# Patient Record
Sex: FEMALE | Race: WHITE | Employment: OTHER | URBAN - METROPOLITAN AREA
[De-identification: names, ages, dates, MRNs, and addresses within clinical notes are randomized per-mention and may not be internally consistent; named-entity substitution may affect disease eponyms.]

---

## 2017-09-01 ENCOUNTER — TRANSFERRED RECORDS (OUTPATIENT)
Dept: HEALTH INFORMATION MANAGEMENT | Facility: CLINIC | Age: 67
End: 2017-09-01

## 2017-10-04 DIAGNOSIS — I10 ESSENTIAL HYPERTENSION WITH GOAL BLOOD PRESSURE LESS THAN 140/90: ICD-10-CM

## 2017-10-04 DIAGNOSIS — E78.5 HYPERLIPIDEMIA WITH TARGET LDL LESS THAN 100: ICD-10-CM

## 2017-10-04 RX ORDER — ISOSORBIDE MONONITRATE 60 MG/1
TABLET, EXTENDED RELEASE ORAL
Qty: 30 TABLET | Refills: 0 | Status: SHIPPED | OUTPATIENT
Start: 2017-10-04 | End: 2017-10-26

## 2017-10-04 RX ORDER — AMLODIPINE BESYLATE 5 MG/1
TABLET ORAL
Qty: 30 TABLET | Refills: 0 | Status: SHIPPED | OUTPATIENT
Start: 2017-10-04 | End: 2017-10-26

## 2017-10-04 RX ORDER — PRAVASTATIN SODIUM 40 MG
TABLET ORAL
Qty: 30 TABLET | Refills: 0 | Status: SHIPPED | OUTPATIENT
Start: 2017-10-04 | End: 2017-10-26

## 2017-10-04 RX ORDER — RAMIPRIL 10 MG/1
CAPSULE ORAL
Qty: 30 CAPSULE | Refills: 0 | Status: SHIPPED | OUTPATIENT
Start: 2017-10-04 | End: 2017-10-26

## 2017-10-04 RX ORDER — LABETALOL 200 MG/1
TABLET, FILM COATED ORAL
Qty: 90 TABLET | Refills: 0 | Status: SHIPPED | OUTPATIENT
Start: 2017-10-04 | End: 2017-10-26

## 2017-10-04 NOTE — TELEPHONE ENCOUNTER
Labetalol     Medication is being filled for 1 time refill only due to:  Patient needs to be seen because due for a yearly office visit .    Ramipril     Medication is being filled for 1 time refill only due to:  Patient needs labs potassium & creatitine . Patient needs to be seen because due for a yearly office visit.    isosorbide mononitrate     Medication is being filled for 1 time refill only due to:  Patient needs to be seen because due for a yearly office visit.     Pravastatin    Medication is being filled for 1 time refill only due to:  Patient needs labs fasting labs. Patient needs to be seen because due for a yearly office visit.     Amlodipine    Medication is being filled for 1 time refill only due to:  Patient needs labs potassium & creatitine. Patient needs to be seen because due for a yearly office visit.

## 2017-10-04 NOTE — TELEPHONE ENCOUNTER
LABETALOL HCL 200MG TAB      Last Written Prescription Date: 10/24/2016  Last Fill Quantity: 270, # refills: 3   [place] [facility] [place] [facility] or  Health prescribing provider:  10/24/2016   Future Office Visit:    Next 5 appointments (look out 90 days)     Oct 23, 2017  2:30 PM CDT   Pre-Op physical with Patel Hughes MD   [facility]    [address] [place] [address]  [address]  [facility] [address]   [phone]                    BP Readings from Last 3 Encounters:   10/24/16 128/70   06/22/15 128/70   07/14/14 128/66     RAMIPRIL 10MG CAP      Last Written Prescription Date: 10/24/2016  Last Fill Quantity: 90, # refills: 3  Last [place] Visit [place] [facility] [place] [facility] or  Health prescribing provider: 10/24/2016  Next 5 appointments (look out 90 days)     Oct 23, 2017  2:30 PM CDT   Pre-Op physical with Patel Hughes MD   [facility] ([facility])    [address] [place] [address] [address]  [facility] [address]   [phone]                   Potassium   Date Value Ref Range Status   10/24/2016 4.1 3.4 - 5.3 mmol/L Final     Creatinine   Date Value Ref Range Status   10/24/2016 0.82 0.52 - 1.04 mg/dL Final     BP Readings from Last 3 Encounters:   10/24/16 128/70   06/22/15 128/70   07/14/14 128/66     ISOSORB MONO ER 60MG TAB      Last Written Prescription Date: 10/24/2016  Last Fill Quantity: 90,  # refills: 3   Last [place] [facility] [place] [facility] or  Health prescribing provider: 10/24/2016                                         Next 5 appointments (look out 90 days)     Oct 23, 2017  2:30 PM CDT   Pre-Op physical with Patel Hughes MD   [facility] ([facility]    [address] [place] [address]  [address]  [facility] [address]   [phone]                    PRAVASTATIN 40MG TAB     Last Written Prescription Date:  10/24/2016  Last Fill Quantity: 90, # refills: 3  [place] [facility] or  Health prescribing provider: 10/24/2016  Next 5 appointments (look out 90 days)     Oct 23, 2017  2:30 PM CDT   [facility] physical with [facility] MD   [facility]    [address]  [address]  [facility] [address] [phone]                   Lab Results   Component Value Date    CHOL 163 10/24/2016     Lab Results   Component Value Date    HDL 58 10/24/2016     Lab Results   Component Value Date    LDL 82 10/24/2016     Lab Results   Component Value Date    TRIG 116 10/24/2016     Lab Results   Component Value Date    CHOLHDLRATIO 3.0 06/22/2015     AmLODIPine Besylate 5MG TAB      Last Written Prescription Date: 10/24/2016  Last Fill Quantity: 90, # refills: 3  Last Office Visit [place] [facility] or [facility] prescribing provider: 10/24/2016  Next 5 appointments (look out 90 days)     Oct 23, 2017  2:30 PM CDT   Pre-Op physical with Patel Hughes MD   [facility]    [address]  [address]  [facility] [address] [phone]                   Potassium   Date Value Ref Range Status   10/24/2016 4.1 3.4 - 5.3 mmol/L Final     Creatinine   Date Value Ref Range Status   10/24/2016 0.82 0.52 - 1.04 mg/dL Final     BP Readings from Last 3 Encounters:   10/24/16 128/70   06/22/15 128/70   07/14/14 128/66

## 2017-10-23 ENCOUNTER — OFFICE VISIT (OUTPATIENT)
Dept: FAMILY MEDICINE | Facility: CLINIC | Age: 67
End: 2017-10-23
Payer: COMMERCIAL

## 2017-10-23 VITALS
DIASTOLIC BLOOD PRESSURE: 72 MMHG | TEMPERATURE: 97.8 F | WEIGHT: 208 LBS | SYSTOLIC BLOOD PRESSURE: 120 MMHG | OXYGEN SATURATION: 96 % | HEART RATE: 67 BPM | RESPIRATION RATE: 20 BRPM | BODY MASS INDEX: 36.86 KG/M2 | HEIGHT: 63 IN

## 2017-10-23 DIAGNOSIS — Z01.818 PREOP GENERAL PHYSICAL EXAM: Primary | ICD-10-CM

## 2017-10-23 DIAGNOSIS — I10 ESSENTIAL HYPERTENSION WITH GOAL BLOOD PRESSURE LESS THAN 140/90: ICD-10-CM

## 2017-10-23 DIAGNOSIS — G47.33 OBSTRUCTIVE SLEEP APNEA: ICD-10-CM

## 2017-10-23 DIAGNOSIS — R82.90 NONSPECIFIC FINDING ON EXAMINATION OF URINE: ICD-10-CM

## 2017-10-23 DIAGNOSIS — E78.5 HYPERLIPIDEMIA WITH TARGET LDL LESS THAN 100: ICD-10-CM

## 2017-10-23 DIAGNOSIS — S86.111D TRAUMATIC RUPTURE OF RIGHT POSTERIOR TIBIAL TENDON, SUBSEQUENT ENCOUNTER: ICD-10-CM

## 2017-10-23 DIAGNOSIS — Z11.59 NEED FOR HEPATITIS C SCREENING TEST: ICD-10-CM

## 2017-10-23 LAB
ALBUMIN UR-MCNC: 30 MG/DL
APPEARANCE UR: CLEAR
BACTERIA #/AREA URNS HPF: ABNORMAL /HPF
BASOPHILS # BLD AUTO: 0 10E9/L (ref 0–0.2)
BASOPHILS NFR BLD AUTO: 0.5 %
BILIRUB UR QL STRIP: ABNORMAL
COLOR UR AUTO: YELLOW
DIFFERENTIAL METHOD BLD: NORMAL
EOSINOPHIL # BLD AUTO: 0.1 10E9/L (ref 0–0.7)
EOSINOPHIL NFR BLD AUTO: 1.2 %
ERYTHROCYTE [DISTWIDTH] IN BLOOD BY AUTOMATED COUNT: 13.5 % (ref 10–15)
GLUCOSE UR STRIP-MCNC: NEGATIVE MG/DL
HCT VFR BLD AUTO: 40.2 % (ref 35–47)
HGB BLD-MCNC: 13.1 G/DL (ref 11.7–15.7)
HGB UR QL STRIP: NEGATIVE
KETONES UR STRIP-MCNC: ABNORMAL MG/DL
LEUKOCYTE ESTERASE UR QL STRIP: ABNORMAL
LYMPHOCYTES # BLD AUTO: 1.3 10E9/L (ref 0.8–5.3)
LYMPHOCYTES NFR BLD AUTO: 23.4 %
MCH RBC QN AUTO: 30.8 PG (ref 26.5–33)
MCHC RBC AUTO-ENTMCNC: 32.6 G/DL (ref 31.5–36.5)
MCV RBC AUTO: 95 FL (ref 78–100)
MONOCYTES # BLD AUTO: 0.6 10E9/L (ref 0–1.3)
MONOCYTES NFR BLD AUTO: 10.3 %
MUCOUS THREADS #/AREA URNS LPF: PRESENT /LPF
NEUTROPHILS # BLD AUTO: 3.6 10E9/L (ref 1.6–8.3)
NEUTROPHILS NFR BLD AUTO: 64.6 %
NITRATE UR QL: NEGATIVE
NON-SQ EPI CELLS #/AREA URNS LPF: ABNORMAL /LPF
PH UR STRIP: 6.5 PH (ref 5–7)
PLATELET # BLD AUTO: 200 10E9/L (ref 150–450)
RBC # BLD AUTO: 4.25 10E12/L (ref 3.8–5.2)
RBC #/AREA URNS AUTO: ABNORMAL /HPF
SOURCE: ABNORMAL
SP GR UR STRIP: 1.02 (ref 1–1.03)
UROBILINOGEN UR STRIP-ACNC: 0.2 EU/DL (ref 0.2–1)
WBC # BLD AUTO: 5.6 10E9/L (ref 4–11)
WBC #/AREA URNS AUTO: ABNORMAL /HPF

## 2017-10-23 PROCEDURE — 81001 URINALYSIS AUTO W/SCOPE: CPT | Performed by: INTERNAL MEDICINE

## 2017-10-23 PROCEDURE — 85025 COMPLETE CBC W/AUTO DIFF WBC: CPT | Performed by: INTERNAL MEDICINE

## 2017-10-23 PROCEDURE — 80053 COMPREHEN METABOLIC PANEL: CPT | Performed by: INTERNAL MEDICINE

## 2017-10-23 PROCEDURE — 86803 HEPATITIS C AB TEST: CPT | Performed by: INTERNAL MEDICINE

## 2017-10-23 PROCEDURE — 99214 OFFICE O/P EST MOD 30 MIN: CPT | Performed by: INTERNAL MEDICINE

## 2017-10-23 PROCEDURE — 87086 URINE CULTURE/COLONY COUNT: CPT | Performed by: INTERNAL MEDICINE

## 2017-10-23 PROCEDURE — 93000 ELECTROCARDIOGRAM COMPLETE: CPT | Performed by: INTERNAL MEDICINE

## 2017-10-23 PROCEDURE — 36415 COLL VENOUS BLD VENIPUNCTURE: CPT | Performed by: INTERNAL MEDICINE

## 2017-10-23 NOTE — MR AVS SNAPSHOT
After Visit Summary   10/23/2017    Bridget Lawson    MRN: [id number]           Patient Information     Date Of Birth          1950        Visit Information        Provider Department      10/23/2017 2:30 PM Patel Hughes MD [facility]        Today's Diagnoses     Preop general physical exam    -  1    Traumatic rupture of right posterior tibial tendon, subsequent encounter        Essential hypertension with goal blood pressure less than 140/90        Obstructive sleep apnea        Hyperlipidemia with target LDL less than 100        Need for hepatitis C screening test          Care Instructions    On the morning of surgery, take the amlodipine,labetalol,and the isosorbide.           Follow-ups after your visit        Your next 10 appointments already scheduled     Nov 01, 2017   Procedure with Xavi Duran MD   [facility] (--)    [address]  [facility] [address] [phone]              Who to contact     If you have questions or need follow up information about today's clinic visit or your schedule please contact [facility] [place] directly at [phone].  Normal or non-critical lab and imaging results will be communicated to you by MyChart, letter or phone within 4 business days after the clinic has received the results. If you do not hear from us within 7 days, please contact the clinic through MyChart or phone. If you have a critical or abnormal lab result, we will notify you by phone as soon as possible.  Submit refill requests through [employer] or call your pharmacy and they will forward the refill request to us. Please allow 3 business days for your refill to be completed.          Additional Information About Your Visit        Care EveryWhere ID     This is your Care EveryWhere ID. This could be used by other organizations to access your [place] medical records  [id number]    "     Your Vitals Were     Pulse Temperature Respirations Height Pulse Oximetry Breastfeeding?    67 97.8  F (36.6  C) 20 5' 3.25\" (1.607 m) 96% No    BMI (Body Mass Index)                   36.55 kg/m2            Blood Pressure from Last 3 Encounters:   10/23/17 120/72   10/24/16 128/70   06/22/15 128/70    Weight from Last 3 Encounters:   10/23/17 208 lb (94.3 kg)   10/24/16 241 lb (109.3 kg)   06/22/15 233 lb (105.7 kg)              We Performed the Following     CBC with platelets and differential     Comprehensive metabolic panel (BMP + Alb, Alk Phos, ALT, AST, Total. Bili, TP)     EKG 12-lead complete w/read - Clinics     Hepatitis C antibody     UA with Microscopic reflex to Culture        Primary Care Provider Office Phone # Fax #    Patel Hughes [phone] [address] [facility] [address]        Equal Access to Services     GITA HIGGINS : Hadii aad ku hadasho Soomaali, waaxda luqadaha, qaybta kaalmada adeegyada, waxay idiin hayaan yasmany kharaitalo hernandez . So [facility] [phone].    ATENCIÓN: Si habla español, tiene a freeman disposición servicios gratuitos de asistencia lingüística. Llame al [phone].    We comply with applicable federal civil rights laws and [place] laws. We do not discriminate on the basis of race, color, national origin, age, disability, sex, sexual orientation, or gender identity.            Thank you!     Thank you for choosing [facility]  for your care. Our goal is always to provide you with excellent care. Hearing back from our patients is one way we can continue to improve our services. Please take a few minutes to complete the written survey that you may receive in the mail after your visit with us. Thank you!             Your Updated Medication List - Protect others around you: Learn how to safely use, store and throw away your medicines at www.disposemymeds.org.          This list is accurate as of: 10/23/17  " 3:32 PM.  Always use your most recent med list.                   Brand Name Dispense Instructions for use Diagnosis    ADVIL 200 MG tablet   Generic drug:  ibuprofen      Take 200 mg by mouth as needed for mild pain        amLODIPine 5 MG tablet    NORVASC    30 tablet    TAKE ONE TABLET BY (5 MG) MOUTH ONCE DAILY    Hyperlipidemia with target LDL less than 100       calcium-vitamin D 600-400 MG-UNIT per tablet    CALTRATE     Take 2 tablets by mouth daily        CALTRATE 600+D 600-800 MG-UNIT Tabs   Generic drug:  Calcium Carb-Cholecalciferol      Take 2 tablets by mouth daily        CENTRUM SILVER ULTRA WOMENS PO      Take 1 tablet by mouth daily        fluticasone 50 MCG/ACT spray    FLONASE    48 g    USE ONE SPRAY(S) IN EACH NOSTRIL ONCE DAILY    Chronic rhinitis       GLUCOSAMINE-CHONDROITIN PO      Take 2 tablets by mouth daily 1500mg/1200mg        isosorbide mononitrate 60 MG 24 hr tablet    IMDUR    30 tablet    TAKE ONE TABLET (60 MG) BY MOUTH ONCE DAILY    Essential hypertension with goal blood pressure less than 140/90       ketoconazole 2 % cream    NIZORAL    60 g    APPLY  CREAM TOPICALLY  DAILY PRN    Intertrigo       labetalol 200 MG tablet    NORMODYNE    90 tablet    TAKE ONE TABLET (200 MG) BY MOUTH THREE TIMES DAILY    Essential hypertension with goal blood pressure less than 140/90       MAGNESIUM GLUCONATE PO      Patient taking 1 400mg tab daily        ORAL PEROXIDE MT      The strength on this is 1.7%, and she applies  BID-TID in orally daily        order for DME      Use your CPAP device as directed by your provider.        pravastatin 40 MG tablet    PRAVACHOL    30 tablet    TAKE ONE TABLET (40 MG) BY MOUTH ONCE DAILY    Hyperlipidemia with target LDL less than 100       ramipril 10 MG capsule    ALTACE    30 capsule    TAKE ONE CAPSULE BY MOUTH ONCE DAILY    Essential hypertension with goal blood pressure less than 140/90       TYLENOL 8 HOUR 650 MG CR tablet   Generic drug:   acetaminophen      Take 650 mg by mouth as needed.

## 2017-10-23 NOTE — PROGRESS NOTES
[address]  [address]  [facility] [address]  [phone]  Dept: [phone]    PRE-OP EVALUATION:  Today's date: 10/23/2017    Bridget Lawson (: 1950) presents for pre-operative evaluation assessment as requested by Dr. Caputo.  She requires evaluation and anesthesia risk assessment prior to undergoing surgery/procedure for treatment of right lower leg pain .  Proposed procedure:   RELEASE TENDON ANKLE     RIGHT POSTERIOR TIBIAL TENDON RECONSTRUCTION          Date of Surgery/ Procedure: 2017  Time of Surgery/ Procedure: early  Hospital/Surgical Facility: [facility]  Fax number for surgical facility:   Primary Physician: Patel Hughes  Type of Anesthesia Anticipated: to be determined    Patient has a Health Care Directive or Living Will:  YES -brought copy in today, and will scan into chart    Preop Questions 10/23/2017   1.  Do you have a history of heart attack, stroke, stent, bypass or surgery on an artery in the head, neck, heart or legs? YES -    2.  Do you ever have any pain or discomfort in your chest? No   3.  Do you have a history of  Heart Failure? No   4.   Are you troubled by shortness of breath when:  walking on a level surface, or up a slight hill, or at night? No   5.  Do you currently have a cold, bronchitis or other respiratory infection? No   6.  Do you have a cough, shortness of breath, or wheezing? No   7.  Do you sometimes get pains in the calves of your legs when you walk? YES -    8. Do you or anyone in your family have previous history of blood clots? No   9.  Do you or does anyone in your family have a serious bleeding problem such as prolonged bleeding following surgeries or cuts? No   10. Have you ever had problems with anemia or been told to take iron pills? YES -    11. Have you had any abnormal blood loss such as black, tarry or bloody stools, or abnormal vaginal bleeding? YES - remote history of gastric ulcer  while taking high doses of Excedrin    12. Have you ever had a blood transfusion? YES -    13. Have you or any of your relatives ever had problems with anesthesia? No   14. Do you have sleep apnea, excessive snoring or daytime drowsiness? YES - she uses CPAP    15. Do you have any prosthetic heart valves? No   16. Do you have prosthetic joints? No         HPI:                                                      Brief HPI related to upcoming procedure: see the ortho notes.              She injured the ankle in late April, but she did not seek medical attention for several months.                                     She reports her health otherwise has been stable.       Her meds include isosorbide mononitrate; she has no hx of CAD, but she has taken this since 2010 to help control hypertension.                                Weight is down 33 lbs via dieting!                            C/o also hand pain; uses ibuprofen,200 mg per day.                         See problem list for active medical problems.  Problems all longstanding and stable, except as noted/documented.  See ROS for pertinent symptoms related to these conditions.                                                                                                  .    MEDICAL HISTORY:                                                      Patient Active Problem List    Diagnosis Date Noted     Essential hypertension with goal blood pressure less than 140/90 10/24/2016     Priority: High     Personal history of tobacco use, presenting hazards to health 10/24/2016     Priority: High     Memory loss 06/22/2015     Priority: High     Noted by pt in 6/15; labs ok; referred to neuro. She saw Dr Silva; labs were done; no consult letter received.                       Saw neuro last year; they told her to stop taking advil PM, and there was improvement.        Obstructive sleep apnea 10/08/2012     Priority: High     Uses CPAP as of 6/2010  Problem list name  "updated by automated process. Provider to review       Primary hyperparathyroidism (H) 10/08/2012     Priority: High     DX in 2003: cured 12/2010 with parathyroid surgery       Acute gastric ulcer with hemorrhage 10/08/2012     Priority: High     2008; f/u EGD showed ulcers had healed; she avoids ASA/ NSAID'S due to this ulcer hx  Problem list name updated by automated process. Provider to review       Late effects of cerebrovascular disease 10/08/2012     Priority: High     Mild L hemiparesis and dysarthria and dysphagia; good recovery           Osteoporosis 10/08/2012     Priority: High     Problem list name updated by automated process. Provider to review       Intracranial hemorrhage (H)      Priority: High     R basal ganglia, due to HTN; had mild L hemiparesis and dysarthria; good recovery       Traumatic rupture of right posterior tibial tendon, subsequent encounter 10/23/2017     Priority: Medium     Happened in 4/17       Family history of malignant neoplasm of breast 10/24/2016     Priority: Medium     mother       Dysphagia 07/14/2014     Priority: Medium     EGD neg in 7/14; Dx= globus       Pain in left foot 02/17/2014     Priority: Medium     5/13; walking injury; \"stress fracture\" saw Dr. Judson Manjarrez; takes aleve 1 tab daily since then; still bothers her in 7/14       Arthritis of right knee 02/17/2014     Priority: Medium     Uses a cane; s/p injection       Anemia 10/08/2012     Priority: Medium     Stable at 12.7 in 6/11  Problem list name updated by automated process. Provider to review       Disorder of bursae and tendons in shoulder region 10/08/2012     Priority: Medium     Problem list name updated by automated process. Provider to review       Hyperlipidemia with target LDL less than 100 10/08/2012     Priority: Medium     Start prava in 7/14  Diagnosis updated by automated process. Provider to review and confirm.       Osteopenia      Priority: Medium     in 6/10, prior to PTH surgery, T - " 3.2 forearm, - 1.9 spine, - 1.5L and -1.6 R femoral necks       Preventive measure 10/08/2012     Priority: Low     APRIMA DATA BASE UNDER THE 9/26/13 NOTE  Mammogram fall 2011 at [facility]; 1/13, 6/15 at [facility], 11/16                                                    s/p GREGORY/BSO;                                             colonoscopy 3/08 neg per pt          Past Medical History:   Diagnosis Date     Hyperlipidemia LDL goal < 100 10/8/2012    Start prava in 7/14     Intracranial hemorrhage (H) 2/10    R basal ganglia, due to HTN; had mild L hemiparesis and dysarthria; good recovery     Osteopenia     in 6/10, prior to PTH surgery, T - 3.2 forearm, - 1.9 spine, - 1.5L and -1.6 R femoral necks     Unspecified essential hypertension     Essential hypertension     Past Surgical History:   Procedure Laterality Date     BREAST SURGERY  11/2004    R breast bx     C/SECTION, CLASSICAL  1985    with tubal     HYSTERECTOMY  5/1998    GREGORY w/ BSO     KNEE SURGERY  9/1998    L Arthroscopy     NECK SURGERY  12/2010    R parathyroid adenoma     SHOULDER SURGERY  11/2008    rotator cuff repair R     TONSILLECTOMY      as a child T & A     Current Outpatient Prescriptions   Medication Sig Dispense Refill     labetalol (NORMODYNE) 200 MG tablet TAKE ONE TABLET (200 MG) BY MOUTH THREE TIMES DAILY 90 tablet 0     ramipril (ALTACE) 10 MG capsule TAKE ONE CAPSULE BY MOUTH ONCE DAILY 30 capsule 0     isosorbide mononitrate (IMDUR) 60 MG 24 hr tablet TAKE ONE TABLET (60 MG) BY MOUTH ONCE DAILY 30 tablet 0     pravastatin (PRAVACHOL) 40 MG tablet TAKE ONE TABLET (40 MG) BY MOUTH ONCE DAILY 30 tablet 0     amLODIPine (NORVASC) 5 MG tablet TAKE ONE TABLET BY (5 MG) MOUTH ONCE DAILY 30 tablet 0     calcium-vitamin D (CALTRATE) 600-400 MG-UNIT per tablet Take 2 tablets by mouth daily       ibuprofen (ADVIL) 200 MG tablet Take 200 mg by mouth as needed for mild pain       MAGNESIUM GLUCONATE PO Patient taking 1 400mg tab daily       Carbamide  Peroxide (ORAL PEROXIDE MT) The strength on this is 1.7%, and she applies  BID-TID in orally daily       fluticasone (FLONASE) 50 MCG/ACT nasal spray USE ONE SPRAY(S) IN EACH NOSTRIL ONCE DAILY 48 g 11     ketoconazole (NIZORAL) 2 % cream APPLY  CREAM TOPICALLY  DAILY PRN 60 g 3     GLUCOSAMINE-CHONDROITIN PO Take 2 tablets by mouth daily 1500mg/1200mg       Calcium Carb-Cholecalciferol (CALTRATE 600+D) 600-800 MG-UNIT TABS Take 2 tablets by mouth daily       Multiple Vitamins-Minerals (CENTRUM SILVER ULTRA WOMENS PO) Take 1 tablet by mouth daily       ORDER FOR DME Use your CPAP device as directed by your provider.       acetaminophen (TYLENOL 8 HOUR) 650 MG CR tablet Take 650 mg by mouth as needed.       OTC products: None, except as noted above    Allergies   Allergen Reactions     Animal Dander       Latex Allergy: NO    Social History   Substance Use Topics     Smoking status: Former Smoker     Packs/day: 2.00     Years: 27.00     Types: Cigarettes     Start date: 10/24/1969     Quit date: 9/1/1996     Smokeless tobacco: Never Used     Alcohol use 0.0 oz/week     0 Standard drinks or equivalent per week      Comment: occasionally     History   Drug Use No       REVIEW OF SYSTEMS:                                                    CONSTITUTIONAL:NEGATIVE  for chills and fever   I: NEGATIVE for worrisome rashes, moles or lesions  E: NEGATIVE for vision changes or irritation  E/M: NEGATIVE for ear, mouth and throat problems  R: NEGATIVE for significant cough or SOB  CV: NEGATIVE for chest pain, palpitations or peripheral edema  GI: NEGATIVE for nausea, abdominal pain, heartburn, or change in bowel habits  : NEGATIVE for frequency, dysuria, or hematuria  N: NEGATIVE for weakness, dizziness or paresthesias  E: NEGATIVE for temperature intolerance, skin/hair changes  H: NEGATIVE for bleeding problems  P: NEGATIVE for changes in mood or affect    EXAM:                                                    /72 (BP  "Location: Left arm, Patient Position: Chair, Cuff Size: Adult Large)  Pulse 67  Temp 97.8  F (36.6  C)  Resp 20  Ht 5' 3.25\" (1.607 m)  Wt 208 lb (94.3 kg)  SpO2 96%  Breastfeeding? No  BMI 36.55 kg/m2    GENERAL APPEARANCE: alert, no distress and over weight     EYES: Eyes grossly normal to inspection     HENT: nose and mouth without ulcers or lesions     NECK: no adenopathy     RESP: lungs clear to auscultation - no rales, rhonchi or wheezes     CV: regular rates and rhythm, normal S1 S2, no S3 or S4 and no murmur, click or rub     ABDOMEN: soft, nontender, without hepatosplenomegaly or masses     MS: She is wearing a boot on the right foot     NEURO: mentation intact and speech normal     PSYCH: mentation appears normal. and affect normal/bright     LYMPHATICS: normal ant/post cervical, supraclavicular nodes    DIAGNOSTICS:                                                      EKG: sinus bradycardia, rate 59 bpm, normal axis, normal intervals, no acute ST/T changes c/w ischemia, no LVH by voltage criteria  Labs Resulted Today:          She has no urinary symptoms, however a urinalysis was requested by the orthopedic office.           Results for orders placed or performed in visit on 10/23/17   Comprehensive metabolic panel (BMP + Alb, Alk Phos, ALT, AST, Total. Bili, TP)   Result Value Ref Range    Sodium 138 133 - 144 mmol/L    Potassium 4.2 3.4 - 5.3 mmol/L    Chloride 103 94 - 109 mmol/L    Carbon Dioxide 28 20 - 32 mmol/L    Anion Gap 7 3 - 14 mmol/L    Glucose 87 70 - 99 mg/dL    Urea Nitrogen 13 7 - 30 mg/dL    Creatinine 0.75 0.52 - 1.04 mg/dL    GFR Estimate 77 >60 mL/min/1.7m2    GFR Estimate If Black >90 >60 mL/min/1.7m2    Calcium 9.8 8.5 - 10.1 mg/dL    Bilirubin Total 0.4 0.2 - 1.3 mg/dL    Albumin 4.2 3.4 - 5.0 g/dL    Protein Total 7.3 6.8 - 8.8 g/dL    Alkaline Phosphatase 60 40 - 150 U/L    ALT 27 0 - 50 U/L    AST 13 0 - 45 U/L   CBC with platelets and differential   Result Value Ref " Range    WBC 5.6 4.0 - 11.0 10e9/L    RBC Count 4.25 3.8 - 5.2 10e12/L    Hemoglobin 13.1 11.7 - 15.7 g/dL    Hematocrit 40.2 35.0 - 47.0 %    MCV 95 78 - 100 fl    MCH 30.8 26.5 - 33.0 pg    MCHC 32.6 31.5 - 36.5 g/dL    RDW 13.5 10.0 - 15.0 %    Platelet Count 200 150 - 450 10e9/L    Diff Method Automated Method     % Neutrophils 64.6 %    % Lymphocytes 23.4 %    % Monocytes 10.3 %    % Eosinophils 1.2 %    % Basophils 0.5 %    Absolute Neutrophil 3.6 1.6 - 8.3 10e9/L    Absolute Lymphocytes 1.3 0.8 - 5.3 10e9/L    Absolute Monocytes 0.6 0.0 - 1.3 10e9/L    Absolute Eosinophils 0.1 0.0 - 0.7 10e9/L    Absolute Basophils 0.0 0.0 - 0.2 10e9/L   Hepatitis C antibody   Result Value Ref Range    Hepatitis C Antibody Nonreactive NR^Nonreactive   UA with Microscopic reflex to Culture   Result Value Ref Range    Color Urine Yellow     Appearance Urine Clear     Glucose Urine Negative NEG^Negative mg/dL    Bilirubin Urine Small (A) NEG^Negative    Ketones Urine Trace (A) NEG^Negative mg/dL    Specific Gravity Urine 1.020 1.003 - 1.035    pH Urine 6.5 5.0 - 7.0 pH    Protein Albumin Urine 30 (A) NEG^Negative mg/dL    Urobilinogen Urine 0.2 0.2 - 1.0 EU/dL    Nitrite Urine Negative NEG^Negative    Blood Urine Negative NEG^Negative    Leukocyte Esterase Urine Small (A) NEG^Negative    Source Midstream Urine     WBC Urine 10-25 (A) OTO2^O - 2 /HPF    RBC Urine O - 2 OTO2^O - 2 /HPF    Squamous Epithelial /LPF Urine Few FEW^Few /LPF    Bacteria Urine Few (A) NEG^Negative /HPF    Mucous Urine Present (A) NEG^Negative /LPF   Urine Culture Aerobic Bacterial   Result Value Ref Range    Specimen Description      Culture Micro       <10,000 colonies/mL  urogenital karthikeyan  Susceptibility testing not routinely done            IMPRESSION:                                                    Reason for surgery/procedure: need for orthopedic surgery  Diagnosis/reason for consult: need for pre-op evaluation    The proposed surgical procedure  is considered INTERMEDIATE risk.    REVISED CARDIAC RISK INDEX  The patient has the following serious cardiovascular risks for perioperative complications:  Cerebrovascular Disease (TIA or CVA)  INTERPRETATION: 1 risks: Class II (low risk - 0.9% complication rate)    The patient has the following additional risks for perioperative complications:  JOHNY: she uses CPAP.                     She is at risk for post-op DVT; she wants post-op DVT prophylaxis.       ICD-10-CM    1. Preop general physical exam Z01.818 Comprehensive metabolic panel (BMP + Alb, Alk Phos, ALT, AST, Total. Bili, TP)     CBC with platelets and differential     EKG 12-lead complete w/read - Clinics     UA with Microscopic reflex to Culture   2. Traumatic rupture of right posterior tibial tendon, subsequent encounter S86.111D Comprehensive metabolic panel (BMP + Alb, Alk Phos, ALT, AST, Total. Bili, TP)     CBC with platelets and differential     EKG 12-lead complete w/read - Clinics     UA with Microscopic reflex to Culture   3. Essential hypertension with goal blood pressure less than 140/90 I10 Comprehensive metabolic panel (BMP + Alb, Alk Phos, ALT, AST, Total. Bili, TP)     EKG 12-lead complete w/read - Clinics     UA with Microscopic reflex to Culture   4. Obstructive sleep apnea G47.33 EKG 12-lead complete w/read - Clinics   5. Hyperlipidemia with target LDL less than 100 E78.5    6. Need for hepatitis C screening test Z11.59 Hepatitis C antibody   7. Nonspecific finding on examination of urine R82.90 Urine Culture Aerobic Bacterial       RECOMMENDATIONS:                                                      --Consult hospital rounder / IM to assist post-op medical management    Obstructive Sleep Apnea (or suspected sleep apnea)  Patient is to bring their home CPAP with them on the day of surgery        Her surgery is early; the only meds that she normally takes early are labetalol,amlodipine,and isosorbide mononitrate.         Patient  Instructions   On the morning of surgery, take the amlodipine,labetalol,and the isosorbide.                She also is requesting postoperative DVT prophylaxis.    APPROVAL GIVEN to proceed with proposed procedure, without further diagnostic evaluation.                                         She does not have a urinary tract infection.                                        Signed Electronically by: Patel Hughes MD    Copy of this evaluation report is provided to requesting physician.    [facility] Preop Guidelines

## 2017-10-23 NOTE — NURSING NOTE
"Chief Complaint   Patient presents with     Pre-Op Exam       Initial /72 (BP Location: Left arm, Patient Position: Chair, Cuff Size: Adult Large)  Pulse 67  Temp 97.8  F (36.6  C)  Resp 20  Ht 5' 3.25\" (1.607 m)  Wt 208 lb (94.3 kg)  SpO2 96%  Breastfeeding? No  BMI 36.55 kg/m2 Estimated body mass index is 36.55 kg/(m^2) as calculated from the following:    Height as of this encounter: 5' 3.25\" (1.607 m).    Weight as of this encounter: 208 lb (94.3 kg).  Medication Reconciliation: complete   Adrienne Ruano LPN  "

## 2017-10-24 LAB
ALBUMIN SERPL-MCNC: 4.2 G/DL (ref 3.4–5)
ALP SERPL-CCNC: 60 U/L (ref 40–150)
ALT SERPL W P-5'-P-CCNC: 27 U/L (ref 0–50)
ANION GAP SERPL CALCULATED.3IONS-SCNC: 7 MMOL/L (ref 3–14)
AST SERPL W P-5'-P-CCNC: 13 U/L (ref 0–45)
BILIRUB SERPL-MCNC: 0.4 MG/DL (ref 0.2–1.3)
BUN SERPL-MCNC: 13 MG/DL (ref 7–30)
CALCIUM SERPL-MCNC: 9.8 MG/DL (ref 8.5–10.1)
CHLORIDE SERPL-SCNC: 103 MMOL/L (ref 94–109)
CO2 SERPL-SCNC: 28 MMOL/L (ref 20–32)
CREAT SERPL-MCNC: 0.75 MG/DL (ref 0.52–1.04)
GFR SERPL CREATININE-BSD FRML MDRD: 77 ML/MIN/1.7M2
GLUCOSE SERPL-MCNC: 87 MG/DL (ref 70–99)
HCV AB SERPL QL IA: NONREACTIVE
POTASSIUM SERPL-SCNC: 4.2 MMOL/L (ref 3.4–5.3)
PROT SERPL-MCNC: 7.3 G/DL (ref 6.8–8.8)
SODIUM SERPL-SCNC: 138 MMOL/L (ref 133–144)

## 2017-10-26 LAB
BACTERIA SPEC CULT: NORMAL
SPECIMEN SOURCE: NORMAL

## 2017-10-26 RX ORDER — KETOCONAZOLE 20 MG/G
CREAM TOPICAL 2 TIMES DAILY PRN
COMMUNITY
End: 2019-06-18

## 2017-10-26 RX ORDER — FLUTICASONE PROPIONATE 50 MCG
1 SPRAY, SUSPENSION (ML) NASAL DAILY
COMMUNITY

## 2017-10-30 DIAGNOSIS — R09.82 PND (POST-NASAL DRIP): Primary | ICD-10-CM

## 2017-10-30 DIAGNOSIS — J31.0 CHRONIC RHINITIS: ICD-10-CM

## 2017-10-30 NOTE — H&P (VIEW-ONLY)
[address]  [address]  [facility] [address]  [phone]  Dept: [phone]    PRE-OP EVALUATION:  Today's date: 10/23/2017    Bridget Lawson (: 1950) presents for pre-operative evaluation assessment as requested by Dr. Caputo.  She requires evaluation and anesthesia risk assessment prior to undergoing surgery/procedure for treatment of right lower leg pain .  Proposed procedure:   RELEASE TENDON ANKLE     RIGHT POSTERIOR TIBIAL TENDON RECONSTRUCTION          Date of Surgery/ Procedure: 2017  Time of Surgery/ Procedure: early  Hospital/Surgical Facility: [facility]  Fax number for surgical facility:   Primary Physician: Patel Hughes  Type of Anesthesia Anticipated: to be determined    Patient has a Health Care Directive or Living Will:  YES -brought copy in today, and will scan into chart    Preop Questions 10/23/2017   1.  Do you have a history of heart attack, stroke, stent, bypass or surgery on an artery in the head, neck, heart or legs? YES -    2.  Do you ever have any pain or discomfort in your chest? No   3.  Do you have a history of  Heart Failure? No   4.   Are you troubled by shortness of breath when:  walking on a level surface, or up a slight hill, or at night? No   5.  Do you currently have a cold, bronchitis or other respiratory infection? No   6.  Do you have a cough, shortness of breath, or wheezing? No   7.  Do you sometimes get pains in the calves of your legs when you walk? YES -    8. Do you or anyone in your family have previous history of blood clots? No   9.  Do you or does anyone in your family have a serious bleeding problem such as prolonged bleeding following surgeries or cuts? No   10. Have you ever had problems with anemia or been told to take iron pills? YES -    11. Have you had any abnormal blood loss such as black, tarry or bloody stools, or abnormal vaginal bleeding? YES - remote history of gastric ulcer  while taking high doses of Excedrin    12. Have you ever had a blood transfusion? YES -    13. Have you or any of your relatives ever had problems with anesthesia? No   14. Do you have sleep apnea, excessive snoring or daytime drowsiness? YES - she uses CPAP    15. Do you have any prosthetic heart valves? No   16. Do you have prosthetic joints? No         HPI:                                                      Brief HPI related to upcoming procedure: see the ortho notes.              She injured the ankle in late April, but she did not seek medical attention for several months.                                     She reports her health otherwise has been stable.       Her meds include isosorbide mononitrate; she has no hx of CAD, but she has taken this since 2010 to help control hypertension.                                Weight is down 33 lbs via dieting!                            C/o also hand pain; uses ibuprofen,200 mg per day.                         See problem list for active medical problems.  Problems all longstanding and stable, except as noted/documented.  See ROS for pertinent symptoms related to these conditions.                                                                                                  .    MEDICAL HISTORY:                                                      Patient Active Problem List    Diagnosis Date Noted     Essential hypertension with goal blood pressure less than 140/90 10/24/2016     Priority: High     Personal history of tobacco use, presenting hazards to health 10/24/2016     Priority: High     Memory loss 06/22/2015     Priority: High     Noted by pt in 6/15; labs ok; referred to neuro. She saw Dr Silva; labs were done; no consult letter received.                       Saw neuro last year; they told her to stop taking advil PM, and there was improvement.        Obstructive sleep apnea 10/08/2012     Priority: High     Uses CPAP as of 6/2010  Problem list name  "updated by automated process. Provider to review       Primary hyperparathyroidism (H) 10/08/2012     Priority: High     DX in 2003: cured 12/2010 with parathyroid surgery       Acute gastric ulcer with hemorrhage 10/08/2012     Priority: High     2008; f/u EGD showed ulcers had healed; she avoids ASA/ NSAID'S due to this ulcer hx  Problem list name updated by automated process. Provider to review       Late effects of cerebrovascular disease 10/08/2012     Priority: High     Mild L hemiparesis and dysarthria and dysphagia; good recovery           Osteoporosis 10/08/2012     Priority: High     Problem list name updated by automated process. Provider to review       Intracranial hemorrhage (H)      Priority: High     R basal ganglia, due to HTN; had mild L hemiparesis and dysarthria; good recovery       Traumatic rupture of right posterior tibial tendon, subsequent encounter 10/23/2017     Priority: Medium     Happened in 4/17       Family history of malignant neoplasm of breast 10/24/2016     Priority: Medium     mother       Dysphagia 07/14/2014     Priority: Medium     EGD neg in 7/14; Dx= globus       Pain in left foot 02/17/2014     Priority: Medium     5/13; walking injury; \"stress fracture\" saw Dr. Judson Manjarrez; takes aleve 1 tab daily since then; still bothers her in 7/14       Arthritis of right knee 02/17/2014     Priority: Medium     Uses a cane; s/p injection       Anemia 10/08/2012     Priority: Medium     Stable at 12.7 in 6/11  Problem list name updated by automated process. Provider to review       Disorder of bursae and tendons in shoulder region 10/08/2012     Priority: Medium     Problem list name updated by automated process. Provider to review       Hyperlipidemia with target LDL less than 100 10/08/2012     Priority: Medium     Start prava in 7/14  Diagnosis updated by automated process. Provider to review and confirm.       Osteopenia      Priority: Medium     in 6/10, prior to PTH surgery, T - " 3.2 forearm, - 1.9 spine, - 1.5L and -1.6 R femoral necks       Preventive measure 10/08/2012     Priority: Low     APRIMA DATA BASE UNDER THE 9/26/13 NOTE  Mammogram fall 2011 at [facility]; 1/13, 6/15 at [facility], 11/16                                                    s/p GREGORY/BSO;                                             colonoscopy 3/08 neg per pt          Past Medical History:   Diagnosis Date     Hyperlipidemia LDL goal < 100 10/8/2012    Start prava in 7/14     Intracranial hemorrhage (H) 2/10    R basal ganglia, due to HTN; had mild L hemiparesis and dysarthria; good recovery     Osteopenia     in 6/10, prior to PTH surgery, T - 3.2 forearm, - 1.9 spine, - 1.5L and -1.6 R femoral necks     Unspecified essential hypertension     Essential hypertension     Past Surgical History:   Procedure Laterality Date     BREAST SURGERY  11/2004    R breast bx     C/SECTION, CLASSICAL  1985    with tubal     HYSTERECTOMY  5/1998    GREGORY w/ BSO     KNEE SURGERY  9/1998    L Arthroscopy     NECK SURGERY  12/2010    R parathyroid adenoma     SHOULDER SURGERY  11/2008    rotator cuff repair R     TONSILLECTOMY      as a child T & A     Current Outpatient Prescriptions   Medication Sig Dispense Refill     labetalol (NORMODYNE) 200 MG tablet TAKE ONE TABLET (200 MG) BY MOUTH THREE TIMES DAILY 90 tablet 0     ramipril (ALTACE) 10 MG capsule TAKE ONE CAPSULE BY MOUTH ONCE DAILY 30 capsule 0     isosorbide mononitrate (IMDUR) 60 MG 24 hr tablet TAKE ONE TABLET (60 MG) BY MOUTH ONCE DAILY 30 tablet 0     pravastatin (PRAVACHOL) 40 MG tablet TAKE ONE TABLET (40 MG) BY MOUTH ONCE DAILY 30 tablet 0     amLODIPine (NORVASC) 5 MG tablet TAKE ONE TABLET BY (5 MG) MOUTH ONCE DAILY 30 tablet 0     calcium-vitamin D (CALTRATE) 600-400 MG-UNIT per tablet Take 2 tablets by mouth daily       ibuprofen (ADVIL) 200 MG tablet Take 200 mg by mouth as needed for mild pain       MAGNESIUM GLUCONATE PO Patient taking 1 400mg tab daily       Carbamide  Peroxide (ORAL PEROXIDE MT) The strength on this is 1.7%, and she applies  BID-TID in orally daily       fluticasone (FLONASE) 50 MCG/ACT nasal spray USE ONE SPRAY(S) IN EACH NOSTRIL ONCE DAILY 48 g 11     ketoconazole (NIZORAL) 2 % cream APPLY  CREAM TOPICALLY  DAILY PRN 60 g 3     GLUCOSAMINE-CHONDROITIN PO Take 2 tablets by mouth daily 1500mg/1200mg       Calcium Carb-Cholecalciferol (CALTRATE 600+D) 600-800 MG-UNIT TABS Take 2 tablets by mouth daily       Multiple Vitamins-Minerals (CENTRUM SILVER ULTRA WOMENS PO) Take 1 tablet by mouth daily       ORDER FOR DME Use your CPAP device as directed by your provider.       acetaminophen (TYLENOL 8 HOUR) 650 MG CR tablet Take 650 mg by mouth as needed.       OTC products: None, except as noted above    Allergies   Allergen Reactions     Animal Dander       Latex Allergy: NO    Social History   Substance Use Topics     Smoking status: Former Smoker     Packs/day: 2.00     Years: 27.00     Types: Cigarettes     Start date: 10/24/1969     Quit date: 9/1/1996     Smokeless tobacco: Never Used     Alcohol use 0.0 oz/week     0 Standard drinks or equivalent per week      Comment: occasionally     History   Drug Use No       REVIEW OF SYSTEMS:                                                    CONSTITUTIONAL:NEGATIVE  for chills and fever   I: NEGATIVE for worrisome rashes, moles or lesions  E: NEGATIVE for vision changes or irritation  E/M: NEGATIVE for ear, mouth and throat problems  R: NEGATIVE for significant cough or SOB  CV: NEGATIVE for chest pain, palpitations or peripheral edema  GI: NEGATIVE for nausea, abdominal pain, heartburn, or change in bowel habits  : NEGATIVE for frequency, dysuria, or hematuria  N: NEGATIVE for weakness, dizziness or paresthesias  E: NEGATIVE for temperature intolerance, skin/hair changes  H: NEGATIVE for bleeding problems  P: NEGATIVE for changes in mood or affect    EXAM:                                                    /72 (BP  "Location: Left arm, Patient Position: Chair, Cuff Size: Adult Large)  Pulse 67  Temp 97.8  F (36.6  C)  Resp 20  Ht 5' 3.25\" (1.607 m)  Wt 208 lb (94.3 kg)  SpO2 96%  Breastfeeding? No  BMI 36.55 kg/m2    GENERAL APPEARANCE: alert, no distress and over weight     EYES: Eyes grossly normal to inspection     HENT: nose and mouth without ulcers or lesions     NECK: no adenopathy     RESP: lungs clear to auscultation - no rales, rhonchi or wheezes     CV: regular rates and rhythm, normal S1 S2, no S3 or S4 and no murmur, click or rub     ABDOMEN: soft, nontender, without hepatosplenomegaly or masses     MS: She is wearing a boot on the right foot     NEURO: mentation intact and speech normal     PSYCH: mentation appears normal. and affect normal/bright     LYMPHATICS: normal ant/post cervical, supraclavicular nodes    DIAGNOSTICS:                                                      EKG: sinus bradycardia, rate 59 bpm, normal axis, normal intervals, no acute ST/T changes c/w ischemia, no LVH by voltage criteria  Labs Resulted Today:          She has no urinary symptoms, however a urinalysis was requested by the orthopedic office.           Results for orders placed or performed in visit on 10/23/17   Comprehensive metabolic panel (BMP + Alb, Alk Phos, ALT, AST, Total. Bili, TP)   Result Value Ref Range    Sodium 138 133 - 144 mmol/L    Potassium 4.2 3.4 - 5.3 mmol/L    Chloride 103 94 - 109 mmol/L    Carbon Dioxide 28 20 - 32 mmol/L    Anion Gap 7 3 - 14 mmol/L    Glucose 87 70 - 99 mg/dL    Urea Nitrogen 13 7 - 30 mg/dL    Creatinine 0.75 0.52 - 1.04 mg/dL    GFR Estimate 77 >60 mL/min/1.7m2    GFR Estimate If Black >90 >60 mL/min/1.7m2    Calcium 9.8 8.5 - 10.1 mg/dL    Bilirubin Total 0.4 0.2 - 1.3 mg/dL    Albumin 4.2 3.4 - 5.0 g/dL    Protein Total 7.3 6.8 - 8.8 g/dL    Alkaline Phosphatase 60 40 - 150 U/L    ALT 27 0 - 50 U/L    AST 13 0 - 45 U/L   CBC with platelets and differential   Result Value Ref " Range    WBC 5.6 4.0 - 11.0 10e9/L    RBC Count 4.25 3.8 - 5.2 10e12/L    Hemoglobin 13.1 11.7 - 15.7 g/dL    Hematocrit 40.2 35.0 - 47.0 %    MCV 95 78 - 100 fl    MCH 30.8 26.5 - 33.0 pg    MCHC 32.6 31.5 - 36.5 g/dL    RDW 13.5 10.0 - 15.0 %    Platelet Count 200 150 - 450 10e9/L    Diff Method Automated Method     % Neutrophils 64.6 %    % Lymphocytes 23.4 %    % Monocytes 10.3 %    % Eosinophils 1.2 %    % Basophils 0.5 %    Absolute Neutrophil 3.6 1.6 - 8.3 10e9/L    Absolute Lymphocytes 1.3 0.8 - 5.3 10e9/L    Absolute Monocytes 0.6 0.0 - 1.3 10e9/L    Absolute Eosinophils 0.1 0.0 - 0.7 10e9/L    Absolute Basophils 0.0 0.0 - 0.2 10e9/L   Hepatitis C antibody   Result Value Ref Range    Hepatitis C Antibody Nonreactive NR^Nonreactive   UA with Microscopic reflex to Culture   Result Value Ref Range    Color Urine Yellow     Appearance Urine Clear     Glucose Urine Negative NEG^Negative mg/dL    Bilirubin Urine Small (A) NEG^Negative    Ketones Urine Trace (A) NEG^Negative mg/dL    Specific Gravity Urine 1.020 1.003 - 1.035    pH Urine 6.5 5.0 - 7.0 pH    Protein Albumin Urine 30 (A) NEG^Negative mg/dL    Urobilinogen Urine 0.2 0.2 - 1.0 EU/dL    Nitrite Urine Negative NEG^Negative    Blood Urine Negative NEG^Negative    Leukocyte Esterase Urine Small (A) NEG^Negative    Source Midstream Urine     WBC Urine 10-25 (A) OTO2^O - 2 /HPF    RBC Urine O - 2 OTO2^O - 2 /HPF    Squamous Epithelial /LPF Urine Few FEW^Few /LPF    Bacteria Urine Few (A) NEG^Negative /HPF    Mucous Urine Present (A) NEG^Negative /LPF   Urine Culture Aerobic Bacterial   Result Value Ref Range    Specimen Description      Culture Micro       <10,000 colonies/mL  urogenital karthikeyan  Susceptibility testing not routinely done            IMPRESSION:                                                    Reason for surgery/procedure: need for orthopedic surgery  Diagnosis/reason for consult: need for pre-op evaluation    The proposed surgical procedure  is considered INTERMEDIATE risk.    REVISED CARDIAC RISK INDEX  The patient has the following serious cardiovascular risks for perioperative complications:  Cerebrovascular Disease (TIA or CVA)  INTERPRETATION: 1 risks: Class II (low risk - 0.9% complication rate)    The patient has the following additional risks for perioperative complications:  JOHNY: she uses CPAP.                     She is at risk for post-op DVT; she wants post-op DVT prophylaxis.       ICD-10-CM    1. Preop general physical exam Z01.818 Comprehensive metabolic panel (BMP + Alb, Alk Phos, ALT, AST, Total. Bili, TP)     CBC with platelets and differential     EKG 12-lead complete w/read - Clinics     UA with Microscopic reflex to Culture   2. Traumatic rupture of right posterior tibial tendon, subsequent encounter S86.111D Comprehensive metabolic panel (BMP + Alb, Alk Phos, ALT, AST, Total. Bili, TP)     CBC with platelets and differential     EKG 12-lead complete w/read - Clinics     UA with Microscopic reflex to Culture   3. Essential hypertension with goal blood pressure less than 140/90 I10 Comprehensive metabolic panel (BMP + Alb, Alk Phos, ALT, AST, Total. Bili, TP)     EKG 12-lead complete w/read - Clinics     UA with Microscopic reflex to Culture   4. Obstructive sleep apnea G47.33 EKG 12-lead complete w/read - Clinics   5. Hyperlipidemia with target LDL less than 100 E78.5    6. Need for hepatitis C screening test Z11.59 Hepatitis C antibody   7. Nonspecific finding on examination of urine R82.90 Urine Culture Aerobic Bacterial       RECOMMENDATIONS:                                                      --Consult hospital rounder / IM to assist post-op medical management    Obstructive Sleep Apnea (or suspected sleep apnea)  Patient is to bring their home CPAP with them on the day of surgery        Her surgery is early; the only meds that she normally takes early are labetalol,amlodipine,and isosorbide mononitrate.         Patient  Instructions   On the morning of surgery, take the amlodipine,labetalol,and the isosorbide.                She also is requesting postoperative DVT prophylaxis.    APPROVAL GIVEN to proceed with proposed procedure, without further diagnostic evaluation.                                         She does not have a urinary tract infection.                                        Signed Electronically by: Patel Hughes MD    Copy of this evaluation report is provided to requesting physician.    [place] Preop Guidelines

## 2017-10-31 PROBLEM — R09.82 PND (POST-NASAL DRIP): Status: ACTIVE | Noted: 2017-10-31

## 2017-10-31 RX ORDER — FLUTICASONE PROPIONATE 50 MCG
SPRAY, SUSPENSION (ML) NASAL
Qty: 1 BOTTLE | Refills: 11 | Status: ON HOLD | OUTPATIENT
Start: 2017-10-31 | End: 2017-11-01

## 2017-10-31 NOTE — TELEPHONE ENCOUNTER
Routing refill request to provider for review/approval because:  Medication is reported/historical  Last OV 10-23-17

## 2017-10-31 NOTE — TELEPHONE ENCOUNTER
fluticasone (FLONASE) 50 MCG/ACT spray - Historical      Last Written Prescription Date:  NA  Last Fill Quantity: NA,   # refills: NA  Future Office visit:     Last office visit 10/23/17    Routing refill request to provider for review/approval because:  Drug not on the [facility], [facility] or [facility] refill protocol or controlled substance

## 2017-11-01 ENCOUNTER — SURGERY (OUTPATIENT)
Age: 67
End: 2017-11-01

## 2017-11-01 ENCOUNTER — ANESTHESIA (OUTPATIENT)
Dept: SURGERY | Facility: CLINIC | Age: 67
End: 2017-11-01
Payer: MEDICARE

## 2017-11-01 ENCOUNTER — HOSPITAL ENCOUNTER (OUTPATIENT)
Facility: CLINIC | Age: 67
Discharge: HOME | End: 2017-11-02
Attending: ORTHOPAEDIC SURGERY | Admitting: ORTHOPAEDIC SURGERY
Payer: MEDICARE

## 2017-11-01 ENCOUNTER — ANESTHESIA EVENT (OUTPATIENT)
Dept: SURGERY | Facility: CLINIC | Age: 67
End: 2017-11-01
Payer: MEDICARE

## 2017-11-01 DIAGNOSIS — Z98.890 STATUS POST TENDON REPAIR: Primary | ICD-10-CM

## 2017-11-01 PROBLEM — S86.119A TIBIALIS POSTERIOR TENDON RUPTURE: Status: ACTIVE | Noted: 2017-11-01

## 2017-11-01 PROCEDURE — A9270 NON-COVERED ITEM OR SERVICE: HCPCS | Mod: GY | Performed by: ORTHOPAEDIC SURGERY

## 2017-11-01 PROCEDURE — 27210995 ZZH RX 272: Performed by: ORTHOPAEDIC SURGERY

## 2017-11-01 PROCEDURE — 25000125 ZZHC RX 250: Performed by: ORTHOPAEDIC SURGERY

## 2017-11-01 PROCEDURE — 37000008 ZZH ANESTHESIA TECHNICAL FEE, 1ST 30 MIN: Performed by: ORTHOPAEDIC SURGERY

## 2017-11-01 PROCEDURE — 40000170 ZZH STATISTIC PRE-PROCEDURE ASSESSMENT II: Performed by: ORTHOPAEDIC SURGERY

## 2017-11-01 PROCEDURE — 71000012 ZZH RECOVERY PHASE 1 LEVEL 1 FIRST HR: Performed by: ORTHOPAEDIC SURGERY

## 2017-11-01 PROCEDURE — 36000056 ZZH SURGERY LEVEL 3 1ST 30 MIN: Performed by: ORTHOPAEDIC SURGERY

## 2017-11-01 PROCEDURE — 25000128 H RX IP 250 OP 636: Performed by: ORTHOPAEDIC SURGERY

## 2017-11-01 PROCEDURE — 40000936 ZZH STATISTIC OUTPATIENT (NON-OBS) NIGHT

## 2017-11-01 PROCEDURE — 25000132 ZZH RX MED GY IP 250 OP 250 PS 637: Mod: GY | Performed by: ORTHOPAEDIC SURGERY

## 2017-11-01 PROCEDURE — 71000013 ZZH RECOVERY PHASE 1 LEVEL 1 EA ADDTL HR: Performed by: ORTHOPAEDIC SURGERY

## 2017-11-01 PROCEDURE — 27210794 ZZH OR GENERAL SUPPLY STERILE: Performed by: ORTHOPAEDIC SURGERY

## 2017-11-01 PROCEDURE — 37000009 ZZH ANESTHESIA TECHNICAL FEE, EACH ADDTL 15 MIN: Performed by: ORTHOPAEDIC SURGERY

## 2017-11-01 PROCEDURE — 27110028 ZZH OR GENERAL SUPPLY NON-STERILE: Performed by: ORTHOPAEDIC SURGERY

## 2017-11-01 PROCEDURE — 36000058 ZZH SURGERY LEVEL 3 EA 15 ADDTL MIN: Performed by: ORTHOPAEDIC SURGERY

## 2017-11-01 PROCEDURE — S0020 INJECTION, BUPIVICAINE HYDRO: HCPCS | Performed by: ORTHOPAEDIC SURGERY

## 2017-11-01 PROCEDURE — 40000934 ZZH STATISTIC OUTPATIENT (NON-OBS) DAY

## 2017-11-01 PROCEDURE — 25000128 H RX IP 250 OP 636: Performed by: NURSE ANESTHETIST, CERTIFIED REGISTERED

## 2017-11-01 PROCEDURE — 25000125 ZZHC RX 250: Performed by: NURSE ANESTHETIST, CERTIFIED REGISTERED

## 2017-11-01 PROCEDURE — 40000935 ZZH STATISTIC OUTPATIENT (NON-OBS) EVE

## 2017-11-01 PROCEDURE — 25000128 H RX IP 250 OP 636: Performed by: ANESTHESIOLOGY

## 2017-11-01 RX ORDER — CEFAZOLIN SODIUM 2 G/100ML
2 INJECTION, SOLUTION INTRAVENOUS
Status: COMPLETED | OUTPATIENT
Start: 2017-11-01 | End: 2017-11-01

## 2017-11-01 RX ORDER — BUPIVACAINE HYDROCHLORIDE 7.5 MG/ML
INJECTION, SOLUTION INTRASPINAL PRN
Status: DISCONTINUED | OUTPATIENT
Start: 2017-11-01 | End: 2017-11-01

## 2017-11-01 RX ORDER — CEPHALEXIN 500 MG/1
500 CAPSULE ORAL 4 TIMES DAILY
Qty: 8 CAPSULE | Refills: 0 | Status: SHIPPED | OUTPATIENT
Start: 2017-11-01 | End: 2017-11-03

## 2017-11-01 RX ORDER — HYDROMORPHONE HYDROCHLORIDE 1 MG/ML
.3-.5 INJECTION, SOLUTION INTRAMUSCULAR; INTRAVENOUS; SUBCUTANEOUS
Status: DISCONTINUED | OUTPATIENT
Start: 2017-11-01 | End: 2017-11-02 | Stop reason: HOSPADM

## 2017-11-01 RX ORDER — AMLODIPINE BESYLATE 5 MG/1
5 TABLET ORAL DAILY
Status: DISCONTINUED | OUTPATIENT
Start: 2017-11-02 | End: 2017-11-02 | Stop reason: HOSPADM

## 2017-11-01 RX ORDER — SODIUM CHLORIDE, SODIUM LACTATE, POTASSIUM CHLORIDE, CALCIUM CHLORIDE 600; 310; 30; 20 MG/100ML; MG/100ML; MG/100ML; MG/100ML
INJECTION, SOLUTION INTRAVENOUS CONTINUOUS
Status: DISCONTINUED | OUTPATIENT
Start: 2017-11-01 | End: 2017-11-02 | Stop reason: HOSPADM

## 2017-11-01 RX ORDER — CEFAZOLIN SODIUM 1 G/3ML
1 INJECTION, POWDER, FOR SOLUTION INTRAMUSCULAR; INTRAVENOUS SEE ADMIN INSTRUCTIONS
Status: DISCONTINUED | OUTPATIENT
Start: 2017-11-01 | End: 2017-11-01

## 2017-11-01 RX ORDER — KETOROLAC TROMETHAMINE 15 MG/ML
15 INJECTION, SOLUTION INTRAMUSCULAR; INTRAVENOUS EVERY 6 HOURS
Status: DISCONTINUED | OUTPATIENT
Start: 2017-11-01 | End: 2017-11-01

## 2017-11-01 RX ORDER — PRAVASTATIN SODIUM 40 MG
40 TABLET ORAL EVERY EVENING
Status: DISCONTINUED | OUTPATIENT
Start: 2017-11-01 | End: 2017-11-02 | Stop reason: HOSPADM

## 2017-11-01 RX ORDER — HYDROXYZINE HYDROCHLORIDE 10 MG/1
10 TABLET, FILM COATED ORAL EVERY 6 HOURS PRN
Status: DISCONTINUED | OUTPATIENT
Start: 2017-11-01 | End: 2017-11-02 | Stop reason: HOSPADM

## 2017-11-01 RX ORDER — ISOSORBIDE MONONITRATE 60 MG/1
60 TABLET, EXTENDED RELEASE ORAL DAILY
Status: DISCONTINUED | OUTPATIENT
Start: 2017-11-02 | End: 2017-11-02 | Stop reason: HOSPADM

## 2017-11-01 RX ORDER — IBUPROFEN 600 MG/1
600 TABLET, FILM COATED ORAL EVERY 6 HOURS PRN
Status: DISCONTINUED | OUTPATIENT
Start: 2017-11-01 | End: 2017-11-01

## 2017-11-01 RX ORDER — ACETAMINOPHEN 325 MG/1
650 TABLET ORAL EVERY 6 HOURS PRN
Status: DISCONTINUED | OUTPATIENT
Start: 2017-11-01 | End: 2017-11-02 | Stop reason: HOSPADM

## 2017-11-01 RX ORDER — KETAMINE HYDROCHLORIDE 10 MG/ML
INJECTION, SOLUTION INTRAMUSCULAR; INTRAVENOUS PRN
Status: DISCONTINUED | OUTPATIENT
Start: 2017-11-01 | End: 2017-11-01

## 2017-11-01 RX ORDER — HYDROMORPHONE HYDROCHLORIDE 1 MG/ML
.3-.5 INJECTION, SOLUTION INTRAMUSCULAR; INTRAVENOUS; SUBCUTANEOUS EVERY 10 MIN PRN
Status: DISCONTINUED | OUTPATIENT
Start: 2017-11-01 | End: 2017-11-01 | Stop reason: HOSPADM

## 2017-11-01 RX ORDER — ONDANSETRON 2 MG/ML
4 INJECTION INTRAMUSCULAR; INTRAVENOUS EVERY 30 MIN PRN
Status: DISCONTINUED | OUTPATIENT
Start: 2017-11-01 | End: 2017-11-01 | Stop reason: HOSPADM

## 2017-11-01 RX ORDER — BUPIVACAINE HYDROCHLORIDE 2.5 MG/ML
INJECTION, SOLUTION EPIDURAL; INFILTRATION; INTRACAUDAL PRN
Status: DISCONTINUED | OUTPATIENT
Start: 2017-11-01 | End: 2017-11-01 | Stop reason: HOSPADM

## 2017-11-01 RX ORDER — MEPERIDINE HYDROCHLORIDE 25 MG/ML
12.5 INJECTION INTRAMUSCULAR; INTRAVENOUS; SUBCUTANEOUS
Status: DISCONTINUED | OUTPATIENT
Start: 2017-11-01 | End: 2017-11-01 | Stop reason: HOSPADM

## 2017-11-01 RX ORDER — SODIUM CHLORIDE, SODIUM LACTATE, POTASSIUM CHLORIDE, CALCIUM CHLORIDE 600; 310; 30; 20 MG/100ML; MG/100ML; MG/100ML; MG/100ML
INJECTION, SOLUTION INTRAVENOUS CONTINUOUS PRN
Status: DISCONTINUED | OUTPATIENT
Start: 2017-11-01 | End: 2017-11-01

## 2017-11-01 RX ORDER — DIAZEPAM 10 MG/2ML
2.5 INJECTION, SOLUTION INTRAMUSCULAR; INTRAVENOUS
Status: DISCONTINUED | OUTPATIENT
Start: 2017-11-01 | End: 2017-11-01 | Stop reason: HOSPADM

## 2017-11-01 RX ORDER — KETOCONAZOLE 20 MG/G
CREAM TOPICAL 2 TIMES DAILY PRN
Status: DISCONTINUED | OUTPATIENT
Start: 2017-11-01 | End: 2017-11-02 | Stop reason: HOSPADM

## 2017-11-01 RX ORDER — LABETALOL 200 MG/1
200 TABLET, FILM COATED ORAL 3 TIMES DAILY
Status: DISCONTINUED | OUTPATIENT
Start: 2017-11-01 | End: 2017-11-02 | Stop reason: HOSPADM

## 2017-11-01 RX ORDER — LABETALOL HYDROCHLORIDE 5 MG/ML
10 INJECTION, SOLUTION INTRAVENOUS
Status: DISCONTINUED | OUTPATIENT
Start: 2017-11-01 | End: 2017-11-01 | Stop reason: HOSPADM

## 2017-11-01 RX ORDER — OXYCODONE HYDROCHLORIDE 5 MG/1
5-10 TABLET ORAL
Status: DISCONTINUED | OUTPATIENT
Start: 2017-11-01 | End: 2017-11-02 | Stop reason: HOSPADM

## 2017-11-01 RX ORDER — FLUTICASONE PROPIONATE 50 MCG
1 SPRAY, SUSPENSION (ML) NASAL AT BEDTIME
Status: DISCONTINUED | OUTPATIENT
Start: 2017-11-01 | End: 2017-11-02 | Stop reason: HOSPADM

## 2017-11-01 RX ORDER — ONDANSETRON 2 MG/ML
INJECTION INTRAMUSCULAR; INTRAVENOUS PRN
Status: DISCONTINUED | OUTPATIENT
Start: 2017-11-01 | End: 2017-11-01

## 2017-11-01 RX ORDER — DEXAMETHASONE SODIUM PHOSPHATE 4 MG/ML
INJECTION, SOLUTION INTRA-ARTICULAR; INTRALESIONAL; INTRAMUSCULAR; INTRAVENOUS; SOFT TISSUE PRN
Status: DISCONTINUED | OUTPATIENT
Start: 2017-11-01 | End: 2017-11-01

## 2017-11-01 RX ORDER — ONDANSETRON 4 MG/1
4 TABLET, ORALLY DISINTEGRATING ORAL EVERY 6 HOURS PRN
Status: DISCONTINUED | OUTPATIENT
Start: 2017-11-01 | End: 2017-11-02 | Stop reason: HOSPADM

## 2017-11-01 RX ORDER — MULTIPLE VITAMINS W/ MINERALS TAB 9MG-400MCG
1 TAB ORAL DAILY
Status: DISCONTINUED | OUTPATIENT
Start: 2017-11-01 | End: 2017-11-02 | Stop reason: HOSPADM

## 2017-11-01 RX ORDER — SODIUM CHLORIDE, SODIUM LACTATE, POTASSIUM CHLORIDE, CALCIUM CHLORIDE 600; 310; 30; 20 MG/100ML; MG/100ML; MG/100ML; MG/100ML
INJECTION, SOLUTION INTRAVENOUS CONTINUOUS
Status: DISCONTINUED | OUTPATIENT
Start: 2017-11-01 | End: 2017-11-01 | Stop reason: HOSPADM

## 2017-11-01 RX ORDER — ACETAMINOPHEN 650 MG/1
650 SUPPOSITORY RECTAL EVERY 4 HOURS PRN
Status: DISCONTINUED | OUTPATIENT
Start: 2017-11-01 | End: 2017-11-01 | Stop reason: HOSPADM

## 2017-11-01 RX ORDER — PROCHLORPERAZINE MALEATE 5 MG
5 TABLET ORAL EVERY 6 HOURS PRN
Status: DISCONTINUED | OUTPATIENT
Start: 2017-11-01 | End: 2017-11-02 | Stop reason: HOSPADM

## 2017-11-01 RX ORDER — KETOROLAC TROMETHAMINE 30 MG/ML
INJECTION, SOLUTION INTRAMUSCULAR; INTRAVENOUS PRN
Status: DISCONTINUED | OUTPATIENT
Start: 2017-11-01 | End: 2017-11-01

## 2017-11-01 RX ORDER — ONDANSETRON 2 MG/ML
4 INJECTION INTRAMUSCULAR; INTRAVENOUS EVERY 6 HOURS PRN
Status: DISCONTINUED | OUTPATIENT
Start: 2017-11-01 | End: 2017-11-02 | Stop reason: HOSPADM

## 2017-11-01 RX ORDER — NALOXONE HYDROCHLORIDE 0.4 MG/ML
.1-.4 INJECTION, SOLUTION INTRAMUSCULAR; INTRAVENOUS; SUBCUTANEOUS
Status: DISCONTINUED | OUTPATIENT
Start: 2017-11-01 | End: 2017-11-01 | Stop reason: HOSPADM

## 2017-11-01 RX ORDER — FENTANYL CITRATE 0.05 MG/ML
25-50 INJECTION, SOLUTION INTRAMUSCULAR; INTRAVENOUS
Status: DISCONTINUED | OUTPATIENT
Start: 2017-11-01 | End: 2017-11-01 | Stop reason: HOSPADM

## 2017-11-01 RX ORDER — FENTANYL CITRATE 50 UG/ML
INJECTION, SOLUTION INTRAMUSCULAR; INTRAVENOUS PRN
Status: DISCONTINUED | OUTPATIENT
Start: 2017-11-01 | End: 2017-11-01

## 2017-11-01 RX ORDER — NALOXONE HYDROCHLORIDE 0.4 MG/ML
.1-.4 INJECTION, SOLUTION INTRAMUSCULAR; INTRAVENOUS; SUBCUTANEOUS
Status: DISCONTINUED | OUTPATIENT
Start: 2017-11-01 | End: 2017-11-02 | Stop reason: HOSPADM

## 2017-11-01 RX ORDER — PROPOFOL 10 MG/ML
INJECTION, EMULSION INTRAVENOUS PRN
Status: DISCONTINUED | OUTPATIENT
Start: 2017-11-01 | End: 2017-11-01

## 2017-11-01 RX ORDER — IBUPROFEN 600 MG/1
600 TABLET, FILM COATED ORAL EVERY 6 HOURS PRN
Qty: 60 TABLET | Refills: 0 | Status: SHIPPED | OUTPATIENT
Start: 2017-11-01 | End: 2021-01-21

## 2017-11-01 RX ORDER — OXYCODONE HYDROCHLORIDE 5 MG/1
5-10 TABLET ORAL
Qty: 60 TABLET | Refills: 0 | Status: SHIPPED | OUTPATIENT
Start: 2017-11-01 | End: 2019-10-26

## 2017-11-01 RX ORDER — RAMIPRIL 10 MG/1
10 CAPSULE ORAL EVERY EVENING
Status: DISCONTINUED | OUTPATIENT
Start: 2017-11-01 | End: 2017-11-02 | Stop reason: HOSPADM

## 2017-11-01 RX ORDER — CEFAZOLIN SODIUM 2 G/100ML
2 INJECTION, SOLUTION INTRAVENOUS EVERY 8 HOURS
Status: DISCONTINUED | OUTPATIENT
Start: 2017-11-01 | End: 2017-11-02 | Stop reason: HOSPADM

## 2017-11-01 RX ORDER — FENTANYL CITRATE 50 UG/ML
25-100 INJECTION, SOLUTION INTRAMUSCULAR; INTRAVENOUS
Status: DISCONTINUED | OUTPATIENT
Start: 2017-11-01 | End: 2017-11-01

## 2017-11-01 RX ORDER — PROPOFOL 10 MG/ML
INJECTION, EMULSION INTRAVENOUS CONTINUOUS PRN
Status: DISCONTINUED | OUTPATIENT
Start: 2017-11-01 | End: 2017-11-01

## 2017-11-01 RX ORDER — ONDANSETRON 4 MG/1
4 TABLET, ORALLY DISINTEGRATING ORAL EVERY 30 MIN PRN
Status: DISCONTINUED | OUTPATIENT
Start: 2017-11-01 | End: 2017-11-01 | Stop reason: HOSPADM

## 2017-11-01 RX ADMIN — LABETALOL HCL 200 MG: 200 TABLET, FILM COATED ORAL at 19:57

## 2017-11-01 RX ADMIN — PROPOFOL 150 MG: 10 INJECTION, EMULSION INTRAVENOUS at 08:29

## 2017-11-01 RX ADMIN — PROPOFOL 50 MG: 10 INJECTION, EMULSION INTRAVENOUS at 08:30

## 2017-11-01 RX ADMIN — BUPIVACAINE HYDROCHLORIDE IN DEXTROSE 13.5 MG: 7.5 INJECTION, SOLUTION SUBARACHNOID at 07:35

## 2017-11-01 RX ADMIN — PROCHLORPERAZINE EDISYLATE 5 MG: 5 INJECTION INTRAMUSCULAR; INTRAVENOUS at 12:25

## 2017-11-01 RX ADMIN — DEXMEDETOMIDINE HYDROCHLORIDE 8 MCG: 100 INJECTION, SOLUTION INTRAVENOUS at 08:26

## 2017-11-01 RX ADMIN — ONDANSETRON 4 MG: 2 SOLUTION INTRAMUSCULAR; INTRAVENOUS at 14:17

## 2017-11-01 RX ADMIN — FENTANYL CITRATE 50 MCG: 50 INJECTION, SOLUTION INTRAMUSCULAR; INTRAVENOUS at 08:59

## 2017-11-01 RX ADMIN — KETAMINE HYDROCHLORIDE 10 MG: 10 INJECTION, SOLUTION INTRAMUSCULAR; INTRAVENOUS at 09:33

## 2017-11-01 RX ADMIN — RAMIPRIL 10 MG: 10 CAPSULE ORAL at 19:53

## 2017-11-01 RX ADMIN — PROPOFOL: 10 INJECTION, EMULSION INTRAVENOUS at 09:12

## 2017-11-01 RX ADMIN — KETAMINE HYDROCHLORIDE 10 MG: 10 INJECTION, SOLUTION INTRAMUSCULAR; INTRAVENOUS at 08:27

## 2017-11-01 RX ADMIN — GENTAMICIN SULFATE 1000 ML: 40 INJECTION, SOLUTION INTRAMUSCULAR; INTRAVENOUS at 09:18

## 2017-11-01 RX ADMIN — PRAVASTATIN SODIUM 40 MG: 40 TABLET ORAL at 19:53

## 2017-11-01 RX ADMIN — MIDAZOLAM HYDROCHLORIDE 1 MG: 1 INJECTION, SOLUTION INTRAMUSCULAR; INTRAVENOUS at 07:32

## 2017-11-01 RX ADMIN — PROPOFOL 75 MCG/KG/MIN: 10 INJECTION, EMULSION INTRAVENOUS at 07:38

## 2017-11-01 RX ADMIN — FENTANYL CITRATE 25 MCG: 50 INJECTION, SOLUTION INTRAMUSCULAR; INTRAVENOUS at 10:24

## 2017-11-01 RX ADMIN — CEFAZOLIN SODIUM 1 G: 2 INJECTION, SOLUTION INTRAVENOUS at 09:42

## 2017-11-01 RX ADMIN — ONDANSETRON 4 MG: 2 INJECTION INTRAMUSCULAR; INTRAVENOUS at 09:22

## 2017-11-01 RX ADMIN — CEFAZOLIN SODIUM 2 G: 2 INJECTION, SOLUTION INTRAVENOUS at 07:39

## 2017-11-01 RX ADMIN — GENTAMICIN SULFATE 1000 ML: 40 INJECTION, SOLUTION INTRAMUSCULAR; INTRAVENOUS at 08:14

## 2017-11-01 RX ADMIN — FENTANYL CITRATE 50 MCG: 50 INJECTION, SOLUTION INTRAMUSCULAR; INTRAVENOUS at 10:16

## 2017-11-01 RX ADMIN — LABETALOL HCL 200 MG: 200 TABLET, FILM COATED ORAL at 15:35

## 2017-11-01 RX ADMIN — HYDROMORPHONE HYDROCHLORIDE 0.5 MG: 1 INJECTION, SOLUTION INTRAMUSCULAR; INTRAVENOUS; SUBCUTANEOUS at 10:38

## 2017-11-01 RX ADMIN — CEFAZOLIN SODIUM 2 G: 2 INJECTION, SOLUTION INTRAVENOUS at 17:58

## 2017-11-01 RX ADMIN — SODIUM CHLORIDE, POTASSIUM CHLORIDE, SODIUM LACTATE AND CALCIUM CHLORIDE: 600; 310; 30; 20 INJECTION, SOLUTION INTRAVENOUS at 07:31

## 2017-11-01 RX ADMIN — FENTANYL CITRATE 50 MCG: 50 INJECTION, SOLUTION INTRAMUSCULAR; INTRAVENOUS at 07:33

## 2017-11-01 RX ADMIN — KETOROLAC TROMETHAMINE 15 MG: 30 INJECTION, SOLUTION INTRAMUSCULAR at 09:41

## 2017-11-01 RX ADMIN — HYDROXYZINE HYDROCHLORIDE 10 MG: 10 TABLET ORAL at 17:54

## 2017-11-01 RX ADMIN — FENTANYL CITRATE 25 MCG: 50 INJECTION, SOLUTION INTRAMUSCULAR; INTRAVENOUS at 10:30

## 2017-11-01 RX ADMIN — DEXAMETHASONE SODIUM PHOSPHATE 4 MG: 4 INJECTION, SOLUTION INTRA-ARTICULAR; INTRALESIONAL; INTRAMUSCULAR; INTRAVENOUS; SOFT TISSUE at 07:48

## 2017-11-01 RX ADMIN — MIDAZOLAM HYDROCHLORIDE 1 MG: 1 INJECTION, SOLUTION INTRAMUSCULAR; INTRAVENOUS at 07:36

## 2017-11-01 RX ADMIN — BUPIVACAINE HYDROCHLORIDE 7 ML: 2.5 INJECTION, SOLUTION EPIDURAL; INFILTRATION; INTRACAUDAL at 09:37

## 2017-11-01 RX ADMIN — HYDROMORPHONE HYDROCHLORIDE 0.5 MG: 1 INJECTION, SOLUTION INTRAMUSCULAR; INTRAVENOUS; SUBCUTANEOUS at 14:24

## 2017-11-01 RX ADMIN — OXYCODONE HYDROCHLORIDE 10 MG: 5 TABLET ORAL at 21:12

## 2017-11-01 RX ADMIN — OXYCODONE HYDROCHLORIDE 10 MG: 5 TABLET ORAL at 17:54

## 2017-11-01 RX ADMIN — HYDROMORPHONE HYDROCHLORIDE 0.5 MG: 1 INJECTION, SOLUTION INTRAMUSCULAR; INTRAVENOUS; SUBCUTANEOUS at 11:46

## 2017-11-01 RX ADMIN — Medication 1 TABLET: at 19:53

## 2017-11-01 RX ADMIN — SODIUM CHLORIDE, POTASSIUM CHLORIDE, SODIUM LACTATE AND CALCIUM CHLORIDE: 600; 310; 30; 20 INJECTION, SOLUTION INTRAVENOUS at 09:22

## 2017-11-01 ASSESSMENT — LIFESTYLE VARIABLES: TOBACCO_USE: 1

## 2017-11-01 ASSESSMENT — PAIN DESCRIPTION - DESCRIPTORS
DESCRIPTORS: ACHING;CONSTANT
DESCRIPTORS: ACHING

## 2017-11-01 NOTE — PLAN OF CARE
Problem: Patient Care Overview  Goal: Plan of Care/Patient Progress Review  Outcome: Improving  Pt doing well. A/o. VSS. Pain controlled. Tolerated reg diet. Some nausea present. Up to BSC with walker. Voided. R leg elevated. IS given.

## 2017-11-01 NOTE — PROGRESS NOTES
Admission medication history interview status for the 11/1/2017  admission is complete. See EPIC admission navigator for prior to admission medications     Medication history source reliability:Good    Medication history interview source(s):Patient    Medication history resources (including written lists, pill bottles, clinic record):None    [facility]    Additional medication history information not noted on PTA med list :None    Time spent in this activity: 45 minutes    Prior to Admission medications    Medication Sig Last Dose Taking? Auth Provider   Calcium Carbonate Antacid (TUMS PO) Take 2 tablets by mouth 2 times daily as needed 10/31/2017 at pm Yes Reported, Patient   MELATONIN PO Take 5 mg by mouth nightly as needed 10/29/2017 at prn Yes Reported, Patient   Carboxymethylcellulose Sodium (THERATEARS OP) Place 1-2 drops into both eyes 2 times daily as needed 10/29/2017 at prn Yes Reported, Patient   Acetaminophen (TYLENOL PO) Take 500 mg by mouth every 6 hours as needed for mild pain or fever  10/31/2017 at pm Yes Reported, Patient   AmLODIPine Besylate (NORVASC PO) Take 5 mg by mouth daily 11/1/2017 at 0400 Yes Reported, Patient   NONFORMULARY Apply to affected area daily PERIO GEL (contains hydrogen peroxide 1.7%)  - Apply to Periotray more than a week at prn Yes Reported, Patient   fluticasone (FLONASE) 50 MCG/ACT spray Spray 1 spray into both nostrils At Bedtime 10/31/2017 at pm Yes Reported, Patient   glucosamine-chondroitinoitin 750-600 MG TABS Take 1 tablet by mouth 2 times daily  10/31/2017 at pm Yes Reported, Patient   Isosorbide Mononitrate (IMDUR PO) Take 60 mg by mouth daily 11/1/2017 at 0400 Yes Reported, Patient   ketoconazole (NIZORAL) 2 % cream Apply topically 2 times daily as needed  10/31/2017 at pm Yes Reported, Patient   LABETALOL HCL PO Take 200 mg by mouth 3 times daily 11/1/2017 at 0400 Yes Reported, Patient   MAGNESIUM GLUCONATE PO Take 400 mg by mouth daily  10/31/2017 at am Yes Reported, Patient   PRAVASTATIN SODIUM PO Take 40 mg by mouth daily 10/31/2017 at pm Yes Reported, Patient   RAMIPRIL PO Take 10 mg by mouth daily 10/31/2017 at pm Yes Reported, Patient   Calcium Carb-Cholecalciferol (CALTRATE 600+D) 600-800 MG-UNIT TABS Take 1 tablet by mouth 2 times daily  10/31/2017 at pm Yes Reported, Patient   Multiple Vitamins-Minerals (CENTRUM SILVER ULTRA WOMENS PO) Take 1 tablet by mouth daily 10/31/2017 at am Yes Reported, Patient   ORDER FOR DME Use your CPAP device as directed by your provider.   Reported, Patient

## 2017-11-01 NOTE — ANESTHESIA POSTPROCEDURE EVALUATION
Patient: Bridget Lawson    Procedure(s):  RIGHT POSTERIOR TIBIAL TENDON RECONSTRUCTION  - Wound Class: I-Clean    Diagnosis:RIGHT TIBIALIS POSTERIOR TENDON RUPTURE  Diagnosis Additional Information: No value filed.    Anesthesia Type:  Spinal    Note:  Anesthesia Post Evaluation    Patient location during evaluation: PACU  Patient participation: Able to fully participate in evaluation  Level of consciousness: awake and alert  Pain management: adequate  Airway patency: patent  Cardiovascular status: acceptable, hemodynamically stable and blood pressure returned to baseline  Respiratory status: acceptable  Hydration status: acceptable  PONV: none     Anesthetic complications: None    Comments: Doing well. No complaints        Last vitals:  Vitals:    11/01/17 1145 11/01/17 1200 11/01/17 1230   BP: 139/75 126/70 136/74   Resp: 18 18 18   Temp:      SpO2:            Electronically Signed By: Merced Sims MD  November 1, 2017  1:16 PM

## 2017-11-01 NOTE — IP AVS SNAPSHOT
MRN:[id number]                      After Visit Summary   11/1/2017    Bridget Lawson    MRN: [id number]           Thank you!     Thank you for choosing [facility] for your care. Our goal is always to provide you with excellent care. Hearing back from our patients is one way we can continue to improve our services. Please take a few minutes to complete the written survey that you may receive in the mail after you visit with us. Thank you!        Patient Information     Date Of Birth          1950        About your hospital stay     You were admitted on:  November 1, 2017 You last received care in the:  [facility]    You were discharged on:  November 2, 2017       Who to Call     For medical emergencies, please call 911.  For non-urgent questions about your medical care, please call your primary care provider or clinic, [phone]  For questions related to your surgery, please call your surgery clinic        Attending Provider     Provider Specialty    Xavi Duran MD Orthopedics       Primary Care Provider Office Phone # Fax #    Patel Hughes [phone]      Additional Services     Home Care PT Referral for Hospital Discharge       PT to eval and treat    Your provider has ordered home care - physical therapy. If you have not been contacted within 2 days of your discharge please call the department phone number listed on the top of this document.                  Further instructions from your care team       Post-Operative Instructions Hindfoot/Ankle/Tendon Surgery Patients  Xavi Duran M.D.    Board Certified  Fellowship, Foot and Ankle Surgery  Member, American Academy of Orthopaedic Surgeons  American Orthopaedic Foot and Ankle Society    Direct Phone 9:00am to 5:00pm [phone]  After Hours/24 Hour On Call [phone]    Diet:  ? Take all prescribed medications with food   ? Narcotic pain medication can cause constipation  ? Avoid  "alcoholic beverages while taking pain medications   ? Increase dietary fiber protein rich foods, fluids post operatively    Activity:  ? Elevate operative foot 90% of the time.  ? \"Swelling runs downhill\" - the more you elevate, the less permanent swelling you will experience  ? Strict non-weight bearing on operative leg.  Crutches, walker, scooter or wheelchair must be used for mobilization.  ? You may be up to the bathroom, to the kitchen for meals and to change locations in the house.  ? You may do sit-ups (NOT BONE GRAFT PATIENTS), leg raises, upper body exercises  ? Adjust your immobilized foot/ankle to relieve pressure on your heel    Special Care Instructions:     ? DO NOT CHANGE OR ALTER THE CAST  ? Keep your cast/splint dry  ? Sponge bath or wrap seal your cast for shower   ? It is not unusual to have some \"numbness\" in foot and ankle following surgery    Report to your Doctor if any of the following occur:  ? Elevated temperature, 101o or higher  ? Increased pain unrelieved by pain medication and/or elevation  ? Tight/loose/wet cast  ? Increased swelling in foot or hip  ? Calf pain  ? For any shortness of breath, DIAL 911, go to the Emergency Room  Medications:  ? Take all of the following medications with food.    ? Aspirin/Ecotrin 325 mg.:  1 tab 1x/day  ? This is for blood clot prevention  ? If you have a sensitive stomach, Ecotrin can be less irritating  ? If you experience any unusual bruising or bleeding or ringing in the ears, stop this medication and call us  ? Oxycodone  1-2 every 3-4 hours as needed for pain  o You may take 1 tablet with plain Tylenol or Ibuprofen for less severe pain or to decrease nausea/mental effect  ?  Hydrocodone  1-2 every 3-4 hours as needed for pain  o You may take 1 tablet with plain Tylenol or Ibuprofen for less severe pain or to decrease nausea/mental effects  ? Ibuprofen 1 every 6 hours as needed for inflammatory pain        Your Next Appointment:    ? Appt. " "scheduled for ______Wed 11/15/17____3:30 pm_____________________        Direct Phone 9:00am to 5:00pm [phone]    After Hours/24 Hour On Call [phone]        [facility]  [phone]  **Call if not contacted within 24-48 hours of discharge**          Pending Results     No orders found for last 3 day(s).            Admission Information     Date & Time Provider Department Dept. Phone    11/1/2017 Xavi Duran MD [facility] [phone]      Your Vitals Were     Blood Pressure Temperature Respirations Height Weight Pulse Oximetry    168/83 (BP Location: Right arm) 98.3  F (36.8  C) (Oral) 18 1.607 m (5' 3.25\") 94.3 kg (208 lb) 95%    BMI (Body Mass Index)                   36.55 kg/m2           Care EveryWhere ID     This is your Care EveryWhere ID. This could be used by other organizations to access your [facility] medical records  [id number]        Equal Access to Services     GITA HIGGINS AH: Hadii michael Collins, wasilvia castillo, qacaseyta katwin stuart, mary hernandez . So [facility] [phone].    ATENCIÓN: Si habla español, tiene a freeman disposición servicios gratuitos de asistencia lingüística. [facility] [phone].    We comply with applicable federal civil rights laws and [place] laws. We do not discriminate on the basis of race, color, national origin, age, disability, sex, sexual orientation, or gender identity.               Review of your medicines      START taking        Dose / Directions    aspirin  MG EC tablet        Dose:  325 mg   Take 1 tablet (325 mg) by mouth daily   Quantity:  60 tablet   Refills:  0       cephALEXin 500 MG capsule   Commonly known as:  KEFLEX        Dose:  500 mg   Take 1 capsule (500 mg) by mouth 4 times daily for 2 days   Quantity:  8 capsule   Refills:  0       ibuprofen 600 MG tablet   Commonly known as:  ADVIL/MOTRIN        Dose:  600 mg   Take 1 tablet (600 mg) by mouth every 6 hours as " needed for other (inflammatory pain)   Quantity:  60 tablet   Refills:  0       oxyCODONE IR 5 MG tablet   Commonly known as:  ROXICODONE        Dose:  5-10 mg   Take 1-2 tablets (5-10 mg) by mouth every 3 hours as needed for moderate to severe pain   Quantity:  60 tablet   Refills:  0         CONTINUE these medicines which have NOT CHANGED        Dose / Directions    CALTRATE 600+D 600-800 MG-UNIT Tabs   Generic drug:  Calcium Carb-Cholecalciferol        Dose:  1 tablet   Take 1 tablet by mouth 2 times daily   Refills:  0       CENTRUM SILVER ULTRA WOMENS PO        Dose:  1 tablet   Take 1 tablet by mouth daily   Refills:  0       fluticasone 50 MCG/ACT spray   Commonly known as:  FLONASE        Dose:  1 spray   Spray 1 spray into both nostrils At Bedtime   Refills:  0       glucosamine-chondroitinoitin 750-600 MG Tabs        Dose:  1 tablet   Take 1 tablet by mouth 2 times daily   Refills:  0       IMDUR PO        Dose:  60 mg   Take 60 mg by mouth daily   Refills:  0       ketoconazole 2 % cream   Commonly known as:  NIZORAL        Apply topically 2 times daily as needed   Refills:  0       LABETALOL HCL PO        Dose:  200 mg   Take 200 mg by mouth 3 times daily   Refills:  0       MAGNESIUM GLUCONATE PO        Dose:  400 mg   Take 400 mg by mouth daily   Refills:  0       MELATONIN PO        Dose:  5 mg   Take 5 mg by mouth nightly as needed   Refills:  0       NONFORMULARY        Apply to affected area daily PERIO GEL (contains hydrogen peroxide 1.7%)  - Apply to Periotray   Refills:  0       NORVASC PO        Dose:  5 mg   Take 5 mg by mouth daily   Refills:  0       order for DME        Use your CPAP device as directed by your provider.   Refills:  0       PRAVASTATIN SODIUM PO        Dose:  40 mg   Take 40 mg by mouth daily   Refills:  0       RAMIPRIL PO        Dose:  10 mg   Take 10 mg by mouth daily   Refills:  0       THERATEARS OP        Dose:  1-2 drop   Place 1-2 drops into both eyes 2 times daily  as needed   Refills:  0       TUMS PO        Dose:  2 tablet   Take 2 tablets by mouth 2 times daily as needed   Refills:  0       TYLENOL PO        Dose:  500 mg   Take 500 mg by mouth every 6 hours as needed for mild pain or fever   Refills:  0            Where to get your medicines      These medications were sent to [facility] - [address]  [address] [address]     Phone:  [phone]     aspirin  MG EC tablet    cephALEXin 500 MG capsule    ibuprofen 600 MG tablet         Some of these will need a paper prescription and others can be bought over the counter. Ask your nurse if you have questions.     Bring a paper prescription for each of these medications     oxyCODONE IR 5 MG tablet               ANTIBIOTIC INSTRUCTION     You've Been Prescribed an Antibiotic - Now What?  Your healthcare team thinks that you or your loved one might have an infection. Some infections can be treated with antibiotics, which are powerful, life-saving drugs. Like all medications, antibiotics have side effects and should only be used when necessary. There are some important things you should know about your antibiotic treatment.      Your healthcare team may run tests before you start taking an antibiotic.    Your team may take samples (e.g., from your blood, urine or other areas) to run tests to look for bacteria. These test can be important to determine if you need an antibiotic at all and, if you do, which antibiotic will work best.      Within a few days, your healthcare team might change or even stop your antibiotic.    Your team may start you on an antibiotic while they are working to find out what is making you sick.    Your team might change your antibiotic because test results show that a different antibiotic would be better to treat your infection.    In some cases, once your team has more information, they learn that you do not need an antibiotic at all.  They may find out that you don't have an infection, or that the antibiotic you're taking won't work against your infection. For example, an infection caused by a virus can't be treated with antibiotics. Staying on an antibiotic when you don't need it is more likely to be harmful than helpful.      You may experience side effects from your antibiotic.    Like all medications, antibiotics have side effects. Some of these can be serious.    Let you healthcare team know if you have any known allergies when you are admitted to the hospital.    One significant side effect of nearly all antibiotics is the risk of severe and sometimes deadly diarrhea caused by Clostridium difficile (C. Difficile). This occurs when a person takes antibiotics because some good germs are destroyed. Antibiotic use allows C. diificile to take over, putting patients at high risk for this serious infection.    As a patient or caregiver, it is important to understand your or your loved one's antibiotic treatment. It is especially important for caregivers to speak up when patients can't speak for themselves. Here are some important questions to ask your healthcare team.    What infection is this antibiotic treating and how do you know I have that infection?    What side effects might occur from this antibiotic?    How long will I need to take this antibiotic?    Is it safe to take this antibiotic with other medications or supplements (e.g., vitamins) that I am taking?     Are there any special directions I need to know about taking this antibiotic? For example, should I take it with food?    How will I be monitored to know whether my infection is responding to the antibiotic?    What tests may help to make sure the right antibiotic is prescribed for me?      Information provided by:  www.cdc.gov/getsmart  U.S. Department of Health and Human Services  Centers for disease Control and Prevention  National Center for Emerging and Zoonotic Infectious  Diseases  Division of Healthcare Quality Promotion         Protect others around you: Learn how to safely use, store and throw away your medicines at www.disposemymeds.org.             Medication List: This is a list of all your medications and when to take them. Check marks below indicate your daily home schedule. Keep this list as a reference.      Medications           Morning Afternoon Evening Bedtime As Needed    aspirin  MG EC tablet   Take 1 tablet (325 mg) by mouth daily                                CALTRATE 600+D 600-800 MG-UNIT Tabs   Take 1 tablet by mouth 2 times daily   Generic drug:  Calcium Carb-Cholecalciferol                                CENTRUM SILVER ULTRA WOMENS PO   Take 1 tablet by mouth daily   Last time this was given:  1 tablet on 11/2/2017  8:39 AM                                cephALEXin 500 MG capsule   Commonly known as:  KEFLEX   Take 1 capsule (500 mg) by mouth 4 times daily for 2 days                                fluticasone 50 MCG/ACT spray   Commonly known as:  FLONASE   Spray 1 spray into both nostrils At Bedtime                                glucosamine-chondroitinoitin 750-600 MG Tabs   Take 1 tablet by mouth 2 times daily                                ibuprofen 600 MG tablet   Commonly known as:  ADVIL/MOTRIN   Take 1 tablet (600 mg) by mouth every 6 hours as needed for other (inflammatory pain)                                IMDUR PO   Take 60 mg by mouth daily   Last time this was given:  60 mg on 11/2/2017  8:39 AM                                ketoconazole 2 % cream   Commonly known as:  NIZORAL   Apply topically 2 times daily as needed                                LABETALOL HCL PO   Take 200 mg by mouth 3 times daily   Last time this was given:  200 mg on 11/2/2017  8:39 AM                                MAGNESIUM GLUCONATE PO   Take 400 mg by mouth daily                                MELATONIN PO   Take 5 mg by mouth nightly as needed                                 NONFORMULARY   Apply to affected area daily PERIO GEL (contains hydrogen peroxide 1.7%)  - Apply to Periotray                                NORVASC PO   Take 5 mg by mouth daily   Last time this was given:  5 mg on 11/2/2017  8:39 AM                                order for DME   Use your CPAP device as directed by your provider.                                oxyCODONE IR 5 MG tablet   Commonly known as:  ROXICODONE   Take 1-2 tablets (5-10 mg) by mouth every 3 hours as needed for moderate to severe pain   Last time this was given:  10 mg on 11/2/2017  8:40 AM                                PRAVASTATIN SODIUM PO   Take 40 mg by mouth daily   Last time this was given:  40 mg on 11/1/2017  7:53 PM                                RAMIPRIL PO   Take 10 mg by mouth daily   Last time this was given:  10 mg on 11/1/2017  7:53 PM                                THERATEARS OP   Place 1-2 drops into both eyes 2 times daily as needed                                TUMS PO   Take 2 tablets by mouth 2 times daily as needed                                TYLENOL PO   Take 500 mg by mouth every 6 hours as needed for mild pain or fever   Last time this was given:  650 mg on 11/2/2017 11:02 AM

## 2017-11-01 NOTE — ANESTHESIA PREPROCEDURE EVALUATION
Anesthesia Evaluation     . Pt has had prior anesthetic.     No history of anesthetic complications          ROS/MED HX    ENT/Pulmonary:     (+)sleep apnea, tobacco use, Past use uses CPAP , . .    Neurologic:     (+)CVA date: 2012 ICH from HTN with deficits- mild left sided facial droop and weakness of arm,     Cardiovascular:     (+) Dyslipidemia, hypertension-range: well controlled typically, ---. : . . . :. .       METS/Exercise Tolerance:     Hematologic:         Musculoskeletal:         GI/Hepatic:     (+) Other GI/Hepatic ho gastric ulcer     (-) GERD   Renal/Genitourinary:         Endo:     (+) Obesity (BMI 37), Other Endocrine Disorder prior hyperparathyroidism, sp surgery and cure 2010.      Psychiatric:         Infectious Disease:         Malignancy:         Other:                     Physical Exam  Normal systems: cardiovascular, pulmonary and dental    Airway   Mallampati: III  TM distance: >3 FB  Neck ROM: full    Dental     Cardiovascular       Pulmonary                     Anesthesia Plan      History & Physical Review  History and physical reviewed and following examination; no interval change.    ASA Status:  3 .    NPO Status:  > 8 hours    Plan for Spinal   PONV prophylaxis:  Ondansetron (or other 5HT-3) and Dexamethasone or Solumedrol       Postoperative Care  Postoperative pain management:  IV analgesics.      Consents  Anesthetic plan, risks, benefits and alternatives discussed with:  Patient and Spouse..                          .

## 2017-11-01 NOTE — IP AVS SNAPSHOT
[facility]    [address] [place] [address]    Phone:  [phone]                                       After Visit Summary   11/1/2017    Bridget Lawson    MRN: [id number]           After Visit Summary Signature Page     I have received my discharge instructions, and my questions have been answered. I have discussed any challenges I see with this plan with the nurse or doctor.    ..........................................................................................................................................  Patient/Patient Representative Signature      ..........................................................................................................................................  Patient Representative Print Name and Relationship to Patient    ..................................................               ................................................  Date                                            Time    ..........................................................................................................................................  Reviewed by Signature/Title    ...................................................              ..............................................  Date                                                            Time

## 2017-11-01 NOTE — ANESTHESIA CARE TRANSFER NOTE
Patient: Bridget Lawson    Procedure(s):  RIGHT POSTERIOR TIBIAL TENDON RECONSTRUCTION  - Wound Class: I-Clean    Diagnosis: RIGHT TIBIALIS POSTERIOR TENDON RUPTURE  Diagnosis Additional Information: No value filed.    Anesthesia Type:   Spinal     Note:  Airway :Face Mask  Patient transferred to:PACU  Comments: Pt exhibits spont resps, all monitors and alarms on in pacu, report given to RN, vss.Handoff Report: Identifed the Patient, Identified the Reponsible Provider, Reviewed the pertinent medical history, Discussed the surgical course, Reviewed Intra-OP anesthesia mangement and issues during anesthesia, Set expectations for post-procedure period and Allowed opportunity for questions and acknowledgement of understanding      Vitals: (Last set prior to Anesthesia Care Transfer)    CRNA VITALS  11/1/2017 0923 - 11/1/2017 1003      11/1/2017             Pulse: 73    SpO2: 97 %    Resp Rate (set): 10                Electronically Signed By: LIZZ Brannon CRNA  November 1, 2017  10:03 AM

## 2017-11-01 NOTE — OP NOTE
DATE OF PROCEDURE:  11/1/2017      PREOPERATIVE DIAGNOSIS:  Complete rupture of right tibialis posterior tendon.      POSTOPERATIVE DIAGNOSIS:  Complete rupture of right tibialis posterior tendon.      PROCEDURES:   1.  Debridement tenosynovectomy, remaining tibialis posterior tendon.   2.  Talonavicular capsulorrhaphy with spring ligament advancement.   3.  Flexor digitorum longus transfer.   4.  Tenodesis, FDL to remaining tibialis posterior stump.      DESCRIPTION OF PROCEDURE:  After adequate general anesthetic was obtained, Bridget Lawson's right foot and ankle were prepped and draped in the usual sterile fashion.  Thigh tourniquet was utilized.  Limb was exsanguinated, tourniquet raised.  A curvilinear incision was made along the course of tibialis posterior tendon extending to the navicular insertion and carried down through the subcutaneous tissue.  Sheath was entered.  There was complete rupture of the tibialis posterior tendon.  There was 3 cm of viable tendon attached to the navicular.  The proximal portion had retracted marked scarring and had deteriorated.  FDL tendon was identified in its sheath, traced to the knot of Ankur, sharply incised and tagged.  We then turned our attention to the spring ligament and talonavicular capsule.  A truncated wedge was excised and sutures were temporarily placed, but not secured, for the talonavicular capsulorrhaphy and spring ligament advancement.  Soft tissue subperiosteally stripped off the dorsal aspect of the navicular.  Drill hole was placed.  The previously harvested FDL tendon was then transferred through the navicular from a plantar to dorsal direction and secured upon itself and to the remaining tibialis posterior stump with interrupted 0 Ethibond.  The talonavicular capsule and spring ligament advancement sutures were secured.  Wound thoroughly irrigated with antibiotic solution.  Flexor retinaculum closed with interrupted 0 Ethibond.  Subcutaneous tissue  closed with a running 0 and 2-0 Vicryl, 3-0 Prolene vertical mattress stitch for the skin.  Marcaine 0.25% instilled along the incision line.  Sterile Guilherme Rosales dressing was applied.  Tourniquet was released after 80 minutes and with good capillary refill.  The patient taken to the recovery room in good condition.         ETHAN EVANS MD             D: 2017 09:54   T: 2017 10:24   MT:       Name:     KERRI LOMELI   MRN:      [id number]        Account:        [id number]   :      1950           Procedure Date: 2017      Document: [id number]       cc: Ethan Evans MD

## 2017-11-02 ENCOUNTER — APPOINTMENT (OUTPATIENT)
Dept: PHYSICAL THERAPY | Facility: CLINIC | Age: 67
End: 2017-11-02
Attending: ORTHOPAEDIC SURGERY
Payer: MEDICARE

## 2017-11-02 VITALS
DIASTOLIC BLOOD PRESSURE: 83 MMHG | RESPIRATION RATE: 18 BRPM | SYSTOLIC BLOOD PRESSURE: 168 MMHG | HEIGHT: 63 IN | TEMPERATURE: 98.3 F | WEIGHT: 208 LBS | BODY MASS INDEX: 36.86 KG/M2 | OXYGEN SATURATION: 95 %

## 2017-11-02 LAB — GLUCOSE BLDC GLUCOMTR-MCNC: 102 MG/DL (ref 70–99)

## 2017-11-02 PROCEDURE — 40000934 ZZH STATISTIC OUTPATIENT (NON-OBS) DAY

## 2017-11-02 PROCEDURE — G8978 MOBILITY CURRENT STATUS: HCPCS | Mod: GP,CI

## 2017-11-02 PROCEDURE — 25000128 H RX IP 250 OP 636: Performed by: ORTHOPAEDIC SURGERY

## 2017-11-02 PROCEDURE — 97161 PT EVAL LOW COMPLEX 20 MIN: CPT | Mod: GP

## 2017-11-02 PROCEDURE — 40000193 ZZH STATISTIC PT WARD VISIT

## 2017-11-02 PROCEDURE — G8980 MOBILITY D/C STATUS: HCPCS | Mod: GP,CI

## 2017-11-02 PROCEDURE — A9270 NON-COVERED ITEM OR SERVICE: HCPCS | Mod: GY | Performed by: ORTHOPAEDIC SURGERY

## 2017-11-02 PROCEDURE — 25000132 ZZH RX MED GY IP 250 OP 250 PS 637: Mod: GY | Performed by: ORTHOPAEDIC SURGERY

## 2017-11-02 PROCEDURE — 82962 GLUCOSE BLOOD TEST: CPT

## 2017-11-02 PROCEDURE — G8979 MOBILITY GOAL STATUS: HCPCS | Mod: GP,CI

## 2017-11-02 RX ADMIN — ISOSORBIDE MONONITRATE 60 MG: 60 TABLET, EXTENDED RELEASE ORAL at 08:39

## 2017-11-02 RX ADMIN — CEFAZOLIN SODIUM 2 G: 2 INJECTION, SOLUTION INTRAVENOUS at 10:10

## 2017-11-02 RX ADMIN — OXYCODONE HYDROCHLORIDE 10 MG: 5 TABLET ORAL at 00:27

## 2017-11-02 RX ADMIN — MULTIPLE VITAMINS W/ MINERALS TAB 1 TABLET: TAB at 08:39

## 2017-11-02 RX ADMIN — AMLODIPINE BESYLATE 5 MG: 5 TABLET ORAL at 08:39

## 2017-11-02 RX ADMIN — ACETAMINOPHEN 650 MG: 325 TABLET, FILM COATED ORAL at 11:02

## 2017-11-02 RX ADMIN — OXYCODONE HYDROCHLORIDE 10 MG: 5 TABLET ORAL at 08:40

## 2017-11-02 RX ADMIN — LABETALOL HCL 200 MG: 200 TABLET, FILM COATED ORAL at 08:39

## 2017-11-02 RX ADMIN — OXYCODONE HYDROCHLORIDE 10 MG: 5 TABLET ORAL at 12:13

## 2017-11-02 RX ADMIN — CEFAZOLIN SODIUM 2 G: 2 INJECTION, SOLUTION INTRAVENOUS at 02:03

## 2017-11-02 RX ADMIN — OXYCODONE HYDROCHLORIDE 10 MG: 5 TABLET ORAL at 04:27

## 2017-11-02 RX ADMIN — SODIUM CHLORIDE, POTASSIUM CHLORIDE, SODIUM LACTATE AND CALCIUM CHLORIDE: 600; 310; 30; 20 INJECTION, SOLUTION INTRAVENOUS at 04:05

## 2017-11-02 RX ADMIN — Medication 1 TABLET: at 08:39

## 2017-11-02 NOTE — PLAN OF CARE
Problem: Patient Care Overview  Goal: Plan of Care/Patient Progress Review  Outcome: Adequate for Discharge Date Met:  11/02/17  VSS, A&Ox4. CMS intact, dressing CDI. Up A1 and voiding adequately. Oxycodone for pain control. Discharge instructions reviewed with pt and all questions answered. Discharged home with family.

## 2017-11-02 NOTE — PLAN OF CARE
Problem: Patient Care Overview  Goal: Plan of Care/Patient Progress Review  Outcome: Improving  ~ Patient able to ambulate as they were prior to admission or with assist devices provided by therapies during their stay. Not met.  ~ Nurse to notify MD when Outpatient Discharge goals have been met and patient is ready for discharge.

## 2017-11-02 NOTE — PROGRESS NOTES
Care Transition Initial Assessment - [facility]  Reason For Consult: discharge planning  Met with: Patient and Family    Active Problems:    Tibialis posterior tendon rupture         DATA  Lives With: spouse  Living Arrangements: house  Description of Support System: Supportive, Involved  Who is your support system?: , Children  Support Assessment: Adequate family and caregiver support.   Identified issues/concerns regarding health management:    SW received request for consult to assist with discharge planning. Patient admitted Observation Status on 11/1/17 following Tibialis Tendon Rupture Surgery. Tentative discharge date 11/2/17. SW met with patient and patient's family to discuss discharge planning and recommendation of Home Care PT at discharge. Patient stating she is in agreement with this at discharge. Discussed with patient coverage through Medicare, and needing to be considered homebound while receiving the services. Patient stating she is in agreement. Patient does not have a preference of home care agencies, and is okay with using [facility]. SW sent referral per protocol. Patient lives with her spouse in a house. Patient is independent at baseline. Patient has good support from spouse and family. Patient has no further questions or concerns at this time.      Resources List: Home Care     Quality Of Family Relationships: supportive, helpful, involved  Transportation Available: family or friend will provide      ASSESSMENT  Cognitive Status:  awake, alert and oriented  Concerns to be addressed: discharge planning.       PLAN  Financial costs for the patient includes: N/A.  Patient given options and choices for discharge: Discussed recommendation of Home PT.  Patient/family is agreeable to the plan?  Yes  Patient Goals and Preferences: return home.  Patient anticipates discharging to: Home with family support and [facility] PT.    Lucy Frye, MSW, LGSW

## 2017-11-02 NOTE — PROGRESS NOTES
Pod 1    Date of Service: November 2, 2017    Afeb, n/v intact  Reviewed findings at surg  Plan: Physical therapy, home, instructed

## 2017-11-02 NOTE — PROGRESS NOTES
11/02/17 0900   Quick Adds   Type of Visit Initial PT Evaluation   Living Environment   Lives With spouse   Living Arrangements house   Home Accessibility no concerns   Number of Stairs to Enter Home 0   Number of Stairs Within Home 0   Transportation Available car;family or friend will provide   Self-Care   Dominant Hand right   Usual Activity Tolerance good   Current Activity Tolerance moderate   Regular Exercise no   Equipment Currently Used at Home walker, rolling;walker, standard;wheelchair, manual   Functional Level Prior   Ambulation 0-->independent   Transferring 0-->independent   Fall history within last six months no   Which of the above functional risks had a recent onset or change? none   Prior Functional Level Comment Pt reports independence with mobility prior to admit. Has ramp to enter home.   General Information   Onset of Illness/Injury or Date of Surgery - Date 11/01/17   Referring Physician Dr. Duran   Patient/Family Goals Statement To go home   Pertinent History of Current Problem (include personal factors and/or comorbidities that impact the POC) Pt is a 67 year old female POD#1 achilles tendon repair   Weight-Bearing Status - RLE nonweight-bearing   Cognitive Status Examination   Orientation orientation to person, place and time   Level of Consciousness alert   Follows Commands and Answers Questions 100% of the time   Pain Assessment   Patient Currently in Pain Yes, see Vital Sign flowsheet   Range of Motion (ROM)   ROM Quick Adds No deficits were identified   ROM Comment Right ankle immobilized   Strength   Manual Muscle Testing Quick Adds No deficits were identified   Strength Comments Right ankle immobilized   Bed Mobility   Bed Mobility Comments Independent supine to/from sit   Transfer Skills   Transfer Comments Sit to/from stand SBA   Gait   Gait Comments 30' FWW CGA   Balance   Balance Comments Good in sitting, fair in standing   Clinical Impression   Criteria for Skilled  "Therapeutic Intervention evaluation only   Clinical Presentation Stable/Uncomplicated   Clinical Presentation Rationale VSS, pain controlled   Clinical Decision Making (Complexity) Low complexity   Anticipated Equipment Needs at Discharge walker;wheelchair;bedside commode   Anticipated Discharge Disposition Home with Home Therapy   Risk & Benefits of therapy have been explained Yes   Patient, Family & other staff in agreement with plan of care Yes   [facility] TM \"6 Clicks\"   2016, Trustees of [facility], under license to [employer].  All rights reserved.   6 Clicks Short Forms Basic Mobility Inpatient Short Form   [facility]  \"6 Clicks\" V.2 Basic Mobility Inpatient Short Form   1. Turning from your back to your side while in a flat bed without using bedrails? 4 - None   2. Moving from lying on your back to sitting on the side of a flat bed without using bedrails? 4 - None   3. Moving to and from a bed to a chair (including a wheelchair)? 3 - A Little   4. Standing up from a chair using your arms (e.g., wheelchair, or bedside chair)? 3 - A Little   5. To walk in hospital room? 3 - A Little   6. Climbing 3-5 steps with a railing? 2 - A Lot   Basic Mobility Raw Score (Score out of 24.Lower scores equate to lower levels of function) 19   Total Evaluation Time   Total Evaluation Time (Minutes) 40     "

## 2017-11-02 NOTE — PLAN OF CARE
Problem: Patient Care Overview  Goal: Plan of Care/Patient Progress Review  Physical Therapy: Order received, chart reviewed and evaluation completed. Pt is a 67 year old female under OBS status POD #1 for right achilles tendon repair by Dr. Duran. Pt with orders for non-WBin on right side. Pt and  report that house is set up, including a ramp to enter and all needs met on first floor. Pt has walker, wheelchair, bedside commode, knee scooter for home use. Pt able to assist at home as needed.     Discharge Planner PT   Patient plan for discharge: Home with spouse  Current status: Pt independent with bed mobility, good sitting balance at EOB. Pt performed sit to/from stand with SBA. Pt able to ambulate 30' with FWW and CGA, maintaining non-WBing status on right. Pt also able to use knee scooter 100' with CGA. Pts  educated on how to assist patient with gait belt, transfers, and ambulation in home environment.  Barriers to return to prior living situation: Decreased activity tolerance, post-op pain, non-WBing  Recommendations for discharge: Home with spouse, may benefit from HHPT to increase mobility within home  Rationale for recommendations: Pt currently requires SBA to CGA, which  is able to provide. Pt has home set-up for wheelchair and knee scooter mobility.        Entered by: Helen Klein 11/02/2017 10:12 AM     Will complete PT orders and discharge patient from PT as discharge recommendations are stated above and patient is a patient under OBS status.

## 2017-11-02 NOTE — PLAN OF CARE
Problem: Patient Care Overview  Goal: Plan of Care/Patient Progress Review  Outcome: Improving  A/o4. VSS. Weaned O2 from 1L to RA, stable at 93%. CMS intact. A2 to BSC. Has left-sided weakness. Non-wt bear on right. Dressing CDI. RLE elevated on foam wedge while in bed. Pt using own CPAP overnight. Rfoot pain mgt oxycodone. Cont to monitor.

## 2017-11-02 NOTE — DISCHARGE INSTRUCTIONS
"Post-Operative Instructions Hindfoot/Ankle/Tendon Surgery Patients  Xavi Duran M.D.    Board Certified  Fellowship, Foot and Ankle Surgery  Member, American Academy of Orthopaedic Surgeons  American Orthopaedic Foot and Ankle Society    Direct Phone 9:00am to 5:00pm [phone]  After Hours/24 Hour On Call [phone]    Diet:  ? Take all prescribed medications with food   ? Narcotic pain medication can cause constipation  ? Avoid alcoholic beverages while taking pain medications   ? Increase dietary fiber protein rich foods, fluids post operatively    Activity:  ? Elevate operative foot 90% of the time.  ? \"Swelling runs downhill\" - the more you elevate, the less permanent swelling you will experience  ? Strict non-weight bearing on operative leg.  Crutches, walker, scooter or wheelchair must be used for mobilization.  ? You may be up to the bathroom, to the kitchen for meals and to change locations in the house.  ? You may do sit-ups (NOT BONE GRAFT PATIENTS), leg raises, upper body exercises  ? Adjust your immobilized foot/ankle to relieve pressure on your heel    Special Care Instructions:     ? DO NOT CHANGE OR ALTER THE CAST  ? Keep your cast/splint dry  ? Sponge bath or wrap seal your cast for shower   ? It is not unusual to have some \"numbness\" in foot and ankle following surgery    Report to your Doctor if any of the following occur:  ? Elevated temperature, 101o or higher  ? Increased pain unrelieved by pain medication and/or elevation  ? Tight/loose/wet cast  ? Increased swelling in foot or hip  ? Calf pain  ? For any shortness of breath, DIAL 911, go to the Emergency Room  Medications:  ? Take all of the following medications with food.    ? Aspirin/Ecotrin 325 mg.:  1 tab 1x/day  ? This is for blood clot prevention  ? If you have a sensitive stomach, Ecotrin can be less irritating  ? If you experience any unusual bruising or bleeding or ringing in the ears, stop this medication and call " us  ? Oxycodone  1-2 every 3-4 hours as needed for pain  o You may take 1 tablet with plain Tylenol or Ibuprofen for less severe pain or to decrease nausea/mental effect  ?  Hydrocodone  1-2 every 3-4 hours as needed for pain  o You may take 1 tablet with plain Tylenol or Ibuprofen for less severe pain or to decrease nausea/mental effects  ? Ibuprofen 1 every 6 hours as needed for inflammatory pain        Your Next Appointment:    ? Appt. scheduled for ______Wed 11/15/17____3:30 pm_____________________        Direct Phone 9:00am to 5:00pm [phone]    After Hours/24 Hour On Call [phone]        [facility]  [phone]  **Call if not contacted within 24-48 hours of discharge**

## 2017-11-24 DIAGNOSIS — I10 ESSENTIAL HYPERTENSION WITH GOAL BLOOD PRESSURE LESS THAN 140/90: ICD-10-CM

## 2017-11-24 DIAGNOSIS — E78.5 HYPERLIPIDEMIA WITH TARGET LDL LESS THAN 100: ICD-10-CM

## 2017-11-27 RX ORDER — RAMIPRIL 10 MG/1
10 CAPSULE ORAL DAILY
Qty: 90 CAPSULE | Refills: 1 | Status: SHIPPED | OUTPATIENT
Start: 2017-11-27 | End: 2018-05-08

## 2017-11-27 RX ORDER — LABETALOL 200 MG/1
200 TABLET, FILM COATED ORAL 3 TIMES DAILY
Qty: 270 TABLET | Refills: 1 | Status: SHIPPED | OUTPATIENT
Start: 2017-11-27 | End: 2018-05-08

## 2017-11-27 RX ORDER — ISOSORBIDE MONONITRATE 60 MG/1
60 TABLET, EXTENDED RELEASE ORAL DAILY
Qty: 90 TABLET | Refills: 1 | Status: SHIPPED | OUTPATIENT
Start: 2017-11-27 | End: 2018-05-08

## 2017-11-27 RX ORDER — AMLODIPINE BESYLATE 5 MG/1
5 TABLET ORAL DAILY
Qty: 90 TABLET | Refills: 1 | Status: SHIPPED | OUTPATIENT
Start: 2017-11-27 | End: 2018-05-08

## 2017-11-27 RX ORDER — PRAVASTATIN SODIUM 40 MG
40 TABLET ORAL DAILY
Qty: 90 TABLET | Refills: 1 | Status: SHIPPED | OUTPATIENT
Start: 2017-11-27 | End: 2018-05-08

## 2017-11-27 NOTE — TELEPHONE ENCOUNTER
Routing refill request to provider for review/approval because:  B/P out of range on all 4 medications

## 2017-12-28 ENCOUNTER — THERAPY VISIT (OUTPATIENT)
Dept: PHYSICAL THERAPY | Facility: CLINIC | Age: 67
End: 2017-12-28
Payer: COMMERCIAL

## 2017-12-28 DIAGNOSIS — Z47.89 AFTERCARE FOLLOWING SURGERY OF THE MUSCULOSKELETAL SYSTEM: ICD-10-CM

## 2017-12-28 DIAGNOSIS — S86.111A RUPTURE OF RIGHT POSTERIOR TIBIALIS TENDON, INITIAL ENCOUNTER: Primary | ICD-10-CM

## 2017-12-28 PROCEDURE — 97161 PT EVAL LOW COMPLEX 20 MIN: CPT | Mod: GP | Performed by: PHYSICAL THERAPIST

## 2017-12-28 PROCEDURE — 97110 THERAPEUTIC EXERCISES: CPT | Mod: GP | Performed by: PHYSICAL THERAPIST

## 2017-12-28 NOTE — PROGRESS NOTES
[facility] Initial Evaluation    Subjective:  Patient is a 67 year old female presenting with rehab right ankle/foot hpi.   Bridget Lawson is a 67 year old female with a right ankle condition.  Condition occurred with:  A fall/slip.  Condition occurred: in the community.  This is a new condition  Late April 2017, patient was traveling to [place] and rolled her R ankle stepping off of a trolley.  This caused rupture of posterior tibialis tendon.  She went on to have tib post reconstruction with Dr Duran on 11/1/17.  .    Patient reports pain:  Medial.    Pain is described as aching and is intermittent and reported as 2/10 and 1/10.  Associated symptoms:  Loss of motion/stiffness, loss of strength and edema. Pain is worse in the P.M..  Exacerbated by: being on her feet/weight bearing activities; currently she's WBAT in orthopedic shoe with a rolling walker (she's not yet taking full weight through R foot) and relieved by ice and bracing/immobilizing (elevation, compression, tylenol as needed).  Since onset symptoms are gradually improving.  Special testing: MRI pre op, no post op imaging.  Previous treatment includes surgery.  Improvement with previous treatment: good initial response to surgery.  General health as reported by patient is fair.  Pertinent medical history includes:  Overweight, high blood pressure, stroke, thyroid problems and sleep disorder/apnea (stroke with mild L sided weakness and decreased balance but overall good recovery (2010)).  Medical allergies: flonase, animal dander.  Surgical history: shoulder and knee surgeries.  Current medications:  High blood pressure medication.  Current occupation is Retired.        Barriers include:  None as reported by patient.    Red flags:  None as reported by patient.                        Objective:  ANKLE:     PROM L PROM R AROM L AROM R MMT L MMT R   Dorsiflexion 20 8 WNL Just stiff     Plantarflexion 50 40 WNL Just stiff      Inversion   WNL Just stiff     Eversion   WNL Just stiff     G Toe Ext         G Toe Flex           Edema:    General: mod edema about R foot (min-nil at basline on L)  Figure 8: L: 53.5cm ; R: 56.0cm    Palpation: incision appears well healing, there is one very small scab yet, the rest is closed     Gait: WBAT in post op shoe with rolling walker (she started at 20% WB and is gradually increasing)      System    Physical Exam    General     ROS    Assessment/Plan:    Patient is a 67 year old female with right side ankle complaints.    Patient has the following significant findings with corresponding treatment plan.                Diagnosis 1:  S/p R tibialis posterior reconstruction on 11/1/17    Pain -  hot/cold therapy, electric stimulation and manual therapy  Decreased ROM/flexibility - manual therapy and therapeutic exercise  Decreased joint mobility - manual therapy and therapeutic exercise  Decreased strength - therapeutic exercise and therapeutic activities  Impaired balance - neuro re-education and therapeutic activities  Decreased proprioception - neuro re-education and therapeutic activities  Edema - cold therapy  Impaired gait - gait training  Decreased function - therapeutic activities    Therapy Evaluation Codes:   1) History comprised of:   Personal factors that impact the plan of care:      None.    Comorbidity factors that impact the plan of care are:      None.     Medications impacting care: None.  2) Examination of Body Systems comprised of:   Body structures and functions that impact the plan of care:      Ankle.   Activity limitations that impact the plan of care are:      Driving, Dressing, Lifting, Squatting/kneeling, Stairs and Walking.  3) Clinical presentation characteristics are:   Stable/Uncomplicated.  4) Decision-Making    Low complexity using standardized patient assessment instrument and/or measureable assessment of functional outcome.  Cumulative Therapy Evaluation is: Low  complexity.    Previous and current functional limitations:  (See Goal Flow Sheet for this information)    Short term and Long term goals: (See Goal Flow Sheet for this information)     Communication ability:  Patient appears to be able to clearly communicate and understand verbal and written communication and follow directions correctly.  Treatment Explanation - The following has been discussed with the patient:   RX ordered/plan of care  Anticipated outcomes  Possible risks and side effects  This patient would benefit from PT intervention to resume normal activities.   Rehab potential is good.    Frequency:  3 X week, once daily  Duration:  for 6 weeks  Discharge Plan:  Achieve all LTG.  Independent in home treatment program.  Reach maximal therapeutic benefit.    Please refer to the daily flowsheet for treatment today, total treatment time and time spent performing 1:1 timed codes.

## 2017-12-28 NOTE — LETTER
[facility]  [address]  [place] [address]  [phone]    2017    Re: Bridget Lawson   :   1950  MRN:  [id number]   REFERRING PHYSICIAN:   Xavi Duran    [facility]  Date of Initial Evaluation:  2017  Visits: 1  Rxs Used: 1  Reason for Referral:     Rupture of right posterior tibialis tendon, initial encounter  Aftercare following surgery of the musculoskeletal system  EVALUATION SUMMARY  [facility] Initial Evaluation  Subjective:  Bridget Lawson is a 67 year old female with a right ankle condition.  Condition occurred with:  A fall/slip.  Condition occurred: in the community.  This is a new condition  Late 2017, patient was traveling to [place] and rolled her R ankle stepping off of a trolley.  This caused rupture of posterior tibialis tendon.  She went on to have tib post reconstruction with Dr Duran on 17.  .  Patient reports pain:  Medial.    Pain is described as aching and is intermittent and reported as 2/10 and 1/10.  Associated symptoms:  Loss of motion/stiffness, loss of strength and edema. Pain is worse in the P.M..  Exacerbated by: being on her feet/weight bearing activities; currently she's WBAT in orthopedic shoe with a rolling walker (she's not yet taking full weight through R foot) and relieved by ice and bracing/immobilizing (elevation, compression, tylenol as needed).  Since onset symptoms are gradually improving.  Special testing: MRI pre op, no post op imaging.  Previous treatment includes surgery.  Improvement with previous treatment: good initial response to surgery.  General health as reported by patient is fair.  Pertinent medical history includes:  Overweight, high blood pressure, stroke, thyroid problems and sleep disorder/apnea (stroke with mild L sided weakness and decreased balance but overall good  recovery ()).  Medical allergies: flonase, animal dander.  Surgical history: shoulder and knee surgeries.  Current medications:  High blood pressure medication.  Current occupation is Retired.  Barriers include:  None as reported by patient.Red flags:  None as reported by patient.  Objective:  ANKLE:     PROM L PROM R AROM L AROM R MMT L MMT R   Dorsiflexion 20 8 WNL Just stiff     Plantarflexion 50 40 WNL Just stiff     Inversion   WNL Just stiff     Eversion   WNL Just stiff     G Toe Ext         G Toe Flex         Edema:    General: mod edema about R foot (min-nil at basline on L)  Figure 8: L: 53.5cm ; R: 56.0cm  Palpation: incision appears well healing, there is one very small scab yet, the rest is   Re: Bridget GABRIELLE Lawson   :   1950    closed   Gait: WBAT in post op shoe with rolling walker (she started at 20% WB and is gradually increasing)  Assessment/Plan:    Patient is a 67 year old female with right side ankle complaints.    Patient has the following significant findings with corresponding treatment plan.                Diagnosis 1:  S/p R tibialis posterior reconstruction on 17    Pain -  hot/cold therapy, electric stimulation and manual therapy  Decreased ROM/flexibility - manual therapy and therapeutic exercise  Decreased joint mobility - manual therapy and therapeutic exercise  Decreased strength - therapeutic exercise and therapeutic activities  Impaired balance - neuro re-education and therapeutic activities  Decreased proprioception - neuro re-education and therapeutic activities  Edema - cold therapy  Impaired gait - gait training  Decreased function - therapeutic activities  Therapy Evaluation Codes:   1) History comprised of:   Personal factors that impact the plan of care:      None.    Comorbidity factors that impact the plan of care are:      None.     Medications impacting care: None.  2) Examination of Body Systems comprised of:   Body structures and functions that impact the  plan of care:      Ankle.   Activity limitations that impact the plan of care are:      Driving, Dressing, Lifting, Squatting/kneeling, Stairs and Walking.  3) Clinical presentation characteristics are:   Stable/Uncomplicated.  4) Decision-Making    Low complexity using standardized patient assessment instrument and/or measureable assessment of functional outcome.  Cumulative Therapy Evaluation is: Low complexity.  Previous and current functional limitations:  (See Goal Flow Sheet for this information)    Short term and Long term goals: (See Goal Flow Sheet for this information)   Communication ability:  Patient appears to be able to clearly communicate and understand verbal and written communication and follow directions correctly.  Treatment Explanation - The following has been discussed with the patient:   RX ordered/plan of care  Anticipated outcomes  Possible risks and side effects  This patient would benefit from PT intervention to resume normal activities.   Rehab potential is good.  Frequency:  3 X week, once daily  Duration:  for 6 weeks  Discharge Plan:  Achieve all LTG.  Independent in home treatment program.  Reach maximal therapeutic benefit.    Re: Bridget Lawson   :   1950    Thank you for your referral.    INQUIRIES  Therapist: Lj Paredes, REHANT, SCS, Cert. MDT    [facility] PHYSICAL THERAPY  [address] [place] [address] [id number] [address]  Phone: [phone]  Fax: [phone]

## 2018-01-02 ENCOUNTER — THERAPY VISIT (OUTPATIENT)
Dept: PHYSICAL THERAPY | Facility: CLINIC | Age: 68
End: 2018-01-02
Payer: COMMERCIAL

## 2018-01-02 DIAGNOSIS — Z47.89 AFTERCARE FOLLOWING SURGERY OF THE MUSCULOSKELETAL SYSTEM: ICD-10-CM

## 2018-01-02 DIAGNOSIS — S86.111A RUPTURE OF RIGHT POSTERIOR TIBIALIS TENDON, INITIAL ENCOUNTER: ICD-10-CM

## 2018-01-02 PROCEDURE — 97140 MANUAL THERAPY 1/> REGIONS: CPT | Mod: GP | Performed by: PHYSICAL THERAPIST

## 2018-01-02 PROCEDURE — 97110 THERAPEUTIC EXERCISES: CPT | Mod: GP | Performed by: PHYSICAL THERAPIST

## 2018-01-05 ENCOUNTER — THERAPY VISIT (OUTPATIENT)
Dept: PHYSICAL THERAPY | Facility: CLINIC | Age: 68
End: 2018-01-05
Payer: COMMERCIAL

## 2018-01-05 DIAGNOSIS — Z47.89 AFTERCARE FOLLOWING SURGERY OF THE MUSCULOSKELETAL SYSTEM: ICD-10-CM

## 2018-01-05 DIAGNOSIS — S86.111A RUPTURE OF RIGHT POSTERIOR TIBIALIS TENDON, INITIAL ENCOUNTER: ICD-10-CM

## 2018-01-05 PROCEDURE — 97140 MANUAL THERAPY 1/> REGIONS: CPT | Mod: GP | Performed by: PHYSICAL THERAPIST

## 2018-01-05 PROCEDURE — 97110 THERAPEUTIC EXERCISES: CPT | Mod: GP | Performed by: PHYSICAL THERAPIST

## 2018-01-08 ENCOUNTER — THERAPY VISIT (OUTPATIENT)
Dept: PHYSICAL THERAPY | Facility: CLINIC | Age: 68
End: 2018-01-08
Payer: COMMERCIAL

## 2018-01-08 DIAGNOSIS — S86.111A RUPTURE OF RIGHT POSTERIOR TIBIALIS TENDON, INITIAL ENCOUNTER: ICD-10-CM

## 2018-01-08 DIAGNOSIS — Z47.89 AFTERCARE FOLLOWING SURGERY OF THE MUSCULOSKELETAL SYSTEM: ICD-10-CM

## 2018-01-08 PROCEDURE — 97110 THERAPEUTIC EXERCISES: CPT | Mod: GP | Performed by: PHYSICAL THERAPIST

## 2018-01-08 PROCEDURE — 97140 MANUAL THERAPY 1/> REGIONS: CPT | Mod: GP | Performed by: PHYSICAL THERAPIST

## 2018-01-10 ENCOUNTER — THERAPY VISIT (OUTPATIENT)
Dept: PHYSICAL THERAPY | Facility: CLINIC | Age: 68
End: 2018-01-10
Payer: COMMERCIAL

## 2018-01-10 DIAGNOSIS — S86.111A RUPTURE OF RIGHT POSTERIOR TIBIALIS TENDON, INITIAL ENCOUNTER: ICD-10-CM

## 2018-01-10 DIAGNOSIS — Z47.89 AFTERCARE FOLLOWING SURGERY OF THE MUSCULOSKELETAL SYSTEM: ICD-10-CM

## 2018-01-10 PROCEDURE — 97140 MANUAL THERAPY 1/> REGIONS: CPT | Mod: GP | Performed by: PHYSICAL THERAPIST

## 2018-01-10 PROCEDURE — 97110 THERAPEUTIC EXERCISES: CPT | Mod: GP | Performed by: PHYSICAL THERAPIST

## 2018-01-12 ENCOUNTER — THERAPY VISIT (OUTPATIENT)
Dept: PHYSICAL THERAPY | Facility: CLINIC | Age: 68
End: 2018-01-12
Payer: COMMERCIAL

## 2018-01-12 DIAGNOSIS — S86.111A RUPTURE OF RIGHT POSTERIOR TIBIALIS TENDON, INITIAL ENCOUNTER: ICD-10-CM

## 2018-01-12 DIAGNOSIS — Z47.89 AFTERCARE FOLLOWING SURGERY OF THE MUSCULOSKELETAL SYSTEM: ICD-10-CM

## 2018-01-12 PROCEDURE — 97110 THERAPEUTIC EXERCISES: CPT | Mod: GP | Performed by: PHYSICAL THERAPIST

## 2018-01-12 PROCEDURE — 97140 MANUAL THERAPY 1/> REGIONS: CPT | Mod: GP | Performed by: PHYSICAL THERAPIST

## 2018-01-15 ENCOUNTER — THERAPY VISIT (OUTPATIENT)
Dept: PHYSICAL THERAPY | Facility: CLINIC | Age: 68
End: 2018-01-15
Payer: COMMERCIAL

## 2018-01-15 DIAGNOSIS — Z47.89 AFTERCARE FOLLOWING SURGERY OF THE MUSCULOSKELETAL SYSTEM: ICD-10-CM

## 2018-01-15 DIAGNOSIS — S86.111A RUPTURE OF RIGHT POSTERIOR TIBIALIS TENDON, INITIAL ENCOUNTER: ICD-10-CM

## 2018-01-15 PROCEDURE — 97110 THERAPEUTIC EXERCISES: CPT | Mod: GP | Performed by: PHYSICAL THERAPIST

## 2018-01-15 PROCEDURE — 97140 MANUAL THERAPY 1/> REGIONS: CPT | Mod: GP | Performed by: PHYSICAL THERAPIST

## 2018-01-15 PROCEDURE — 97112 NEUROMUSCULAR REEDUCATION: CPT | Mod: GP | Performed by: PHYSICAL THERAPIST

## 2018-01-17 ENCOUNTER — THERAPY VISIT (OUTPATIENT)
Dept: PHYSICAL THERAPY | Facility: CLINIC | Age: 68
End: 2018-01-17
Payer: COMMERCIAL

## 2018-01-17 DIAGNOSIS — S86.111A RUPTURE OF RIGHT POSTERIOR TIBIALIS TENDON, INITIAL ENCOUNTER: ICD-10-CM

## 2018-01-17 DIAGNOSIS — Z47.89 AFTERCARE FOLLOWING SURGERY OF THE MUSCULOSKELETAL SYSTEM: ICD-10-CM

## 2018-01-17 PROCEDURE — 97110 THERAPEUTIC EXERCISES: CPT | Mod: GP | Performed by: PHYSICAL THERAPIST

## 2018-01-17 PROCEDURE — 97112 NEUROMUSCULAR REEDUCATION: CPT | Mod: GP | Performed by: PHYSICAL THERAPIST

## 2018-01-17 PROCEDURE — 97140 MANUAL THERAPY 1/> REGIONS: CPT | Mod: GP | Performed by: PHYSICAL THERAPIST

## 2018-01-19 ENCOUNTER — THERAPY VISIT (OUTPATIENT)
Dept: PHYSICAL THERAPY | Facility: CLINIC | Age: 68
End: 2018-01-19
Payer: COMMERCIAL

## 2018-01-19 DIAGNOSIS — Z47.89 AFTERCARE FOLLOWING SURGERY OF THE MUSCULOSKELETAL SYSTEM: ICD-10-CM

## 2018-01-19 DIAGNOSIS — S86.111A RUPTURE OF RIGHT POSTERIOR TIBIALIS TENDON, INITIAL ENCOUNTER: ICD-10-CM

## 2018-01-19 PROCEDURE — 97110 THERAPEUTIC EXERCISES: CPT | Mod: GP | Performed by: PHYSICAL THERAPIST

## 2018-01-19 PROCEDURE — 97140 MANUAL THERAPY 1/> REGIONS: CPT | Mod: GP | Performed by: PHYSICAL THERAPIST

## 2018-01-19 PROCEDURE — 97112 NEUROMUSCULAR REEDUCATION: CPT | Mod: GP | Performed by: PHYSICAL THERAPIST

## 2018-01-22 ENCOUNTER — THERAPY VISIT (OUTPATIENT)
Dept: PHYSICAL THERAPY | Facility: CLINIC | Age: 68
End: 2018-01-22
Payer: COMMERCIAL

## 2018-01-22 DIAGNOSIS — Z47.89 AFTERCARE FOLLOWING SURGERY OF THE MUSCULOSKELETAL SYSTEM: ICD-10-CM

## 2018-01-22 DIAGNOSIS — S86.111A RUPTURE OF RIGHT POSTERIOR TIBIALIS TENDON, INITIAL ENCOUNTER: ICD-10-CM

## 2018-01-22 PROCEDURE — 97140 MANUAL THERAPY 1/> REGIONS: CPT | Mod: GP | Performed by: PHYSICAL THERAPIST

## 2018-01-22 PROCEDURE — 97110 THERAPEUTIC EXERCISES: CPT | Mod: GP | Performed by: PHYSICAL THERAPIST

## 2018-01-22 PROCEDURE — 97112 NEUROMUSCULAR REEDUCATION: CPT | Mod: GP | Performed by: PHYSICAL THERAPIST

## 2018-01-22 NOTE — LETTER
"[facility]  [address]  [place] [address]  [phone]    2018    Re: Bridget Lawson   :   1950  MRN:  [id number]   REFERRING PHYSICIAN:   Xavi Duran    [facility]  Date of Initial Evaluation:  2017  Visits:  10 Rxs Used: 10  Reason for Referral:     Aftercare following surgery of the musculoskeletal system  Rupture of right posterior tibialis tendon, initial encounter  PROGRESS  REPORT  Progress reporting period is from 2017 to 2018.   Bridget has been seen in PT 10 times s/p right posterior tibial tendon reconstruction.    SUBJECTIVE  Subjective: no pain at right ankle but brief intermittent \"stinging\" sensations at medial ankle.      Current Pain level:  (0-1/10).      Initial Pain level: 2/10.   Changes in function:  Yes (See Goal flowsheet attached for changes in current functional level)  Adverse reaction to treatment or activity: None  OBJECTIVE  Changes noted in objective findings:    Objective: Patient has progressed to ambulation with cane without ortho shoe, with SBA.  She continues to use walker in community. Her main issue with cane seems to be balance/confidence. Right ankle AROM: DF  10, NE 55, inversion 5, eversion 8.  Lateral ankle pain complaint has resolved but patient continues tender here.   ASSESSMENT/PLAN  Updated problem list and treatment plan: Diagnosis 1:  S/p right posterior tibial tendon reconstruction.    Pain -  manual therapy, self management and education  Decreased ROM/flexibility - manual therapy and therapeutic exercise  Decreased strength - therapeutic exercise and therapeutic activities  Impaired balance - neuro re-education and therapeutic activities  Impaired gait - gait training  STG/LTGs have been met or progress has been made towards goals:  Yes (See Goal flow sheet completed today.)  Assessment of Progress: The " patient's condition is improving.  Self Management Plans:  Patient has been instructed in a home treatment program.  I have re-evaluated this patient and find that the nature, scope, duration and intensity of the therapy is appropriate for the medical condition of the patient.  Bridget continues to require the following intervention to meet STG and LTG's:  PT  Recommendations:  This patient would benefit from continued therapy.     Frequency:  1 X week, once daily  Duration:  for 6 weeks  Re: Bridget ANTHONY Lulu   :   1950    Bridget would benefit from continued PT to address safe independent gait, balance, and ankle strengthening as OK'd by surgeon.    Thank you for your referral.    INQUIRIES  Therapist: Zoraida Arriola PT, [facility]   [facility]  [address] [place] [address] [address] [address]  Phone: [phone]  Fax: [phone]

## 2018-01-22 NOTE — PROGRESS NOTES
"Subjective:  HPI                    Objective:  System    Physical Exam    General     ROS    Assessment/Plan:    PROGRESS  REPORT    Progress reporting period is from 12/28/2017 to 1/22/2018.   Bridget has been seen in PT 10 times s/p right posterior tibial tendon reconstruction.      SUBJECTIVE  Subjective: no pain at right ankle but brief intermittent \"stinging\" sensations at medial ankle.      Current Pain level:  (0-1/10).      Initial Pain level: 2/10.   Changes in function:  Yes (See Goal flowsheet attached for changes in current functional level)  Adverse reaction to treatment or activity: None    OBJECTIVE  Changes noted in objective findings:    Objective: Patient has progressed to ambulation with cane without ortho shoe, with SBA.  She continues to use walker in community. Her main issue with cane seems to be balance/confidence. Right ankle AROM: DF  10, MI 55, inversion 5, eversion 8.  Lateral ankle pain complaint has resolved but patient continues tender here.     ASSESSMENT/PLAN  Updated problem list and treatment plan: Diagnosis 1:  S/p right posterior tibial tendon reconstruction.    Pain -  manual therapy, self management and education  Decreased ROM/flexibility - manual therapy and therapeutic exercise  Decreased strength - therapeutic exercise and therapeutic activities  Impaired balance - neuro re-education and therapeutic activities  Impaired gait - gait training  STG/LTGs have been met or progress has been made towards goals:  Yes (See Goal flow sheet completed today.)  Assessment of Progress: The patient's condition is improving.  Self Management Plans:  Patient has been instructed in a home treatment program.  I have re-evaluated this patient and find that the nature, scope, duration and intensity of the therapy is appropriate for the medical condition of the patient.  Bridget continues to require the following intervention to meet STG and LTG's:  PT    Recommendations:  This patient would " benefit from continued therapy.     Frequency:  1 X week, once daily  Duration:  for 6 weeks  Bridget would benefit from continued PT to address safe independent gait, balance, and ankle strengthening as OK'd by surgeon.          Please refer to the daily flowsheet for treatment today, total treatment time and time spent performing 1:1 timed codes.

## 2018-01-24 ENCOUNTER — THERAPY VISIT (OUTPATIENT)
Dept: PHYSICAL THERAPY | Facility: CLINIC | Age: 68
End: 2018-01-24
Payer: COMMERCIAL

## 2018-01-24 DIAGNOSIS — S86.111A RUPTURE OF RIGHT POSTERIOR TIBIALIS TENDON, INITIAL ENCOUNTER: ICD-10-CM

## 2018-01-24 DIAGNOSIS — Z47.89 AFTERCARE FOLLOWING SURGERY OF THE MUSCULOSKELETAL SYSTEM: ICD-10-CM

## 2018-01-24 PROCEDURE — 97110 THERAPEUTIC EXERCISES: CPT | Mod: GP | Performed by: PHYSICAL THERAPIST

## 2018-01-24 PROCEDURE — 97112 NEUROMUSCULAR REEDUCATION: CPT | Mod: GP | Performed by: PHYSICAL THERAPIST

## 2018-01-24 PROCEDURE — 97140 MANUAL THERAPY 1/> REGIONS: CPT | Mod: GP | Performed by: PHYSICAL THERAPIST

## 2018-01-29 ENCOUNTER — THERAPY VISIT (OUTPATIENT)
Dept: PHYSICAL THERAPY | Facility: CLINIC | Age: 68
End: 2018-01-29
Payer: COMMERCIAL

## 2018-01-29 DIAGNOSIS — Z47.89 AFTERCARE FOLLOWING SURGERY OF THE MUSCULOSKELETAL SYSTEM: ICD-10-CM

## 2018-01-29 DIAGNOSIS — S86.111A RUPTURE OF RIGHT POSTERIOR TIBIALIS TENDON, INITIAL ENCOUNTER: ICD-10-CM

## 2018-01-29 PROCEDURE — 97110 THERAPEUTIC EXERCISES: CPT | Mod: GP | Performed by: PHYSICAL THERAPIST

## 2018-01-29 PROCEDURE — 97140 MANUAL THERAPY 1/> REGIONS: CPT | Mod: GP | Performed by: PHYSICAL THERAPIST

## 2018-01-29 PROCEDURE — 97112 NEUROMUSCULAR REEDUCATION: CPT | Mod: GP | Performed by: PHYSICAL THERAPIST

## 2018-02-06 ENCOUNTER — THERAPY VISIT (OUTPATIENT)
Dept: PHYSICAL THERAPY | Facility: CLINIC | Age: 68
End: 2018-02-06
Payer: COMMERCIAL

## 2018-02-06 DIAGNOSIS — Z47.89 AFTERCARE FOLLOWING SURGERY OF THE MUSCULOSKELETAL SYSTEM: ICD-10-CM

## 2018-02-06 DIAGNOSIS — S86.111A RUPTURE OF RIGHT POSTERIOR TIBIALIS TENDON, INITIAL ENCOUNTER: ICD-10-CM

## 2018-02-06 PROCEDURE — 97112 NEUROMUSCULAR REEDUCATION: CPT | Mod: GP | Performed by: PHYSICAL THERAPIST

## 2018-02-06 PROCEDURE — 97110 THERAPEUTIC EXERCISES: CPT | Mod: GP | Performed by: PHYSICAL THERAPIST

## 2018-02-06 PROCEDURE — 97140 MANUAL THERAPY 1/> REGIONS: CPT | Mod: GP | Performed by: PHYSICAL THERAPIST

## 2018-02-12 ENCOUNTER — THERAPY VISIT (OUTPATIENT)
Dept: PHYSICAL THERAPY | Facility: CLINIC | Age: 68
End: 2018-02-12
Payer: COMMERCIAL

## 2018-02-12 DIAGNOSIS — Z47.89 AFTERCARE FOLLOWING SURGERY OF THE MUSCULOSKELETAL SYSTEM: ICD-10-CM

## 2018-02-12 DIAGNOSIS — S86.111A RUPTURE OF RIGHT POSTERIOR TIBIALIS TENDON, INITIAL ENCOUNTER: ICD-10-CM

## 2018-02-12 PROCEDURE — 97110 THERAPEUTIC EXERCISES: CPT | Mod: GP | Performed by: PHYSICAL THERAPIST

## 2018-02-12 PROCEDURE — 97112 NEUROMUSCULAR REEDUCATION: CPT | Mod: GP | Performed by: PHYSICAL THERAPIST

## 2018-02-17 DIAGNOSIS — L30.4 INTERTRIGO: ICD-10-CM

## 2018-02-19 RX ORDER — KETOCONAZOLE 20 MG/G
CREAM TOPICAL
Qty: 60 G | Refills: 3 | Status: SHIPPED | OUTPATIENT
Start: 2018-02-19 | End: 2019-02-15

## 2018-02-19 NOTE — TELEPHONE ENCOUNTER
"Requested Prescriptions   Pending Prescriptions Disp Refills     ketoconazole (NIZORAL) 2 % cream [Pharmacy Med Name: KETOCONAZOLE 2%     CRE]  3      Last Written Prescription Date:  na  Last Fill Quantity: na,  # refills: na   Last office visit: 10/23/2017 with prescribing provider:  Dr Hughes   Future Office Visit:     Sig: APPLY CREAM TOPICALLY DAILY AS NEEDED    Antifungal Agents Passed    2/17/2018  4:17 PM       Passed - Recent or future visit with authorizing provider's specialty    Patient had office visit in the last year or has a visit in the next 30 days with authorizing provider.  See \"Patient Info\" tab in inbasket, or \"Choose Columns\" in Meds & Orders section of the refill encounter.            Passed - Not Fluconazole or Terconazole     If oral Fluconazole or Terconazole, may refill if indicated in progress notes.             "

## 2018-02-20 ENCOUNTER — THERAPY VISIT (OUTPATIENT)
Dept: PHYSICAL THERAPY | Facility: CLINIC | Age: 68
End: 2018-02-20
Payer: COMMERCIAL

## 2018-02-20 DIAGNOSIS — Z47.89 AFTERCARE FOLLOWING SURGERY OF THE MUSCULOSKELETAL SYSTEM: ICD-10-CM

## 2018-02-20 DIAGNOSIS — S86.111A RUPTURE OF RIGHT POSTERIOR TIBIALIS TENDON, INITIAL ENCOUNTER: ICD-10-CM

## 2018-02-20 PROCEDURE — 97110 THERAPEUTIC EXERCISES: CPT | Mod: GP | Performed by: PHYSICAL THERAPIST

## 2018-02-20 PROCEDURE — 97140 MANUAL THERAPY 1/> REGIONS: CPT | Mod: GP | Performed by: PHYSICAL THERAPIST

## 2018-02-20 PROCEDURE — 97112 NEUROMUSCULAR REEDUCATION: CPT | Mod: GP | Performed by: PHYSICAL THERAPIST

## 2018-02-26 ENCOUNTER — THERAPY VISIT (OUTPATIENT)
Dept: PHYSICAL THERAPY | Facility: CLINIC | Age: 68
End: 2018-02-26
Payer: COMMERCIAL

## 2018-02-26 DIAGNOSIS — S86.111A RUPTURE OF RIGHT POSTERIOR TIBIALIS TENDON, INITIAL ENCOUNTER: ICD-10-CM

## 2018-02-26 DIAGNOSIS — Z47.89 AFTERCARE FOLLOWING SURGERY OF THE MUSCULOSKELETAL SYSTEM: ICD-10-CM

## 2018-02-26 PROCEDURE — 97112 NEUROMUSCULAR REEDUCATION: CPT | Mod: GP | Performed by: PHYSICAL THERAPIST

## 2018-02-26 PROCEDURE — 97110 THERAPEUTIC EXERCISES: CPT | Mod: GP | Performed by: PHYSICAL THERAPIST

## 2018-02-26 PROCEDURE — 97140 MANUAL THERAPY 1/> REGIONS: CPT | Mod: GP | Performed by: PHYSICAL THERAPIST

## 2018-03-06 ENCOUNTER — THERAPY VISIT (OUTPATIENT)
Dept: PHYSICAL THERAPY | Facility: CLINIC | Age: 68
End: 2018-03-06
Payer: COMMERCIAL

## 2018-03-06 DIAGNOSIS — Z47.89 AFTERCARE FOLLOWING SURGERY OF THE MUSCULOSKELETAL SYSTEM: ICD-10-CM

## 2018-03-06 DIAGNOSIS — S86.111A RUPTURE OF RIGHT POSTERIOR TIBIALIS TENDON, INITIAL ENCOUNTER: ICD-10-CM

## 2018-03-06 PROCEDURE — 97140 MANUAL THERAPY 1/> REGIONS: CPT | Mod: GP | Performed by: PHYSICAL THERAPIST

## 2018-03-06 PROCEDURE — 97110 THERAPEUTIC EXERCISES: CPT | Mod: GP | Performed by: PHYSICAL THERAPIST

## 2018-03-06 PROCEDURE — 97112 NEUROMUSCULAR REEDUCATION: CPT | Mod: GP | Performed by: PHYSICAL THERAPIST

## 2018-03-12 ENCOUNTER — THERAPY VISIT (OUTPATIENT)
Dept: PHYSICAL THERAPY | Facility: CLINIC | Age: 68
End: 2018-03-12
Payer: COMMERCIAL

## 2018-03-12 DIAGNOSIS — S86.111A RUPTURE OF RIGHT POSTERIOR TIBIALIS TENDON, INITIAL ENCOUNTER: ICD-10-CM

## 2018-03-12 DIAGNOSIS — Z47.89 AFTERCARE FOLLOWING SURGERY OF THE MUSCULOSKELETAL SYSTEM: ICD-10-CM

## 2018-03-12 PROCEDURE — 97112 NEUROMUSCULAR REEDUCATION: CPT | Mod: GP | Performed by: PHYSICAL THERAPIST

## 2018-03-12 PROCEDURE — 97110 THERAPEUTIC EXERCISES: CPT | Mod: GP | Performed by: PHYSICAL THERAPIST

## 2018-03-12 NOTE — LETTER
[facility]  [id number]  [place] [address]  [phone]    2018    Re: Bridget Lawson   :   1950  MRN:  [id number]   REFERRING PHYSICIAN:   Xavi Duran    [facility]  Date of Initial Evaluation:  2017  Visits:  18 Rxs Used: 18  Reason for Referral:     Aftercare following surgery of the musculoskeletal system  Rupture of right posterior tibialis tendon, initial encounter  DISCHARGE REPORT  Progress reporting period is from 2017 to 3/12/2018.   Bridget has been seen in PT 18 times s/p right posterior tibialis tendon reconstruction.  Treatment has included manual therapy, ROM and strengthening exercises, and gait training.  SUBJECTIVE    Subjective: Patient continues to report mild right lateral ankle pain     Current Pain level:  (0-1/10).        Initial Pain level: 2/10.   Changes in function:  Yes (See Goal flowsheet attached for changes in current functional level)  Adverse reaction to treatment or activity: None  OBJECTIVE  Changes noted in objective findings:    Objective: Patient ambulates with narrow base quad cane.  She does not rely on cane for weight bearing, but gait pattern is improved with cane.  Most notable gait deviation is loss of push off bilat.  Patient is unable to do single toe raise on either leg.  She can invert right ankle through partial range in gravity neutral but not against gravity.  Mild edema at lateral ankle persists.   ASSESSMENT/PLAN  STG/LTGs have been met or progress has been made towards goals:  Yes (See Goal flow sheet completed today.)  Assessment of Progress: The patient's condition is improving.  Self Management Plans:  Patient is independent in a home treatment program.  Recommendations:  Patient returns to MD for follow up visit on 3/20/2018.    Thank you for your referral.    INQUIRIES  Therapist:Zoraida Arriola, PT,  \[facility]\""  [facility]  [address] [place] [address] [id number]  [place] [address]  Phone: [phone]  Fax: [phone]

## 2018-03-13 NOTE — PROGRESS NOTES
Subjective:  HPI                    Objective:  System    Physical Exam    General     ROS    Assessment/Plan:    DISCHARGE REPORT    Progress reporting period is from 12/28/2017 to 3/12/2018.   Bridget has been seen in PT 18 times s/p right posterior tibialis tendon reconstruction.  Treatment has included manual therapy, ROM and strengthening exercises, and gait training.    SUBJECTIVE    Subjective: Patient continues to report mild right lateral ankle pain     Current Pain level:  (0-1/10).        Initial Pain level: 2/10.   Changes in function:  Yes (See Goal flowsheet attached for changes in current functional level)  Adverse reaction to treatment or activity: None    OBJECTIVE  Changes noted in objective findings:    Objective: Patient ambulates with narrow base quad cane.  She does not rely on cane for weight bearing, but gait pattern is improved with cane.  Most notable gait deviation is loss of push off bilat.  Patient is unable to do single toe raise on either leg.  She can invert right ankle through partial range in gravity neutral but not against gravity.  Mild edema at lateral ankle persists.     ASSESSMENT/PLAN    STG/LTGs have been met or progress has been made towards goals:  Yes (See Goal flow sheet completed today.)  Assessment of Progress: The patient's condition is improving.  Self Management Plans:  Patient is independent in a home treatment program.      Recommendations:  Patient returns to MD for follow up visit on 3/20/2018.    Please refer to the daily flowsheet for treatment today, total treatment time and time spent performing 1:1 timed codes.

## 2018-03-20 ENCOUNTER — TRANSFERRED RECORDS (OUTPATIENT)
Dept: HEALTH INFORMATION MANAGEMENT | Facility: CLINIC | Age: 68
End: 2018-03-20

## 2018-05-08 DIAGNOSIS — I10 ESSENTIAL HYPERTENSION WITH GOAL BLOOD PRESSURE LESS THAN 140/90: ICD-10-CM

## 2018-05-08 DIAGNOSIS — E78.5 HYPERLIPIDEMIA WITH TARGET LDL LESS THAN 100: ICD-10-CM

## 2018-05-09 NOTE — TELEPHONE ENCOUNTER
"RAMIPRIL 10MG CAP  Last Written Prescription Date:  11/27/17  Last Fill Quantity: 90,  # refills: 1   Last office visit: 10/23/2017 with prescribing provider:     Future Office Visit:    Requested Prescriptions   Pending Prescriptions Disp Refills     ramipril (ALTACE) 10 MG capsule [Pharmacy Med Name: RAMIPRIL 10MG       CAP] 90 capsule 1     Sig: TAKE ONE CAPSULE BY MOUTH ONCE DAILY    ACE Inhibitors (Including Combos) Protocol Failed    5/8/2018  3:22 PM       Failed - Blood pressure under 140/90 in past 12 months    BP Readings from Last 3 Encounters:   11/02/17 168/83   10/23/17 120/72   10/24/16 128/70                Passed - Recent (12 mo) or future (30 days) visit within the authorizing provider's specialty    Patient had office visit in the last 12 months or has a visit in the next 30 days with authorizing provider or within the authorizing provider's specialty.  See \"Patient Info\" tab in inbasket, or \"Choose Columns\" in Meds & Orders section of the refill encounter.           Passed - Patient is age 18 or older       Passed - No active pregnancy on record       Passed - Normal serum creatinine on file in past 12 months    Recent Labs   Lab Test  10/23/17   1519   CR  0.75            Passed - Normal serum potassium on file in past 12 months    Recent Labs   Lab Test  10/23/17   1519   POTASSIUM  4.2            Passed - No positive pregnancy test in past 12 months        pravastatin (PRAVACHOL) 40 MG tablet [Pharmacy Med Name: PRAVASTATIN 40MG    TAB] 90 tablet 1     Sig: TAKE ONE TABLET BY MOUTH ONCE DAILY    Statins Protocol Failed    5/8/2018  3:22 PM       Failed - LDL on file in past 12 months    Recent Labs   Lab Test  10/24/16   0937   LDL  82            Passed - No abnormal creatine kinase in past 12 months    No lab results found.            Passed - Recent (12 mo) or future (30 days) visit within the authorizing provider's specialty    Patient had office visit in the last 12 months or has a visit " "in the next 30 days with authorizing provider or within the authorizing provider's specialty.  See \"Patient Info\" tab in inbasket, or \"Choose Columns\" in Meds & Orders section of the refill encounter.           Passed - Patient is age 18 or older       Passed - No active pregnancy on record       Passed - No positive pregnancy test in past 12 months        amLODIPine (NORVASC) 5 MG tablet [Pharmacy Med Name: AmLODIPine Besylate 5MG TAB] 90 tablet 1     Sig: TAKE ONE TABLET BY MOUTH ONCE DAILY    Calcium Channel Blockers Protocol  Failed    5/8/2018  3:22 PM       Failed - Blood pressure under 140/90 in past 12 months    BP Readings from Last 3 Encounters:   11/02/17 168/83   10/23/17 120/72   10/24/16 128/70                Passed - Recent (12 mo) or future (30 days) visit within the authorizing provider's specialty    Patient had office visit in the last 12 months or has a visit in the next 30 days with authorizing provider or within the authorizing provider's specialty.  See \"Patient Info\" tab in inbasket, or \"Choose Columns\" in Meds & Orders section of the refill encounter.           Passed - Patient is age 18 or older       Passed - No active pregnancy on record       Passed - Normal serum creatinine on file in past 12 months    Recent Labs   Lab Test  10/23/17   1519   CR  0.75            Passed - No positive pregnancy test in past 12 months        labetalol (NORMODYNE) 200 MG tablet [Pharmacy Med Name: LABETALOL HCL 200MG     TAB] 270 tablet 1     Sig: TAKE ONE TABLET BY MOUTH THREE TIMES DAILY    Beta-Blockers Protocol Failed    5/8/2018  3:22 PM       Failed - Blood pressure under 140/90 in past 12 months    BP Readings from Last 3 Encounters:   11/02/17 168/83   10/23/17 120/72   10/24/16 128/70                Passed - Patient is age 6 or older       Passed - Recent (12 mo) or future (30 days) visit within the authorizing provider's specialty    Patient had office visit in the last 12 months or has a visit " "in the next 30 days with authorizing provider or within the authorizing provider's specialty.  See \"Patient Info\" tab in inbasket, or \"Choose Columns\" in Meds & Orders section of the refill encounter.            isosorbide mononitrate (IMDUR) 60 MG 24 hr tablet [Pharmacy Med Name: ISOSORB MONO ER 60MG TAB] 90 tablet 1     Sig: TAKE ONE TABLET BY MOUTH ONCE DAILY    Nitrates Failed    5/8/2018  3:22 PM       Failed - Blood pressure under 140/90 in past 12 months    BP Readings from Last 3 Encounters:   11/02/17 168/83   10/23/17 120/72   10/24/16 128/70                Passed - Pt is not on erectile dysfunction medications       Passed - Recent (12 mo) or future (30 days) visit within the authorizing provider's specialty    Patient had office visit in the last 12 months or has a visit in the next 30 days with authorizing provider or within the authorizing provider's specialty.  See \"Patient Info\" tab in inbasket, or \"Choose Columns\" in Meds & Orders section of the refill encounter.           Passed - Patient is age 18 or older        PRAVASTATIN 40MG TAB  Last Written Prescription Date:  11/27/17  Last Fill Quantity: 90,  # refills: 1   Last office visit: 10/23/2017 with prescribing provider:     Future Office Visit:    Requested Prescriptions   Pending Prescriptions Disp Refills     ramipril (ALTACE) 10 MG capsule [Pharmacy Med Name: RAMIPRIL 10MG       CAP] 90 capsule 1     Sig: TAKE ONE CAPSULE BY MOUTH ONCE DAILY    ACE Inhibitors (Including Combos) Protocol Failed    5/8/2018  3:22 PM       Failed - Blood pressure under 140/90 in past 12 months    BP Readings from Last 3 Encounters:   11/02/17 168/83   10/23/17 120/72   10/24/16 128/70                Passed - Recent (12 mo) or future (30 days) visit within the authorizing provider's specialty    Patient had office visit in the last 12 months or has a visit in the next 30 days with authorizing provider or within the authorizing provider's specialty.  See \"Patient " "Info\" tab in inbasket, or \"Choose Columns\" in Meds & Orders section of the refill encounter.           Passed - Patient is age 18 or older       Passed - No active pregnancy on record       Passed - Normal serum creatinine on file in past 12 months    Recent Labs   Lab Test  10/23/17   1519   CR  0.75            Passed - Normal serum potassium on file in past 12 months    Recent Labs   Lab Test  10/23/17   1519   POTASSIUM  4.2            Passed - No positive pregnancy test in past 12 months        pravastatin (PRAVACHOL) 40 MG tablet [Pharmacy Med Name: PRAVASTATIN 40MG    TAB] 90 tablet 1     Sig: TAKE ONE TABLET BY MOUTH ONCE DAILY    Statins Protocol Failed    5/8/2018  3:22 PM       Failed - LDL on file in past 12 months    Recent Labs   Lab Test  10/24/16   0937   LDL  82            Passed - No abnormal creatine kinase in past 12 months    No lab results found.            Passed - Recent (12 mo) or future (30 days) visit within the authorizing provider's specialty    Patient had office visit in the last 12 months or has a visit in the next 30 days with authorizing provider or within the authorizing provider's specialty.  See \"Patient Info\" tab in inbasket, or \"Choose Columns\" in Meds & Orders section of the refill encounter.           Passed - Patient is age 18 or older       Passed - No active pregnancy on record       Passed - No positive pregnancy test in past 12 months        amLODIPine (NORVASC) 5 MG tablet [Pharmacy Med Name: AmLODIPine Besylate 5MG TAB] 90 tablet 1     Sig: TAKE ONE TABLET BY MOUTH ONCE DAILY    Calcium Channel Blockers Protocol  Failed    5/8/2018  3:22 PM       Failed - Blood pressure under 140/90 in past 12 months    BP Readings from Last 3 Encounters:   11/02/17 168/83   10/23/17 120/72   10/24/16 128/70                Passed - Recent (12 mo) or future (30 days) visit within the authorizing provider's specialty    Patient had office visit in the last 12 months or has a visit in the " "next 30 days with authorizing provider or within the authorizing provider's specialty.  See \"Patient Info\" tab in inbasket, or \"Choose Columns\" in Meds & Orders section of the refill encounter.           Passed - Patient is age 18 or older       Passed - No active pregnancy on record       Passed - Normal serum creatinine on file in past 12 months    Recent Labs   Lab Test  10/23/17   1519   CR  0.75            Passed - No positive pregnancy test in past 12 months        labetalol (NORMODYNE) 200 MG tablet [Pharmacy Med Name: LABETALOL HCL 200MG     TAB] 270 tablet 1     Sig: TAKE ONE TABLET BY MOUTH THREE TIMES DAILY    Beta-Blockers Protocol Failed    5/8/2018  3:22 PM       Failed - Blood pressure under 140/90 in past 12 months    BP Readings from Last 3 Encounters:   11/02/17 168/83   10/23/17 120/72   10/24/16 128/70                Passed - Patient is age 6 or older       Passed - Recent (12 mo) or future (30 days) visit within the authorizing provider's specialty    Patient had office visit in the last 12 months or has a visit in the next 30 days with authorizing provider or within the authorizing provider's specialty.  See \"Patient Info\" tab in inbasket, or \"Choose Columns\" in Meds & Orders section of the refill encounter.            isosorbide mononitrate (IMDUR) 60 MG 24 hr tablet [Pharmacy Med Name: ISOSORB MONO ER 60MG TAB] 90 tablet 1     Sig: TAKE ONE TABLET BY MOUTH ONCE DAILY    Nitrates Failed    5/8/2018  3:22 PM       Failed - Blood pressure under 140/90 in past 12 months    BP Readings from Last 3 Encounters:   11/02/17 168/83   10/23/17 120/72   10/24/16 128/70                Passed - Pt is not on erectile dysfunction medications       Passed - Recent (12 mo) or future (30 days) visit within the authorizing provider's specialty    Patient had office visit in the last 12 months or has a visit in the next 30 days with authorizing provider or within the authorizing provider's specialty.  See " "\"Patient Info\" tab in inbasket, or \"Choose Columns\" in Meds & Orders section of the refill encounter.           Passed - Patient is age 18 or older        AmLODIPine Besylate 5MG TAB  Last Written Prescription Date:  11/27/17  Last Fill Quantity: 90,  # refills: 1   Last office visit: 10/23/2017 with prescribing provider:     Future Office Visit:    Requested Prescriptions   Pending Prescriptions Disp Refills     ramipril (ALTACE) 10 MG capsule [Pharmacy Med Name: RAMIPRIL 10MG       CAP] 90 capsule 1     Sig: TAKE ONE CAPSULE BY MOUTH ONCE DAILY    ACE Inhibitors (Including Combos) Protocol Failed    5/8/2018  3:22 PM       Failed - Blood pressure under 140/90 in past 12 months    BP Readings from Last 3 Encounters:   11/02/17 168/83   10/23/17 120/72   10/24/16 128/70                Passed - Recent (12 mo) or future (30 days) visit within the authorizing provider's specialty    Patient had office visit in the last 12 months or has a visit in the next 30 days with authorizing provider or within the authorizing provider's specialty.  See \"Patient Info\" tab in inbasket, or \"Choose Columns\" in Meds & Orders section of the refill encounter.           Passed - Patient is age 18 or older       Passed - No active pregnancy on record       Passed - Normal serum creatinine on file in past 12 months    Recent Labs   Lab Test  10/23/17   1519   CR  0.75            Passed - Normal serum potassium on file in past 12 months    Recent Labs   Lab Test  10/23/17   1519   POTASSIUM  4.2            Passed - No positive pregnancy test in past 12 months        pravastatin (PRAVACHOL) 40 MG tablet [Pharmacy Med Name: PRAVASTATIN 40MG    TAB] 90 tablet 1     Sig: TAKE ONE TABLET BY MOUTH ONCE DAILY    Statins Protocol Failed    5/8/2018  3:22 PM       Failed - LDL on file in past 12 months    Recent Labs   Lab Test  10/24/16   0937   LDL  82            Passed - No abnormal creatine kinase in past 12 months    No lab results found.      " "      Passed - Recent (12 mo) or future (30 days) visit within the authorizing provider's specialty    Patient had office visit in the last 12 months or has a visit in the next 30 days with authorizing provider or within the authorizing provider's specialty.  See \"Patient Info\" tab in inbasket, or \"Choose Columns\" in Meds & Orders section of the refill encounter.           Passed - Patient is age 18 or older       Passed - No active pregnancy on record       Passed - No positive pregnancy test in past 12 months        amLODIPine (NORVASC) 5 MG tablet [Pharmacy Med Name: AmLODIPine Besylate 5MG TAB] 90 tablet 1     Sig: TAKE ONE TABLET BY MOUTH ONCE DAILY    Calcium Channel Blockers Protocol  Failed    5/8/2018  3:22 PM       Failed - Blood pressure under 140/90 in past 12 months    BP Readings from Last 3 Encounters:   11/02/17 168/83   10/23/17 120/72   10/24/16 128/70                Passed - Recent (12 mo) or future (30 days) visit within the authorizing provider's specialty    Patient had office visit in the last 12 months or has a visit in the next 30 days with authorizing provider or within the authorizing provider's specialty.  See \"Patient Info\" tab in inbasket, or \"Choose Columns\" in Meds & Orders section of the refill encounter.           Passed - Patient is age 18 or older       Passed - No active pregnancy on record       Passed - Normal serum creatinine on file in past 12 months    Recent Labs   Lab Test  10/23/17   1519   CR  0.75            Passed - No positive pregnancy test in past 12 months        labetalol (NORMODYNE) 200 MG tablet [Pharmacy Med Name: LABETALOL HCL 200MG     TAB] 270 tablet 1     Sig: TAKE ONE TABLET BY MOUTH THREE TIMES DAILY    Beta-Blockers Protocol Failed    5/8/2018  3:22 PM       Failed - Blood pressure under 140/90 in past 12 months    BP Readings from Last 3 Encounters:   11/02/17 168/83   10/23/17 120/72   10/24/16 128/70                Passed - Patient is age 6 or older " "      Passed - Recent (12 mo) or future (30 days) visit within the authorizing provider's specialty    Patient had office visit in the last 12 months or has a visit in the next 30 days with authorizing provider or within the authorizing provider's specialty.  See \"Patient Info\" tab in inbasket, or \"Choose Columns\" in Meds & Orders section of the refill encounter.            isosorbide mononitrate (IMDUR) 60 MG 24 hr tablet [Pharmacy Med Name: ISOSORB MONO ER 60MG TAB] 90 tablet 1     Sig: TAKE ONE TABLET BY MOUTH ONCE DAILY    Nitrates Failed    5/8/2018  3:22 PM       Failed - Blood pressure under 140/90 in past 12 months    BP Readings from Last 3 Encounters:   11/02/17 168/83   10/23/17 120/72   10/24/16 128/70                Passed - Pt is not on erectile dysfunction medications       Passed - Recent (12 mo) or future (30 days) visit within the authorizing provider's specialty    Patient had office visit in the last 12 months or has a visit in the next 30 days with authorizing provider or within the authorizing provider's specialty.  See \"Patient Info\" tab in inbasket, or \"Choose Columns\" in Meds & Orders section of the refill encounter.           Passed - Patient is age 18 or older          LABETALOL HCL 200MG TAB  Last Written Prescription Date:  11/27/17  Last Fill Quantity: 270,  # refills: 1   Last office visit: 10/23/2017 with prescribing provider:     Future Office Visit:    Requested Prescriptions   Pending Prescriptions Disp Refills     ramipril (ALTACE) 10 MG capsule [Pharmacy Med Name: RAMIPRIL 10MG       CAP] 90 capsule 1     Sig: TAKE ONE CAPSULE BY MOUTH ONCE DAILY    ACE Inhibitors (Including Combos) Protocol Failed    5/8/2018  3:22 PM       Failed - Blood pressure under 140/90 in past 12 months    BP Readings from Last 3 Encounters:   11/02/17 168/83   10/23/17 120/72   10/24/16 128/70                Passed - Recent (12 mo) or future (30 days) visit within the authorizing provider's specialty    " "Patient had office visit in the last 12 months or has a visit in the next 30 days with authorizing provider or within the authorizing provider's specialty.  See \"Patient Info\" tab in inbasket, or \"Choose Columns\" in Meds & Orders section of the refill encounter.           Passed - Patient is age 18 or older       Passed - No active pregnancy on record       Passed - Normal serum creatinine on file in past 12 months    Recent Labs   Lab Test  10/23/17   1519   CR  0.75            Passed - Normal serum potassium on file in past 12 months    Recent Labs   Lab Test  10/23/17   1519   POTASSIUM  4.2            Passed - No positive pregnancy test in past 12 months        pravastatin (PRAVACHOL) 40 MG tablet [Pharmacy Med Name: PRAVASTATIN 40MG    TAB] 90 tablet 1     Sig: TAKE ONE TABLET BY MOUTH ONCE DAILY    Statins Protocol Failed    5/8/2018  3:22 PM       Failed - LDL on file in past 12 months    Recent Labs   Lab Test  10/24/16   0937   LDL  82            Passed - No abnormal creatine kinase in past 12 months    No lab results found.            Passed - Recent (12 mo) or future (30 days) visit within the authorizing provider's specialty    Patient had office visit in the last 12 months or has a visit in the next 30 days with authorizing provider or within the authorizing provider's specialty.  See \"Patient Info\" tab in inbasket, or \"Choose Columns\" in Meds & Orders section of the refill encounter.           Passed - Patient is age 18 or older       Passed - No active pregnancy on record       Passed - No positive pregnancy test in past 12 months        amLODIPine (NORVASC) 5 MG tablet [Pharmacy Med Name: AmLODIPine Besylate 5MG TAB] 90 tablet 1     Sig: TAKE ONE TABLET BY MOUTH ONCE DAILY    Calcium Channel Blockers Protocol  Failed    5/8/2018  3:22 PM       Failed - Blood pressure under 140/90 in past 12 months    BP Readings from Last 3 Encounters:   11/02/17 168/83   10/23/17 120/72   10/24/16 128/70             " "   Passed - Recent (12 mo) or future (30 days) visit within the authorizing provider's specialty    Patient had office visit in the last 12 months or has a visit in the next 30 days with authorizing provider or within the authorizing provider's specialty.  See \"Patient Info\" tab in inbasket, or \"Choose Columns\" in Meds & Orders section of the refill encounter.           Passed - Patient is age 18 or older       Passed - No active pregnancy on record       Passed - Normal serum creatinine on file in past 12 months    Recent Labs   Lab Test  10/23/17   1519   CR  0.75            Passed - No positive pregnancy test in past 12 months        labetalol (NORMODYNE) 200 MG tablet [Pharmacy Med Name: LABETALOL HCL 200MG     TAB] 270 tablet 1     Sig: TAKE ONE TABLET BY MOUTH THREE TIMES DAILY    Beta-Blockers Protocol Failed    5/8/2018  3:22 PM       Failed - Blood pressure under 140/90 in past 12 months    BP Readings from Last 3 Encounters:   11/02/17 168/83   10/23/17 120/72   10/24/16 128/70                Passed - Patient is age 6 or older       Passed - Recent (12 mo) or future (30 days) visit within the authorizing provider's specialty    Patient had office visit in the last 12 months or has a visit in the next 30 days with authorizing provider or within the authorizing provider's specialty.  See \"Patient Info\" tab in inbasket, or \"Choose Columns\" in Meds & Orders section of the refill encounter.            isosorbide mononitrate (IMDUR) 60 MG 24 hr tablet [Pharmacy Med Name: ISOSORB MONO ER 60MG TAB] 90 tablet 1     Sig: TAKE ONE TABLET BY MOUTH ONCE DAILY    Nitrates Failed    5/8/2018  3:22 PM       Failed - Blood pressure under 140/90 in past 12 months    BP Readings from Last 3 Encounters:   11/02/17 168/83   10/23/17 120/72   10/24/16 128/70                Passed - Pt is not on erectile dysfunction medications       Passed - Recent (12 mo) or future (30 days) visit within the authorizing provider's specialty    " "Patient had office visit in the last 12 months or has a visit in the next 30 days with authorizing provider or within the authorizing provider's specialty.  See \"Patient Info\" tab in inbasket, or \"Choose Columns\" in Meds & Orders section of the refill encounter.           Passed - Patient is age 18 or older        ISOSORB MONO ER 60MG TAB  Last Written Prescription Date:  11/27/17  Last Fill Quantity: 90,  # refills: 1   Last office visit: 10/23/2017 with prescribing provider:     Future Office Visit:    Requested Prescriptions   Pending Prescriptions Disp Refills     ramipril (ALTACE) 10 MG capsule [Pharmacy Med Name: RAMIPRIL 10MG       CAP] 90 capsule 1     Sig: TAKE ONE CAPSULE BY MOUTH ONCE DAILY    ACE Inhibitors (Including Combos) Protocol Failed    5/8/2018  3:22 PM       Failed - Blood pressure under 140/90 in past 12 months    BP Readings from Last 3 Encounters:   11/02/17 168/83   10/23/17 120/72   10/24/16 128/70                Passed - Recent (12 mo) or future (30 days) visit within the authorizing provider's specialty    Patient had office visit in the last 12 months or has a visit in the next 30 days with authorizing provider or within the authorizing provider's specialty.  See \"Patient Info\" tab in inbasket, or \"Choose Columns\" in Meds & Orders section of the refill encounter.           Passed - Patient is age 18 or older       Passed - No active pregnancy on record       Passed - Normal serum creatinine on file in past 12 months    Recent Labs   Lab Test  10/23/17   1519   CR  0.75            Passed - Normal serum potassium on file in past 12 months    Recent Labs   Lab Test  10/23/17   1519   POTASSIUM  4.2            Passed - No positive pregnancy test in past 12 months        pravastatin (PRAVACHOL) 40 MG tablet [Pharmacy Med Name: PRAVASTATIN 40MG    TAB] 90 tablet 1     Sig: TAKE ONE TABLET BY MOUTH ONCE DAILY    Statins Protocol Failed    5/8/2018  3:22 PM       Failed - LDL on file in past 12 " "months    Recent Labs   Lab Test  10/24/16   0937   LDL  82            Passed - No abnormal creatine kinase in past 12 months    No lab results found.            Passed - Recent (12 mo) or future (30 days) visit within the authorizing provider's specialty    Patient had office visit in the last 12 months or has a visit in the next 30 days with authorizing provider or within the authorizing provider's specialty.  See \"Patient Info\" tab in inbasket, or \"Choose Columns\" in Meds & Orders section of the refill encounter.           Passed - Patient is age 18 or older       Passed - No active pregnancy on record       Passed - No positive pregnancy test in past 12 months        amLODIPine (NORVASC) 5 MG tablet [Pharmacy Med Name: AmLODIPine Besylate 5MG TAB] 90 tablet 1     Sig: TAKE ONE TABLET BY MOUTH ONCE DAILY    Calcium Channel Blockers Protocol  Failed    5/8/2018  3:22 PM       Failed - Blood pressure under 140/90 in past 12 months    BP Readings from Last 3 Encounters:   11/02/17 168/83   10/23/17 120/72   10/24/16 128/70                Passed - Recent (12 mo) or future (30 days) visit within the authorizing provider's specialty    Patient had office visit in the last 12 months or has a visit in the next 30 days with authorizing provider or within the authorizing provider's specialty.  See \"Patient Info\" tab in inbasket, or \"Choose Columns\" in Meds & Orders section of the refill encounter.           Passed - Patient is age 18 or older       Passed - No active pregnancy on record       Passed - Normal serum creatinine on file in past 12 months    Recent Labs   Lab Test  10/23/17   1519   CR  0.75            Passed - No positive pregnancy test in past 12 months        labetalol (NORMODYNE) 200 MG tablet [Pharmacy Med Name: LABETALOL HCL 200MG     TAB] 270 tablet 1     Sig: TAKE ONE TABLET BY MOUTH THREE TIMES DAILY    Beta-Blockers Protocol Failed    5/8/2018  3:22 PM       Failed - Blood pressure under 140/90 in " "past 12 months    BP Readings from Last 3 Encounters:   11/02/17 168/83   10/23/17 120/72   10/24/16 128/70                Passed - Patient is age 6 or older       Passed - Recent (12 mo) or future (30 days) visit within the authorizing provider's specialty    Patient had office visit in the last 12 months or has a visit in the next 30 days with authorizing provider or within the authorizing provider's specialty.  See \"Patient Info\" tab in inbasket, or \"Choose Columns\" in Meds & Orders section of the refill encounter.            isosorbide mononitrate (IMDUR) 60 MG 24 hr tablet [Pharmacy Med Name: ISOSORB MONO ER 60MG TAB] 90 tablet 1     Sig: TAKE ONE TABLET BY MOUTH ONCE DAILY    Nitrates Failed    5/8/2018  3:22 PM       Failed - Blood pressure under 140/90 in past 12 months    BP Readings from Last 3 Encounters:   11/02/17 168/83   10/23/17 120/72   10/24/16 128/70                Passed - Pt is not on erectile dysfunction medications       Passed - Recent (12 mo) or future (30 days) visit within the authorizing provider's specialty    Patient had office visit in the last 12 months or has a visit in the next 30 days with authorizing provider or within the authorizing provider's specialty.  See \"Patient Info\" tab in inbasket, or \"Choose Columns\" in Meds & Orders section of the refill encounter.           Passed - Patient is age 18 or older          "

## 2018-05-10 RX ORDER — LABETALOL 200 MG/1
TABLET, FILM COATED ORAL
Qty: 270 TABLET | Refills: 4 | Status: SHIPPED | OUTPATIENT
Start: 2018-05-10 | End: 2019-08-01

## 2018-05-10 RX ORDER — RAMIPRIL 10 MG/1
CAPSULE ORAL
Qty: 90 CAPSULE | Refills: 4 | Status: SHIPPED | OUTPATIENT
Start: 2018-05-10 | End: 2019-08-01

## 2018-05-10 RX ORDER — PRAVASTATIN SODIUM 40 MG
TABLET ORAL
Qty: 90 TABLET | Refills: 4 | Status: SHIPPED | OUTPATIENT
Start: 2018-05-10 | End: 2019-08-01

## 2018-05-10 RX ORDER — ISOSORBIDE MONONITRATE 60 MG/1
TABLET, EXTENDED RELEASE ORAL
Qty: 90 TABLET | Refills: 4 | Status: SHIPPED | OUTPATIENT
Start: 2018-05-10 | End: 2019-08-01

## 2018-05-10 RX ORDER — AMLODIPINE BESYLATE 5 MG/1
TABLET ORAL
Qty: 90 TABLET | Refills: 4 | Status: SHIPPED | OUTPATIENT
Start: 2018-05-10 | End: 2019-08-01

## 2018-05-10 NOTE — TELEPHONE ENCOUNTER
Routing refill request to provider for review/approval because:  out of range:  Blood pressure  Labs not current:  Last lipids 10/16

## 2018-06-08 ENCOUNTER — TELEPHONE (OUTPATIENT)
Dept: FAMILY MEDICINE | Facility: CLINIC | Age: 68
End: 2018-06-08

## 2018-06-08 NOTE — LETTER
June 8, 2018    Bridget Lawson  [address]  [facility] [address]    Dear Lea Gill cares about your health and your health plan.  I have reviewed your medical conditions, medication list and lab results, and am making recommendations based on this review to better manage your health.    You are in particular need of attention regarding:  -Colon Cancer Screening    I am recommending that you:     -schedule a COLONOSCOPY to look for colon cancer (due every 10 years or 5 years in higher risk situations.)        Colon cancer is now the second leading cause of cancer-related deaths in the [facility] for both men and women and there are over 130,000 new cases and 50,000 deaths per year from colon cancer.  Colonoscopies can prevent 90-95% of these deaths.  Problem lesions can be removed before they ever become cancer.  This test is not only looking for cancer, but also getting rid of precancerious lesions.    If you are under/uninsured, we recommend you contact the [facility] program. [facility] is a free colorectal cancer screening program that provides colonoscopies for eligible under/uninsured [place] men and women. If you are interested in receiving a free colonoscopy, please call [facility] at [phone] (mention code [email or web address]) to see if you re eligible.      If you do not wish to do a colonoscopy or cannot afford to do one, at this time, there is another option. It is called a FIT test or Fecal Immunochemical Occult Blood Test (take home stool sample kit).  It does not replace the colonoscopy for colorectal cancer screening, but it can detect hidden bleeding in the lower colon.  It does need to be repeated every year and if a positive result is obtained, you would be referred for a colonoscopy.          If you have completed either one of these tests at another facility, please call with the details of when and where the tests were done and if they were normal or not. Or have the  records sent to our clinic so that we can best coordinate your care.      Please call us at the [place] location:  [phone] or use [email or web address] to address the above recommendations.     Thank you for trusting [facility].  We appreciate the opportunity to serve you and look forward to supporting your healthcare in the future.    If you have (or plan to have) any of these tests done at a facility other than a [facility] or a [facility], please have the results sent to the [facility] location noted above.      Best Regards,    Patel Hughes MD

## 2018-06-08 NOTE — TELEPHONE ENCOUNTER
Panel Management Review      Patient has the following on her problem list: None      Composite cancer screening  Chart review shows that this patient is due/due soon for the following Colonoscopy  Summary:    Patient is due/failing the following:   COLONOSCOPY    Action needed:   Patient needs referral/order: colonoscopy    Type of outreach:    Sent letter.    Questions for provider review:    None                                                                                                                                    .Aristeo BROWN       Chart routed to  .

## 2018-09-10 ENCOUNTER — OFFICE VISIT (OUTPATIENT)
Dept: SLEEP MEDICINE | Facility: CLINIC | Age: 68
End: 2018-09-10
Payer: COMMERCIAL

## 2018-09-10 VITALS
HEIGHT: 63 IN | TEMPERATURE: 97.8 F | SYSTOLIC BLOOD PRESSURE: 119 MMHG | WEIGHT: 222.6 LBS | HEART RATE: 79 BPM | BODY MASS INDEX: 39.44 KG/M2 | RESPIRATION RATE: 16 BRPM | OXYGEN SATURATION: 95 % | DIASTOLIC BLOOD PRESSURE: 79 MMHG

## 2018-09-10 DIAGNOSIS — G47.33 OSA (OBSTRUCTIVE SLEEP APNEA): Primary | ICD-10-CM

## 2018-09-10 DIAGNOSIS — E66.01 MORBID OBESITY (H): ICD-10-CM

## 2018-09-10 PROCEDURE — 99202 OFFICE O/P NEW SF 15 MIN: CPT | Performed by: PSYCHIATRY & NEUROLOGY

## 2018-09-10 NOTE — MR AVS SNAPSHOT
After Visit Summary   9/10/2018    Bridget Lawson    MRN: [id number]           Patient Information     Date Of Birth          1950        Visit Information        Provider Department      9/10/2018 10:00 AM Wale Sandhu MD [facility]        Today's Diagnoses     JOHNY (obstructive sleep apnea)    -  1    Morbid obesity (H)          Care Instructions      Your BMI is Body mass index is 39.43 kg/(m^2).  Weight management is a personal decision.  If you are interested in exploring weight loss strategies, the following discussion covers the approaches that may be successful. Body mass index (BMI) is one way to tell whether you are at a healthy weight, overweight, or obese. It measures your weight in relation to your height.  A BMI of 18.5 to 24.9 is in the healthy range. A person with a BMI of 25 to 29.9 is considered overweight, and someone with a BMI of 30 or greater is considered obese. More than two-thirds of American adults are considered overweight or obese.  Being overweight or obese increases the risk for further weight gain. Excess weight may lead to heart disease and diabetes.  Creating and following plans for healthy eating and physical activity may help you improve your health.  Weight control is part of healthy lifestyle and includes exercise, emotional health, and healthy eating habits. Careful eating habits lifelong are the mainstay of weight control. Though there are significant health benefits from weight loss, long-term weight loss with diet alone may be very difficult to achieve- studies show long-term success with dietary management in less than 10% of people. Attaining a healthy weight may be especially difficult to achieve in those with severe obesity. In some cases, medications, devices and surgical management might be considered.  What can you do?  If you are overweight or obese and are interested in methods for weight loss, you should discuss this  with your provider.     Consider reducing daily calorie intake by 500 calories.     Keep a food journal.     Avoiding skipping meals, consider cutting portions instead.    Diet combined with exercise helps maintain muscle while optimizing fat loss. Strength training is particularly important for building and maintaining muscle mass. Exercise helps reduce stress, increase energy, and improves fitness. Increasing exercise without diet control, however, may not burn enough calories to loose weight.       Start walking three days a week 10-20 minutes at a time    Work towards walking thirty minutes five days a week     Eventually, increase the speed of your walking for 1-2 minutes at time    In addition, we recommend that you review healthy lifestyles and methods for weight loss available through the National Institutes of Health patient information sites:  http://win.niddk.nih.gov/publications/index.htm    And look into health and wellness programs that may be available through your health insurance provider, employer, local community center, or bello club.    Weight management plan: Patient was referred to their PCP to discuss a diet and exercise plan.            Follow-ups after your visit        Your next 10 appointments already scheduled     Oct 22, 2018 10:00 AM CDT   Return Sleep Patient with Geovanni Gardner MD   [facility] ([facility]    [address] [address] [phone]              Who to contact     If you have questions or need follow up information about today's clinic visit or your schedule please contact [facility] directly at [phone].  Normal or non-critical lab and imaging results will be communicated to you by MyChart, letter or phone within 4 business days after the clinic has received the results. If you do not hear from us within 7 days, please contact the clinic through MyChart or phone.  "If you have a critical or abnormal lab result, we will notify you by phone as soon as possible.  Submit refill requests through Strut or call your pharmacy and they will forward the refill request to us. Please allow 3 business days for your refill to be completed.          Additional Information About Your Visit        [employer] Information     Strut lets you send messages to your doctor, view your test results, renew your prescriptions, schedule appointments and more. To sign up, go to [email or web address] . Click on \"Log in\" on the left side of the screen, which will take you to the Welcome page. Then click on \"Sign up Now\" on the right side of the page.     You will be asked to enter the access code listed below, as well as some personal information. Please follow the directions to create your username and password.     Your access code is: [email or web address]  Expires: 2018 10:53 AM     Your access code will  in 90 days. If you need help or a new code, please call your [facility] clinic or [phone].        Care EveryWhere ID     This is your Care EveryWhere ID. This could be used by other organizations to access your [facility] medical records  [id number]        Your Vitals Were     Pulse Temperature Respirations Height Pulse Oximetry BMI (Body Mass Index)    79 97.8  F (36.6  C) (Oral) 16 1.6 m (5' 3\") 95% 39.43 kg/m2       Blood Pressure from Last 3 Encounters:   09/10/18 119/79   17 168/83   10/23/17 120/72    Weight from Last 3 Encounters:   09/10/18 101 kg (222 lb 9.6 oz)   17 94.3 kg (208 lb)   10/23/17 94.3 kg (208 lb)              We Performed the Following     Comprehensive DME        Primary Care Provider Office Phone # Fax #    Patel Hughes [phone] [address]  [facility] [address]        Equal Access to Services     [facility]: Lisa Collins, prince castillo, qaybta mary hamilton" laandre sapp. So [facility] [phone].    ATENCIÓN: Si habla mei, tiene a freeman disposición servicios gratuitos de asistencia lingüística. Yeyo couch [phone].    We comply with applicable federal civil rights laws and [place] laws. We do not discriminate on the basis of race, color, national origin, age, disability, sex, sexual orientation, or gender identity.            Thank you!     Thank you for choosing [facility]  for your care. Our goal is always to provide you with excellent care. Hearing back from our patients is one way we can continue to improve our services. Please take a few minutes to complete the written survey that you may receive in the mail after your visit with us. Thank you!             Your Updated Medication List - Protect others around you: Learn how to safely use, store and throw away your medicines at www.disposemymeds.org.          This list is accurate as of 9/10/18 10:53 AM.  Always use your most recent med list.                   Brand Name Dispense Instructions for use Diagnosis    aspirin 325 MG EC tablet     60 tablet    Take 1 tablet (325 mg) by mouth daily        CALTRATE 600+D 600-800 MG-UNIT Tabs   Generic drug:  Calcium Carb-Cholecalciferol      Take 1 tablet by mouth 2 times daily        CENTRUM SILVER ULTRA WOMENS PO      Take 1 tablet by mouth daily        fluticasone 50 MCG/ACT spray    FLONASE     Spray 1 spray into both nostrils At Bedtime        glucosamine-chondroitinoitin 750-600 MG Tabs      Take 1 tablet by mouth 2 times daily        ibuprofen 600 MG tablet    ADVIL/MOTRIN    60 tablet    Take 1 tablet (600 mg) by mouth every 6 hours as needed for other (inflammatory pain)        * IMDUR PO      Take 60 mg by mouth daily        * isosorbide mononitrate 60 MG 24 hr tablet    IMDUR    90 tablet    TAKE ONE TABLET BY MOUTH ONCE DAILY    Essential hypertension with goal blood pressure less than 140/90       * ketoconazole 2 % cream    NIZORAL     Apply  topically 2 times daily as needed        * ketoconazole 2 % cream    NIZORAL    60 g    APPLY CREAM TOPICALLY DAILY AS NEEDED    Intertrigo       * LABETALOL HCL PO      Take 200 mg by mouth 3 times daily        * labetalol 200 MG tablet    NORMODYNE    270 tablet    TAKE ONE TABLET BY MOUTH THREE TIMES DAILY    Essential hypertension with goal blood pressure less than 140/90       MAGNESIUM GLUCONATE PO      Take 400 mg by mouth daily        MELATONIN PO      Take 5 mg by mouth nightly as needed        NONFORMULARY      Apply to affected area daily PERIO GEL (contains hydrogen peroxide 1.7%)  - Apply to Periotray        * NORVASC PO      Take 5 mg by mouth daily        * amLODIPine 5 MG tablet    NORVASC    90 tablet    TAKE ONE TABLET BY MOUTH ONCE DAILY    Hyperlipidemia with target LDL less than 100       order for DME      Use your CPAP device as directed by your provider.        oxyCODONE IR 5 MG tablet    ROXICODONE    60 tablet    Take 1-2 tablets (5-10 mg) by mouth every 3 hours as needed for moderate to severe pain        * PRAVASTATIN SODIUM PO      Take 40 mg by mouth daily        * pravastatin 40 MG tablet    PRAVACHOL    90 tablet    TAKE ONE TABLET BY MOUTH ONCE DAILY    Hyperlipidemia with target LDL less than 100       * RAMIPRIL PO      Take 10 mg by mouth daily        * ramipril 10 MG capsule    ALTACE    90 capsule    TAKE ONE CAPSULE BY MOUTH ONCE DAILY    Essential hypertension with goal blood pressure less than 140/90       THERATEARS OP      Place 1-2 drops into both eyes 2 times daily as needed        TUMS PO      Take 2 tablets by mouth 2 times daily as needed        TYLENOL PO      Take 500 mg by mouth every 6 hours as needed for mild pain or fever        * Notice:  This list has 12 medication(s) that are the same as other medications prescribed for you. Read the directions carefully, and ask your doctor or other care provider to review them with you.

## 2018-09-10 NOTE — NURSING NOTE
"Chief Complaint   Patient presents with     Sleep Problem     Have CPAP, need new machine       Initial /79  Pulse 79  Temp 97.8  F (36.6  C) (Oral)  Resp 16  Ht 1.6 m (5' 3\")  Wt 101 kg (222 lb 9.6 oz)  SpO2 95%  BMI 39.43 kg/m2 Estimated body mass index is 39.43 kg/(m^2) as calculated from the following:    Height as of this encounter: 1.6 m (5' 3\").    Weight as of this encounter: 101 kg (222 lb 9.6 oz).    Medication Reconciliation: complete     ESS 7  Neck 37cm  Nida Holley      "

## 2018-09-10 NOTE — PROGRESS NOTES
"  Sleep Consultation:    Patient is referred by Dr Hughes to re-evaluate JOHNY and its management.     Date on this visit: 9/10/2018    Primary Physician: Patel Hughes     Bridget Lawson reports nightly poor quality of sleep for 6 months.       Ms. Lawson is a 68 year old female with a history of hemorhagic stroke and JOHNY managed by 8 years of CPAP use who presents to clinic complaining of a dysfunctional CPAP device. She complains that the device started to smell like it was \"burning\" and she \"doesn't even hear the motor anymore.\" This has been going on for 6 months and has caused her sleep quality to deteriorate. She reports waking up 5-6x/night and has difficulty falling asleep. In addition to this, she reports some subjective \"leg restlessness\" which impedes her ability to fall asleep. She endorses dry mouth in conjunction with mouth breathing when she wakes up. Her  tells her that she snores.       Bridget goes to sleep at 1:00 AM during the week. She wakes up at 9:00 AM with an alarm. She falls asleep in 15 minutes.  Bridget has difficulty falling asleep.  She wakes up 5-8 times a night for 10 minutes before falling back to sleep.  On weekends, Bridget goes to sleep at 1:00 AM.  She wakes up at 9:00 AM without an alarm. She falls asleep in 15 minutes. Patient gets an average of 8 hours of sleep per night although this sleep is highly fragmented.      Bridget is retired. Patient does have a regular bed partner. There is not report of kicking.  She does not have witnessed apneas. Patient sleeps on her back. She has frequent morning dry mouth. Bridget has reflux at night.      Bridget inadvertently naps 1-2 times per day for 20-30 minutes, feels unrefreshed after naps. Bridget does not drive.    Allergies:    Allergies   Allergen Reactions     Animal Dander        Medications:    Current Outpatient Prescriptions   Medication Sig Dispense Refill     Acetaminophen (TYLENOL PO) Take 500 mg by mouth every 6 hours as " needed for mild pain or fever        amLODIPine (NORVASC) 5 MG tablet TAKE ONE TABLET BY MOUTH ONCE DAILY 90 tablet 4     AmLODIPine Besylate (NORVASC PO) Take 5 mg by mouth daily       aspirin  MG EC tablet Take 1 tablet (325 mg) by mouth daily 60 tablet 0     Calcium Carb-Cholecalciferol (CALTRATE 600+D) 600-800 MG-UNIT TABS Take 1 tablet by mouth 2 times daily        Calcium Carbonate Antacid (TUMS PO) Take 2 tablets by mouth 2 times daily as needed       Carboxymethylcellulose Sodium (THERATEARS OP) Place 1-2 drops into both eyes 2 times daily as needed       fluticasone (FLONASE) 50 MCG/ACT spray Spray 1 spray into both nostrils At Bedtime       glucosamine-chondroitinoitin 750-600 MG TABS Take 1 tablet by mouth 2 times daily        ibuprofen (ADVIL/MOTRIN) 600 MG tablet Take 1 tablet (600 mg) by mouth every 6 hours as needed for other (inflammatory pain) 60 tablet 0     Isosorbide Mononitrate (IMDUR PO) Take 60 mg by mouth daily       isosorbide mononitrate (IMDUR) 60 MG 24 hr tablet TAKE ONE TABLET BY MOUTH ONCE DAILY 90 tablet 4     ketoconazole (NIZORAL) 2 % cream APPLY CREAM TOPICALLY DAILY AS NEEDED 60 g 3     ketoconazole (NIZORAL) 2 % cream Apply topically 2 times daily as needed        labetalol (NORMODYNE) 200 MG tablet TAKE ONE TABLET BY MOUTH THREE TIMES DAILY 270 tablet 4     LABETALOL HCL PO Take 200 mg by mouth 3 times daily       MAGNESIUM GLUCONATE PO Take 400 mg by mouth daily       MELATONIN PO Take 5 mg by mouth nightly as needed       Multiple Vitamins-Minerals (CENTRUM SILVER ULTRA WOMENS PO) Take 1 tablet by mouth daily       NONFORMULARY Apply to affected area daily PERIO GEL (contains hydrogen peroxide 1.7%)  - Apply to Periotray       ORDER FOR DME Use your CPAP device as directed by your provider.       oxyCODONE (ROXICODONE) 5 MG IR tablet Take 1-2 tablets (5-10 mg) by mouth every 3 hours as needed for moderate to severe pain 60 tablet 0     pravastatin (PRAVACHOL) 40 MG tablet  "TAKE ONE TABLET BY MOUTH ONCE DAILY 90 tablet 4     PRAVASTATIN SODIUM PO Take 40 mg by mouth daily       ramipril (ALTACE) 10 MG capsule TAKE ONE CAPSULE BY MOUTH ONCE DAILY 90 capsule 4     RAMIPRIL PO Take 10 mg by mouth daily         Problem List:  Patient Active Problem List    Diagnosis Date Noted     Personal history of tobacco use, presenting hazards to health 10/24/2016     Priority: High     Obesity (BMI 35.0-39.9) with comorbidity (H) 09/10/2018     Priority: Medium     Aftercare following surgery of the musculoskeletal system 12/28/2017     Priority: Medium     Tibialis posterior tendon rupture 11/01/2017     Priority: Medium     PND (post-nasal drip) 10/31/2017     Priority: Medium     Traumatic rupture of right posterior tibial tendon, subsequent encounter 10/23/2017     Priority: Medium     Happened in 4/17       Essential hypertension with goal blood pressure less than 140/90 10/24/2016     Priority: Medium     Family history of malignant neoplasm of breast 10/24/2016     Priority: Medium     mother       Memory loss 06/22/2015     Priority: Medium     Noted by pt in 6/15; labs ok; referred to neuro. She saw Dr Silva; labs were done; no consult letter received.                       Saw neuro last year; they told her to stop taking advil PM, and there was improvement.        Dysphagia 07/14/2014     Priority: Medium     EGD neg in 7/14; Dx= globus       Pain in left foot 02/17/2014     Priority: Medium     5/13; walking injury; \"stress fracture\" saw Dr. Judson Manjarrez; takes aleve 1 tab daily since then; still bothers her in 7/14       Arthritis of right knee 02/17/2014     Priority: Medium     Uses a cane; s/p injection       Anemia 10/08/2012     Priority: Medium     Stable at 12.7 in 6/11  Problem list name updated by automated process. Provider to review       Obstructive sleep apnea 10/08/2012     Priority: Medium     Uses CPAP as of 6/2010  Problem list name updated by automated process. Provider " to review       Primary hyperparathyroidism (H) 10/08/2012     Priority: Medium     DX in 2003: cured 12/2010 with parathyroid surgery       Acute gastric ulcer with hemorrhage 10/08/2012     Priority: Medium     2008; f/u EGD showed ulcers had healed; she avoids ASA/ NSAID'S due to this ulcer hx  Problem list name updated by automated process. Provider to review       Late effects of cerebrovascular disease 10/08/2012     Priority: Medium     Mild L hemiparesis and dysarthria and dysphagia; good recovery           Osteoporosis 10/08/2012     Priority: Medium     Problem list name updated by automated process. Provider to review       Disorder of bursae and tendons in shoulder region 10/08/2012     Priority: Medium     Problem list name updated by automated process. Provider to review       Hyperlipidemia with target LDL less than 100 10/08/2012     Priority: Medium     Start prava in 7/14  Diagnosis updated by automated process. Provider to review and confirm.       Preventive measure 10/08/2012     Priority: Medium     [facility] DATA BASE UNDER THE 9/26/13 NOTE  Mammogram fall 2011 at [facility]; 1/13, 6/15 at [facility] [place], 11/16                                                    s/p GREGORY/BSO;                                             colonoscopy 3/08 neg per pt       Osteopenia      Priority: Medium     in 6/10, prior to PTH surgery, T - 3.2 forearm, - 1.9 spine, - 1.5L and -1.6 R femoral necks       Intracranial hemorrhage (H)      Priority: Medium     R basal ganglia, due to HTN; had mild L hemiparesis and dysarthria; good recovery          Past Medical/Surgical History:  Past Medical History:   Diagnosis Date     Cerebral artery occlusion with cerebral infarction (H)      Hx of stroke   (02/10)     Hyperlipidemia LDL goal < 100 10/8/2012    Start prava in 7/14     Intracranial hemorrhage (H) 2/10    R basal ganglia, due to HTN; had mild L hemiparesis and dysarthria; good recovery     Obese      Osteopenia     in 6/10,  prior to PTH surgery, T - 3.2 forearm, - 1.9 spine, - 1.5L and -1.6 R femoral necks     Sleep apnea     CPAP     Unspecified essential hypertension     Essential hypertension     Past Surgical History:   Procedure Laterality Date     BREAST SURGERY  2004    R breast bx     C/SECTION, CLASSICAL      with tubal     HYSTERECTOMY  1998    GREGORY w/ BSO     KNEE SURGERY  1998    L Arthroscopy     NECK SURGERY  2010    R parathyroid adenoma     ORTHOPEDIC SURGERY       REPAIR TENDON ANKLE Right 2017    Procedure: REPAIR TENDON ANKLE;  RIGHT POSTERIOR TIBIAL TENDON RECONSTRUCTION ;  Surgeon: Xavi Duran MD;  Location: [facility] OR     SHOULDER SURGERY  2008    rotator cuff repair R     TONSILLECTOMY      as a child T & A       Social History:  Social History     Social History     Marital status:      Spouse name: N/A     Number of children: N/A     Years of education: N/A     Occupational History     Not on file.     Social History Main Topics     Smoking status: Former Smoker     Packs/day: 2.00     Years: 27.00     Types: Cigarettes     Start date: 10/24/1969     Quit date: 1996     Smokeless tobacco: Never Used     Alcohol use 0.0 oz/week     0 Standard drinks or equivalent per week      Comment: occasionally     Drug use: No     Sexual activity: No     Other Topics Concern     Not on file     Social History Narrative       Family History:  Family History   Problem Relation Age of Onset     Breast Cancer Mother       [place]; masectomy left; asthma;  in  at age 97     Breast Cancer Maternal Grandmother      possible CA; masectomy     Breast Cancer Paternal Aunt      possible CA, masectomy     Respiratory Father      COPD     Lipids Brother        Review of Systems:  A 10 complete review of systems reviewed and is negative with the exeption of what has been mentioned in the history of present illness.      Physical Examination:  Vitals: /79  Pulse 79  Temp 97.8  F (36.6  C)  "(Oral)  Resp 16  Ht 1.6 m (5' 3\")  Wt 101 kg (222 lb 9.6 oz)  SpO2 95%  BMI 39.43 kg/m2  BMI= Body mass index is 39.43 kg/(m^2).    Neck Cir (cm): 37 cm    Paradise Total Score 9/10/2018   Total score - Paradise 7       RAFAEL Total Score: 10 (09/10/18 1013)    GENERAL APPEARANCE: healthy, alert and no distress  NEURO: Mentation intact, slight dysarthria from tongue and mouth asymmetry and mild weakness of the facial muscles on the left. Loss of the nasolabial fold present on the left. Otherwise strength and tone are normal and symmetric. Patient is alert and oriented. Reflexes are appropriate and symmetric. PERRLA, sensation symmetric on both sides.     Impression/Plan:    Ms. Lawson is a 68 year old female who returns to discuss the replacement of her CPAP for her JOHNY. Over the last 5 years she has done exceptionally well but now has had 6 months of dysfunctional CPAP use and is waking frequently due to difficulty breathing. She describes her CPAP as being \"broken\" stating that you \"can''t hear the motor.\" I agree that the CPAP has reached the end of its serviceable life at 8 years.  As far as she can tell she is essentially perfect in terms of compliance.  She is using CPAP 100% of nights.      We will reorder her supplies/CPAP and have her follow up with us through the STM program as well as a 6 week follow up with Dr Gardner.          Obstructive sleep apnea reviewed.  Complications of untreated sleep apnea were reviewed.      CC: No ref. provider found    Sleep Staff Addendum  I have seen and evaluated the patient today and agree with the documentation.      JACKIE DE LA VEGA MD     "

## 2018-09-10 NOTE — PATIENT INSTRUCTIONS

## 2018-09-21 ENCOUNTER — DOCUMENTATION ONLY (OUTPATIENT)
Dept: SLEEP MEDICINE | Facility: CLINIC | Age: 68
End: 2018-09-21
Payer: COMMERCIAL

## 2018-09-21 NOTE — PROGRESS NOTES
Patient was offered choice of vendor and chose [facility].  Patient Bridget Lawson was set up at [facility] on September 21, 2018. Patient received a [employer] DreamStation Auto. Pressures were set at 5-15 cm H2O.   Patient s ramp is 5 cm H2O for Auto and FLEX/EPR is A Flex.  Patient received  heated tubing and heated humidifier.  Patient is not enrolled in the STM Program, SHE DECLINED HAVING [facility] FOLLOW UP WITH HER, THIS IS A REPLACEMENT MACHINE and does need to meet compliance. Patient has a follow up on 10/22/2018 with .    Alvin Joshua

## 2018-10-22 ENCOUNTER — OFFICE VISIT (OUTPATIENT)
Dept: SLEEP MEDICINE | Facility: CLINIC | Age: 68
End: 2018-10-22
Payer: COMMERCIAL

## 2018-10-22 VITALS
SYSTOLIC BLOOD PRESSURE: 128 MMHG | WEIGHT: 219 LBS | BODY MASS INDEX: 38.8 KG/M2 | HEIGHT: 63 IN | DIASTOLIC BLOOD PRESSURE: 74 MMHG | HEART RATE: 71 BPM | OXYGEN SATURATION: 96 %

## 2018-10-22 DIAGNOSIS — G47.33 OSA (OBSTRUCTIVE SLEEP APNEA): Primary | ICD-10-CM

## 2018-10-22 PROCEDURE — 99213 OFFICE O/P EST LOW 20 MIN: CPT | Performed by: INTERNAL MEDICINE

## 2018-10-22 NOTE — NURSING NOTE
"Chief Complaint   Patient presents with     Sleep Study     follow up [place], increase pressure       Initial /74  Pulse 71  Ht 1.6 m (5' 2.99\")  Wt 99.3 kg (219 lb)  SpO2 96%  BMI 38.8 kg/m2 Estimated body mass index is 38.8 kg/(m^2) as calculated from the following:    Height as of this encounter: 1.6 m (5' 2.99\").    Weight as of this encounter: 99.3 kg (219 lb).    Medication Reconciliation: complete    ESS 4    Marina Welsh MA        "

## 2018-10-22 NOTE — PROGRESS NOTES
Follow up new AutoCPAP machine.      Patient data (AHI-3.7, adherance 97%, leak 21 secs, average usage > 8 hours a night) looks great however she struggles with pressure starting low which is not surprising since she she started at 10cm of H2O for many years.      We discussed raising the starting CPAP from 5 where it currently is to as high as 10.  After discussion it was clear she would like to start where it was before and use the ramp as necessary.  I think that is fine.  She will continue to follow up with [facility] and give us a call if she has any problems.      15 minutes were spent with this patient greater than 50% in counsiling and coordination of care. All questions were answered.    It is a great privilege being asked to participate in this patients care.  They understand the importance in of never operating operating a motor vehicle while tired or sleepy.

## 2018-10-22 NOTE — MR AVS SNAPSHOT
After Visit Summary   10/22/2018    Bridget Lawson    MRN: [id number]           Patient Information     Date Of Birth          1950        Visit Information        Provider Department      10/22/2018 10:00 AM Geovanni Gardner MD [facility]        Today's Diagnoses     JOHNY (obstructive sleep apnea)    -  1      Care Instructions      Your BMI is Body mass index is 38.8 kg/(m^2).  Weight management is a personal decision.  If you are interested in exploring weight loss strategies, the following discussion covers the approaches that may be successful. Body mass index (BMI) is one way to tell whether you are at a healthy weight, overweight, or obese. It measures your weight in relation to your height.  A BMI of 18.5 to 24.9 is in the healthy range. A person with a BMI of 25 to 29.9 is considered overweight, and someone with a BMI of 30 or greater is considered obese. More than two-thirds of American adults are considered overweight or obese.  Being overweight or obese increases the risk for further weight gain. Excess weight may lead to heart disease and diabetes.  Creating and following plans for healthy eating and physical activity may help you improve your health.  Weight control is part of healthy lifestyle and includes exercise, emotional health, and healthy eating habits. Careful eating habits lifelong are the mainstay of weight control. Though there are significant health benefits from weight loss, long-term weight loss with diet alone may be very difficult to achieve- studies show long-term success with dietary management in less than 10% of people. Attaining a healthy weight may be especially difficult to achieve in those with severe obesity. In some cases, medications, devices and surgical management might be considered.  What can you do?  If you are overweight or obese and are interested in methods for weight loss, you should discuss this with your provider.      Consider reducing daily calorie intake by 500 calories.     Keep a food journal.     Avoiding skipping meals, consider cutting portions instead.    Diet combined with exercise helps maintain muscle while optimizing fat loss. Strength training is particularly important for building and maintaining muscle mass. Exercise helps reduce stress, increase energy, and improves fitness. Increasing exercise without diet control, however, may not burn enough calories to loose weight.       Start walking three days a week 10-20 minutes at a time    Work towards walking thirty minutes five days a week     Eventually, increase the speed of your walking for 1-2 minutes at time    In addition, we recommend that you review healthy lifestyles and methods for weight loss available through the National Institutes of Health patient information sites:  http://win.niddk.nih.gov/publications/index.htm    And look into health and wellness programs that may be available through your health insurance provider, employer, local community center, or bello club.    Weight management plan: Patient was referred to their PCP to discuss a diet and exercise plan.              Follow-ups after your visit        Who to contact     If you have questions or need follow up information about today's clinic visit or your schedule please contact [facility] directly at [phone].  Normal or non-critical lab and imaging results will be communicated to you by MyChart, letter or phone within 4 business days after the clinic has received the results. If you do not hear from us within 7 days, please contact the clinic through sickweatherhart or phone. If you have a critical or abnormal lab result, we will notify you by phone as soon as possible.  Submit refill requests through [employer] or call your pharmacy and they will forward the refill request to us. Please allow 3 business days for your refill to be completed.          Additional Information  "About Your Visit        [employer] Information     "[employer]" lets you send messages to your doctor, view your test results, renew your prescriptions, schedule appointments and more. To sign up, go to [email or web address]/"[email or web address]" . Click on \"Log in\" on the left side of the screen, which will take you to the Welcome page. Then click on \"Sign up Now\" on the right side of the page.     You will be asked to enter the access code listed below, as well as some personal information. Please follow the directions to create your username and password.     Your access code is: [email or web address]  Expires: 2018 10:53 AM     Your access code will  in 90 days. If you need help or a new code, please call your [facility] clinic or [phone].        Care EveryWhere ID     This is your Care EveryWhere ID. This could be used by other organizations to access your [facility] medical records  [id number]        Your Vitals Were     Pulse Height Pulse Oximetry BMI (Body Mass Index)          71 1.6 m (5' 2.99\") 96% 38.8 kg/m2         Blood Pressure from Last 3 Encounters:   10/22/18 128/74   09/10/18 119/79   17 168/83    Weight from Last 3 Encounters:   10/22/18 99.3 kg (219 lb)   09/10/18 101 kg (222 lb 9.6 oz)   17 94.3 kg (208 lb)              Today, you had the following     No orders found for display       Primary Care Provider Office Phone # Fax #    Patel Hughes [phone]       [address] [facility] [address]        Equal Access to Services     [facility] RONALDO : Hadii michael Collins, waaxda luqadaha, qaybta kaalmada narciso, mary sapp. So [facility] [phone].    ATENCIÓN: Si habla español, tiene a freeman disposición servicios gratuitos de asistencia lingüística. Llame al [phone].    We comply with applicable federal civil rights laws and [place] laws. We do not discriminate on the basis of race, color, national origin, age, disability, sex, sexual " orientation, or gender identity.            Thank you!     Thank you for choosing [facility]  for your care. Our goal is always to provide you with excellent care. Hearing back from our patients is one way we can continue to improve our services. Please take a few minutes to complete the written survey that you may receive in the mail after your visit with us. Thank you!             Your Updated Medication List - Protect others around you: Learn how to safely use, store and throw away your medicines at www.disposemymeds.org.          This list is accurate as of 10/22/18 10:24 AM.  Always use your most recent med list.                   Brand Name Dispense Instructions for use Diagnosis    aspirin 325 MG EC tablet     60 tablet    Take 1 tablet (325 mg) by mouth daily        CALTRATE 600+D 600-800 MG-UNIT Tabs   Generic drug:  Calcium Carb-Cholecalciferol      Take 1 tablet by mouth 2 times daily        CENTRUM SILVER ULTRA WOMENS PO      Take 1 tablet by mouth daily        fluticasone 50 MCG/ACT spray    FLONASE     Spray 1 spray into both nostrils At Bedtime        glucosamine-chondroitinoitin 750-600 MG Tabs      Take 1 tablet by mouth 2 times daily        ibuprofen 600 MG tablet    ADVIL/MOTRIN    60 tablet    Take 1 tablet (600 mg) by mouth every 6 hours as needed for other (inflammatory pain)        * IMDUR PO      Take 60 mg by mouth daily        * isosorbide mononitrate 60 MG 24 hr tablet    IMDUR    90 tablet    TAKE ONE TABLET BY MOUTH ONCE DAILY    Essential hypertension with goal blood pressure less than 140/90       * ketoconazole 2 % cream    NIZORAL     Apply topically 2 times daily as needed        * ketoconazole 2 % cream    NIZORAL    60 g    APPLY CREAM TOPICALLY DAILY AS NEEDED    Intertrigo       * LABETALOL HCL PO      Take 200 mg by mouth 3 times daily        * labetalol 200 MG tablet    NORMODYNE    270 tablet    TAKE ONE TABLET BY MOUTH THREE TIMES DAILY    Essential  hypertension with goal blood pressure less than 140/90       MAGNESIUM GLUCONATE PO      Take 400 mg by mouth daily        MELATONIN PO      Take 5 mg by mouth nightly as needed        NONFORMULARY      Apply to affected area daily PERIO GEL (contains hydrogen peroxide 1.7%)  - Apply to Periotray        * NORVASC PO      Take 5 mg by mouth daily        * amLODIPine 5 MG tablet    NORVASC    90 tablet    TAKE ONE TABLET BY MOUTH ONCE DAILY    Hyperlipidemia with target LDL less than 100       order for DME      Use your CPAP device as directed by your provider.        oxyCODONE IR 5 MG tablet    ROXICODONE    60 tablet    Take 1-2 tablets (5-10 mg) by mouth every 3 hours as needed for moderate to severe pain        * PRAVASTATIN SODIUM PO      Take 40 mg by mouth daily        * pravastatin 40 MG tablet    PRAVACHOL    90 tablet    TAKE ONE TABLET BY MOUTH ONCE DAILY    Hyperlipidemia with target LDL less than 100       * RAMIPRIL PO      Take 10 mg by mouth daily        * ramipril 10 MG capsule    ALTACE    90 capsule    TAKE ONE CAPSULE BY MOUTH ONCE DAILY    Essential hypertension with goal blood pressure less than 140/90       THERATEARS OP      Place 1-2 drops into both eyes 2 times daily as needed        TUMS PO      Take 2 tablets by mouth 2 times daily as needed        TYLENOL PO      Take 500 mg by mouth every 6 hours as needed for mild pain or fever        * Notice:  This list has 12 medication(s) that are the same as other medications prescribed for you. Read the directions carefully, and ask your doctor or other care provider to review them with you.

## 2018-10-22 NOTE — PATIENT INSTRUCTIONS

## 2019-01-23 ENCOUNTER — TRANSFERRED RECORDS (OUTPATIENT)
Dept: HEALTH INFORMATION MANAGEMENT | Facility: CLINIC | Age: 69
End: 2019-01-23

## 2019-02-15 DIAGNOSIS — L30.4 INTERTRIGO: ICD-10-CM

## 2019-02-15 NOTE — TELEPHONE ENCOUNTER
"KETOCONAZOLE 2%     CRE  Last Written Prescription Date:  02/19/18  Last Fill Quantity: 60g,  # refills: 3   Last office visit: 10/23/2017 with prescribing provider:  10/23/17   Future Office Visit:    Requested Prescriptions   Pending Prescriptions Disp Refills     ketoconazole (NIZORAL) 2 % external cream [Pharmacy Med Name: KETOCONAZOLE 2%     CRE]  3     Sig: APPLY  CREAM TOPICALLY TO AFFECTED AREA AS NEEDED    Antifungal Agents Failed - 2/15/2019  1:50 PM       Failed - Recent (12 mo) or future (30 days) visit within the authorizing provider's specialty    Patient had office visit in the last 12 months or has a visit in the next 30 days with authorizing provider or within the authorizing provider's specialty.  See \"Patient Info\" tab in inbasket, or \"Choose Columns\" in Meds & Orders section of the refill encounter.             Passed - Not Fluconazole or Terconazole     If oral Fluconazole or Terconazole, may refill if indicated in progress notes.          Passed - Medication is active on med list          "

## 2019-02-18 RX ORDER — KETOCONAZOLE 20 MG/G
CREAM TOPICAL
Qty: 60 G | Refills: 3 | Status: SHIPPED | OUTPATIENT
Start: 2019-02-18 | End: 2020-01-27

## 2019-02-25 ENCOUNTER — TRANSFERRED RECORDS (OUTPATIENT)
Dept: HEALTH INFORMATION MANAGEMENT | Facility: CLINIC | Age: 69
End: 2019-02-25

## 2019-05-30 ENCOUNTER — OFFICE VISIT (OUTPATIENT)
Dept: FAMILY MEDICINE | Facility: CLINIC | Age: 69
End: 2019-05-30
Payer: COMMERCIAL

## 2019-05-30 VITALS
RESPIRATION RATE: 14 BRPM | TEMPERATURE: 97.6 F | WEIGHT: 252 LBS | DIASTOLIC BLOOD PRESSURE: 72 MMHG | SYSTOLIC BLOOD PRESSURE: 112 MMHG | HEIGHT: 63 IN | OXYGEN SATURATION: 96 % | HEART RATE: 78 BPM | BODY MASS INDEX: 44.65 KG/M2

## 2019-05-30 DIAGNOSIS — Z12.31 ENCOUNTER FOR SCREENING MAMMOGRAM FOR BREAST CANCER: ICD-10-CM

## 2019-05-30 DIAGNOSIS — H10.31 ACUTE BACTERIAL CONJUNCTIVITIS OF RIGHT EYE: ICD-10-CM

## 2019-05-30 DIAGNOSIS — H91.8X3 OTHER SPECIFIED HEARING LOSS OF BOTH EARS: ICD-10-CM

## 2019-05-30 DIAGNOSIS — H60.391 INFECTIVE OTITIS EXTERNA, RIGHT: Primary | ICD-10-CM

## 2019-05-30 DIAGNOSIS — H61.22 EXCESSIVE CERUMEN IN LEFT EAR CANAL: ICD-10-CM

## 2019-05-30 DIAGNOSIS — Z12.11 SPECIAL SCREENING FOR MALIGNANT NEOPLASMS, COLON: ICD-10-CM

## 2019-05-30 PROCEDURE — 99214 OFFICE O/P EST MOD 30 MIN: CPT | Mod: 25 | Performed by: FAMILY MEDICINE

## 2019-05-30 PROCEDURE — 69209 REMOVE IMPACTED EAR WAX UNI: CPT | Performed by: FAMILY MEDICINE

## 2019-05-30 RX ORDER — POLYMYXIN B SULFATE AND TRIMETHOPRIM 1; 10000 MG/ML; [USP'U]/ML
1-2 SOLUTION OPHTHALMIC EVERY 4 HOURS
Qty: 6 ML | Refills: 0 | Status: SHIPPED | OUTPATIENT
Start: 2019-05-30 | End: 2019-06-18

## 2019-05-30 ASSESSMENT — MIFFLIN-ST. JEOR: SCORE: 1637.19

## 2019-05-30 NOTE — PROGRESS NOTES
"Subjective     Bridget Lawson is a 68 year old female who presents to clinic today for the following health issues:    HPI   Ear Pain      Duration: [place]    Description (location/character/radiation): Right ear pain, heard pop, followed by ear drainage, left ear feels like it is plugged    Intensity:  moderate    Accompanying signs and symptoms: See above    History (similar episodes/previous evaluation): None    Precipitating or alleviating factors: None    Therapies tried and outcome: Advil     Has had difficulty with hearing in both ears for some time.   Interested in seeing Audiology to get hearing aides.      Reviewed and updated as needed this visit by Provider  Tobacco  Allergies  Meds  Problems  Med Hx  Surg Hx  Fam Hx         Review of Systems   ROS COMP: CONSTITUTIONAL: NEGATIVE for fever, chills, change in weight  INTEGUMENTARY/SKIN: NEGATIVE for worrisome rashes, moles or lesions  EYES: NEGATIVE for vision changes or irritation  ENT/MOUTH: NEGATIVE for mouth or throat problems  RESP: NEGATIVE for significant cough or SOB  CV: NEGATIVE for chest pain, palpitations or peripheral edema  GI: NEGATIVE for nausea, abdominal pain, heartburn, or change in bowel habits  : NEGATIVE for frequency, dysuria, or hematuria  HEME: NEGATIVE for bleeding problems      Objective    /72 (Patient Position: Sitting, Cuff Size: Adult Regular)   Pulse 78   Temp 97.6  F (36.4  C) (Tympanic)   Resp 14   Ht 1.6 m (5' 3\")   Wt 114.3 kg (252 lb)   SpO2 96%   Breastfeeding? No   BMI 44.64 kg/m    Body mass index is 44.64 kg/m .  Physical Exam   GENERAL: Alert and no distress  EYES: Eyes grossly normal to inspection, PERRL and conjunctivae and sclerae normal  HENT: Very hard of hearing.  normal cephalic/atraumatic, right ear: bulging membrane, mucopurulent effusion and normal ear canal, left ear: occluded with wax, nose and mouth without ulcers or lesions, " oropharynx clear and oral mucous membranes moist  NECK: no adenopathy, no asymmetry, masses, or scars and thyroid normal to palpation  RESP: lungs clear to auscultation - no rales, rhonchi or wheezes  CV: regular rate and rhythm, normal S1 S2, no S3 or S4, no murmur, click or rub, no peripheral edema and peripheral pulses strong  ABDOMEN: soft, nontender, no hepatosplenomegaly, no masses and bowel sounds normal  MS: no gross musculoskeletal defects noted, no edema  SKIN: no suspicious lesions or rashes  PSYCH: mentation appears normal, affect normal/bright    Diagnostic Test Results:  Labs reviewed in Epic  none         Assessment & Plan   Problem List Items Addressed This Visit     None      Visit Diagnoses     Infective otitis externa, right    -  Primary    Relevant Medications    amoxicillin-clavulanate (AUGMENTIN) 875-125 MG tablet    Acute bacterial conjunctivitis of right eye        Relevant Medications    trimethoprim-polymyxin b (POLYTRIM) 59508-9.1 UNIT/ML-% ophthalmic solution    Excessive cerumen in left ear canal        Other specified hearing loss of both ears        Relevant Orders    AUDIOLOGY ADULT REFERRAL    Special screening for malignant neoplasms, colon        Relevant Orders    GASTROENTEROLOGY ADULT REF PROCEDURE ONLY Other; [facility] [phone]    Encounter for screening mammogram for breast cancer        Relevant Orders    *MA Screening Digital Bilateral         Left ear canal was found to be occluded with wax - my nurse was able to remove the ear wax successfully with irrigation (half warm water and half hydrogen peroxide.)  Hearing and plugged-feeling (in left ear) improved significantly.  Will return to clinic if symptoms persist or worsen.  See Patient Instructions for details and follow-up instructions  Information on cerumen impaction/removal, home care discussed with patient who expressed understanding.    Rx Augmentin for Right OM    Rx Polytrim for right eye  "conjunctivitis.    Agreeable to get Preventive Cares done as well.  Ordered Colonoscopy and mammogram.    BMI:   Estimated body mass index is 44.64 kg/m  as calculated from the following:    Height as of this encounter: 1.6 m (5' 3\").    Weight as of this encounter: 114.3 kg (252 lb).   Weight management plan: Discussed healthy diet and exercise guidelines        Work on weight loss  Regular exercise  See Patient Instructions  Return in about 10 days (around 6/9/2019) for If Not Getting Any Better.    Lalita Biggs, DO  [facility]      "

## 2019-06-13 DIAGNOSIS — H60.391 INFECTIVE OTITIS EXTERNA, RIGHT: ICD-10-CM

## 2019-06-13 NOTE — TELEPHONE ENCOUNTER
amoxicillin-clavulanate (AUGMENTIN) 875-125 MG tablet   Last Written Prescription Date:  5/30/2019 END: 6/9/2019  Last Fill Quantity: 20 tablet,  # refills: 0   Last office visit: 5/30/2019 with prescribing provider:  Dian   Future Office Visit:        Routing refill request to provider for review/approval because:  Drug not on the [facility], [facility] or [facility] refill protocol or controlled substance

## 2019-06-13 NOTE — TELEPHONE ENCOUNTER
Patient calling to check on the status of refill. Patient states her ears are still really bad. Patient would like a phone call to let her know when sent to the pharmacy.

## 2019-06-14 NOTE — TELEPHONE ENCOUNTER
Did that Augmentin help at all?  Maybe we should try Azithromycin instead?  If not any better after this, then needs to be seen.    --Dr. Biggs

## 2019-06-17 NOTE — PROGRESS NOTES
Subjective     Bridget Lawson is a 68 year old female who presents to clinic today for the following health issues:    HPI   Ear issue      Duration: 2.5 weeks    Description (location/character/radiation): patient states that she is having pain in both ears, drainage in the right ear, mild sore throat right side, no nasal congestion    Intensity:  mild, moderate    Accompanying signs and symptoms: see above; recently had a head cold while traveling in the [facility]    History (similar episodes/previous evaluation): None    Precipitating or alleviating factors: None    Therapies tried and outcome: tylenol with some improvement, put on augmentin 5/30/19 by [facility] for right ear infection, had left ear irrigated at same visit.     Reviewed and updated as needed this visit by Provider  Tobacco  Allergies  Meds  Problems  Med Hx  Surg Hx  Fam Hx  Soc Hx          Additional complaints: None    HPI additional notes: Bridget presents today with   Chief Complaint   Patient presents with     URI   Has a mild sore throat on the right side, no fevers.         Review of Systems   C: Negative for fever, chills, recent change in weight  Skin: Negative for worrisome rashes or lesions  ENT/MOUTH:POSITIVE for ear pain, ear pressure and sore throat.  Negative for tinnitus and dental pain.  Resp: Negative for significant cough or SOB  MS: Negative for significant arthralgias or myalgias  NEURO: POSITIVE for vertigo  P: Negative for changes in mood or affect  ROS otherwise negative.    Chart Review:  History   Smoking Status     Former Smoker     Packs/day: 2.00     Years: 27.00     Types: Cigarettes     Start date: 10/24/1969     Quit date: 9/1/1996   Smokeless Tobacco     Never Used     Patient Active Problem List   Diagnosis     Anemia     Obstructive sleep apnea     Primary hyperparathyroidism (H)     Acute gastric ulcer with hemorrhage     Late effects of cerebrovascular disease     Osteoporosis     Hyperlipidemia with target LDL  less than 100     Preventive measure     Osteopenia     Intracranial hemorrhage (H)     Pain in left foot     Arthritis of right knee     Dysphagia     Memory loss     Essential hypertension with goal blood pressure less than 140/90     Personal history of tobacco use, presenting hazards to health     Family history of malignant neoplasm of breast     Traumatic rupture of right posterior tibial tendon, subsequent encounter     PND (post-nasal drip)     Obesity (BMI 35.0-39.9) with comorbidity (H)     Past Surgical History:   Procedure Laterality Date     BREAST SURGERY  11/2004    R breast bx     C/SECTION, CLASSICAL  1985    with tubal     HYSTERECTOMY  5/1998    GREGORY w/ BSO     KNEE SURGERY  9/1998    L Arthroscopy     NECK SURGERY  12/2010    R parathyroid adenoma     ORTHOPEDIC SURGERY       REPAIR TENDON ANKLE Right 11/1/2017    Procedure: REPAIR TENDON ANKLE;  RIGHT POSTERIOR TIBIAL TENDON RECONSTRUCTION ;  Surgeon: Xavi Duran MD;  Location: [facility] OR     SHOULDER SURGERY  11/2008    rotator cuff repair R     TONSILLECTOMY      as a child T & A     Problem list, Medication list, Allergies, Medical/Social/Surg hx reviewed in [facility], updated as appropriate.           Objective    /74   Pulse 75   Temp 96.9  F (36.1  C) (Tympanic)   Resp 16   Wt 112.9 kg (249 lb)   BMI 44.11 kg/m    Body mass index is 44.11 kg/m .  Physical Exam   GENERAL: healthy, alert, in no acute distress  EYES: Grossly normal to inspection, EOMI, PERRL  HENT: Ear canals normal bilaterally. TM mildly injected  bulging with purulent effusion right.  Nasal mucosa mildly edematous with clear rhinorrhea.  No septal deviation.  Mouth- mucous membranes moist, no lesions or ulcerations. Pharynx pink. and No tonsillary hypertrophy. Uvula midline, no  post-nasal drainage.  Maxillary and frontal sinuses nontender to palpation bilaterally  NECK:tender and 2+ posterior cervical LAD right  RESP: lungs clear to auscultation - no rales, no  rhonchi, no wheezes  CV: regular rate and rhythm, normal S1 S2.  No peripheral edema.  SKIN: no suspicious lesions, no rashes  PSYCH: Alert and oriented times 3;  Able to articulate logical thoughts. Affect is normal.    Diagnostic test results: None        Assessment & Plan       ICD-10-CM    1. Non-recurrent acute suppurative otitis media of right ear without spontaneous rupture of tympanic membrane H66.001 azithromycin (ZITHROMAX) 250 MG tablet     If doesn't improve after 10 days, may need 5 day course of prednisone.  Can call to request this.  Discussed looks better than how the ear was described in Dr. Biggs's note from last month.    Please see patient instructions for treatment details.    Return in about 10 days (around 6/28/2019) for Recheck if not improving, phone call to clinic.    Holly Haywood PA-C  [facility]

## 2019-06-17 NOTE — TELEPHONE ENCOUNTER
Talked with patient this AM. She set up follow up pietro with provider tomorrow. Will wait until then for a medication update. States that a new medication is what is needed.

## 2019-06-18 ENCOUNTER — OFFICE VISIT (OUTPATIENT)
Dept: FAMILY MEDICINE | Facility: CLINIC | Age: 69
End: 2019-06-18
Payer: COMMERCIAL

## 2019-06-18 VITALS
BODY MASS INDEX: 44.11 KG/M2 | TEMPERATURE: 96.9 F | SYSTOLIC BLOOD PRESSURE: 116 MMHG | RESPIRATION RATE: 16 BRPM | WEIGHT: 249 LBS | HEART RATE: 75 BPM | DIASTOLIC BLOOD PRESSURE: 74 MMHG

## 2019-06-18 DIAGNOSIS — H66.001 NON-RECURRENT ACUTE SUPPURATIVE OTITIS MEDIA OF RIGHT EAR WITHOUT SPONTANEOUS RUPTURE OF TYMPANIC MEMBRANE: Primary | ICD-10-CM

## 2019-06-18 PROBLEM — S86.119A TIBIALIS POSTERIOR TENDON RUPTURE: Status: RESOLVED | Noted: 2017-11-01 | Resolved: 2019-06-18

## 2019-06-18 PROBLEM — Z47.89 AFTERCARE FOLLOWING SURGERY OF THE MUSCULOSKELETAL SYSTEM: Status: RESOLVED | Noted: 2017-12-28 | Resolved: 2019-06-18

## 2019-06-18 PROCEDURE — 99213 OFFICE O/P EST LOW 20 MIN: CPT | Performed by: PHYSICIAN ASSISTANT

## 2019-06-18 RX ORDER — AZITHROMYCIN 250 MG/1
TABLET, FILM COATED ORAL
Qty: 6 TABLET | Refills: 0 | Status: SHIPPED | OUTPATIENT
Start: 2019-06-18 | End: 2019-10-26

## 2019-06-18 NOTE — PATIENT INSTRUCTIONS
Middle Ear Infection (Adult)  You have an infection of the middle ear (the space behind the eardrum). It can occur as a result of the common cold. This is because congestion can block the internal passage (eustachian tube) that drains fluid from the middle ear. When the middle ear fills with fluid, bacteria can grow there and cause an infection. Oral antibiotics are used to treat this illness, not ear drops. Symptoms usually start to improve within 1-2 days of treatment.    Home Care:    Finish all of the antibiotic medicine prescribed, even though you may feel better after the first few days.    You may use acetaminophen (Tylenol) or ibuprofen (Motrin, Advil) to control pain, unless something else was prescribed. [NOTE: If you have chronic liver or kidney disease or have ever had a stomach ulcer or GI bleeding, talk with your doctor before using these medicines.] (Do not give aspirin to anyone under 18 years of age who is ill with a fever. It may cause severe liver damage.)    Use a decongestant like sudafed to help dry up the fluid behind the ear drum.    Apply warm compresses to the ear to help with pain.  Follow Up  with your doctor or this facility in two weeks if all symptoms have not cleared, or if hearing does not return to normal within one month.  Get Prompt Medical Attention  if any of the following occur:    Ear pain gets worse or does not improve after three days of treatment    Unusual drowsiness or confusion    Neck pain, stiff neck or headache    Fluid or blood draining from the ear canal    Fever of 100.4 F (38 C) or higher after 3 days of antibiotics, or as directed by your healthcare provider    Convulsion (seizure)    [id number] [employer]. [address] [place] [address], [place] [address]. All rights reserved. This information is not intended as a substitute for professional medical care. Always follow your healthcare professional's instructions.

## 2019-07-15 ENCOUNTER — HOSPITAL ENCOUNTER (OUTPATIENT)
Dept: MAMMOGRAPHY | Facility: CLINIC | Age: 69
Discharge: HOME | End: 2019-07-15
Attending: FAMILY MEDICINE | Admitting: FAMILY MEDICINE
Payer: COMMERCIAL

## 2019-07-15 DIAGNOSIS — Z12.31 ENCOUNTER FOR SCREENING MAMMOGRAM FOR BREAST CANCER: ICD-10-CM

## 2019-07-15 PROCEDURE — 77067 SCR MAMMO BI INCL CAD: CPT

## 2019-08-01 DIAGNOSIS — E78.5 HYPERLIPIDEMIA WITH TARGET LDL LESS THAN 100: ICD-10-CM

## 2019-08-01 DIAGNOSIS — I10 ESSENTIAL HYPERTENSION WITH GOAL BLOOD PRESSURE LESS THAN 140/90: ICD-10-CM

## 2019-08-02 RX ORDER — PRAVASTATIN SODIUM 40 MG
TABLET ORAL
Qty: 30 TABLET | Refills: 0 | Status: SHIPPED | OUTPATIENT
Start: 2019-08-02 | End: 2019-09-14

## 2019-08-02 RX ORDER — AMLODIPINE BESYLATE 5 MG/1
TABLET ORAL
Qty: 30 TABLET | Refills: 0 | Status: SHIPPED | OUTPATIENT
Start: 2019-08-02 | End: 2019-09-14

## 2019-08-02 RX ORDER — RAMIPRIL 10 MG/1
CAPSULE ORAL
Qty: 30 CAPSULE | Refills: 0 | Status: SHIPPED | OUTPATIENT
Start: 2019-08-02 | End: 2019-09-14

## 2019-08-02 RX ORDER — ISOSORBIDE MONONITRATE 60 MG/1
TABLET, EXTENDED RELEASE ORAL
Qty: 90 TABLET | Refills: 2 | Status: SHIPPED | OUTPATIENT
Start: 2019-08-02 | End: 2020-04-13

## 2019-08-02 RX ORDER — LABETALOL 200 MG/1
TABLET, FILM COATED ORAL
Qty: 270 TABLET | Refills: 2 | Status: SHIPPED | OUTPATIENT
Start: 2019-08-02 | End: 2020-05-01

## 2019-08-02 NOTE — TELEPHONE ENCOUNTER
"Prescription approved per [facility] Refill Protocol for 12 months of refills since last appointment, which was 6/18/2918. (labetalol, Imdur)    Medication is being filled for 1 time refill only due to:  Future labs ordered Cr, LDL, K+.          Requested Prescriptions   Pending Prescriptions Disp Refills     labetalol (NORMODYNE) 200 MG tablet [Pharmacy Med Name: LABETALOL HCL 200MG     TAB] 270 tablet 4     Sig: TAKE 1 TABLET BY MOUTH THREE TIMES DAILY       Beta-Blockers Protocol Passed - 8/1/2019  4:53 PM        Passed - Blood pressure under 140/90 in past 12 months     BP Readings from Last 3 Encounters:   06/18/19 116/74   05/30/19 112/72   10/22/18 128/74                 Passed - Patient is age 6 or older        Passed - Recent (12 mo) or future (30 days) visit within the authorizing provider's specialty     Patient had office visit in the last 12 months or has a visit in the next 30 days with authorizing provider or within the authorizing provider's specialty.  See \"Patient Info\" tab in inbasket, or \"Choose Columns\" in Meds & Orders section of the refill encounter.              Passed - Medication is active on med list        isosorbide mononitrate (IMDUR) 60 MG 24 hr tablet [Pharmacy Med Name: ISOSORB MONO ER 60MG TAB] 90 tablet 4     Sig: TAKE 1 TABLET BY MOUTH ONCE DAILY       Nitrates Passed - 8/1/2019  4:53 PM        Passed - Blood pressure under 140/90 in past 12 months     BP Readings from Last 3 Encounters:   06/18/19 116/74   05/30/19 112/72   10/22/18 128/74                 Passed - Pt is not on erectile dysfunction medications        Passed - Recent (12 mo) or future (30 days) visit within the authorizing provider's specialty     Patient had office visit in the last 12 months or has a visit in the next 30 days with authorizing provider or within the authorizing provider's specialty.  See \"Patient Info\" tab in inbasket, or \"Choose Columns\" in Meds & Orders section of the refill encounter.              " "Passed - Medication is active on med list        Passed - Patient is age 18 or older        amLODIPine (NORVASC) 5 MG tablet [Pharmacy Med Name: AMLODIPINE 5MG TAB] 90 tablet 4     Sig: TAKE 1 TABLET BY MOUTH ONCE DAILY       Calcium Channel Blockers Protocol  Failed - 8/1/2019  4:53 PM        Failed - Normal serum creatinine on file in past 12 months     Recent Labs   Lab Test 10/23/17  1519   CR 0.75             Passed - Blood pressure under 140/90 in past 12 months     BP Readings from Last 3 Encounters:   06/18/19 116/74   05/30/19 112/72   10/22/18 128/74                 Passed - Recent (12 mo) or future (30 days) visit within the authorizing provider's specialty     Patient had office visit in the last 12 months or has a visit in the next 30 days with authorizing provider or within the authorizing provider's specialty.  See \"Patient Info\" tab in inbasket, or \"Choose Columns\" in Meds & Orders section of the refill encounter.              Passed - Medication is active on med list        Passed - Patient is age 18 or older        Passed - No active pregnancy on record        Passed - No positive pregnancy test in past 12 months        pravastatin (PRAVACHOL) 40 MG tablet [Pharmacy Med Name: PRAVASTATIN 40MG    TAB] 90 tablet 4     Sig: TAKE 1 TABLET BY MOUTH ONCE DAILY       Statins Protocol Failed - 8/1/2019  4:53 PM        Failed - LDL on file in past 12 months     Recent Labs   Lab Test 10/24/16  0937   LDL 82             Passed - No abnormal creatine kinase in past 12 months     No lab results found.             Passed - Recent (12 mo) or future (30 days) visit within the authorizing provider's specialty     Patient had office visit in the last 12 months or has a visit in the next 30 days with authorizing provider or within the authorizing provider's specialty.  See \"Patient Info\" tab in inbasket, or \"Choose Columns\" in Meds & Orders section of the refill encounter.              Passed - Medication is active " "on med list        Passed - Patient is age 18 or older        Passed - No active pregnancy on record        Passed - No positive pregnancy test in past 12 months        ramipril (ALTACE) 10 MG capsule [Pharmacy Med Name: RAMIPRIL 10MG       CAP] 90 capsule 4     Sig: TAKE 1 CAPSULE BY MOUTH ONCE DAILY       ACE Inhibitors (Including Combos) Protocol Failed - 8/1/2019  4:53 PM        Failed - Normal serum creatinine on file in past 12 months     Recent Labs   Lab Test 10/23/17  1519   CR 0.75             Failed - Normal serum potassium on file in past 12 months     Recent Labs   Lab Test 10/23/17  1519   POTASSIUM 4.2             Passed - Blood pressure under 140/90 in past 12 months     BP Readings from Last 3 Encounters:   06/18/19 116/74   05/30/19 112/72   10/22/18 128/74                 Passed - Recent (12 mo) or future (30 days) visit within the authorizing provider's specialty     Patient had office visit in the last 12 months or has a visit in the next 30 days with authorizing provider or within the authorizing provider's specialty.  See \"Patient Info\" tab in inbasket, or \"Choose Columns\" in Meds & Orders section of the refill encounter.              Passed - Medication is active on med list        Passed - Patient is age 18 or older        Passed - No active pregnancy on record        Passed - No positive pregnancy test within past 12 months          "

## 2019-09-10 ENCOUNTER — TRANSFERRED RECORDS (OUTPATIENT)
Dept: HEALTH INFORMATION MANAGEMENT | Facility: CLINIC | Age: 69
End: 2019-09-10

## 2019-09-14 DIAGNOSIS — I10 ESSENTIAL HYPERTENSION WITH GOAL BLOOD PRESSURE LESS THAN 140/90: ICD-10-CM

## 2019-09-14 DIAGNOSIS — E78.5 HYPERLIPIDEMIA WITH TARGET LDL LESS THAN 100: ICD-10-CM

## 2019-09-14 NOTE — TELEPHONE ENCOUNTER
"Requested Prescriptions   Pending Prescriptions Disp Refills     ramipril (ALTACE) 10 MG capsule [Pharmacy Med Name: RAMIPRIL 10MG       CAP]  Last Written Prescription Date:  8/2/2019  Last Fill Quantity: 30 capsule,  # refills: 0   Last office visit: 6/18/2019 with prescribing provider:  Yobany Hurst Office Visit:     30 capsule 0     Sig: TAKE 1 CAPSULE BY MOUTH ONCE DAILY PLEASE SCHEDULE A LAB ONLY VISIT       ACE Inhibitors (Including Combos) Protocol Failed - 9/14/2019  9:39 AM        Failed - Normal serum creatinine on file in past 12 months     Recent Labs   Lab Test 10/23/17  1519   CR 0.75             Failed - Normal serum potassium on file in past 12 months     Recent Labs   Lab Test 10/23/17  1519   POTASSIUM 4.2             Passed - Blood pressure under 140/90 in past 12 months     BP Readings from Last 3 Encounters:   06/18/19 116/74   05/30/19 112/72   10/22/18 128/74                 Passed - Recent (12 mo) or future (30 days) visit within the authorizing provider's specialty     Patient had office visit in the last 12 months or has a visit in the next 30 days with authorizing provider or within the authorizing provider's specialty.  See \"Patient Info\" tab in inbasket, or \"Choose Columns\" in Meds & Orders section of the refill encounter.              Passed - Medication is active on med list        Passed - Patient is age 18 or older        Passed - No active pregnancy on record        Passed - No positive pregnancy test within past 12 months        pravastatin (PRAVACHOL) 40 MG tablet [Pharmacy Med Name: PRAVASTATIN 40MG    TAB]  Last Written Prescription Date:  8/2/2019  Last Fill Quantity: 30 tablet,  # refills: 0   Last office visit: 6/18/2019 with prescribing provider:  Yobany Hurst Office Visit:     30 tablet 0     Sig: TAKE 1 TABLET BY MOUTH ONCE DAILY PLEASE SCHEDULE A LAB ONLY VISIT       Statins Protocol Failed - 9/14/2019  9:39 AM        Failed - LDL on file in past 12 months     " "Recent Labs   Lab Test 10/24/16  0937   LDL 82             Passed - No abnormal creatine kinase in past 12 months     No lab results found.             Passed - Recent (12 mo) or future (30 days) visit within the authorizing provider's specialty     Patient had office visit in the last 12 months or has a visit in the next 30 days with authorizing provider or within the authorizing provider's specialty.  See \"Patient Info\" tab in inbasket, or \"Choose Columns\" in Meds & Orders section of the refill encounter.              Passed - Medication is active on med list        Passed - Patient is age 18 or older        Passed - No active pregnancy on record        Passed - No positive pregnancy test in past 12 months        amLODIPine (NORVASC) 5 MG tablet [Pharmacy Med Name: AMLODIPINE 5MG TAB]  Last Written Prescription Date:  8/2/2019  Last Fill Quantity: 30 tablet,  # refills: 0   Last office visit: 6/18/2019 with prescribing provider:  Yobany   Future Office Visit:     30 tablet 0     Sig: TAKE 1 TABLET BY MOUTH ONCE DAILY NEEDS TO SCHEDULE A LAB ONLY VISIT       Calcium Channel Blockers Protocol  Failed - 9/14/2019  9:39 AM        Failed - Normal serum creatinine on file in past 12 months     Recent Labs   Lab Test 10/23/17  1519   CR 0.75             Passed - Blood pressure under 140/90 in past 12 months     BP Readings from Last 3 Encounters:   06/18/19 116/74   05/30/19 112/72   10/22/18 128/74                 Passed - Recent (12 mo) or future (30 days) visit within the authorizing provider's specialty     Patient had office visit in the last 12 months or has a visit in the next 30 days with authorizing provider or within the authorizing provider's specialty.  See \"Patient Info\" tab in inbasket, or \"Choose Columns\" in Meds & Orders section of the refill encounter.              Passed - Medication is active on med list        Passed - Patient is age 18 or older        Passed - No active pregnancy on record        " Passed - No positive pregnancy test in past 12 months

## 2019-09-16 RX ORDER — RAMIPRIL 10 MG/1
CAPSULE ORAL
Qty: 30 CAPSULE | Refills: 0 | Status: SHIPPED | OUTPATIENT
Start: 2019-09-16 | End: 2019-10-28

## 2019-09-16 RX ORDER — AMLODIPINE BESYLATE 5 MG/1
5 TABLET ORAL DAILY
Qty: 30 TABLET | Refills: 0 | Status: SHIPPED | OUTPATIENT
Start: 2019-09-16 | End: 2019-10-26

## 2019-09-16 RX ORDER — PRAVASTATIN SODIUM 40 MG
40 TABLET ORAL DAILY
Qty: 30 TABLET | Refills: 0 | Status: SHIPPED | OUTPATIENT
Start: 2019-09-16 | End: 2019-10-26

## 2019-09-16 NOTE — TELEPHONE ENCOUNTER
"I have not seen this patient in nearly 2 years.                       She does not need a \" lab only appointment\".                    She needs to see me for an office visit, blood pressure check, and then I will order the lab work that is needed.  "

## 2019-09-16 NOTE — TELEPHONE ENCOUNTER
VM left for patient to call clinic to schedule a lab only.     Routing refill request to provider for review/approval because:  Patient already had a ericka refill. Overdue labs.

## 2019-09-18 DIAGNOSIS — E78.5 HYPERLIPIDEMIA WITH TARGET LDL LESS THAN 100: ICD-10-CM

## 2019-09-18 DIAGNOSIS — I10 ESSENTIAL HYPERTENSION WITH GOAL BLOOD PRESSURE LESS THAN 140/90: ICD-10-CM

## 2019-09-18 LAB
CHOLEST SERPL-MCNC: 229 MG/DL
CREAT SERPL-MCNC: 0.7 MG/DL (ref 0.52–1.04)
GFR SERPL CREATININE-BSD FRML MDRD: 87 ML/MIN/{1.73_M2}
HDLC SERPL-MCNC: 62 MG/DL
LDLC SERPL CALC-MCNC: 134 MG/DL
NONHDLC SERPL-MCNC: 167 MG/DL
POTASSIUM SERPL-SCNC: 4.3 MMOL/L (ref 3.4–5.3)
TRIGL SERPL-MCNC: 163 MG/DL

## 2019-09-18 PROCEDURE — 80061 LIPID PANEL: CPT | Performed by: INTERNAL MEDICINE

## 2019-09-18 PROCEDURE — 82565 ASSAY OF CREATININE: CPT | Performed by: INTERNAL MEDICINE

## 2019-09-18 PROCEDURE — 84132 ASSAY OF SERUM POTASSIUM: CPT | Performed by: INTERNAL MEDICINE

## 2019-09-18 PROCEDURE — 36415 COLL VENOUS BLD VENIPUNCTURE: CPT | Performed by: INTERNAL MEDICINE

## 2019-09-19 DIAGNOSIS — E78.5 HYPERLIPIDEMIA WITH TARGET LDL LESS THAN 100: ICD-10-CM

## 2019-09-19 NOTE — TELEPHONE ENCOUNTER
"Requested Prescriptions   Pending Prescriptions Disp Refills     pravastatin (PRAVACHOL) 40 MG tablet [Pharmacy Med Name: PRAVASTATIN 40MG    TAB]  Last Written Prescription Date:  9/16/2019  Last Fill Quantity: 30 tablet,  # refills: 0   Last office visit: 6/18/2019 with prescribing provider:  Yobany Hurst Office Visit:     30 tablet 0     Sig: TAKE 1 TABLET BY MOUTH ONCE DAILY PLEASE SCHEDULE A LAB ONLY VISIT       Statins Protocol Passed - 9/19/2019 11:30 AM        Passed - LDL on file in past 12 months     Recent Labs   Lab Test 09/18/19  0925   *             Passed - No abnormal creatine kinase in past 12 months     No lab results found.             Passed - Recent (12 mo) or future (30 days) visit within the authorizing provider's specialty     Patient had office visit in the last 12 months or has a visit in the next 30 days with authorizing provider or within the authorizing provider's specialty.  See \"Patient Info\" tab in inbasket, or \"Choose Columns\" in Meds & Orders section of the refill encounter.              Passed - Medication is active on med list        Passed - Patient is age 18 or older        Passed - No active pregnancy on record        Passed - No positive pregnancy test in past 12 months        amLODIPine (NORVASC) 5 MG tablet [Pharmacy Med Name: AMLODIPINE 5MG TAB]  Last Written Prescription Date:  9/16/2019  Last Fill Quantity: 30 tablet,  # refills: 0   Last office visit: 6/18/2019 with prescribing provider:  Yobany Hurst Office Visit:     30 tablet 0     Sig: TAKE 1 TABLET BY MOUTH ONCE DAILY NEEDS TO SCHEDULE A LAB ONLY VISIT       Calcium Channel Blockers Protocol  Passed - 9/19/2019 11:30 AM        Passed - Blood pressure under 140/90 in past 12 months     BP Readings from Last 3 Encounters:   06/18/19 116/74   05/30/19 112/72   10/22/18 128/74                 Passed - Recent (12 mo) or future (30 days) visit within the authorizing provider's specialty     Patient had " "office visit in the last 12 months or has a visit in the next 30 days with authorizing provider or within the authorizing provider's specialty.  See \"Patient Info\" tab in inbasket, or \"Choose Columns\" in Meds & Orders section of the refill encounter.              Passed - Medication is active on med list        Passed - Patient is age 18 or older        Passed - No active pregnancy on record        Passed - Normal serum creatinine on file in past 12 months     Recent Labs   Lab Test 09/18/19  0925   CR 0.70             Passed - No positive pregnancy test in past 12 months           "

## 2019-09-20 RX ORDER — AMLODIPINE BESYLATE 5 MG/1
TABLET ORAL
Qty: 90 TABLET | Refills: 0 | Status: SHIPPED | OUTPATIENT
Start: 2019-09-20 | End: 2019-10-28

## 2019-09-20 RX ORDER — PRAVASTATIN SODIUM 40 MG
TABLET ORAL
Qty: 90 TABLET | Refills: 0 | Status: SHIPPED | OUTPATIENT
Start: 2019-09-20 | End: 2019-10-28 | Stop reason: ALTCHOICE

## 2019-10-28 ENCOUNTER — OFFICE VISIT (OUTPATIENT)
Dept: FAMILY MEDICINE | Facility: CLINIC | Age: 69
End: 2019-10-28
Payer: COMMERCIAL

## 2019-10-28 VITALS
SYSTOLIC BLOOD PRESSURE: 120 MMHG | HEART RATE: 72 BPM | DIASTOLIC BLOOD PRESSURE: 72 MMHG | OXYGEN SATURATION: 96 % | WEIGHT: 255 LBS | BODY MASS INDEX: 45.17 KG/M2 | TEMPERATURE: 97 F

## 2019-10-28 DIAGNOSIS — Z87.891 PERSONAL HISTORY OF TOBACCO USE, PRESENTING HAZARDS TO HEALTH: ICD-10-CM

## 2019-10-28 DIAGNOSIS — M81.0 OSTEOPOROSIS WITHOUT CURRENT PATHOLOGICAL FRACTURE, UNSPECIFIED OSTEOPOROSIS TYPE: ICD-10-CM

## 2019-10-28 DIAGNOSIS — I10 ESSENTIAL HYPERTENSION WITH GOAL BLOOD PRESSURE LESS THAN 130/80: Chronic | ICD-10-CM

## 2019-10-28 DIAGNOSIS — E21.0 PRIMARY HYPERPARATHYROIDISM (H): ICD-10-CM

## 2019-10-28 DIAGNOSIS — Z87.11 HISTORY OF GASTRIC ULCER: ICD-10-CM

## 2019-10-28 DIAGNOSIS — Z12.11 SPECIAL SCREENING FOR MALIGNANT NEOPLASMS, COLON: ICD-10-CM

## 2019-10-28 DIAGNOSIS — R39.15 URINARY URGENCY: ICD-10-CM

## 2019-10-28 DIAGNOSIS — E78.5 HYPERLIPIDEMIA WITH TARGET LDL LESS THAN 100: ICD-10-CM

## 2019-10-28 DIAGNOSIS — Z79.1 NSAID LONG-TERM USE: ICD-10-CM

## 2019-10-28 DIAGNOSIS — G47.33 OBSTRUCTIVE SLEEP APNEA: Chronic | ICD-10-CM

## 2019-10-28 DIAGNOSIS — R35.0 URINARY FREQUENCY: ICD-10-CM

## 2019-10-28 DIAGNOSIS — Z00.00 ENCOUNTER FOR MEDICARE ANNUAL WELLNESS EXAM: Primary | ICD-10-CM

## 2019-10-28 DIAGNOSIS — G56.01 CARPAL TUNNEL SYNDROME OF RIGHT WRIST: ICD-10-CM

## 2019-10-28 DIAGNOSIS — M19.049 HAND ARTHRITIS: ICD-10-CM

## 2019-10-28 DIAGNOSIS — E66.01 MORBID OBESITY (H): ICD-10-CM

## 2019-10-28 LAB
BASOPHILS # BLD AUTO: 0 10E9/L (ref 0–0.2)
BASOPHILS NFR BLD AUTO: 0.4 %
CRP SERPL-MCNC: <2.9 MG/L (ref 0–8)
DIFFERENTIAL METHOD BLD: NORMAL
EOSINOPHIL # BLD AUTO: 0.1 10E9/L (ref 0–0.7)
EOSINOPHIL NFR BLD AUTO: 1.8 %
ERYTHROCYTE [DISTWIDTH] IN BLOOD BY AUTOMATED COUNT: 14.5 % (ref 10–15)
HCT VFR BLD AUTO: 40.9 % (ref 35–47)
HGB BLD-MCNC: 13.2 G/DL (ref 11.7–15.7)
LYMPHOCYTES # BLD AUTO: 1.3 10E9/L (ref 0.8–5.3)
LYMPHOCYTES NFR BLD AUTO: 16 %
MCH RBC QN AUTO: 30.1 PG (ref 26.5–33)
MCHC RBC AUTO-ENTMCNC: 32.3 G/DL (ref 31.5–36.5)
MCV RBC AUTO: 93 FL (ref 78–100)
MONOCYTES # BLD AUTO: 0.7 10E9/L (ref 0–1.3)
MONOCYTES NFR BLD AUTO: 8.6 %
NEUTROPHILS # BLD AUTO: 5.7 10E9/L (ref 1.6–8.3)
NEUTROPHILS NFR BLD AUTO: 73.2 %
PLATELET # BLD AUTO: 233 10E9/L (ref 150–450)
RBC # BLD AUTO: 4.39 10E12/L (ref 3.8–5.2)
WBC # BLD AUTO: 7.8 10E9/L (ref 4–11)

## 2019-10-28 PROCEDURE — 85025 COMPLETE CBC W/AUTO DIFF WBC: CPT | Performed by: INTERNAL MEDICINE

## 2019-10-28 PROCEDURE — 80053 COMPREHEN METABOLIC PANEL: CPT | Performed by: INTERNAL MEDICINE

## 2019-10-28 PROCEDURE — 84443 ASSAY THYROID STIM HORMONE: CPT | Performed by: INTERNAL MEDICINE

## 2019-10-28 PROCEDURE — 86431 RHEUMATOID FACTOR QUANT: CPT | Performed by: INTERNAL MEDICINE

## 2019-10-28 PROCEDURE — 86140 C-REACTIVE PROTEIN: CPT | Performed by: INTERNAL MEDICINE

## 2019-10-28 PROCEDURE — 99397 PER PM REEVAL EST PAT 65+ YR: CPT | Performed by: INTERNAL MEDICINE

## 2019-10-28 PROCEDURE — 36415 COLL VENOUS BLD VENIPUNCTURE: CPT | Performed by: INTERNAL MEDICINE

## 2019-10-28 PROCEDURE — 99214 OFFICE O/P EST MOD 30 MIN: CPT | Mod: 25 | Performed by: INTERNAL MEDICINE

## 2019-10-28 RX ORDER — ROSUVASTATIN CALCIUM 20 MG/1
20 TABLET, COATED ORAL DAILY
Qty: 90 TABLET | Refills: 3 | Status: SHIPPED | OUTPATIENT
Start: 2019-10-28 | End: 2020-09-30

## 2019-10-28 RX ORDER — AMLODIPINE BESYLATE 5 MG/1
TABLET ORAL
Qty: 90 TABLET | Refills: 3 | Status: SHIPPED | OUTPATIENT
Start: 2019-10-28 | End: 2020-09-30

## 2019-10-28 RX ORDER — RAMIPRIL 10 MG/1
CAPSULE ORAL
Qty: 90 CAPSULE | Refills: 3 | Status: SHIPPED | OUTPATIENT
Start: 2019-10-28 | End: 2020-09-30

## 2019-10-28 ASSESSMENT — ACTIVITIES OF DAILY LIVING (ADL): CURRENT_FUNCTION: NO ASSISTANCE NEEDED

## 2019-10-28 NOTE — PROGRESS NOTES
"SUBJECTIVE:   Bridget Lawson is a 69 year old female who presents for Preventive Visit.      Are you in the first 12 months of your Medicare coverage?  No    Healthy Habits:     In general, how would you rate your overall health?  Poor    Frequency of exercise:  None    Do you usually eat at least 4 servings of fruit and vegetables a day, include whole grains    & fiber and avoid regularly eating high fat or \"junk\" foods?  No    Taking medications regularly:  Yes    Medication side effects:  Lightheadedness    Ability to successfully perform activities of daily living:  No assistance needed    Home Safety:  Throw rugs in the hallway    Hearing Impairment:  Difficulty following a conversation in a noisy restaurant or crowded room, need to ask people to speak up or repeat themselves and difficulty understanding soft or whispered speech    In the past 6 months, have you been bothered by leaking of urine? Yes    In general, how would you rate your overall mental or emotional health?  Fair      PHQ-2 Total Score: 1    Additional concerns today:  Yes    Do you feel safe in your environment? Yes    Do you have a Health Care Directive? Yes: Advance Directive has been received and scanned.      Fall risk  Fallen 2 or more times in the past year?: No  Any fall with injury in the past year?: No    Cognitive Screening   1) Repeat 3 items (Leader, Season, Table)    2) Clock draw: NORMAL  3) 3 item recall: Recalls 3 objects  Results: 3 items recalled: COGNITIVE IMPAIRMENT LESS LIKELY    Mini-CogTM Copyright JI Bernabe. Licensed by the author for use in [facility]; reprinted with permission ([email or web address]@.[email or web address]). All rights reserved.          Reviewed and updated as needed this visit by clinical staff  Tobacco  Allergies  Meds         Reviewed and updated as needed this visit by Provider        Social History     Tobacco Use     Smoking status: Former Smoker     Packs/day: 2.00     Years: 27.00     Pack years: 54.00     " "Types: Cigarettes     Start date: 10/24/1969     Last attempt to quit: 1996     Years since quittin.1     Smokeless tobacco: Never Used   Substance Use Topics     Alcohol use: Yes     Alcohol/week: 0.0 standard drinks     Comment: occasionally         Alcohol Use 10/28/2019   Prescreen: >3 drinks/day or >7 drinks/week? No       Last appt with me 2 yrs ago!  That was for a pre-op H+P.     Aches all over; hands,ankles,knees.               Takes some tylenol or advil daily; alternates.              Hx of gastric ulcer ; \"I was taking Excedrin then\".            No current GI sx.               Uses CPAP.    She regained all the weight that she lost.                               Has urinary frequency and some urgency and some double voiding.              Willing to have a colonoscopy and a bone density.            Had a recent mammogram.                         Wears a R wrist splint; did not want carpal tunnel surgery.         Has ongoing R foot/ankle pain after surgery.             Taking all her meds; recent LDL was too high.                                Current providers sharing in care for this patient include:   Patient Care Team:  Patel Hughes MD as PCP - General (Internal Medicine)  Holly Haywood PA-C as Assigned PCP    The following health maintenance items are reviewed in Epic and correct as of today:  Health Maintenance   Topic Date Due     ADVANCE CARE PLANNING  1950     MEDICARE ANNUAL WELLNESS VISIT  2014     FALL RISK ASSESSMENT  10/24/2017     COLONOSCOPY  2018     ZOSTER IMMUNIZATION (3 of 3) 2019     MAMMO SCREENING  07/15/2021     LIPID  2024     DTAP/TDAP/TD IMMUNIZATION (3 - Td) 2025     DEXA  Completed     HEPATITIS C SCREENING  Completed     PHQ-2  Completed     INFLUENZA VACCINE  Completed     PNEUMOCOCCAL IMMUNIZATION 65+ LOW/MEDIUM RISK  Completed     IPV IMMUNIZATION  Aged Out     MENINGITIS IMMUNIZATION  Aged Out "     Patient Active Problem List    Diagnosis Date Noted     Essential hypertension with goal blood pressure less than 130/80 10/24/2016     Priority: High     Personal history of tobacco use, presenting hazards to health 10/24/2016     Priority: High     Quit 1996            No AAA on CT in 2010       Memory loss 06/22/2015     Priority: High     Noted by pt in 6/15; labs ok; referred to neuro. She saw Dr Silva; labs were done; no consult letter received.                       Saw neuro last year; they told her to stop taking advil PM, and there was improvement.        Obstructive sleep apnea 10/08/2012     Priority: High     Uses CPAP as of 6/2010  Problem list name updated by automated process. Provider to review       Primary hyperparathyroidism (H) 10/08/2012     Priority: High     DX in 2003: cured 12/2010 with parathyroid surgery       Acute gastric ulcer with hemorrhage 10/08/2012     Priority: High     2008; f/u EGD showed ulcers had healed; she avoids ASA/ NSAID'S due to this ulcer hx  Problem list name updated by automated process. Provider to review       Late effects of cerebrovascular disease 10/08/2012     Priority: High     Mild L hemiparesis and dysarthria and dysphagia; good recovery           Osteoporosis 10/08/2012     Priority: High     Problem list name updated by automated process. Provider to review       Intracranial hemorrhage (H)      Priority: High     R basal ganglia, due to HTN; had mild L hemiparesis and dysarthria; good recovery       History of gastric ulcer 10/28/2019     Priority: Medium     Carpal tunnel syndrome of right wrist 10/28/2019     Priority: Medium     Per Dr Guardado of ortho; wears a wrist splint R       Hand arthritis 10/28/2019     Priority: Medium     NSAID long-term use 10/28/2019     Priority: Medium     Urinary frequency 10/28/2019     Priority: Medium     Urinary urgency 10/28/2019     Priority: Medium     Obesity (BMI 35.0-39.9) with comorbidity (H) 09/10/2018      "Priority: Medium     PND (post-nasal drip) 10/31/2017     Priority: Medium     Traumatic rupture of right posterior tibial tendon, subsequent encounter 10/23/2017     Priority: Medium     Happened in 4/17       Family history of malignant neoplasm of breast 10/24/2016     Priority: Medium     mother       Dysphagia 07/14/2014     Priority: Medium     EGD neg in 7/14; Dx= globus       Pain in left foot 02/17/2014     Priority: Medium     5/13; walking injury; \"stress fracture\" saw Dr. Judson Manjarrez; takes aleve 1 tab daily since then; still bothers her in 7/14       Arthritis of right knee 02/17/2014     Priority: Medium     Uses a cane; s/p injection       Anemia 10/08/2012     Priority: Medium     Stable at 12.7 in 6/11  Problem list name updated by automated process. Provider to review       Hyperlipidemia with target LDL less than 100 10/08/2012     Priority: Medium     Start prava in 7/14  Diagnosis updated by automated process. Provider to review and confirm.       Osteopenia      Priority: Medium     in 6/10, prior to PTH surgery, T - 3.2 forearm, - 1.9 spine, - 1.5L and -1.6 R femoral necks       Preventive measure 10/08/2012     Priority: Low     APRIMA DATA BASE UNDER THE 9/26/13 NOTE  Mammogram fall 2011 at [facility]; 1/13, 6/15 at [facility], 11/16 , 7/19                             s/p GREGORY/BSO;           colonoscopy 3/08 neg per pt   FIT neg in 9/19; per her insurance company       Past Surgical History:   Procedure Laterality Date     BREAST SURGERY  11/2004    R breast bx     C/SECTION, CLASSICAL  1985    with tubal     HYSTERECTOMY  5/1998    GREGORY w/ BSO     KNEE SURGERY  9/1998    L Arthroscopy     NECK SURGERY  12/2010    R parathyroid adenoma     ORTHOPEDIC SURGERY       REPAIR TENDON ANKLE Right 11/1/2017    Procedure: REPAIR TENDON ANKLE;  RIGHT POSTERIOR TIBIAL TENDON RECONSTRUCTION ;  Surgeon: Xavi Duran MD;  Location: [facility] OR     SHOULDER SURGERY  11/2008    rotator cuff repair R     " TONSILLECTOMY      as a child T & A       BP Readings from Last 3 Encounters:   10/28/19 120/72   06/18/19 116/74   05/30/19 112/72    Wt Readings from Last 3 Encounters:   10/28/19 115.7 kg (255 lb)   06/18/19 112.9 kg (249 lb)   05/30/19 114.3 kg (252 lb)                  Current Outpatient Medications   Medication Sig Dispense Refill     Acetaminophen (TYLENOL PO) Take 500 mg by mouth every 6 hours as needed for mild pain or fever        amLODIPine (NORVASC) 5 MG tablet TAKE 1 TABLET BY MOUTH ONCE DAILY NEEDS TO SCHEDULE A LAB ONLY VISIT 90 tablet 0     aspirin  MG EC tablet Take 1 tablet (325 mg) by mouth daily 60 tablet 0     Calcium Carb-Cholecalciferol (CALTRATE 600+D) 600-800 MG-UNIT TABS Take 1 tablet by mouth 2 times daily        Carboxymethylcellulose Sodium (THERATEARS OP) Place 1-2 drops into both eyes 2 times daily as needed       fluticasone (FLONASE) 50 MCG/ACT spray Spray 1 spray into both nostrils At Bedtime       glucosamine-chondroitinoitin 750-600 MG TABS Take 1 tablet by mouth 2 times daily        ibuprofen (ADVIL/MOTRIN) 600 MG tablet Take 1 tablet (600 mg) by mouth every 6 hours as needed for other (inflammatory pain) 60 tablet 0     isosorbide mononitrate (IMDUR) 60 MG 24 hr tablet TAKE 1 TABLET BY MOUTH ONCE DAILY 90 tablet 2     ketoconazole (NIZORAL) 2 % external cream APPLY  CREAM TOPICALLY TO AFFECTED AREA AS NEEDED 60 g 3     labetalol (NORMODYNE) 200 MG tablet TAKE 1 TABLET BY MOUTH THREE TIMES DAILY 270 tablet 2     MAGNESIUM GLUCONATE PO Take 400 mg by mouth daily       MELATONIN PO Take 5 mg by mouth nightly as needed       Multiple Vitamins-Minerals (CENTRUM SILVER ULTRA WOMENS PO) Take 1 tablet by mouth daily       NONFORMULARY Apply to affected area daily PERIO GEL (contains hydrogen peroxide 1.7%)  - Apply to Periotray       ORDER FOR DME Use your CPAP device as directed by your provider.       pravastatin (PRAVACHOL) 40 MG tablet TAKE 1 TABLET BY MOUTH ONCE DAILY PLEASE  "SCHEDULE A LAB ONLY VISIT 90 tablet 0     ramipril (ALTACE) 10 MG capsule TAKE 1 CAPSULE BY MOUTH ONCE DAILY PLEASE SCHEDULE A LAB ONLY VISIT 30 capsule 0     Allergies   Allergen Reactions     Animal Dander          Review of Systems  CONSTITUTIONAL:weight gain  INTEGUMENTARY/SKIN: NEGATIVE for worrisome rashes, moles or lesions  EYES: NEGATIVE for vision changes or irritation  ENT/MOUTH: hearing loss; does not want evaluation for hearing aids  RESP: NEGATIVE for significant cough or SOB  BREAST: NEGATIVE for masses, tenderness or discharge  CV: NEGATIVE for chest pain, palpitations or peripheral edema  GI: NEGATIVE for nausea, abdominal pain, heartburn, or change in bowel habits   female: neg for dysuria or hematuria  NEURO: NEGATIVE for weakness, dizziness or paresthesias  ENDOCRINE: NEGATIVE for temperature intolerance, skin/hair changes  HEME: NEGATIVE for bleeding problems  PSYCHIATRIC: NEGATIVE for changes in mood or affect    OBJECTIVE:   /72 (Cuff Size: Adult Large)   Pulse 72   Temp 97  F (36.1  C) (Tympanic)   Wt 115.7 kg (255 lb)   SpO2 96%   BMI 45.17 kg/m   Estimated body mass index is 45.17 kg/m  as calculated from the following:    Height as of 5/30/19: 1.6 m (5' 3\").    Weight as of this encounter: 115.7 kg (255 lb).  Physical Exam  GENERAL APPEARANCE: alert, no distress and over weight  EYES: Eyes grossly normal to inspection  HENT: ear canals and TM's normal, nose and mouth without ulcers or lesions, oropharynx clear and oral mucous membranes moist  NECK: no adenopathy, no asymmetry, masses, or scars and thyroid normal to palpation  RESP: lungs clear to auscultation - no rales, rhonchi or wheezes  BREAST: normal without masses, tenderness or nipple discharge and no palpable axillary masses or adenopathy  CV: regular rate and rhythm, normal S1 S2, no S3 or S4, no murmur, click or rub, no peripheral edema and peripheral pulses strong  ABDOMEN: soft, nontender, no hepatosplenomegaly and " no masses  SKIN: no suspicious lesions or rashes  NEURO: mentation intact and speech normal  PSYCH: mentation appears normal and affect normal/bright    Diagnostic Test Results:  pending    ASSESSMENT / PLAN:   Bridget was seen today for physical.    Diagnoses and all orders for this visit:    Encounter for Medicare annual wellness exam    Essential hypertension with goal blood pressure less than 130/80  -     Comprehensive metabolic panel (BMP + Alb, Alk Phos, ALT, AST, Total. Bili, TP)  -     ramipril (ALTACE) 10 MG capsule; TAKE 1 CAPSULE BY MOUTH ONCE DAILY PLEASE SCHEDULE A LAB ONLY VISIT    Hyperlipidemia with target LDL less than 100  -     TSH with free T4 reflex  -     rosuvastatin (CRESTOR) 20 MG tablet; Take 1 tablet (20 mg) by mouth daily  -     amLODIPine (NORVASC) 5 MG tablet; TAKE 1 TABLET BY MOUTH ONCE DAILY NEEDS TO SCHEDULE A LAB ONLY VISIT    Obesity (BMI 35.0-39.9) with comorbidity (H)    Obstructive sleep apnea  -     CBC with platelets and differential    Hand arthritis  -     Rheumatoid factor  -     CRP, inflammation    NSAID long-term use  -     esomeprazole (NEXIUM) 20 MG DR capsule; Take 1 capsule (20 mg) by mouth every morning (before breakfast) Take 30-60 minutes before eating.    History of gastric ulcer  -     esomeprazole (NEXIUM) 20 MG DR capsule; Take 1 capsule (20 mg) by mouth every morning (before breakfast) Take 30-60 minutes before eating.    Urinary frequency  -     UROLOGY ADULT REFERRAL  -     UA with Microscopic reflex to Culture    Urinary urgency  -     UROLOGY ADULT REFERRAL  -     UA with Microscopic reflex to Culture    Osteoporosis without current pathological fracture, unspecified osteoporosis type  -     DX Hip/Pelvis/Spine; Future    Primary hyperparathyroidism (H)  -     DX Hip/Pelvis/Spine; Future    Special screening for malignant neoplasms, colon  -     GASTROENTEROLOGY ADULT REF PROCEDURE ONLY ASA, NSAIDS    Carpal tunnel syndrome of right wrist    Personal  "history of tobacco use, presenting hazards to health           Summary and implications:  We reviewed multiple issues.           We reviewed all of the issues on the diagnoses list.                      BP is controlled.                   Need a more potent statin.            Weight gain is unfortunate.               Check labs and adjust medications as indicated. Non fasting labs.                      Add PPI while taking a NSAID.             Patient Instructions   Today we switched your cholesterol lowering medication from pravastatin to rosuvastatin, 20 mg per day.                  We also added generic nexium every morning to protect your stomach.                       Call to set up a bone density test; done at the [facility].                 We are planning a colonoscopy, and a urology referral.                                    Please see me when all your tests and consults have been completed, or in 3-4 months, whichever comes first.                      Return in about 3 months (around 1/28/2020) for labs will be needed, follow up of several issues, lipid follow up.        End of Life Planning:  Patient currently has an advanced directive: Yes.  Practitioner is supportive of decision.    COUNSELING:  Reviewed preventive health counseling, as reflected in patient instructions  Special attention given to:       Regular exercise       Healthy diet/nutrition    Estimated body mass index is 45.17 kg/m  as calculated from the following:    Height as of 5/30/19: 1.6 m (5' 3\").    Weight as of this encounter: 115.7 kg (255 lb).    Weight management plan: Discussed healthy diet and exercise guidelines     reports that she quit smoking about 23 years ago. Her smoking use included cigarettes. She started smoking about 50 years ago. She has a 54.00 pack-year smoking history. She has never used smokeless tobacco.      Appropriate preventive services were discussed with this patient, including applicable " screening as appropriate for cardiovascular disease, diabetes, osteopenia/osteoporosis, and glaucoma.  As appropriate for age/gender, discussed screening for colorectal cancer, prostate cancer, breast cancer, and cervical cancer. Checklist reviewing preventive services available has been given to the patient.    Reviewed patients plan of care and provided an AVS. The Basic Care Plan (routine screening as documented in Health Maintenance) for Bridget meets the Care Plan requirement. This Care Plan has been established and reviewed with the Patient.    Counseling Resources:  ATP IV Guidelines  Pooled Cohorts Equation Calculator  Breast Cancer Risk Calculator  FRAX Risk Assessment  ICSI Preventive Guidelines  Dietary Guidelines for Americans, 2010  Labels That Talk's MyPlate  ASA Prophylaxis  Lung CA Screening    Patel Hughes MD  [facility]    Results for orders placed or performed in visit on 10/28/19   Comprehensive metabolic panel (BMP + Alb, Alk Phos, ALT, AST, Total. Bili, TP)   Result Value Ref Range    Sodium 138 133 - 144 mmol/L    Potassium 4.1 3.4 - 5.3 mmol/L    Chloride 106 94 - 109 mmol/L    Carbon Dioxide 26 20 - 32 mmol/L    Anion Gap 6 3 - 14 mmol/L    Glucose 78 70 - 99 mg/dL    Urea Nitrogen 24 7 - 30 mg/dL    Creatinine 0.74 0.52 - 1.04 mg/dL    GFR Estimate 83 >60 mL/min/[1.73_m2]    GFR Estimate If Black >90 >60 mL/min/[1.73_m2]    Calcium 9.7 8.5 - 10.1 mg/dL    Bilirubin Total 0.3 0.2 - 1.3 mg/dL    Albumin 4.2 3.4 - 5.0 g/dL    Protein Total 7.1 6.8 - 8.8 g/dL    Alkaline Phosphatase 59 40 - 150 U/L    ALT 36 0 - 50 U/L    AST 19 0 - 45 U/L   CBC with platelets and differential   Result Value Ref Range    WBC 7.8 4.0 - 11.0 10e9/L    RBC Count 4.39 3.8 - 5.2 10e12/L    Hemoglobin 13.2 11.7 - 15.7 g/dL    Hematocrit 40.9 35.0 - 47.0 %    MCV 93 78 - 100 fl    MCH 30.1 26.5 - 33.0 pg    MCHC 32.3 31.5 - 36.5 g/dL    RDW 14.5 10.0 - 15.0 %    Platelet Count 233 150 - 450  10e9/L    % Neutrophils 73.2 %    % Lymphocytes 16.0 %    % Monocytes 8.6 %    % Eosinophils 1.8 %    % Basophils 0.4 %    Absolute Neutrophil 5.7 1.6 - 8.3 10e9/L    Absolute Lymphocytes 1.3 0.8 - 5.3 10e9/L    Absolute Monocytes 0.7 0.0 - 1.3 10e9/L    Absolute Eosinophils 0.1 0.0 - 0.7 10e9/L    Absolute Basophils 0.0 0.0 - 0.2 10e9/L    Diff Method Automated Method    TSH with free T4 reflex   Result Value Ref Range    TSH 0.49 0.40 - 4.00 mU/L   Rheumatoid factor   Result Value Ref Range    Rheumatoid Factor <20 <20 IU/mL   CRP, inflammation   Result Value Ref Range    CRP Inflammation <2.9 0.0 - 8.0 mg/L     Letter sent.                              Your lab results are normal,including the liver,kidney,glucose,bone marrow,thyroid,and the rheumatoid factor,which is negative.      Keep taking the same medications.

## 2019-10-28 NOTE — LETTER
October 29, 2019      Bridget Lawson  [address]  [facility] [address]        Dear ,    We are writing to inform you of your test results.       Your lab results are normal,including the liver,kidney,glucose,bone marrow,thyroid,and the rheumatoid factor,which is negative.      Keep taking the same medications.     Results for orders placed or performed in visit on 10/28/19   Comprehensive metabolic panel (BMP + Alb, Alk Phos, ALT, AST, Total. Bili, TP)   Result Value Ref Range    Sodium 138 133 - 144 mmol/L    Potassium 4.1 3.4 - 5.3 mmol/L    Chloride 106 94 - 109 mmol/L    Carbon Dioxide 26 20 - 32 mmol/L    Anion Gap 6 3 - 14 mmol/L    Glucose 78 70 - 99 mg/dL    Urea Nitrogen 24 7 - 30 mg/dL    Creatinine 0.74 0.52 - 1.04 mg/dL    GFR Estimate 83 >60 mL/min/[1.73_m2]    GFR Estimate If Black >90 >60 mL/min/[1.73_m2]    Calcium 9.7 8.5 - 10.1 mg/dL    Bilirubin Total 0.3 0.2 - 1.3 mg/dL    Albumin 4.2 3.4 - 5.0 g/dL    Protein Total 7.1 6.8 - 8.8 g/dL    Alkaline Phosphatase 59 40 - 150 U/L    ALT 36 0 - 50 U/L    AST 19 0 - 45 U/L   CBC with platelets and differential   Result Value Ref Range    WBC 7.8 4.0 - 11.0 10e9/L    RBC Count 4.39 3.8 - 5.2 10e12/L    Hemoglobin 13.2 11.7 - 15.7 g/dL    Hematocrit 40.9 35.0 - 47.0 %    MCV 93 78 - 100 fl    MCH 30.1 26.5 - 33.0 pg    MCHC 32.3 31.5 - 36.5 g/dL    RDW 14.5 10.0 - 15.0 %    Platelet Count 233 150 - 450 10e9/L    % Neutrophils 73.2 %    % Lymphocytes 16.0 %    % Monocytes 8.6 %    % Eosinophils 1.8 %    % Basophils 0.4 %    Absolute Neutrophil 5.7 1.6 - 8.3 10e9/L    Absolute Lymphocytes 1.3 0.8 - 5.3 10e9/L    Absolute Monocytes 0.7 0.0 - 1.3 10e9/L    Absolute Eosinophils 0.1 0.0 - 0.7 10e9/L    Absolute Basophils 0.0 0.0 - 0.2 10e9/L    Diff Method Automated Method    TSH with free T4 reflex   Result Value Ref Range    TSH 0.49 0.40 - 4.00 mU/L   Rheumatoid factor   Result Value Ref Range    Rheumatoid Factor <20 <20 IU/mL   CRP,  inflammation   Result Value Ref Range    CRP Inflammation <2.9 0.0 - 8.0 mg/L         If you have any questions or concerns, please call the clinic at the number listed above.       Sincerely,        Patel Hughes MD

## 2019-10-28 NOTE — PATIENT INSTRUCTIONS
Today we switched your cholesterol lowering medication from pravastatin to rosuvastatin, 20 mg per day.                  We also added generic nexium every morning to protect your stomach.                       Call to set up a bone density test; done at the [facility].                 We are planning a colonoscopy, and a urology referral.                                    Please see me when all your tests and consults have been completed, or in 3-4 months, whichever comes first.

## 2019-10-29 LAB
ALBUMIN SERPL-MCNC: 4.2 G/DL (ref 3.4–5)
ALP SERPL-CCNC: 59 U/L (ref 40–150)
ALT SERPL W P-5'-P-CCNC: 36 U/L (ref 0–50)
ANION GAP SERPL CALCULATED.3IONS-SCNC: 6 MMOL/L (ref 3–14)
AST SERPL W P-5'-P-CCNC: 19 U/L (ref 0–45)
BILIRUB SERPL-MCNC: 0.3 MG/DL (ref 0.2–1.3)
BUN SERPL-MCNC: 24 MG/DL (ref 7–30)
CALCIUM SERPL-MCNC: 9.7 MG/DL (ref 8.5–10.1)
CHLORIDE SERPL-SCNC: 106 MMOL/L (ref 94–109)
CO2 SERPL-SCNC: 26 MMOL/L (ref 20–32)
CREAT SERPL-MCNC: 0.74 MG/DL (ref 0.52–1.04)
GFR SERPL CREATININE-BSD FRML MDRD: 83 ML/MIN/{1.73_M2}
GLUCOSE SERPL-MCNC: 78 MG/DL (ref 70–99)
POTASSIUM SERPL-SCNC: 4.1 MMOL/L (ref 3.4–5.3)
PROT SERPL-MCNC: 7.1 G/DL (ref 6.8–8.8)
RHEUMATOID FACT SER NEPH-ACNC: <20 IU/ML (ref 0–20)
SODIUM SERPL-SCNC: 138 MMOL/L (ref 133–144)
TSH SERPL DL<=0.005 MIU/L-ACNC: 0.49 MU/L (ref 0.4–4)

## 2019-11-01 DIAGNOSIS — N39.41 URGE INCONTINENCE: Primary | ICD-10-CM

## 2019-11-05 ENCOUNTER — ANCILLARY PROCEDURE (OUTPATIENT)
Dept: BONE DENSITY | Facility: CLINIC | Age: 69
End: 2019-11-05
Attending: INTERNAL MEDICINE
Payer: COMMERCIAL

## 2019-11-05 DIAGNOSIS — M81.0 OSTEOPOROSIS WITHOUT CURRENT PATHOLOGICAL FRACTURE, UNSPECIFIED OSTEOPOROSIS TYPE: ICD-10-CM

## 2019-11-05 DIAGNOSIS — E21.0 PRIMARY HYPERPARATHYROIDISM (H): ICD-10-CM

## 2019-11-05 PROCEDURE — 77080 DXA BONE DENSITY AXIAL: CPT | Performed by: FAMILY MEDICINE

## 2019-11-11 ENCOUNTER — OFFICE VISIT (OUTPATIENT)
Dept: UROLOGY | Facility: CLINIC | Age: 69
End: 2019-11-11
Payer: COMMERCIAL

## 2019-11-11 ENCOUNTER — TELEPHONE (OUTPATIENT)
Dept: UROLOGY | Facility: CLINIC | Age: 69
End: 2019-11-11

## 2019-11-11 VITALS
DIASTOLIC BLOOD PRESSURE: 80 MMHG | BODY MASS INDEX: 45.18 KG/M2 | WEIGHT: 255 LBS | HEART RATE: 80 BPM | HEIGHT: 63 IN | OXYGEN SATURATION: 98 % | SYSTOLIC BLOOD PRESSURE: 134 MMHG

## 2019-11-11 DIAGNOSIS — N39.41 URGE INCONTINENCE: ICD-10-CM

## 2019-11-11 LAB
ALBUMIN UR-MCNC: 30 MG/DL
APPEARANCE UR: CLEAR
BILIRUB UR QL STRIP: ABNORMAL
COLOR UR AUTO: YELLOW
GLUCOSE UR STRIP-MCNC: NEGATIVE MG/DL
HGB UR QL STRIP: NEGATIVE
KETONES UR STRIP-MCNC: ABNORMAL MG/DL
LEUKOCYTE ESTERASE UR QL STRIP: ABNORMAL
NITRATE UR QL: NEGATIVE
PH UR STRIP: 5 PH (ref 5–7)
RESIDUAL VOLUME (RV) (EXTERNAL): 73
SOURCE: ABNORMAL
SP GR UR STRIP: >1.03 (ref 1–1.03)
UROBILINOGEN UR STRIP-ACNC: 0.2 EU/DL (ref 0.2–1)

## 2019-11-11 PROCEDURE — 81003 URINALYSIS AUTO W/O SCOPE: CPT | Performed by: PHYSICIAN ASSISTANT

## 2019-11-11 PROCEDURE — 99203 OFFICE O/P NEW LOW 30 MIN: CPT | Mod: 25 | Performed by: PHYSICIAN ASSISTANT

## 2019-11-11 PROCEDURE — 51798 US URINE CAPACITY MEASURE: CPT | Performed by: PHYSICIAN ASSISTANT

## 2019-11-11 RX ORDER — ESTRADIOL 0.1 MG/G
CREAM VAGINAL
Qty: 42.5 G | Refills: 3 | Status: SHIPPED | OUTPATIENT
Start: 2019-11-11 | End: 2020-12-18

## 2019-11-11 RX ORDER — TOLTERODINE 4 MG/1
4 CAPSULE, EXTENDED RELEASE ORAL DAILY
Qty: 90 CAPSULE | Refills: 3 | Status: SHIPPED | OUTPATIENT
Start: 2019-11-11 | End: 2020-01-20

## 2019-11-11 ASSESSMENT — MIFFLIN-ST. JEOR: SCORE: 1637.86

## 2019-11-11 ASSESSMENT — PAIN SCALES - GENERAL: PAINLEVEL: MILD PAIN (3)

## 2019-11-11 NOTE — PROGRESS NOTES
CC: Urgency, frequency, nocturia    HPI:  Bridget Lawson is a pleasant 69 year old female who presents in consultation from Dr. Hughes for evaluation of the above. All ongoing for many years. Nocturia x 6, frequency, urgency throughout the day. Has eliminated caffeine. Can struggle with constipation. No gross hematuria. No dysuria.    Past Medical History:   Diagnosis Date     Cerebral artery occlusion with cerebral infarction (H)      Hx of stroke   (02/10)     Hyperlipidemia LDL goal < 100 10/8/2012    Start prava in 7/14     Intracranial hemorrhage (H) 2/10    R basal ganglia, due to HTN; had mild L hemiparesis and dysarthria; good recovery     Obese      Osteopenia     in 6/10, prior to PTH surgery, T - 3.2 forearm, - 1.9 spine, - 1.5L and -1.6 R femoral necks     Sleep apnea     CPAP     Spider veins      Unspecified essential hypertension     Essential hypertension       Past Surgical History:   Procedure Laterality Date     BREAST SURGERY  11/2004    R breast bx     C/SECTION, CLASSICAL  1985    with tubal     HYSTERECTOMY  5/1998    GREGORY w/ BSO     KNEE SURGERY  9/1998    L Arthroscopy     NECK SURGERY  12/2010    R parathyroid adenoma     ORTHOPEDIC SURGERY       REPAIR TENDON ANKLE Right 11/1/2017    Procedure: REPAIR TENDON ANKLE;  RIGHT POSTERIOR TIBIAL TENDON RECONSTRUCTION ;  Surgeon: Xavi Duran MD;  Location: [facility] OR     SHOULDER SURGERY  11/2008    rotator cuff repair R     TONSILLECTOMY      as a child T & A       Social History     Socioeconomic History     Marital status:      Spouse name: Not on file     Number of children: 2     Years of education: Not on file     Highest education level: Not on file   Occupational History     Not on file   Social Needs     Financial resource strain: Not on file     Food insecurity:     Worry: Not on file     Inability: Not on file     Transportation needs:     Medical: Not on file     Non-medical: Not on file   Tobacco Use     Smoking status:  Former Smoker     Packs/day: 2.00     Years: 27.00     Pack years: 54.00     Types: Cigarettes     Start date: 10/24/1969     Last attempt to quit: 1996     Years since quittin.2     Smokeless tobacco: Never Used   Substance and Sexual Activity     Alcohol use: Yes     Alcohol/week: 0.0 standard drinks     Comment: occasionally     Drug use: No     Sexual activity: Never   Lifestyle     Physical activity:     Days per week: Not on file     Minutes per session: Not on file     Stress: Not on file   Relationships     Social connections:     Talks on phone: Not on file     Gets together: Not on file     Attends Protestant service: Not on file     Active member of club or organization: Not on file     Attends meetings of clubs or organizations: Not on file     Relationship status: Not on file     Intimate partner violence:     Fear of current or ex partner: Not on file     Emotionally abused: Not on file     Physically abused: Not on file     Forced sexual activity: Not on file   Other Topics Concern     Parent/sibling w/ CABG, MI or angioplasty before 65F 55M? Not Asked   Social History Narrative    2 dtrs; no GC       Family History   Problem Relation Age of Onset     Breast Cancer Mother          [place]; masectomy left; asthma;  in  at age 97     Breast Cancer Maternal Grandmother         possible CA; masectomy     Breast Cancer Paternal Aunt         possible CA, masectomy     Respiratory Father         COPD     Lipids Brother        ROS:14 point ROS neg other than the symptoms noted above in the HPI.    Allergies   Allergen Reactions     Animal Dander        Current Outpatient Medications   Medication     Acetaminophen (TYLENOL PO)     amLODIPine (NORVASC) 5 MG tablet     aspirin  MG EC tablet     Calcium Carb-Cholecalciferol (CALTRATE 600+D) 600-800 MG-UNIT TABS     Carboxymethylcellulose Sodium (THERATEARS OP)     esomeprazole (NEXIUM) 20 MG DR capsule     fluticasone (FLONASE) 50 MCG/ACT spray  "    glucosamine-chondroitinoitin 750-600 MG TABS     ibuprofen (ADVIL/MOTRIN) 600 MG tablet     isosorbide mononitrate (IMDUR) 60 MG 24 hr tablet     ketoconazole (NIZORAL) 2 % external cream     labetalol (NORMODYNE) 200 MG tablet     MAGNESIUM GLUCONATE PO     MELATONIN PO     ramipril (ALTACE) 10 MG capsule     rosuvastatin (CRESTOR) 20 MG tablet     Multiple Vitamins-Minerals (CENTRUM SILVER ULTRA WOMENS PO)     NONFORMULARY     ORDER FOR DME     No current facility-administered medications for this visit.          PEx:   Blood pressure 134/80, pulse 80, height 1.588 m (5' 2.5\"), weight 115.7 kg (255 lb), SpO2 98 %, not currently breastfeeding.    PSYCH: NAD  EYES: EOMI  MOUTH: MMM  NECK: Supple, no notable adenopathy  RESP: Unlabored breathing  CARDIAC: No LE edema  SKIN: Warm, no rashes  ABD: soft, Nontender  NEURO: AAO x3    Urine: trace leuk est  PVR 73cc    A/P: Bridget Lawson is a 69 year old female with urgency, frequency  -PVR normal  -Estrogen cream three times a week near urethra (pea-sized amount): If >$40, she will let me know and we can get via a compound pharmacy.  -Detrol LA 4mg daily, SE reviewed.   -Eliminate irritants    JULIA Sales [facility]    30 minutes were spent with the patient today, > 50% in counseling and coordination of care                "

## 2019-11-11 NOTE — PATIENT INSTRUCTIONS
Below is a list of things that can irritate the bladder and should be eliminated:      Caffeinated soft drinks.    Coffee.    Tea.    Chocolate.    Tomato-based foods.    Acidic juices and fruits. (includes cranberry juice)    Alcohol.    Carbonated drinks.    Aspartame/Nutrasweet.      Limit fluids after 5pm    Check on CPAP settings    Estrogen cream three times a week near urethra (pea-sized amount): If >$40, let me know and we can get via a compound pharmacy.    You have been prescribed medication to help relax your bladder (tolteradine or Detrol LA)    This medication may help control (or improve) urinary frequency, urgency and urge incontinence.    Common side effects include:  Dry mouth (Biotene mouthwash can help with this.)  Constipation  Drowsiness  Blurred vision    Rare side effect:  Urinary retention

## 2019-11-11 NOTE — LETTER
11/11/2019       RE: Bridget Lawson  [address]  [facility] [address]     Dear Colleague,    Thank you for referring your patient, Bridget Lawson, to the [facility] at [facility]. Please see a copy of my visit note below.    CC: Urgency, frequency, nocturia    HPI:  Bridget Lawson is a pleasant 69 year old female who presents in consultation from Dr. Hughes for evaluation of the above. All ongoing for many years. Nocturia x 6, frequency, urgency throughout the day. Has eliminated caffeine. Can struggle with constipation. No gross hematuria. No dysuria.    Past Medical History:   Diagnosis Date     Cerebral artery occlusion with cerebral infarction (H)      Hx of stroke   (02/10)     Hyperlipidemia LDL goal < 100 10/8/2012    Start prava in 7/14     Intracranial hemorrhage (H) 2/10    R basal ganglia, due to HTN; had mild L hemiparesis and dysarthria; good recovery     Obese      Osteopenia     in 6/10, prior to PTH surgery, T - 3.2 forearm, - 1.9 spine, - 1.5L and -1.6 R femoral necks     Sleep apnea     CPAP     Spider veins      Unspecified essential hypertension     Essential hypertension       Past Surgical History:   Procedure Laterality Date     BREAST SURGERY  11/2004    R breast bx     C/SECTION, CLASSICAL  1985    with tubal     HYSTERECTOMY  5/1998    GREGORY w/ BSO     KNEE SURGERY  9/1998    L Arthroscopy     NECK SURGERY  12/2010    R parathyroid adenoma     ORTHOPEDIC SURGERY       REPAIR TENDON ANKLE Right 11/1/2017    Procedure: REPAIR TENDON ANKLE;  RIGHT POSTERIOR TIBIAL TENDON RECONSTRUCTION ;  Surgeon: Xavi Duran MD;  Location: [facility] OR     SHOULDER SURGERY  11/2008    rotator cuff repair R     TONSILLECTOMY      as a child T & A       Social History     Socioeconomic History     Marital status:      Spouse name: Not on file     Number of children: 2     Years of education: Not on file      Highest education level: Not on file   Occupational History     Not on file   Social Needs     Financial resource strain: Not on file     Food insecurity:     Worry: Not on file     Inability: Not on file     Transportation needs:     Medical: Not on file     Non-medical: Not on file   Tobacco Use     Smoking status: Former Smoker     Packs/day: 2.00     Years: 27.00     Pack years: 54.00     Types: Cigarettes     Start date: 10/24/1969     Last attempt to quit: 1996     Years since quittin.2     Smokeless tobacco: Never Used   Substance and Sexual Activity     Alcohol use: Yes     Alcohol/week: 0.0 standard drinks     Comment: occasionally     Drug use: No     Sexual activity: Never   Lifestyle     Physical activity:     Days per week: Not on file     Minutes per session: Not on file     Stress: Not on file   Relationships     Social connections:     Talks on phone: Not on file     Gets together: Not on file     Attends Christian service: Not on file     Active member of club or organization: Not on file     Attends meetings of clubs or organizations: Not on file     Relationship status: Not on file     Intimate partner violence:     Fear of current or ex partner: Not on file     Emotionally abused: Not on file     Physically abused: Not on file     Forced sexual activity: Not on file   Other Topics Concern     Parent/sibling w/ CABG, MI or angioplasty before 65F 55M? Not Asked   Social History Narrative    2 dtrs; no GC       Family History   Problem Relation Age of Onset     Breast Cancer Mother          [place]; masectomy left; asthma;  in  at age 97     Breast Cancer Maternal Grandmother         possible CA; masectomy     Breast Cancer Paternal Aunt         possible CA, masectomy     Respiratory Father         COPD     Lipids Brother        ROS:14 point ROS neg other than the symptoms noted above in the HPI.    Allergies   Allergen Reactions     Animal Dander        Current Outpatient Medications  "  Medication     Acetaminophen (TYLENOL PO)     amLODIPine (NORVASC) 5 MG tablet     aspirin  MG EC tablet     Calcium Carb-Cholecalciferol (CALTRATE 600+D) 600-800 MG-UNIT TABS     Carboxymethylcellulose Sodium (THERATEARS OP)     esomeprazole (NEXIUM) 20 MG DR capsule     fluticasone (FLONASE) 50 MCG/ACT spray     glucosamine-chondroitinoitin 750-600 MG TABS     ibuprofen (ADVIL/MOTRIN) 600 MG tablet     isosorbide mononitrate (IMDUR) 60 MG 24 hr tablet     ketoconazole (NIZORAL) 2 % external cream     labetalol (NORMODYNE) 200 MG tablet     MAGNESIUM GLUCONATE PO     MELATONIN PO     ramipril (ALTACE) 10 MG capsule     rosuvastatin (CRESTOR) 20 MG tablet     Multiple Vitamins-Minerals (CENTRUM SILVER ULTRA WOMENS PO)     NONFORMULARY     ORDER FOR DME     No current facility-administered medications for this visit.          PEx:   Blood pressure 134/80, pulse 80, height 1.588 m (5' 2.5\"), weight 115.7 kg (255 lb), SpO2 98 %, not currently breastfeeding.    PSYCH: NAD  EYES: EOMI  MOUTH: MMM  NECK: Supple, no notable adenopathy  RESP: Unlabored breathing  CARDIAC: No LE edema  SKIN: Warm, no rashes  ABD: soft, Nontender  NEURO: AAO x3    Urine: trace leuk est  PVR 73cc    A/P: Bridget Lawson is a 69 year old female with urgency, frequency  -PVR normal  -Estrogen cream three times a week near urethra (pea-sized amount): If >$40, she will let me know and we can get via a compound pharmacy.  -Detrol LA 4mg daily, SE reviewed.   -Eliminate irritants    JULIA aSles [facility]    30 minutes were spent with the patient today, > 50% in counseling and coordination of care      "

## 2019-11-11 NOTE — NURSING NOTE
Chief Complaint   Patient presents with     Incontinence     Pt here for urge incontinence     PVR is 73mL  Brittany Finley CMA

## 2019-11-12 ENCOUNTER — TELEPHONE (OUTPATIENT)
Dept: UROLOGY | Facility: CLINIC | Age: 69
End: 2019-11-12

## 2019-11-12 DIAGNOSIS — N39.41 URGE INCONTINENCE: Primary | ICD-10-CM

## 2019-11-12 RX ORDER — TOLTERODINE TARTRATE 2 MG/1
2 TABLET, EXTENDED RELEASE ORAL 2 TIMES DAILY
Qty: 90 TABLET | Refills: 3 | Status: SHIPPED | OUTPATIENT
Start: 2019-11-12 | End: 2020-01-20

## 2019-11-12 NOTE — TELEPHONE ENCOUNTER
Pt calling and asking to if it is ok to use estrace cream without a uterus.  I told her yes.  Pt also said her insurance did not cover detrol LA.  I tried to resend a rx for detrol IR.  If detrol IR is not covered pt will call insurance to see what is covered.  KARAN Snider CMA

## 2019-11-13 ENCOUNTER — TELEPHONE (OUTPATIENT)
Dept: SLEEP MEDICINE | Facility: CLINIC | Age: 69
End: 2019-11-13

## 2019-11-13 NOTE — TELEPHONE ENCOUNTER
"Reason for call:  Other   Patient called regarding (reason for call): appointment  Additional comments: Per PT: \"Only needs 5 minutes\"    Phone number to reach patient:  Cell number on file:    Telephone Information:   Mobile [phone]       Best Time:  Any    Can we leave a detailed message on this number?  YES    "

## 2019-11-22 ENCOUNTER — OFFICE VISIT (OUTPATIENT)
Dept: SLEEP MEDICINE | Facility: CLINIC | Age: 69
End: 2019-11-22
Payer: COMMERCIAL

## 2019-11-22 ENCOUNTER — TELEPHONE (OUTPATIENT)
Dept: UROLOGY | Facility: CLINIC | Age: 69
End: 2019-11-22

## 2019-11-22 VITALS
DIASTOLIC BLOOD PRESSURE: 85 MMHG | WEIGHT: 256 LBS | RESPIRATION RATE: 16 BRPM | HEIGHT: 63 IN | HEART RATE: 73 BPM | BODY MASS INDEX: 45.36 KG/M2 | SYSTOLIC BLOOD PRESSURE: 132 MMHG | OXYGEN SATURATION: 96 %

## 2019-11-22 DIAGNOSIS — R39.15 URINARY URGENCY: Primary | ICD-10-CM

## 2019-11-22 DIAGNOSIS — R35.1 NOCTURIA: ICD-10-CM

## 2019-11-22 DIAGNOSIS — R35.0 URINARY FREQUENCY: ICD-10-CM

## 2019-11-22 DIAGNOSIS — G47.33 OBSTRUCTIVE SLEEP APNEA: Primary | Chronic | ICD-10-CM

## 2019-11-22 PROCEDURE — 99214 OFFICE O/P EST MOD 30 MIN: CPT | Performed by: PHYSICIAN ASSISTANT

## 2019-11-22 RX ORDER — MIRABEGRON 25 MG/1
25 TABLET, FILM COATED, EXTENDED RELEASE ORAL DAILY
Qty: 30 TABLET | Refills: 3 | Status: SHIPPED | OUTPATIENT
Start: 2019-11-22 | End: 2020-01-27

## 2019-11-22 ASSESSMENT — MIFFLIN-ST. JEOR: SCORE: 1642.4

## 2019-11-22 NOTE — TELEPHONE ENCOUNTER
"Called Bridget with this update and instructions. She expressed understanding. Rx e-scribed to pt's pharmacy.  SAMIA Davis RN    ----- Message from Stephanie Shi PA-C sent at 11/22/2019 12:15 PM CST -----  Regarding: RE: alternative med  Myrbetriq 25 mg daily. Needs to check BP weekly and stop med if rising.     ----- Message -----  From: Marina Davis RN  Sent: 11/22/2019  12:01 PM CST  To: Stephanie Shi PA-C, #  Subject: alternative med                                  Hello,  Received denial notice from the PA team re: tolterodine. They sent the following alternative information:    \"The current step therapy requirements on the member's plan include:   Oxybutynin, Myrbetriq, Trospium chloride, Toviaz, Solifenacin\".    Thank you,  Marina                "

## 2019-11-22 NOTE — PROGRESS NOTES
Sleep Follow-Up Visit:    Date on this visit: 11/22/2019    Bridget Lawson comes in today for follow-up of her CPAP use for JOHNY. Her medical history is significant for hemorrhagic stroke, HTN, memory loss.   She had a sleep study at [facility] in 2010.   She is on auto CPAP 10-15 cm. She got a new CPAP about a year ago. She went to the urologist because she wakes frequently for the restroom. The urologist recommended having the pressures increased. She wakes and is not sure if the air is blowing. She is not aware of snoring with CPAP but is not observed while sleeping. Her energy is low. She uses a full face mask. She sometimes gets a little leak at the eye. She gets new supplies about every 3 months. She does not wake with a dry nose or mouth. She feels her previous ResMed machine used to blow a lot stronger.    The compliance data shows that the patient used the CPAP for 30/30 nights, 100% of nights for >4 hours.  The 90th% pressure is 14.7 cm.  The average time in large leak is 0 sec.  The average nightly usage is 7:58.  The average AHI is 1.4/hr. The pressure is often near the upper limit for snoring. The snore index is 25. She wakes 2-5 times per night. There was a reduction in hypopneas about 10 days ago, which is when she went to the urologist and started on tolterodine. She was waking frequently before getting her new machine as well.  She goes to bed 1-3 AM and is up 8 AM to noon. She does sometimes have difficulty falling asleep and staying asleep. She takes 10 mg melatonin. She falls asleep watching TV for about an hour about 4 times per week.     Past medical/surgical history, family history, social history, medications and allergies were reviewed.      Problem List:  Patient Active Problem List    Diagnosis Date Noted     Personal history of tobacco use, presenting hazards to health 10/24/2016     Priority: High     Quit 1996            No AAA on CT in 2010       History of gastric ulcer 10/28/2019      "Priority: Medium     Carpal tunnel syndrome of right wrist 10/28/2019     Priority: Medium     Per Dr Guardado of ortho; wears a wrist splint R       Hand arthritis 10/28/2019     Priority: Medium     NSAID long-term use 10/28/2019     Priority: Medium     Urinary frequency 10/28/2019     Priority: Medium     Urinary urgency 10/28/2019     Priority: Medium     Obesity (BMI 35.0-39.9) with comorbidity (H) 09/10/2018     Priority: Medium     PND (post-nasal drip) 10/31/2017     Priority: Medium     Traumatic rupture of right posterior tibial tendon, subsequent encounter 10/23/2017     Priority: Medium     Happened in 4/17       Essential hypertension with goal blood pressure less than 130/80 10/24/2016     Priority: Medium     Family history of malignant neoplasm of breast 10/24/2016     Priority: Medium     mother       Memory loss 06/22/2015     Priority: Medium     Noted by pt in 6/15; labs ok; referred to neuro. She saw Dr Silva; labs were done; no consult letter received.                       Saw neuro last year; they told her to stop taking advil PM, and there was improvement.        Dysphagia 07/14/2014     Priority: Medium     EGD neg in 7/14; Dx= globus       Pain in left foot 02/17/2014     Priority: Medium     5/13; walking injury; \"stress fracture\" saw Dr. Judson Manjarrez; takes aleve 1 tab daily since then; still bothers her in 7/14       Arthritis of right knee 02/17/2014     Priority: Medium     Uses a cane; s/p injection       Anemia 10/08/2012     Priority: Medium     Stable at 12.7 in 6/11  Problem list name updated by automated process. Provider to review       Obstructive sleep apnea 10/08/2012     Priority: Medium     Uses CPAP as of 6/2010  Problem list name updated by automated process. Provider to review       Primary hyperparathyroidism (H) 10/08/2012     Priority: Medium     DX in 2003: cured 12/2010 with parathyroid surgery       Acute gastric ulcer with hemorrhage 10/08/2012     Priority: Medium "     2008; f/u EGD showed ulcers had healed; she avoids ASA/ NSAID'S due to this ulcer hx  Problem list name updated by automated process. Provider to review       Late effects of cerebrovascular disease 10/08/2012     Priority: Medium     Mild L hemiparesis and dysarthria and dysphagia; good recovery           Other osteoporosis without current pathological fracture 10/08/2012     Priority: Medium     in 6/10, prior to PTH surgery, T - 3.2 forearm, - 1.9 spine, - 1.5L and -1.6 R femoral necks                    In 11/19, T -0.3 L fem neck, -2.4 R;    + 0 spine, -3.4 radius       Hyperlipidemia with target LDL less than 100 10/08/2012     Priority: Medium     Start prava in 7/14  Diagnosis updated by automated process. Provider to review and confirm.       Preventive measure 10/08/2012     Priority: Medium     [facility] UND[facility] THE 9/26/13 NOTE  Mammogram fall 2011 at [facility]; 1/13, 6/15 at [facility] [place], 11/16 , 7/19                             s/p GREGORY/BSO;           colonoscopy 3/08 neg per pt   FIT neg in 9/19; per her insurance company       Osteopenia      Priority: Medium     in 6/10, prior to PTH surgery, T - 3.2 forearm, - 1.9 spine, - 1.5L and -1.6 R femoral necks       Intracranial hemorrhage (H)      Priority: Medium     R basal ganglia, due to HTN; had mild L hemiparesis and dysarthria; good recovery          Impression/Plan:    (G47.33) Obstructive sleep apnea  (primary encounter diagnosis)  Comment: She wakes frequently to urinate and her urologist recommended getting her CPAP pressures adjusted. She does not feel the pressures are as strong as with her last machine. That being said, she still woke frequently to urinate before switching machines.  Plan: Comprehensive DME        Her Dreamstation machine that she got last year was set to 10-15 cm. I checked the pressure against 2 manometers. The machine starts blowing 10 cm but when I block the airflow a couple of times, the pressure drops to 8 cm. I have  set the machine to 12-17 cm to compensate for the reduced pressures. She was encouraged to take the machine back to [facility] to get it fixed or replaced. If she gets a new machine, the pressures should be reset at 10-15 cm.      She will follow up with me in about 1 year(s).     Twenty-five minutes spent with patient, all of which were spent face-to-face counseling, consulting, coordinating plan of care.      Bennett Goltz, PA-C    CC: No ref. provider found

## 2019-11-22 NOTE — NURSING NOTE
"Chief Complaint   Patient presents with     CPAP Follow Up       Initial /85   Pulse 73   Resp 16   Ht 1.588 m (5' 2.5\")   Wt 116.1 kg (256 lb)   SpO2 96%   BMI 46.08 kg/m   Estimated body mass index is 46.08 kg/m  as calculated from the following:    Height as of this encounter: 1.588 m (5' 2.5\").    Weight as of this encounter: 116.1 kg (256 lb).    Medication Reconciliation: complete     ESS 6  Nida Holley MA        "

## 2019-11-22 NOTE — PATIENT INSTRUCTIONS
Your BMI is Body mass index is 46.08 kg/m .  Weight management is a personal decision.  If you are interested in exploring weight loss strategies, the following discussion covers the approaches that may be successful. Body mass index (BMI) is one way to tell whether you are at a healthy weight, overweight, or obese. It measures your weight in relation to your height.  A BMI of 18.5 to 24.9 is in the healthy range. A person with a BMI of 25 to 29.9 is considered overweight, and someone with a BMI of 30 or greater is considered obese. More than two-thirds of American adults are considered overweight or obese.  Being overweight or obese increases the risk for further weight gain. Excess weight may lead to heart disease and diabetes.  Creating and following plans for healthy eating and physical activity may help you improve your health.  Weight control is part of healthy lifestyle and includes exercise, emotional health, and healthy eating habits. Careful eating habits lifelong are the mainstay of weight control. Though there are significant health benefits from weight loss, long-term weight loss with diet alone may be very difficult to achieve- studies show long-term success with dietary management in less than 10% of people. Attaining a healthy weight may be especially difficult to achieve in those with severe obesity. In some cases, medications, devices and surgical management might be considered.  What can you do?  If you are overweight or obese and are interested in methods for weight loss, you should discuss this with your provider.     Consider reducing daily calorie intake by 500 calories.     Keep a food journal.     Avoiding skipping meals, consider cutting portions instead.    Diet combined with exercise helps maintain muscle while optimizing fat loss. Strength training is particularly important for building and maintaining muscle mass. Exercise helps reduce stress, increase energy, and improves fitness.  Increasing exercise without diet control, however, may not burn enough calories to loose weight.       Start walking three days a week 10-20 minutes at a time    Work towards walking thirty minutes five days a week     Eventually, increase the speed of your walking for 1-2 minutes at time    In addition, we recommend that you review healthy lifestyles and methods for weight loss available through the National Institutes of Health patient information sites:  http://win.niddk.nih.gov/publications/index.htm    And look into health and wellness programs that may be available through your health insurance provider, employer, local community center, or bello club.    Weight management plan: Patient was referred to their PCP to discuss a diet and exercise plan.

## 2019-11-25 ENCOUNTER — DOCUMENTATION ONLY (OUTPATIENT)
Dept: SLEEP MEDICINE | Facility: CLINIC | Age: 69
End: 2019-11-25
Payer: COMMERCIAL

## 2019-11-25 NOTE — PROGRESS NOTES
PATIENT'S  BROUGHT MACHINE TO [facility]. SHE RECENTLY HAD AN APPOINTMENT WITH [facility] GOLTZ AND THE PRESSURES WERE OFF BY 2 CM H2O. EXPLAINED REPAIR AND LOANER MACHINE PROCESS AND THEY WANTED TO MOVE FORWARD WITH THAT. PATIENT RECEIVED [facility] MACHINE. WILL SEND HER MACHINE TO [facility] FOR REPAIR.

## 2019-11-26 ENCOUNTER — TRANSFERRED RECORDS (OUTPATIENT)
Dept: HEALTH INFORMATION MANAGEMENT | Facility: CLINIC | Age: 69
End: 2019-11-26

## 2020-01-15 ENCOUNTER — TRANSFERRED RECORDS (OUTPATIENT)
Dept: HEALTH INFORMATION MANAGEMENT | Facility: CLINIC | Age: 70
End: 2020-01-15

## 2020-01-20 ENCOUNTER — OFFICE VISIT (OUTPATIENT)
Dept: SLEEP MEDICINE | Facility: CLINIC | Age: 70
End: 2020-01-20
Payer: COMMERCIAL

## 2020-01-20 VITALS
RESPIRATION RATE: 16 BRPM | DIASTOLIC BLOOD PRESSURE: 72 MMHG | BODY MASS INDEX: 45.71 KG/M2 | OXYGEN SATURATION: 96 % | HEIGHT: 63 IN | SYSTOLIC BLOOD PRESSURE: 137 MMHG | HEART RATE: 69 BPM | WEIGHT: 258 LBS

## 2020-01-20 DIAGNOSIS — G47.33 OSA (OBSTRUCTIVE SLEEP APNEA): Primary | ICD-10-CM

## 2020-01-20 PROCEDURE — 99213 OFFICE O/P EST LOW 20 MIN: CPT | Performed by: PSYCHIATRY & NEUROLOGY

## 2020-01-20 ASSESSMENT — MIFFLIN-ST. JEOR: SCORE: 1651.47

## 2020-01-20 NOTE — PATIENT INSTRUCTIONS
Your BMI is Body mass index is 46.44 kg/m .  Weight management is a personal decision.  If you are interested in exploring weight loss strategies, the following discussion covers the approaches that may be successful. Body mass index (BMI) is one way to tell whether you are at a healthy weight, overweight, or obese. It measures your weight in relation to your height.  A BMI of 18.5 to 24.9 is in the healthy range. A person with a BMI of 25 to 29.9 is considered overweight, and someone with a BMI of 30 or greater is considered obese. More than two-thirds of American adults are considered overweight or obese.  Being overweight or obese increases the risk for further weight gain. Excess weight may lead to heart disease and diabetes.  Creating and following plans for healthy eating and physical activity may help you improve your health.  Weight control is part of healthy lifestyle and includes exercise, emotional health, and healthy eating habits. Careful eating habits lifelong are the mainstay of weight control. Though there are significant health benefits from weight loss, long-term weight loss with diet alone may be very difficult to achieve- studies show long-term success with dietary management in less than 10% of people. Attaining a healthy weight may be especially difficult to achieve in those with severe obesity. In some cases, medications, devices and surgical management might be considered.  What can you do?  If you are overweight or obese and are interested in methods for weight loss, you should discuss this with your provider.     Consider reducing daily calorie intake by 500 calories.     Keep a food journal.     Avoiding skipping meals, consider cutting portions instead.    Diet combined with exercise helps maintain muscle while optimizing fat loss. Strength training is particularly important for building and maintaining muscle mass. Exercise helps reduce stress, increase energy, and improves fitness.  Increasing exercise without diet control, however, may not burn enough calories to loose weight.       Start walking three days a week 10-20 minutes at a time    Work towards walking thirty minutes five days a week     Eventually, increase the speed of your walking for 1-2 minutes at time    In addition, we recommend that you review healthy lifestyles and methods for weight loss available through the National Institutes of Health patient information sites:  http://win.niddk.nih.gov/publications/index.htm    And look into health and wellness programs that may be available through your health insurance provider, employer, local community center, or bello club.    Weight management plan: Patient was referred to their PCP to discuss a diet and exercise plan.

## 2020-01-20 NOTE — PROGRESS NOTES
Follow up CPAP    Patient doing very well on loaner CPAP machine which is set at 12-17.  Please see Bennett Goltz's note regarding the recent bump up in pressure.     Her average usage is 8 hours a night, her AHI is 1.3 and she has minimal air leakage.      She feels so much better she would like her new machine set at 12-17.  I indicated this this could possibly be too much pressure because her current loaner was mistakeningly given her too low of pressure.  Regardless she indicates she understands and she will follow up with [facility] where if she is having break through centrals, airleakage or any discomfort then can drop her back to 10-15.      She indicates she is awake and alert and has no current sleep concern besides checking in on the CPAP.     All questions were answered.    It is a great privilege being asked to participate in this patients care.  The patient has been advised on the importance in of never operating operating a motor vehicle while tired or sleepy.        15 minutes were spent with this patient greater than 50% in counsiling and coordination of care.

## 2020-01-23 PROBLEM — Z86.73 HISTORY OF STROKE: Status: ACTIVE | Noted: 2020-01-23

## 2020-01-27 ENCOUNTER — OFFICE VISIT (OUTPATIENT)
Dept: FAMILY MEDICINE | Facility: CLINIC | Age: 70
End: 2020-01-27
Payer: COMMERCIAL

## 2020-01-27 VITALS
TEMPERATURE: 97.7 F | DIASTOLIC BLOOD PRESSURE: 76 MMHG | SYSTOLIC BLOOD PRESSURE: 134 MMHG | BODY MASS INDEX: 44.47 KG/M2 | RESPIRATION RATE: 20 BRPM | HEART RATE: 88 BPM | HEIGHT: 63 IN | WEIGHT: 251 LBS

## 2020-01-27 DIAGNOSIS — Z86.73 HISTORY OF STROKE: ICD-10-CM

## 2020-01-27 DIAGNOSIS — G47.33 OBSTRUCTIVE SLEEP APNEA: Chronic | ICD-10-CM

## 2020-01-27 DIAGNOSIS — I10 ESSENTIAL HYPERTENSION WITH GOAL BLOOD PRESSURE LESS THAN 130/80: Chronic | ICD-10-CM

## 2020-01-27 DIAGNOSIS — G56.01 CARPAL TUNNEL SYNDROME OF RIGHT WRIST: ICD-10-CM

## 2020-01-27 DIAGNOSIS — E78.5 HYPERLIPIDEMIA WITH TARGET LDL LESS THAN 100: ICD-10-CM

## 2020-01-27 DIAGNOSIS — L30.4 INTERTRIGO: ICD-10-CM

## 2020-01-27 DIAGNOSIS — Z01.818 PREOP GENERAL PHYSICAL EXAM: Primary | ICD-10-CM

## 2020-01-27 DIAGNOSIS — M81.0 OSTEOPOROSIS WITHOUT CURRENT PATHOLOGICAL FRACTURE, UNSPECIFIED OSTEOPOROSIS TYPE: ICD-10-CM

## 2020-01-27 PROCEDURE — 36415 COLL VENOUS BLD VENIPUNCTURE: CPT | Performed by: INTERNAL MEDICINE

## 2020-01-27 PROCEDURE — 93000 ELECTROCARDIOGRAM COMPLETE: CPT | Performed by: INTERNAL MEDICINE

## 2020-01-27 PROCEDURE — 83721 ASSAY OF BLOOD LIPOPROTEIN: CPT | Performed by: INTERNAL MEDICINE

## 2020-01-27 PROCEDURE — 99214 OFFICE O/P EST MOD 30 MIN: CPT | Mod: 25 | Performed by: INTERNAL MEDICINE

## 2020-01-27 RX ORDER — KETOCONAZOLE 20 MG/G
CREAM TOPICAL
Qty: 60 G | Refills: 3 | Status: SHIPPED | OUTPATIENT
Start: 2020-01-27 | End: 2020-12-18

## 2020-01-27 ASSESSMENT — MIFFLIN-ST. JEOR: SCORE: 1619.72

## 2020-01-27 NOTE — LETTER
January 28, 2020      Bridget ANTHONY Lulu  [address] [facility] [address]        Dear ,    We are writing to inform you of your test results.           This cholesterol level is excellent.  Keep taking the rosuvastatin.    Results for orders placed or performed in visit on 01/27/20   LDL cholesterol direct     Status: None   Result Value Ref Range    LDL Cholesterol Direct 59 <100 mg/dL         If you have any questions or concerns, please call the clinic at the number listed above.       Sincerely,        Patel Hughes MD

## 2020-01-27 NOTE — TELEPHONE ENCOUNTER
"Requested Prescriptions   Pending Prescriptions Disp Refills     ketoconazole (NIZORAL) 2 % external cream [Pharmacy Med Name: Ketoconazole 2 % External Cream]  Last Written Prescription Date:  2/18/2019  Last Fill Quantity: 60 g,  # refills: 3   Last office visit: 10/28/2019 with prescribing provider:  Ellen   Future Office Visit:   Next 5 appointments (look out 90 days)    Jan 27, 2020  9:30 AM CST  [facility] [address] [place] [id number] [phone]        60 g 0     Sig: APPLY  CREAM TOPICALLY TO AFFECTED AREA AS NEEDED       Antifungal Agents Passed - 1/27/2020  5:33 AM        Passed - Recent (12 mo) or future (30 days) visit within the authorizing provider's specialty     Patient has had an office visit with the authorizing provider or a provider within the authorizing providers department within the previous 12 mos or has a future within next 30 days. See \"Patient Info\" tab in inbasket, or \"Choose Columns\" in Meds & Orders section of the refill encounter.              Passed - Not Fluconazole or Terconazole      If oral Fluconazole or Terconazole, may refill if indicated in progress notes.           Passed - Medication is active on med list           "

## 2020-01-27 NOTE — PATIENT INSTRUCTIONS
On the morning of surgery take your blood pressure medications.               Stop taking the aspirin 5 days before; start it again right away afterwards.                                                                   For osteoporosis, I suggest that you be treated with a medication called Reclast.      This is given intravenously, once per year, for 3 years.          Please call your insurance company to find out the cost.

## 2020-01-27 NOTE — PROGRESS NOTES
[address]  [address]  [facility] [address]  [phone]  Dept: [phone]    PRE-OP EVALUATION:  Today's date: 2020    Bridget Lawson (: 1950) presents for pre-operative evaluation assessment as requested by Dr. Guardado.  She requires evaluation and anesthesia risk assessment prior to undergoing surgery/procedure for treatment of Right wrist pain .    Fax number for surgical facility: [phone]([facility] office)  Primary Physician: Patel Hughes  Type of Anesthesia Anticipated: to be determined    Patient has a Health Care Directive or Living Will:  YES -see cht.    Preop Questions 2020   Who is doing your surgery? dr Guardado   What are you having done? carpal tunnel   Date of Surgery/Procedure: 2020   Facility or Hospital where procedure/surgery will be performed: [facility]                surgery center   1.  Do you have a history of Heart attack, stroke, stent, coronary bypass surgery, or other heart surgery? YES  - stroke   2.  Do you ever have any pain or discomfort in your chest? No   3.  Do you have a history of  Heart Failure? No   4.   Are you troubled by shortness of breath when:  walking on a level surface, or up a slight hill, or at night? No   5.  Do you currently have a cold, bronchitis or other respiratory infection? No   6.  Do you have a cough, shortness of breath, or wheezing? No   7.  Do you sometimes get pains in the calves of your legs when you walk? No   8. Do you or anyone in your family have previous history of blood clots? No   9.  Do you or does anyone in your family have a serious bleeding problem such as prolonged bleeding following surgeries or cuts? No   10. Have you ever had problems with anemia or been told to take iron pills? YES -    11. Have you had any abnormal blood loss such as black, tarry or bloody stools, or abnormal vaginal bleeding? NO   12. Have you ever had a blood transfusion? YES -     13. Have you or any of your relatives ever had problems with anesthesia? No   14. Do you have sleep apnea, excessive snoring or daytime drowsiness? YES - USES CPAP   15. Do you have any prosthetic heart valves? No   16. Do you have prosthetic joints? No   17. Is there any chance that you may be pregnant? No         HPI:     HPI related to upcoming procedure: numbness and tingling R hand; carpal tunnel surgery is planned.      See problem list for active medical problems.  Problems all longstanding and stable, except as noted/documented.  See ROS for pertinent symptoms related to these conditions.      MEDICAL HISTORY:     Patient Active Problem List    Diagnosis Date Noted     History of stroke 01/23/2020     Priority: High     Essential hypertension with goal blood pressure less than 130/80 10/24/2016     Priority: High     Personal history of tobacco use, presenting hazards to health 10/24/2016     Priority: High     Quit 1996            No AAA on CT in 2010       Memory loss 06/22/2015     Priority: High     Noted by pt in 6/15; labs ok; referred to neuro. She saw Dr Silva; labs were done; no consult letter received.                       Saw neuro last year; they told her to stop taking advil PM, and there was improvement.        Obstructive sleep apnea 10/08/2012     Priority: High     Uses CPAP as of 6/2010  Problem list name updated by automated process. Provider to review       Primary hyperparathyroidism (H) 10/08/2012     Priority: High     DX in 2003: cured 12/2010 with parathyroid surgery       Acute gastric ulcer with hemorrhage 10/08/2012     Priority: High     2008; f/u EGD showed ulcers had healed; she avoids ASA/ NSAID'S due to this ulcer hx  Problem list name updated by automated process. Provider to review       Late effects of cerebrovascular disease 10/08/2012     Priority: High     Mild L hemiparesis and dysarthria and dysphagia; good recovery           Other osteoporosis without current  "pathological fracture 10/08/2012     Priority: High     in 6/10, prior to PTH surgery, T - 3.2 forearm, - 1.9 spine, - 1.5L and -1.6 R femoral necks                    In 11/19, T -0.3 L fem neck, -2.4 R;    + 0 spine, -3.4 radius       Intracranial hemorrhage (H)      Priority: High     R basal ganglia, due to HTN; had mild L hemiparesis and dysarthria; good recovery       History of gastric ulcer 10/28/2019     Priority: Medium     Carpal tunnel syndrome of right wrist 10/28/2019     Priority: Medium     Per Dr Guardado of ortho; wears a wrist splint R       Hand arthritis 10/28/2019     Priority: Medium     NSAID long-term use 10/28/2019     Priority: Medium     Urinary frequency 10/28/2019     Priority: Medium     Urinary urgency 10/28/2019     Priority: Medium     Obesity (BMI 35.0-39.9) with comorbidity (H) 09/10/2018     Priority: Medium     PND (post-nasal drip) 10/31/2017     Priority: Medium     Traumatic rupture of right posterior tibial tendon, subsequent encounter 10/23/2017     Priority: Medium     Happened in 4/17       Family history of malignant neoplasm of breast 10/24/2016     Priority: Medium     mother       Dysphagia 07/14/2014     Priority: Medium     EGD neg in 7/14; Dx= globus       Pain in left foot 02/17/2014     Priority: Medium     5/13; walking injury; \"stress fracture\" saw Dr. Judson Manjarrez; takes aleve 1 tab daily since then; still bothers her in 7/14       Arthritis of right knee 02/17/2014     Priority: Medium     Uses a cane; s/p injection       Anemia 10/08/2012     Priority: Medium     Stable at 12.7 in 6/11  Problem list name updated by automated process. Provider to review       Hyperlipidemia with target LDL less than 100 10/08/2012     Priority: Medium     Start prava in 7/14  Diagnosis updated by automated process. Provider to review and confirm.       Osteopenia      Priority: Medium     in 6/10, prior to PTH surgery, T - 3.2 forearm, - 1.9 spine, - 1.5L and -1.6 R femoral " necks       Preventive measure 10/08/2012     Priority: Low     APRIMA DATA BASE UNDER THE 9/26/13 NOTE  Mammogram fall 2011 at [facility]; 1/13, 6/15 at [facility], 11/16 , 7/19                             s/p GREGORY/BSO;           colonoscopy 3/08 neg per pt   FIT neg in 9/19; per her insurance company        4 small adenomas in 11/19; recheck in 3 yrs          Past Medical History:   Diagnosis Date     Cerebral artery occlusion with cerebral infarction (H)      Hx of stroke   (02/10)     Hyperlipidemia LDL goal < 100 10/8/2012    Start prava in 7/14     Intracranial hemorrhage (H) 2/10    R basal ganglia, due to HTN; had mild L hemiparesis and dysarthria; good recovery     Obese      Osteopenia     in 6/10, prior to PTH surgery, T - 3.2 forearm, - 1.9 spine, - 1.5L and -1.6 R femoral necks     Sleep apnea     CPAP     Spider veins      Unspecified essential hypertension     Essential hypertension     Past Surgical History:   Procedure Laterality Date     BREAST SURGERY  11/2004    R breast bx     C/SECTION, CLASSICAL  1985    with tubal     HYSTERECTOMY  5/1998    GREGORY w/ BSO     KNEE SURGERY  9/1998    L Arthroscopy     NECK SURGERY  12/2010    R parathyroid adenoma     ORTHOPEDIC SURGERY       REPAIR TENDON ANKLE Right 11/1/2017    Procedure: REPAIR TENDON ANKLE;  RIGHT POSTERIOR TIBIAL TENDON RECONSTRUCTION ;  Surgeon: Xavi Duran MD;  Location: [facility] OR     SHOULDER SURGERY  11/2008    rotator cuff repair R     TONSILLECTOMY      as a child T & A     Current Outpatient Medications   Medication Sig Dispense Refill     Acetaminophen (TYLENOL PO) Take 500 mg by mouth every 6 hours as needed for mild pain or fever        amLODIPine (NORVASC) 5 MG tablet TAKE 1 TABLET BY MOUTH ONCE DAILY NEEDS TO SCHEDULE A LAB ONLY VISIT 90 tablet 3     aspirin  MG EC tablet Take 1 tablet (325 mg) by mouth daily 60 tablet 0     Calcium Carb-Cholecalciferol (CALTRATE 600+D) 600-800 MG-UNIT TABS Take 1 tablet by mouth 2 times daily         Carboxymethylcellulose Sodium (THERATEARS OP) Place 1-2 drops into both eyes 2 times daily as needed       esomeprazole (NEXIUM) 20 MG DR capsule Take 1 capsule (20 mg) by mouth every morning (before breakfast) Take 30-60 minutes before eating. 90 capsule 3     estradiol (ESTRACE VAGINAL) 0.1 MG/GM vaginal cream Apply small amount to the vaginal opening and urethra M, W, F q. h.s. 42.5 g 3     fluticasone (FLONASE) 50 MCG/ACT spray Spray 1 spray into both nostrils At Bedtime       glucosamine-chondroitinoitin 750-600 MG TABS Take 1 tablet by mouth 2 times daily        ibuprofen (ADVIL/MOTRIN) 600 MG tablet Take 1 tablet (600 mg) by mouth every 6 hours as needed for other (inflammatory pain) 60 tablet 0     isosorbide mononitrate (IMDUR) 60 MG 24 hr tablet TAKE 1 TABLET BY MOUTH ONCE DAILY 90 tablet 2     ketoconazole (NIZORAL) 2 % external cream APPLY  CREAM TOPICALLY TO AFFECTED AREA AS NEEDED 60 g 3     labetalol (NORMODYNE) 200 MG tablet TAKE 1 TABLET BY MOUTH THREE TIMES DAILY 270 tablet 2     MAGNESIUM GLUCONATE PO Take 400 mg by mouth daily       MELATONIN PO Take 5 mg by mouth nightly as needed       Multiple Vitamins-Minerals (CENTRUM SILVER ULTRA WOMENS PO) Take 1 tablet by mouth daily       NONFORMULARY Apply to affected area daily PERIO GEL (contains hydrogen peroxide 1.7%)  - Apply to Periotray       ORDER FOR DME Use your CPAP device as directed by your provider.       ramipril (ALTACE) 10 MG capsule TAKE 1 CAPSULE BY MOUTH ONCE DAILY PLEASE SCHEDULE A LAB ONLY VISIT 90 capsule 3     rosuvastatin (CRESTOR) 20 MG tablet Take 1 tablet (20 mg) by mouth daily 90 tablet 3     OTC products: None, except as noted above    Allergies   Allergen Reactions     Animal Dander       Latex Allergy: NO    Social History     Tobacco Use     Smoking status: Former Smoker     Packs/day: 2.00     Years: 27.00     Pack years: 54.00     Types: Cigarettes     Start date: 10/24/1969     Last attempt to quit: 9/1/1996     " Years since quittin.4     Smokeless tobacco: Never Used   Substance Use Topics     Alcohol use: Yes     Alcohol/week: 0.0 standard drinks     Comment: occasionally     History   Drug Use No       REVIEW OF SYSTEMS:   CONSTITUTIONAL: NEGATIVE for fever, chills, change in weight  ENT/MOUTH: NEGATIVE for ear, mouth and throat problems  RESP: NEGATIVE for significant cough or SOB  CV: NEGATIVE for chest pain, palpitations or peripheral edema  GI: NEGATIVE for nausea, abdominal pain, heartburn, or change in bowel habits  : urgency; working with urology  NEURO: NEGATIVE for weakness, dizziness or paresthesias    EXAM:   /76   Pulse 88   Temp 97.7  F (36.5  C)   Resp 20   Ht 1.588 m (5' 2.5\")   Wt 113.9 kg (251 lb)   BMI 45.18 kg/m    GENERAL APPEARANCE: alert, no distress and over weight  HENT: nose and mouth without ulcers or lesions  RESP: lungs clear to auscultation - no rales, rhonchi or wheezes  CV: regular rate and rhythm, normal S1 S2, no S3 or S4 and no murmur, click or rub   ABDOMEN: soft, nontender, without hepatosplenomegaly or masses  NEURO: Normal strength and tone, mentation intact, speech normal and gait abnormal ; uses a cane    DIAGNOSTICS:   EKG: appears normal, NSR, normal axis, normal intervals, no acute ST/T changes c/w ischemia, no LVH by voltage criteria, unchanged from previous tracings    Recent Labs   Lab Test 10/28/19  1126 19  0925 10/23/17  1519  07/16/15  1104   HGB 13.2  --  13.1  --   --      --  200  --   --      --  138   < >  --    POTASSIUM 4.1 4.3 4.2   < >  --    CR 0.74 0.70 0.75   < >  --    A1C  --   --   --   --  5.5    < > = values in this interval not displayed.        IMPRESSION:   Reason for surgery/procedure: need for carpal tunnel surgery  Diagnosis/reason for consult: need for pre-op evaluation    The proposed surgical procedure is considered LOW risk.    REVISED CARDIAC RISK INDEX  The patient has the following serious cardiovascular " risks for perioperative complications:  No serious cardiac risks      The patient has the following additional risks for perioperative complications:  Morbid obesity      ICD-10-CM    1. Preop general physical exam Z01.818 EKG 12-lead complete w/read - Clinics   2. Carpal tunnel syndrome of right wrist G56.01 EKG 12-lead complete w/read - Clinics   3. Essential hypertension with goal blood pressure less than 130/80 I10 EKG 12-lead complete w/read - Clinics   4. Obstructive sleep apnea G47.33    5. History of stroke Z86.73    6. Osteoporosis without current pathological fracture, unspecified osteoporosis type M81.0    7. Hyperlipidemia with target LDL less than 100 E78.5 LDL cholesterol direct   8. Intertrigo L30.4 ketoconazole (NIZORAL) 2 % external cream       RECOMMENDATIONS:       Obstructive Sleep Apnea (or suspected sleep apnea)  Patient is clearly advised to use their home CPAP when released from surgery      Patient Instructions   On the morning of surgery take your blood pressure medications.               Stop taking the aspirin 5 days before; start it again right away afterwards.                                                                   For osteoporosis, I suggest that you be treated with a medication called Reclast.      This is given intravenously, once per year, for 3 years.          Please call your insurance company to find out the cost.            APPROVAL GIVEN to proceed with proposed procedure, without further diagnostic evaluation       Signed Electronically by: Patel Hughes MD    Copy of this evaluation report is provided to requesting physician.    Results for orders placed or performed in visit on 01/27/20   LDL cholesterol direct     Status: None   Result Value Ref Range    LDL Cholesterol Direct 59 <100 mg/dL     Letter sent.             This cholesterol level is excellent.  Keep taking the rosuvastatin.

## 2020-01-28 LAB — LDLC SERPL DIRECT ASSAY-MCNC: 59 MG/DL

## 2020-01-28 RX ORDER — KETOCONAZOLE 20 MG/G
CREAM TOPICAL
Qty: 60 G | Refills: 0
Start: 2020-01-28

## 2020-01-29 ENCOUNTER — TELEPHONE (OUTPATIENT)
Dept: FAMILY MEDICINE | Facility: CLINIC | Age: 70
End: 2020-01-29

## 2020-01-29 RX ORDER — ZOLEDRONIC ACID 5 MG/100ML
5 INJECTION, SOLUTION INTRAVENOUS ONCE
Status: CANCELLED
Start: 2020-01-30

## 2020-01-29 RX ORDER — HEPARIN SODIUM (PORCINE) LOCK FLUSH IV SOLN 100 UNIT/ML 100 UNIT/ML
5 SOLUTION INTRAVENOUS
Status: CANCELLED | OUTPATIENT
Start: 2020-01-30

## 2020-01-29 RX ORDER — HEPARIN SODIUM,PORCINE 10 UNIT/ML
5 VIAL (ML) INTRAVENOUS
Status: CANCELLED | OUTPATIENT
Start: 2020-01-30

## 2020-01-29 NOTE — TELEPHONE ENCOUNTER
Order generated.  She should call [phone] to set this up.                       Please let her know.

## 2020-01-29 NOTE — TELEPHONE ENCOUNTER
Pt called to report her insurance covers reclast. Would provider like to order this for her? Please advice.

## 2020-01-29 NOTE — TELEPHONE ENCOUNTER
Reason for Call:  Other returning call    Detailed comments:    Patient states her insurance will cover reclast   Please put in order for patient and let her know what to do Dr Hughes asked her to find this information  Phone Number Patient can be reached at: Home number on file [phone] (home)    Best Time: anytime    Can we leave a detailed message on this number? YES    Call taken on 1/29/2020 at 12:50 PM by KERRI TINOCO

## 2020-01-31 ENCOUNTER — INFUSION THERAPY VISIT (OUTPATIENT)
Dept: INFUSION THERAPY | Facility: CLINIC | Age: 70
End: 2020-01-31
Attending: INTERNAL MEDICINE
Payer: COMMERCIAL

## 2020-01-31 ENCOUNTER — HOSPITAL ENCOUNTER (OUTPATIENT)
Facility: CLINIC | Age: 70
Setting detail: SPECIMEN
Discharge: HOME | End: 2020-01-31
Attending: INTERNAL MEDICINE | Admitting: INTERNAL MEDICINE
Payer: COMMERCIAL

## 2020-01-31 VITALS
DIASTOLIC BLOOD PRESSURE: 82 MMHG | HEART RATE: 71 BPM | TEMPERATURE: 97.6 F | RESPIRATION RATE: 20 BRPM | OXYGEN SATURATION: 94 % | SYSTOLIC BLOOD PRESSURE: 136 MMHG

## 2020-01-31 DIAGNOSIS — M81.8 OTHER OSTEOPOROSIS WITHOUT CURRENT PATHOLOGICAL FRACTURE: Primary | ICD-10-CM

## 2020-01-31 DIAGNOSIS — R13.10 DYSPHAGIA, UNSPECIFIED TYPE: ICD-10-CM

## 2020-01-31 DIAGNOSIS — Z87.11 HISTORY OF GASTRIC ULCER: ICD-10-CM

## 2020-01-31 DIAGNOSIS — E21.0 PRIMARY HYPERPARATHYROIDISM (H): ICD-10-CM

## 2020-01-31 LAB
CALCIUM SERPL-MCNC: 9.6 MG/DL (ref 8.5–10.1)
CREAT SERPL-MCNC: 0.53 MG/DL (ref 0.52–1.04)
GFR SERPL CREATININE-BSD FRML MDRD: >90 ML/MIN/{1.73_M2}

## 2020-01-31 PROCEDURE — 25000128 H RX IP 250 OP 636: Performed by: INTERNAL MEDICINE

## 2020-01-31 PROCEDURE — 82565 ASSAY OF CREATININE: CPT | Performed by: INTERNAL MEDICINE

## 2020-01-31 PROCEDURE — 96365 THER/PROPH/DIAG IV INF INIT: CPT

## 2020-01-31 PROCEDURE — 82310 ASSAY OF CALCIUM: CPT | Performed by: INTERNAL MEDICINE

## 2020-01-31 RX ORDER — ZOLEDRONIC ACID 5 MG/100ML
5 INJECTION, SOLUTION INTRAVENOUS ONCE
Status: COMPLETED | OUTPATIENT
Start: 2020-01-31 | End: 2020-01-31

## 2020-01-31 RX ORDER — HEPARIN SODIUM (PORCINE) LOCK FLUSH IV SOLN 100 UNIT/ML 100 UNIT/ML
5 SOLUTION INTRAVENOUS
Status: CANCELLED | OUTPATIENT
Start: 2020-01-31

## 2020-01-31 RX ORDER — HEPARIN SODIUM,PORCINE 10 UNIT/ML
5 VIAL (ML) INTRAVENOUS
Status: CANCELLED | OUTPATIENT
Start: 2020-01-31

## 2020-01-31 RX ORDER — ZOLEDRONIC ACID 5 MG/100ML
5 INJECTION, SOLUTION INTRAVENOUS ONCE
Status: CANCELLED
Start: 2020-01-31

## 2020-01-31 RX ADMIN — ZOLEDRONIC ACID 5 MG: 0.05 INJECTION, SOLUTION INTRAVENOUS at 14:59

## 2020-01-31 ASSESSMENT — PAIN SCALES - GENERAL: PAINLEVEL: NO PAIN (0)

## 2020-01-31 NOTE — PROGRESS NOTES
Infusion Nursing Note:  Bridget Lawson presents today for Reclast and labs.    Patient seen by provider today: No   present during visit today: Not Applicable.    Note: N/A.    Intravenous Access:  Peripheral IV placed.    Treatment Conditions:  Creatinine: 0.53  Calcium 9.6      Post Infusion Assessment:  Patient tolerated infusion without incident.  Blood return noted pre and post infusion.  Site patent and intact, free from redness, edema or discomfort.  No evidence of extravasations.  Access discontinued per protocol.       Discharge Plan:   Patient declined prescription refills.  Discharge instructions reviewed with: Patient.  Patient verbalized understanding of discharge instructions and all questions answered.  Copy of AVS reviewed with patient and/or family.  Patient will return as scheduled for next appointment.  Patient discharged in stable condition accompanied by: self.  Departure Mode: Ambulatory.    Tisha Ureña RN

## 2020-02-07 ENCOUNTER — HOSPITAL ENCOUNTER (OUTPATIENT)
Facility: CLINIC | Age: 70
Discharge: HOME | End: 2020-02-07
Attending: ORTHOPAEDIC SURGERY | Admitting: ORTHOPAEDIC SURGERY
Payer: COMMERCIAL

## 2020-02-07 ENCOUNTER — ANESTHESIA EVENT (OUTPATIENT)
Dept: SURGERY | Facility: CLINIC | Age: 70
End: 2020-02-07
Payer: COMMERCIAL

## 2020-02-07 ENCOUNTER — ANESTHESIA EVENT (OUTPATIENT)
Dept: SURGERY | Facility: CLINIC | Age: 70
End: 2020-02-07

## 2020-02-07 ENCOUNTER — ANESTHESIA (OUTPATIENT)
Dept: SURGERY | Facility: CLINIC | Age: 70
End: 2020-02-07

## 2020-02-07 ENCOUNTER — ANESTHESIA (OUTPATIENT)
Dept: SURGERY | Facility: CLINIC | Age: 70
End: 2020-02-07
Payer: COMMERCIAL

## 2020-02-07 VITALS
DIASTOLIC BLOOD PRESSURE: 87 MMHG | OXYGEN SATURATION: 97 % | HEIGHT: 63 IN | HEART RATE: 71 BPM | SYSTOLIC BLOOD PRESSURE: 146 MMHG | WEIGHT: 253.6 LBS | TEMPERATURE: 97.6 F | BODY MASS INDEX: 44.93 KG/M2 | RESPIRATION RATE: 16 BRPM

## 2020-02-07 DIAGNOSIS — G56.01 CARPAL TUNNEL SYNDROME OF RIGHT WRIST: Primary | ICD-10-CM

## 2020-02-07 PROCEDURE — 25000128 H RX IP 250 OP 636: Performed by: ORTHOPAEDIC SURGERY

## 2020-02-07 PROCEDURE — 27110028 ZZH OR GENERAL SUPPLY NON-STERILE: Performed by: ORTHOPAEDIC SURGERY

## 2020-02-07 PROCEDURE — 25800030 ZZH RX IP 258 OP 636: Performed by: NURSE ANESTHETIST, CERTIFIED REGISTERED

## 2020-02-07 PROCEDURE — 71000027 ZZH RECOVERY PHASE 2 EACH 15 MINS: Performed by: ORTHOPAEDIC SURGERY

## 2020-02-07 PROCEDURE — 36000058 ZZH SURGERY LEVEL 3 EA 15 ADDTL MIN: Performed by: ORTHOPAEDIC SURGERY

## 2020-02-07 PROCEDURE — 36000056 ZZH SURGERY LEVEL 3 1ST 30 MIN: Performed by: ORTHOPAEDIC SURGERY

## 2020-02-07 PROCEDURE — 25000125 ZZHC RX 250: Performed by: ORTHOPAEDIC SURGERY

## 2020-02-07 PROCEDURE — 37000009 ZZH ANESTHESIA TECHNICAL FEE, EACH ADDTL 15 MIN: Performed by: ORTHOPAEDIC SURGERY

## 2020-02-07 PROCEDURE — 27210794 ZZH OR GENERAL SUPPLY STERILE: Performed by: ORTHOPAEDIC SURGERY

## 2020-02-07 PROCEDURE — 37000008 ZZH ANESTHESIA TECHNICAL FEE, 1ST 30 MIN: Performed by: ORTHOPAEDIC SURGERY

## 2020-02-07 PROCEDURE — 71000012 ZZH RECOVERY PHASE 1 LEVEL 1 FIRST HR: Performed by: ORTHOPAEDIC SURGERY

## 2020-02-07 PROCEDURE — 25000128 H RX IP 250 OP 636: Performed by: NURSE ANESTHETIST, CERTIFIED REGISTERED

## 2020-02-07 PROCEDURE — 25000125 ZZHC RX 250: Performed by: NURSE ANESTHETIST, CERTIFIED REGISTERED

## 2020-02-07 PROCEDURE — 40000170 ZZH STATISTIC PRE-PROCEDURE ASSESSMENT II: Performed by: ORTHOPAEDIC SURGERY

## 2020-02-07 RX ORDER — BACITRACIN ZINC 500 [USP'U]/G
OINTMENT TOPICAL PRN
Status: DISCONTINUED | OUTPATIENT
Start: 2020-02-07 | End: 2020-02-07 | Stop reason: HOSPADM

## 2020-02-07 RX ORDER — CEFAZOLIN SODIUM 2 G/100ML
2 INJECTION, SOLUTION INTRAVENOUS
Status: COMPLETED | OUTPATIENT
Start: 2020-02-07 | End: 2020-02-07

## 2020-02-07 RX ORDER — HYDROMORPHONE HYDROCHLORIDE 1 MG/ML
.3-.5 INJECTION, SOLUTION INTRAMUSCULAR; INTRAVENOUS; SUBCUTANEOUS EVERY 10 MIN PRN
Status: DISCONTINUED | OUTPATIENT
Start: 2020-02-07 | End: 2020-02-07 | Stop reason: HOSPADM

## 2020-02-07 RX ORDER — ONDANSETRON 2 MG/ML
INJECTION INTRAMUSCULAR; INTRAVENOUS PRN
Status: DISCONTINUED | OUTPATIENT
Start: 2020-02-07 | End: 2020-02-07

## 2020-02-07 RX ORDER — MEPERIDINE HYDROCHLORIDE 25 MG/ML
12.5 INJECTION INTRAMUSCULAR; INTRAVENOUS; SUBCUTANEOUS
Status: DISCONTINUED | OUTPATIENT
Start: 2020-02-07 | End: 2020-02-07 | Stop reason: HOSPADM

## 2020-02-07 RX ORDER — FENTANYL CITRATE 50 UG/ML
25-100 INJECTION, SOLUTION INTRAMUSCULAR; INTRAVENOUS
Status: DISCONTINUED | OUTPATIENT
Start: 2020-02-07 | End: 2020-02-07 | Stop reason: HOSPADM

## 2020-02-07 RX ORDER — HYDROCODONE BITARTRATE AND ACETAMINOPHEN 5; 325 MG/1; MG/1
1 TABLET ORAL
Status: DISCONTINUED | OUTPATIENT
Start: 2020-02-07 | End: 2020-02-07 | Stop reason: HOSPADM

## 2020-02-07 RX ORDER — PROPOFOL 10 MG/ML
INJECTION, EMULSION INTRAVENOUS CONTINUOUS PRN
Status: DISCONTINUED | OUTPATIENT
Start: 2020-02-07 | End: 2020-02-07

## 2020-02-07 RX ORDER — MAGNESIUM HYDROXIDE 1200 MG/15ML
LIQUID ORAL PRN
Status: DISCONTINUED | OUTPATIENT
Start: 2020-02-07 | End: 2020-02-07 | Stop reason: HOSPADM

## 2020-02-07 RX ORDER — SODIUM CHLORIDE, SODIUM LACTATE, POTASSIUM CHLORIDE, CALCIUM CHLORIDE 600; 310; 30; 20 MG/100ML; MG/100ML; MG/100ML; MG/100ML
INJECTION, SOLUTION INTRAVENOUS CONTINUOUS
Status: DISCONTINUED | OUTPATIENT
Start: 2020-02-07 | End: 2020-02-07 | Stop reason: HOSPADM

## 2020-02-07 RX ORDER — ACETAMINOPHEN 325 MG/1
650 TABLET ORAL
Status: DISCONTINUED | OUTPATIENT
Start: 2020-02-07 | End: 2020-02-07 | Stop reason: HOSPADM

## 2020-02-07 RX ORDER — CEFAZOLIN SODIUM 1 G/3ML
1 INJECTION, POWDER, FOR SOLUTION INTRAMUSCULAR; INTRAVENOUS SEE ADMIN INSTRUCTIONS
Status: DISCONTINUED | OUTPATIENT
Start: 2020-02-07 | End: 2020-02-07 | Stop reason: HOSPADM

## 2020-02-07 RX ORDER — NALOXONE HYDROCHLORIDE 0.4 MG/ML
.1-.4 INJECTION, SOLUTION INTRAMUSCULAR; INTRAVENOUS; SUBCUTANEOUS
Status: DISCONTINUED | OUTPATIENT
Start: 2020-02-07 | End: 2020-02-07 | Stop reason: HOSPADM

## 2020-02-07 RX ORDER — ONDANSETRON 2 MG/ML
4 INJECTION INTRAMUSCULAR; INTRAVENOUS EVERY 30 MIN PRN
Status: DISCONTINUED | OUTPATIENT
Start: 2020-02-07 | End: 2020-02-07 | Stop reason: HOSPADM

## 2020-02-07 RX ORDER — HYDROCODONE BITARTRATE AND ACETAMINOPHEN 5; 325 MG/1; MG/1
1-2 TABLET ORAL EVERY 4 HOURS PRN
Qty: 10 TABLET | Refills: 0 | Status: SHIPPED | OUTPATIENT
Start: 2020-02-07 | End: 2021-01-21

## 2020-02-07 RX ORDER — ACETAMINOPHEN 650 MG/1
650 SUPPOSITORY RECTAL EVERY 4 HOURS PRN
Status: DISCONTINUED | OUTPATIENT
Start: 2020-02-07 | End: 2020-02-07 | Stop reason: HOSPADM

## 2020-02-07 RX ORDER — FENTANYL CITRATE 50 UG/ML
25-50 INJECTION, SOLUTION INTRAMUSCULAR; INTRAVENOUS
Status: DISCONTINUED | OUTPATIENT
Start: 2020-02-07 | End: 2020-02-07 | Stop reason: HOSPADM

## 2020-02-07 RX ORDER — DIAZEPAM 10 MG/2ML
2.5 INJECTION, SOLUTION INTRAMUSCULAR; INTRAVENOUS
Status: DISCONTINUED | OUTPATIENT
Start: 2020-02-07 | End: 2020-02-07 | Stop reason: HOSPADM

## 2020-02-07 RX ORDER — ONDANSETRON 4 MG/1
4 TABLET, ORALLY DISINTEGRATING ORAL EVERY 30 MIN PRN
Status: DISCONTINUED | OUTPATIENT
Start: 2020-02-07 | End: 2020-02-07 | Stop reason: HOSPADM

## 2020-02-07 RX ORDER — SODIUM CHLORIDE, SODIUM LACTATE, POTASSIUM CHLORIDE, CALCIUM CHLORIDE 600; 310; 30; 20 MG/100ML; MG/100ML; MG/100ML; MG/100ML
INJECTION, SOLUTION INTRAVENOUS CONTINUOUS PRN
Status: DISCONTINUED | OUTPATIENT
Start: 2020-02-07 | End: 2020-02-07

## 2020-02-07 RX ORDER — BUPIVACAINE HYDROCHLORIDE 2.5 MG/ML
INJECTION, SOLUTION INFILTRATION; PERINEURAL PRN
Status: DISCONTINUED | OUTPATIENT
Start: 2020-02-07 | End: 2020-02-07 | Stop reason: HOSPADM

## 2020-02-07 RX ORDER — FENTANYL CITRATE 50 UG/ML
INJECTION, SOLUTION INTRAMUSCULAR; INTRAVENOUS PRN
Status: DISCONTINUED | OUTPATIENT
Start: 2020-02-07 | End: 2020-02-07

## 2020-02-07 RX ORDER — LIDOCAINE HYDROCHLORIDE 20 MG/ML
INJECTION, SOLUTION INFILTRATION; PERINEURAL PRN
Status: DISCONTINUED | OUTPATIENT
Start: 2020-02-07 | End: 2020-02-07

## 2020-02-07 RX ADMIN — FENTANYL CITRATE 25 MCG: 50 INJECTION, SOLUTION INTRAMUSCULAR; INTRAVENOUS at 12:22

## 2020-02-07 RX ADMIN — SODIUM CHLORIDE, POTASSIUM CHLORIDE, SODIUM LACTATE AND CALCIUM CHLORIDE: 600; 310; 30; 20 INJECTION, SOLUTION INTRAVENOUS at 12:27

## 2020-02-07 RX ADMIN — PROPOFOL 100 MCG/KG/MIN: 10 INJECTION, EMULSION INTRAVENOUS at 12:24

## 2020-02-07 RX ADMIN — DEXMEDETOMIDINE HYDROCHLORIDE 4 MCG: 100 INJECTION, SOLUTION INTRAVENOUS at 12:35

## 2020-02-07 RX ADMIN — MIDAZOLAM 0.5 MG: 1 INJECTION INTRAMUSCULAR; INTRAVENOUS at 12:22

## 2020-02-07 RX ADMIN — CEFAZOLIN SODIUM 2 G: 2 INJECTION, SOLUTION INTRAVENOUS at 12:33

## 2020-02-07 RX ADMIN — LIDOCAINE HYDROCHLORIDE 20 MG: 20 INJECTION, SOLUTION INFILTRATION; PERINEURAL at 12:24

## 2020-02-07 RX ADMIN — ONDANSETRON 4 MG: 2 INJECTION INTRAMUSCULAR; INTRAVENOUS at 12:35

## 2020-02-07 RX ADMIN — DEXMEDETOMIDINE HYDROCHLORIDE 8 MCG: 100 INJECTION, SOLUTION INTRAVENOUS at 12:44

## 2020-02-07 RX ADMIN — DEXMEDETOMIDINE HYDROCHLORIDE 8 MCG: 100 INJECTION, SOLUTION INTRAVENOUS at 12:21

## 2020-02-07 ASSESSMENT — MIFFLIN-ST. JEOR: SCORE: 1631.51

## 2020-02-07 ASSESSMENT — LIFESTYLE VARIABLES: TOBACCO_USE: 1

## 2020-02-07 NOTE — DISCHARGE INSTRUCTIONS
** Because you had anesthesia today and your history of sleep apnea, it is extremely important that you use your CPAP machine for the next 24 hours while napping or sleeping.**        Same Day Surgery Discharge Instructions for  Sedation and General Anesthesia       It's not unusual to feel dizzy, light-headed or faint for up to 24 hours after surgery or while taking pain medication.  If you have these symptoms: sit for a few minutes before standing and have someone assist you when you get up to walk or use the bathroom.      You should rest and relax for the next 24 hours. We recommend you make arrangements to have an adult stay with you for at least 24 hours after your discharge.  Avoid hazardous and strenuous activity.      DO NOT DRIVE any vehicle or operate mechanical equipment for 24 hours following the end of your surgery.  Even though you may feel normal, your reactions may be affected by the medication you have received.      Do not drink alcoholic beverages for 24 hours following surgery.       Slowly progress to your regular diet as you feel able. It's not unusual to feel nauseated and/or vomit after receiving anesthesia.  If you develop these symptoms, drink clear liquids (apple juice, ginger ale, broth, 7-up, etc. ) until you feel better.  If your nausea and vomiting persists for 24 hours, please notify your surgeon.        All narcotic pain medications, along with inactivity and anesthesia, can cause constipation. Drinking plenty of liquids and increasing fiber intake will help.      For any questions of a medical nature, call your surgeon.      Do not make important decisions for 24 hours.      If you had general anesthesia, you may have a sore throat for a couple of days related to the breathing tube used during surgery.  You may use Cepacol lozenges to help with this discomfort.  If it worsens or if you develop a fever, contact your surgeon.       If you feel your pain is not well managed with the  pain medications prescribed by your surgeon, please contact your surgeon's office to let them know so they can address your concerns.             [facility]   Discharge Instructions   Denise Guardado M.D.      PAIN    You may have numbing medication in you incisional area.  As this medication wears off you can take the pills prescribed for you.     Elevate your arm for the first 24 hours to reduce swelling and pain.  Use ice as needed.    DRESSINGS    Keep your dressing dry and intact as instructed. Cover with plastic wrap or a plastic bag to shower.     Trigger finger release: may change dressings in 3 days, cover incision with band aid.    Endoscopic Carpal tunnel release: may change dressing in 3 days.  Cover incision with band aid.      ACTIVITIES    After the surgery, begin moving your fingers immediately.    You may use your hand for light activities and driving.     You may also return to work for light duty or as discussed with your doctor.    No heavy lifting, pushing, or pulling, with your surgical hand.    FOLLOW-UP    If a follow-up appointment was not made for you, call the office, as soon as possible, after your surgery to schedule a follow up appointment for 10-14 days post-operatively.       [facility] patients:  you can be seen at the [place] office on Mondays or Wednesdays:  [address], [place] [address]            [facility] patients:  you can be seen at the [place] office on Tuesdays or Thursdays at:   [address] [place] [address]      Call [phone] for your follow-up appointment.     CALL OUR OFFICE, AT [phone] IF:    You develop a fever (101   or above).    You develop redness, swelling or drainage around the incisional area.    You have problems such as rash or continued nausea and/or vomiting with medication.    You have any questions, problems or concerns.          **If you have questions or concerns  about your procedure,  call Dr. Guardado at [phone]**

## 2020-02-07 NOTE — ANESTHESIA POSTPROCEDURE EVALUATION
Patient: Bridget Lawson    Procedure(s):  RIGHT ENDOSCOPIC CARPAL TUNNEL RELEASE    Diagnosis:Carpal tunnel syndrome of right wrist [G56.01]  Diagnosis Additional Information: No value filed.    Anesthesia Type:  MAC    Note:  Anesthesia Post Evaluation    Patient location during evaluation: PACU  Patient participation: Able to fully participate in evaluation  Level of consciousness: awake  Pain management: adequate  Airway patency: patent  Cardiovascular status: acceptable  Respiratory status: acceptable  Hydration status: acceptable  PONV: controlled     Anesthetic complications: None          Last vitals:  Vitals:    02/07/20 1330 02/07/20 1345 02/07/20 1415   BP: 121/60 126/79 (!) 146/87   Pulse: 78 71 71   Resp: 15 15 16   Temp: 36.4  C (97.6  F)     SpO2: 93% 92% 97%         Electronically Signed By: Дмитрий Hill MD  February 7, 2020  2:40 PM

## 2020-02-07 NOTE — ANESTHESIA PREPROCEDURE EVALUATION
Anesthesia Pre-Procedure Evaluation    Patient: Bridget Lawson   MRN: [id number] : 1950          Preoperative Diagnosis: Carpal tunnel syndrome of right wrist [G56.01]    Procedure(s):  RIGHT ENDOSCOPPI CARPAL TUNNEL RELEASE    Past Medical History:   Diagnosis Date     Cerebral artery occlusion with cerebral infarction (H)      Hx of stroke   (02/10)     Hyperlipidemia LDL goal < 100 10/8/2012    Start prava in      Intracranial hemorrhage (H) 2/10    R basal ganglia, due to HTN; had mild L hemiparesis and dysarthria; good recovery     Obese      Osteopenia     in 6/10, prior to PTH surgery, T - 3.2 forearm, - 1.9 spine, - 1.5L and -1.6 R femoral necks     Sleep apnea     CPAP     Spider veins      Unspecified essential hypertension     Essential hypertension     Past Surgical History:   Procedure Laterality Date     BREAST SURGERY  2004    R breast bx     C/SECTION, CLASSICAL      with tubal     HYSTERECTOMY  1998    GREGORY w/ BSO     KNEE SURGERY  1998    L Arthroscopy     NECK SURGERY  2010    R parathyroid adenoma     ORTHOPEDIC SURGERY       REPAIR TENDON ANKLE Right 2017    Procedure: REPAIR TENDON ANKLE;  RIGHT POSTERIOR TIBIAL TENDON RECONSTRUCTION ;  Surgeon: Xavi Duran MD;  Location: [facility] OR     SHOULDER SURGERY  2008    rotator cuff repair R     TONSILLECTOMY      as a child T & A       Anesthesia Evaluation     . Pt has had prior anesthetic.     No history of anesthetic complications          ROS/MED HX    ENT/Pulmonary:     (+)sleep apnea, tobacco use, Past use uses CPAP , . .    Neurologic:     (+)CVA date: ~ with deficits- mild left sided weakness, walks with a cane.  No ,     Cardiovascular:     (+) Dyslipidemia, hypertension-range: controlled, ---. : . . . :. .       METS/Exercise Tolerance:     Hematologic:         Musculoskeletal:         GI/Hepatic:        (-) GERD   Renal/Genitourinary:         Endo:     (+) Obesity (BMI 45), .      Psychiatric:        "  Infectious Disease:         Malignancy:         Other:                          Physical Exam  Normal systems: cardiovascular, pulmonary and dental    Airway   Mallampati: II  TM distance: >3 FB  Neck ROM: full    Dental     Cardiovascular       Pulmonary             Lab Results   Component Value Date    WBC 7.8 10/28/2019    HGB 13.2 10/28/2019    HCT 40.9 10/28/2019     10/28/2019    CRP <2.9 10/28/2019     10/28/2019    POTASSIUM 4.1 10/28/2019    CHLORIDE 106 10/28/2019    CO2 26 10/28/2019    BUN 24 10/28/2019    CR 0.53 01/31/2020    GLC 78 10/28/2019    CLARA 9.6 01/31/2020    PHOS 2.8 02/20/2010    MAG 1.9 02/20/2010    ALBUMIN 4.2 10/28/2019    PROTTOTAL 7.1 10/28/2019    ALT 36 10/28/2019    AST 19 10/28/2019    ALKPHOS 59 10/28/2019    BILITOTAL 0.3 10/28/2019    LIPASE 107 01/11/2008    PTT 27 02/17/2010    INR 1.00 02/17/2010    TSH 0.49 10/28/2019    T4 1.03 07/16/2015       Preop Vitals  BP Readings from Last 3 Encounters:   02/07/20 (!) 143/71   01/31/20 136/82   01/27/20 134/76    Pulse Readings from Last 3 Encounters:   02/07/20 76   01/31/20 71   01/27/20 88      Resp Readings from Last 3 Encounters:   02/07/20 20   01/31/20 20   01/27/20 20    SpO2 Readings from Last 3 Encounters:   02/07/20 95%   01/31/20 94%   01/20/20 96%      Temp Readings from Last 1 Encounters:   02/07/20 35.7  C (96.2  F) (Oral)    Ht Readings from Last 1 Encounters:   02/07/20 1.588 m (5' 2.5\")      Wt Readings from Last 1 Encounters:   02/07/20 115 kg (253 lb 9.6 oz)    Estimated body mass index is 45.64 kg/m  as calculated from the following:    Height as of this encounter: 1.588 m (5' 2.5\").    Weight as of this encounter: 115 kg (253 lb 9.6 oz).       Anesthesia Plan      History & Physical Review  History and physical reviewed and following examination; no interval change.    ASA Status:  3 .    NPO Status:  > 8 hours    Plan for MAC Reason for MAC:  Deep or markedly invasive procedure (G8)  PONV " prophylaxis:  Ondansetron (or other 5HT-3)       Postoperative Care  Postoperative pain management:  IV analgesics.      Consents  Anesthetic plan, risks, benefits and alternatives discussed with:  Patient..                 Дмитрий Hill MD

## 2020-02-07 NOTE — BRIEF OP NOTE
[facility]    Brief Operative Note    Pre-operative diagnosis: Carpal tunnel syndrome of right wrist [G56.01]  Post-operative diagnosis Same as pre-operative diagnosis    Procedure: Procedure(s):  RIGHT ENDOSCOPIC CARPAL TUNNEL RELEASE  Surgeon: Surgeon(s) and Role:     * Piper Guardado MD - Primary  Anesthesia: Monitor Anesthesia Care   Estimated blood loss: Minimal  Drains: None  Specimens: * No specimens in log *  Findings:   None.  Complications: None.  Implants: * No implants in log *

## 2020-02-07 NOTE — OP NOTE
Procedure Date: 02/07/2020      PREOPERATIVE DIAGNOSIS:  Right carpal tunnel syndrome.      POSTOPERATIVE DIAGNOSIS:  Right carpal tunnel syndrome.      PROCEDURE:  Right endoscopic carpal tunnel release.      SURGEON:  Piper Guardado MD      ANESTHESIA:  Local with sedation.      INDICATIONS:  The patient has refractory carpal tunnel symptoms.  Treatment options were discussed, and surgery was recommended.  The nature of surgery and recovery was reviewed.  She understands and agrees to proceed with the above procedure.      DESCRIPTION OF PROCEDURE:  The patient was brought to the operating room.  IV sedation and 2 grams of Ancef were given.  Her arm was sterilely prepped and draped below an upper arm tourniquet.  A pause was performed to confirm the site and the procedure planned.  The area of incision was anesthetized with 0.25% Marcaine plain.  A total of 8 mL was used.  The limb was exsanguinated with an Esmarch and the tourniquet was inflated to 250 mmHg.      A transverse incision was made proximal to the wrist.  Skin and subcutaneous tissues were divided.  A distally-based flap of antebrachial fascia was elevated, exposing the entrance to the carpal tunnel nicely.  A series of dilators followed by a synovial elevator were advanced into the carpal tunnel space.  The endoscope was then advanced distally and the undersurface of the ligament was nicely visualized.  The release was initiated and a single pass was made from the distal portion of the ligament proximally.  The ligament was normal in appearance proximally; however, distally there was significant muscle noted and rather than to continue the distal portion of the release endoscopically, the proximal portion was completed and the distal centimeter of antebrachial fascia was divided longitudinally with tenotomy scissors.        To complete the release safely, a small incision was made in the palm.  Skin and subcutaneous tissues were divided.  The area of  the release proximally was identified.  The muscle was protected and the release was completed under direct vision.  The terminal branches of the nerve were visualized and were normal in appearance.  The terminal nerve branches were visualized.  The nerve anatomy and carpal tunnel contents were otherwise unremarkable.        The tourniquet was then deflated.  Hemostasis was achieved with pressure and bipolar cautery and the skin was closed with interrupted nylon suture.  A bulky sterile dressing was applied.  She tolerated the procedure well and was taken to the recovery room in satisfactory condition.         CATY INIGUEZ MD             D: 2020   T: 2020   MT: LASHAY      Name:     KERRI LOMELI   MRN:      [id number]        Account:        [id number]   :      1950           Procedure Date: 2020      Document: [id number]

## 2020-02-17 ENCOUNTER — TRANSFERRED RECORDS (OUTPATIENT)
Dept: HEALTH INFORMATION MANAGEMENT | Facility: CLINIC | Age: 70
End: 2020-02-17

## 2020-03-03 DIAGNOSIS — R39.15 URINARY URGENCY: Primary | ICD-10-CM

## 2020-03-03 DIAGNOSIS — R35.0 URINARY FREQUENCY: ICD-10-CM

## 2020-03-09 ENCOUNTER — OFFICE VISIT (OUTPATIENT)
Dept: UROLOGY | Facility: CLINIC | Age: 70
End: 2020-03-09
Payer: COMMERCIAL

## 2020-03-09 VITALS
DIASTOLIC BLOOD PRESSURE: 80 MMHG | HEART RATE: 80 BPM | WEIGHT: 255 LBS | BODY MASS INDEX: 45.18 KG/M2 | HEIGHT: 63 IN | SYSTOLIC BLOOD PRESSURE: 140 MMHG | OXYGEN SATURATION: 96 %

## 2020-03-09 DIAGNOSIS — R35.0 URINARY FREQUENCY: ICD-10-CM

## 2020-03-09 DIAGNOSIS — R39.15 URINARY URGENCY: ICD-10-CM

## 2020-03-09 LAB — RESIDUAL VOLUME (RV) (EXTERNAL): 151

## 2020-03-09 PROCEDURE — 99214 OFFICE O/P EST MOD 30 MIN: CPT | Mod: 25 | Performed by: PHYSICIAN ASSISTANT

## 2020-03-09 PROCEDURE — 51798 US URINE CAPACITY MEASURE: CPT | Performed by: PHYSICIAN ASSISTANT

## 2020-03-09 RX ORDER — SOLIFENACIN SUCCINATE 10 MG/1
10 TABLET, FILM COATED ORAL DAILY
Qty: 90 TABLET | Refills: 4 | Status: SHIPPED | OUTPATIENT
Start: 2020-03-09 | End: 2022-02-18

## 2020-03-09 ASSESSMENT — MIFFLIN-ST. JEOR: SCORE: 1637.86

## 2020-03-09 ASSESSMENT — PAIN SCALES - GENERAL: PAINLEVEL: MODERATE PAIN (4)

## 2020-03-09 NOTE — PROGRESS NOTES
"CC: Urgency, frequency, nocturia    HPI:  Bridget Lawson is a pleasant 69 year old female who presents in 3 month follow up of the above. All ongoing for many years. Nocturia x 6, frequency, urgency throughout the day. Has eliminated caffeine. Can struggle with constipation. No gross hematuria. No dysuria.    Has been on estrace cream a few months now and has had \"60 % improvement\" in her symptoms. Several of the anticholinergics were >$500 and has not started on one of these.  Brings a list from the pharmacy today that are on her formulary--oxybutynin, vesicare, tropsium.      Past Medical History:   Diagnosis Date     Cerebral artery occlusion with cerebral infarction (H)      Hx of stroke   (02/10)     Hyperlipidemia LDL goal < 100 10/8/2012    Start prava in 7/14     Intracranial hemorrhage (H) 2/10    R basal ganglia, due to HTN; had mild L hemiparesis and dysarthria; good recovery     Obese      Osteopenia     in 6/10, prior to PTH surgery, T - 3.2 forearm, - 1.9 spine, - 1.5L and -1.6 R femoral necks     Sleep apnea     CPAP     Spider veins      Unspecified essential hypertension     Essential hypertension       Past Surgical History:   Procedure Laterality Date     BREAST SURGERY  11/2004    R breast bx     C/SECTION, CLASSICAL  1985    with tubal     ENDOSCOPIC RELEASE CARPAL TUNNEL Right 2/7/2020    Procedure: RIGHT ENDOSCOPIC CARPAL TUNNEL RELEASE;  Surgeon: Piper Guardado MD;  Location: [facility] OR     HYSTERECTOMY  5/1998    GREGORY w/ BSO     KNEE SURGERY  9/1998    L Arthroscopy     NECK SURGERY  12/2010    R parathyroid adenoma     ORTHOPEDIC SURGERY       REPAIR TENDON ANKLE Right 11/1/2017    Procedure: REPAIR TENDON ANKLE;  RIGHT POSTERIOR TIBIAL TENDON RECONSTRUCTION ;  Surgeon: Xavi Duran MD;  Location: [facility] OR     SHOULDER SURGERY  11/2008    rotator cuff repair R     TONSILLECTOMY      as a child T & A       Social History     Socioeconomic History     Marital status:      Spouse name: " Not on file     Number of children: 2     Years of education: Not on file     Highest education level: Not on file   Occupational History     Not on file   Social Needs     Financial resource strain: Not on file     Food insecurity     Worry: Not on file     Inability: Not on file     Transportation needs     Medical: Not on file     Non-medical: Not on file   Tobacco Use     Smoking status: Former Smoker     Packs/day: 2.00     Years: 27.00     Pack years: 54.00     Types: Cigarettes     Start date: 10/24/1969     Last attempt to quit: 1996     Years since quittin.5     Smokeless tobacco: Never Used   Substance and Sexual Activity     Alcohol use: Yes     Alcohol/week: 0.0 standard drinks     Comment: 1/day     Drug use: No     Sexual activity: Not Currently   Lifestyle     Physical activity     Days per week: Not on file     Minutes per session: Not on file     Stress: Not on file   Relationships     Social connections     Talks on phone: Not on file     Gets together: Not on file     Attends Yazidi service: Not on file     Active member of club or organization: Not on file     Attends meetings of clubs or organizations: Not on file     Relationship status: Not on file     Intimate partner violence     Fear of current or ex partner: Not on file     Emotionally abused: Not on file     Physically abused: Not on file     Forced sexual activity: Not on file   Other Topics Concern     Parent/sibling w/ CABG, MI or angioplasty before 65F 55M? Not Asked   Social History Narrative    2 dtrs; no GC       Family History   Problem Relation Age of Onset     Breast Cancer Mother          CA; masectomy left; asthma;  in 2015 at age 97     Breast Cancer Maternal Grandmother         possible CA; masectomy     Breast Cancer Paternal Aunt         possible CA, masectomy     Respiratory Father         COPD     Lipids Brother        ROS:14 point ROS neg other than the symptoms noted above in the HPI.    Allergies  "  Allergen Reactions     Animal Dander        Current Outpatient Medications   Medication     Acetaminophen (TYLENOL PO)     amLODIPine (NORVASC) 5 MG tablet     aspirin  MG EC tablet     Calcium Carb-Cholecalciferol (CALTRATE 600+D) 600-800 MG-UNIT TABS     Carboxymethylcellulose Sodium (THERATEARS OP)     esomeprazole (NEXIUM) 20 MG DR capsule     estradiol (ESTRACE VAGINAL) 0.1 MG/GM vaginal cream     fluticasone (FLONASE) 50 MCG/ACT spray     glucosamine-chondroitinoitin 750-600 MG TABS     HYDROcodone-acetaminophen (NORCO) 5-325 MG tablet     ibuprofen (ADVIL/MOTRIN) 600 MG tablet     isosorbide mononitrate (IMDUR) 60 MG 24 hr tablet     ketoconazole (NIZORAL) 2 % external cream     labetalol (NORMODYNE) 200 MG tablet     MAGNESIUM GLUCONATE PO     MELATONIN PO     Multiple Vitamins-Minerals (CENTRUM SILVER ULTRA WOMENS PO)     NONFORMULARY     ORDER FOR DME     ramipril (ALTACE) 10 MG capsule     rosuvastatin (CRESTOR) 20 MG tablet     No current facility-administered medications for this visit.          PEx:   BP (!) 140/80 (BP Location: Left arm, Patient Position: Sitting, Cuff Size: Adult Regular)   Pulse 80   Ht 1.588 m (5' 2.5\")   Wt 115.7 kg (255 lb)   SpO2 96%   BMI 45.90 kg/m      PSYCH: NAD  EYES: EOMI  MOUTH: MMM  NECK: Supple, no notable adenopathy  RESP: Unlabored breathing  CARDIAC: No LE edema  SKIN: Warm, no rashes  ABD: soft, Nontender  NEURO: AAO x3    Urine: trace leuk est  PVR 151cc    A/P: Bridget Lawson is a 69 year old female with urgency, frequency  -Continue estrogen cream three times a week near urethra (pea-sized amount): If >$40, she will let me know and we can get via a compound pharmacy.  - Vesicare 10mg daily, again reviewed SE.   -Eliminate irritants    JULIA Sales [facility]    20 minutes were spent with the patient today, > 50% in counseling and coordination of care                "

## 2020-03-09 NOTE — LETTER
"3/9/2020       RE: Bridget Lawson  [address]  [facility] [address]     Dear Colleague,    Thank you for referring your patient, Bridget Lawson, to the [facility] at [facility]. Please see a copy of my visit note below.    CC: Urgency, frequency, nocturia    HPI:  Bridget Lawson is a pleasant 69 year old female who presents in 3 month follow up of the above. All ongoing for many years. Nocturia x 6, frequency, urgency throughout the day. Has eliminated caffeine. Can struggle with constipation. No gross hematuria. No dysuria.    Has been on estrace cream a few months now and has had \"60 % improvement\" in her symptoms. Several of the anticholinergics were >$500 and has not started on one of these.  Brings a list from the pharmacy today that are on her formulary--oxybutynin, vesicare, tropsium.      Past Medical History:   Diagnosis Date     Cerebral artery occlusion with cerebral infarction (H)      Hx of stroke   (02/10)     Hyperlipidemia LDL goal < 100 10/8/2012    Start prava in 7/14     Intracranial hemorrhage (H) 2/10    R basal ganglia, due to HTN; had mild L hemiparesis and dysarthria; good recovery     Obese      Osteopenia     in 6/10, prior to PTH surgery, T - 3.2 forearm, - 1.9 spine, - 1.5L and -1.6 R femoral necks     Sleep apnea     CPAP     Spider veins      Unspecified essential hypertension     Essential hypertension       Past Surgical History:   Procedure Laterality Date     BREAST SURGERY  11/2004    R breast bx     C/SECTION, CLASSICAL  1985    with tubal     ENDOSCOPIC RELEASE CARPAL TUNNEL Right 2/7/2020    Procedure: RIGHT ENDOSCOPIC CARPAL TUNNEL RELEASE;  Surgeon: Piper Guardado MD;  Location: [facility] OR     HYSTERECTOMY  5/1998    GREGORY w/ BSO     KNEE SURGERY  9/1998    L Arthroscopy     NECK SURGERY  12/2010    R parathyroid adenoma     ORTHOPEDIC SURGERY       REPAIR TENDON ANKLE Right 11/1/2017    " Procedure: REPAIR TENDON ANKLE;  RIGHT POSTERIOR TIBIAL TENDON RECONSTRUCTION ;  Surgeon: Xavi Duran MD;  Location: [facility] OR     SHOULDER SURGERY  2008    rotator cuff repair R     TONSILLECTOMY      as a child T & A       Social History     Socioeconomic History     Marital status:      Spouse name: Not on file     Number of children: 2     Years of education: Not on file     Highest education level: Not on file   Occupational History     Not on file   Social Needs     Financial resource strain: Not on file     Food insecurity     Worry: Not on file     Inability: Not on file     Transportation needs     Medical: Not on file     Non-medical: Not on file   Tobacco Use     Smoking status: Former Smoker     Packs/day: 2.00     Years: 27.00     Pack years: 54.00     Types: Cigarettes     Start date: 10/24/1969     Last attempt to quit: 1996     Years since quittin.5     Smokeless tobacco: Never Used   Substance and Sexual Activity     Alcohol use: Yes     Alcohol/week: 0.0 standard drinks     Comment: 1/day     Drug use: No     Sexual activity: Not Currently   Lifestyle     Physical activity     Days per week: Not on file     Minutes per session: Not on file     Stress: Not on file   Relationships     Social connections     Talks on phone: Not on file     Gets together: Not on file     Attends Christian service: Not on file     Active member of club or organization: Not on file     Attends meetings of clubs or organizations: Not on file     Relationship status: Not on file     Intimate partner violence     Fear of current or ex partner: Not on file     Emotionally abused: Not on file     Physically abused: Not on file     Forced sexual activity: Not on file   Other Topics Concern     Parent/sibling w/ CABG, MI or angioplasty before 65F 55M? Not Asked   Social History Narrative    2 dtrs; no GC       Family History   Problem Relation Age of Onset     Breast Cancer Mother          CA; masectomy  "left; asthma;  in  at age 97     Breast Cancer Maternal Grandmother         possible CA; masectomy     Breast Cancer Paternal Aunt         possible CA, masectomy     Respiratory Father         COPD     Lipids Brother        ROS:14 point ROS neg other than the symptoms noted above in the HPI.    Allergies   Allergen Reactions     Animal Dander        Current Outpatient Medications   Medication     Acetaminophen (TYLENOL PO)     amLODIPine (NORVASC) 5 MG tablet     aspirin  MG EC tablet     Calcium Carb-Cholecalciferol (CALTRATE 600+D) 600-800 MG-UNIT TABS     Carboxymethylcellulose Sodium (THERATEARS OP)     esomeprazole (NEXIUM) 20 MG DR capsule     estradiol (ESTRACE VAGINAL) 0.1 MG/GM vaginal cream     fluticasone (FLONASE) 50 MCG/ACT spray     glucosamine-chondroitinoitin 750-600 MG TABS     HYDROcodone-acetaminophen (NORCO) 5-325 MG tablet     ibuprofen (ADVIL/MOTRIN) 600 MG tablet     isosorbide mononitrate (IMDUR) 60 MG 24 hr tablet     ketoconazole (NIZORAL) 2 % external cream     labetalol (NORMODYNE) 200 MG tablet     MAGNESIUM GLUCONATE PO     MELATONIN PO     Multiple Vitamins-Minerals (CENTRUM SILVER ULTRA WOMENS PO)     NONFORMULARY     ORDER FOR DME     ramipril (ALTACE) 10 MG capsule     rosuvastatin (CRESTOR) 20 MG tablet     No current facility-administered medications for this visit.          PEx:   BP (!) 140/80 (BP Location: Left arm, Patient Position: Sitting, Cuff Size: Adult Regular)   Pulse 80   Ht 1.588 m (5' 2.5\")   Wt 115.7 kg (255 lb)   SpO2 96%   BMI 45.90 kg/m      PSYCH: NAD  EYES: EOMI  MOUTH: MMM  NECK: Supple, no notable adenopathy  RESP: Unlabored breathing  CARDIAC: No LE edema  SKIN: Warm, no rashes  ABD: soft, Nontender  NEURO: AAO x3    Urine: trace leuk est  PVR 151cc    A/P: Bridget Lawson is a 69 year old female with urgency, frequency  -Continue estrogen cream three times a week near urethra (pea-sized amount): If >$40, she will let me know and we can " get via a compound pharmacy.  - Vesicare 10mg daily, again reviewed SE.   -Eliminate irritants    Stephanie Shi PA-C  [facility]    20 minutes were spent with the patient today, > 50% in counseling and coordination of care        Again, thank you for allowing me to participate in the care of your patient.      Sincerely,    Stephanie Shi PA-C, PAZevC

## 2020-03-09 NOTE — NURSING NOTE
Chief Complaint   Patient presents with     Clinic Care Coordination - Follow-up     urinary frequency and urgency   PVR was 151 ml today.  Tisha Herrera LPN

## 2020-05-01 DIAGNOSIS — I10 ESSENTIAL HYPERTENSION WITH GOAL BLOOD PRESSURE LESS THAN 140/90: ICD-10-CM

## 2020-05-01 RX ORDER — LABETALOL 200 MG/1
TABLET, FILM COATED ORAL
Qty: 270 TABLET | Refills: 0 | Status: SHIPPED | OUTPATIENT
Start: 2020-05-01 | End: 2020-08-05

## 2020-08-05 DIAGNOSIS — I10 ESSENTIAL HYPERTENSION WITH GOAL BLOOD PRESSURE LESS THAN 140/90: ICD-10-CM

## 2020-08-05 RX ORDER — LABETALOL 200 MG/1
TABLET, FILM COATED ORAL
Qty: 270 TABLET | Refills: 0 | Status: SHIPPED | OUTPATIENT
Start: 2020-08-05 | End: 2020-11-02

## 2020-08-05 NOTE — TELEPHONE ENCOUNTER
Routing refill request to provider for review/approval because:  Failed BP protocol   Blood pressure under 140/90 in past 12 months

## 2020-08-10 ENCOUNTER — TELEPHONE (OUTPATIENT)
Dept: FAMILY MEDICINE | Facility: CLINIC | Age: 70
End: 2020-08-10

## 2020-08-10 NOTE — TELEPHONE ENCOUNTER
Patient Contact    Attempt # 1    Was call answered?  No.  Left message on voicemail with information to call me back.    On call back: pt would not need an OV if to renew, please have pt fax paperwork to Dr. Hughes to fill out.

## 2020-08-10 NOTE — TELEPHONE ENCOUNTER
Reason for Call:  Other   Form for updated handicapped tag for car    Detailed comments: Patient's handicapped tag for her car has    Please fill out form and send to [employer]     Please call with any questions  Patient did not bring in form    Phone Number Patient can be reached at: Home number on file [phone] (home)    Best Time: anytime    Can we leave a detailed message on this number? YES    Call taken on 8/10/2020 at 10:31 AM by KERRI TINOCO

## 2020-08-11 NOTE — TELEPHONE ENCOUNTER
Patient Contact    Attempt # 2    Was call answered?  No.  Left message on voicemail with information to call me back.    On call back:    pt would not need an OV if to renew, please have pt fax paperwork to Dr. Hughes to fill out.

## 2020-08-12 NOTE — TELEPHONE ENCOUNTER
Patient says she does not have any paperwork and that Dr. Hughes should have a form that he just needs to fill out. She signed something in the mail to renew it a few months ago and has not received sticker still and thinks Dr. Hughes needs to send in form.

## 2020-08-12 NOTE — TELEPHONE ENCOUNTER
Patient Contact     Attempt # 3     Was call answered?  No.  Left message on voicemail with information to call me back.     On call back:     pt would not need an OV if to renew, please have pt fax paperwork to Dr. Hughes to fill out.

## 2020-09-30 DIAGNOSIS — E78.5 HYPERLIPIDEMIA WITH TARGET LDL LESS THAN 100: ICD-10-CM

## 2020-09-30 RX ORDER — ROSUVASTATIN CALCIUM 20 MG/1
TABLET, COATED ORAL
Qty: 90 TABLET | Refills: 0 | Status: SHIPPED | OUTPATIENT
Start: 2020-09-30 | End: 2020-12-18

## 2020-10-08 ENCOUNTER — MEDICAL CORRESPONDENCE (OUTPATIENT)
Dept: HEALTH INFORMATION MANAGEMENT | Facility: CLINIC | Age: 70
End: 2020-10-08

## 2020-10-09 ENCOUNTER — CARE COORDINATION (OUTPATIENT)
Dept: SLEEP MEDICINE | Facility: CLINIC | Age: 70
End: 2020-10-09

## 2020-10-13 NOTE — PROGRESS NOTES
Faxed signed cmn full face mask , copy of pressure settings change order, and notes from 11/22/2019, copy of requested sleep study 6/27/2010, patient demographics to [facility] [phone].  Copy of signed cmn sent to HIM to be scanned into epic.

## 2020-10-21 ENCOUNTER — TELEPHONE (OUTPATIENT)
Dept: SLEEP MEDICINE | Facility: CLINIC | Age: 70
End: 2020-10-21

## 2020-10-21 NOTE — TELEPHONE ENCOUNTER
Reason for call:  Other   Patient called regarding (reason for call): Forms from [employer]  Additional comments: Patient need forms completed from [employer] in order. Per pt : [employer] have sent over forms multiple times and have not received it back. Please call and assist patient.     Phone number to reach patient:  Home number on file [phone] (home)    Best Time:  asap    Can we leave a detailed message on this number?  YES    Travel screening: Not Applicable

## 2020-10-22 ENCOUNTER — CARE COORDINATION (OUTPATIENT)
Dept: SLEEP MEDICINE | Facility: CLINIC | Age: 70
End: 2020-10-22

## 2020-10-22 ENCOUNTER — TELEPHONE (OUTPATIENT)
Dept: SLEEP MEDICINE | Facility: CLINIC | Age: 70
End: 2020-10-22

## 2020-10-22 ENCOUNTER — MEDICAL CORRESPONDENCE (OUTPATIENT)
Dept: HEALTH INFORMATION MANAGEMENT | Facility: CLINIC | Age: 70
End: 2020-10-22

## 2020-10-22 DIAGNOSIS — G47.33 OSA (OBSTRUCTIVE SLEEP APNEA): Primary | ICD-10-CM

## 2020-10-22 NOTE — TELEPHONE ENCOUNTER
Patient was informed that Orders have been faxed to [facility], information will be faxed to [facility] again. Fax: [phone]    Marina Welsh MA

## 2020-10-22 NOTE — PROGRESS NOTES
Faxed for 2nd time signed CMN for cpap supplies, copy of order for cpap supplies from [facility],  requested sleep study 2010, demographics facesheet, and copy of scanned insurance card, office notes visit 1/20/2020, and office notes 11/22/2019 to [facility], FAX:  [phone].  CMN sent to HIM [facility] to be scanned into epic.    First signed CMN documented faxed on 10/9/2020, which is documented in epic,  along with all documents,  This signed CMN has been scanned into epic.    Spoke with Bridget and informed her all the above records, as well as signed CMN was faxed back to them. I also let her know this all had been doen on 10/9/2020, and documented in epic.  She understood.

## 2020-12-18 DIAGNOSIS — E78.5 HYPERLIPIDEMIA WITH TARGET LDL LESS THAN 100: ICD-10-CM

## 2020-12-18 DIAGNOSIS — I10 ESSENTIAL HYPERTENSION WITH GOAL BLOOD PRESSURE LESS THAN 130/80: Chronic | ICD-10-CM

## 2020-12-18 DIAGNOSIS — L30.4 INTERTRIGO: ICD-10-CM

## 2020-12-18 DIAGNOSIS — N39.41 URGE INCONTINENCE: ICD-10-CM

## 2020-12-18 DIAGNOSIS — Z87.11 HISTORY OF GASTRIC ULCER: ICD-10-CM

## 2020-12-18 DIAGNOSIS — Z79.1 NSAID LONG-TERM USE: ICD-10-CM

## 2020-12-18 RX ORDER — ESTRADIOL 0.1 MG/G
CREAM VAGINAL
Qty: 43 G | Refills: 1 | Status: SHIPPED | OUTPATIENT
Start: 2020-12-18 | End: 2022-04-18

## 2020-12-18 RX ORDER — ROSUVASTATIN CALCIUM 20 MG/1
TABLET, COATED ORAL
Qty: 90 TABLET | Refills: 0 | Status: SHIPPED | OUTPATIENT
Start: 2020-12-18 | End: 2021-03-31

## 2020-12-18 RX ORDER — KETOCONAZOLE 20 MG/G
CREAM TOPICAL
Qty: 60 G | Refills: 0 | Status: SHIPPED | OUTPATIENT
Start: 2020-12-18 | End: 2021-03-12

## 2020-12-22 RX ORDER — RAMIPRIL 10 MG/1
10 CAPSULE ORAL DAILY
Qty: 90 CAPSULE | Refills: 0 | Status: SHIPPED | OUTPATIENT
Start: 2020-12-22 | End: 2021-03-12

## 2020-12-22 RX ORDER — AMLODIPINE BESYLATE 5 MG/1
TABLET ORAL
Qty: 90 TABLET | Refills: 0 | Status: SHIPPED | OUTPATIENT
Start: 2020-12-22 | End: 2021-03-12

## 2020-12-22 NOTE — TELEPHONE ENCOUNTER
Routing refill request to provider for review/approval because:  out of range:  blood pressure  diagnosis of osteoporosis on record

## 2021-01-20 ENCOUNTER — TELEPHONE (OUTPATIENT)
Dept: INTERNAL MEDICINE | Facility: CLINIC | Age: 71
End: 2021-01-20

## 2021-01-20 NOTE — TELEPHONE ENCOUNTER
I have not seen this patient in one year.                    Video,phone, or in person visit needed first, and labs will also be needed.                                        Please let her know.

## 2021-01-20 NOTE — TELEPHONE ENCOUNTER
Patient calling into clinic stating she needs a new order for a reclast infusion. Once order has been placed please reach out to patient to notify.    .May MON    [facility] Rasheeda Stafford

## 2021-01-21 ENCOUNTER — VIRTUAL VISIT (OUTPATIENT)
Dept: INTERNAL MEDICINE | Facility: CLINIC | Age: 71
End: 2021-01-21
Payer: COMMERCIAL

## 2021-01-21 ENCOUNTER — TELEPHONE (OUTPATIENT)
Dept: INTERNAL MEDICINE | Facility: CLINIC | Age: 71
End: 2021-01-21

## 2021-01-21 DIAGNOSIS — Z86.73 HISTORY OF STROKE: ICD-10-CM

## 2021-01-21 DIAGNOSIS — M81.8 OTHER OSTEOPOROSIS WITHOUT CURRENT PATHOLOGICAL FRACTURE: ICD-10-CM

## 2021-01-21 DIAGNOSIS — I10 ESSENTIAL HYPERTENSION WITH GOAL BLOOD PRESSURE LESS THAN 130/80: Primary | ICD-10-CM

## 2021-01-21 DIAGNOSIS — Z79.1 NSAID LONG-TERM USE: ICD-10-CM

## 2021-01-21 DIAGNOSIS — E78.5 HYPERLIPIDEMIA WITH TARGET LDL LESS THAN 100: ICD-10-CM

## 2021-01-21 DIAGNOSIS — Z87.11 HISTORY OF GASTRIC ULCER: ICD-10-CM

## 2021-01-21 DIAGNOSIS — R35.0 URINARY FREQUENCY: ICD-10-CM

## 2021-01-21 DIAGNOSIS — Z20.822 ENCOUNTER FOR LABORATORY TESTING FOR COVID-19 VIRUS: ICD-10-CM

## 2021-01-21 PROCEDURE — 99214 OFFICE O/P EST MOD 30 MIN: CPT | Mod: 95 | Performed by: INTERNAL MEDICINE

## 2021-01-21 NOTE — PATIENT INSTRUCTIONS
"I have generated a lab order.              Please call ahead for a \"lab only\" appointment.          You will also need a COVID test before your Reclast infusion.                 Schedule the Reclast infusion first, and then let us know the date so we can generate a COVID order.          I will let you know your lab results.              You should also have a mammogram this year.           Start checking your blood pressure please, so we can be sure that your medications are effective.                     "

## 2021-01-21 NOTE — TELEPHONE ENCOUNTER
Her infusion is scheduled for February 1.  Therefore, I can now complete the Covid order.          Please let her know.

## 2021-01-21 NOTE — PROGRESS NOTES
"Bridget is a 70 year old who is being evaluated via a billable telephone visit.      What phone number would you like to be contacted at? [phone]  How would you like to obtain your AVS? Mail a copy  Assessment & Plan     Essential hypertension with goal blood pressure less than 130/80     She will start checking her blood pressure.  She will come in for lab work.  - Comprehensive metabolic panel (BMP + Alb, Alk Phos, ALT, AST, Total. Bili, TP)    Hyperlipidemia with target LDL less than 100  Check labs and adjust medications as indicated.    - Comprehensive metabolic panel (BMP + Alb, Alk Phos, ALT, AST, Total. Bili, TP)  - Lipid Profile (Chol, Trig, HDL, LDL calc)    Other osteoporosis without current pathological fracture  She is due for Reclast infusion #2  - Comprehensive metabolic panel (BMP + Alb, Alk Phos, ALT, AST, Total. Bili, TP)    History of stroke  No current issues  - CBC with platelets and differential    History of gastric ulcer  She needs to continue PPI therapy  - CBC with platelets and differential    NSAID long-term use  She uses a low-dose, but she states this is necessary for quality of life reasons.    Urinary frequency  This has improved with Vesicare                         BMI:   Estimated body mass index is 45.9 kg/m  as calculated from the following:    Height as of 3/9/20: 1.588 m (5' 2.5\").    Weight as of 3/9/20: 115.7 kg (255 lb).   Weight management plan: Discussed healthy diet and exercise guidelines      Patient Instructions   I have generated a lab order.              Please call ahead for a \"lab only\" appointment.          You will also need a COVID test before your Reclast infusion.                 Schedule the Reclast infusion first, and then let us know the date so we can generate a COVID order.          I will let you know your lab results.              You should also have a mammogram this year.           Start checking your blood pressure please, so we can be sure that " your medications are effective.                         Return in about 6 months (around 7/21/2021) for follow up of several issues.    Patel Hughes MD  [facility]    Hansa Gill is a 70 year old who presents to clinic today for the following health issues     HPI       Needs new orders for reclast infusion - gets infusion once yearly                                                                                                                                                                                                                                                                                                                                                             She is hoping to get the COVID vaccine.              Her  is a trujillo; neg for COVID times 2.                    She is feeling isolated, etc.                                 Not checking her BP.      She has a BP monitor.                              Takes some ibuprofen; 200 mg or 400 mg per day.              Taking a PPI:  States that nexium is no longer covered.               She needs to continue PPI therapy.                             Review of Systems         Objective           Vitals:  No vitals were obtained today due to virtual visit.    Physical Exam     PSYCH: Alert and oriented times 3; coherent speech, normal   rate and volume, able to articulate logical thoughts, able   to abstract reason, no tangential thoughts, no hallucinations   or delusions  Her affect is normal  RESP: No cough, no audible wheezing, able to talk in full sentences  Remainder of exam unable to be completed due to telephone visits                Phone call duration: 15 minutes                         Addendum:                Recent Results (from the past 48 hour(s))   CBC with platelets and differential    Collection Time: 02/04/21 10:09 AM   Result Value Ref Range    WBC 5.6 4.0 - 11.0 10e9/L    RBC Count  4.57 3.8 - 5.2 10e12/L    Hemoglobin 13.2 11.7 - 15.7 g/dL    Hematocrit 42.0 35.0 - 47.0 %    MCV 92 78 - 100 fl    MCH 28.9 26.5 - 33.0 pg    MCHC 31.4 (L) 31.5 - 36.5 g/dL    RDW 14.6 10.0 - 15.0 %    Platelet Count 203 150 - 450 10e9/L    % Neutrophils 65.3 %    % Lymphocytes 19.6 %    % Monocytes 12.3 %    % Eosinophils 2.3 %    % Basophils 0.5 %    Absolute Neutrophil 3.7 1.6 - 8.3 10e9/L    Absolute Lymphocytes 1.1 0.8 - 5.3 10e9/L    Absolute Monocytes 0.7 0.0 - 1.3 10e9/L    Absolute Eosinophils 0.1 0.0 - 0.7 10e9/L    Absolute Basophils 0.0 0.0 - 0.2 10e9/L    Diff Method Automated Method    Lipid Profile (Chol, Trig, HDL, LDL calc)    Collection Time: 02/04/21 10:09 AM   Result Value Ref Range    Cholesterol 134 <200 mg/dL    Triglycerides 116 <150 mg/dL    HDL Cholesterol 62 >49 mg/dL    LDL Cholesterol Calculated 49 <100 mg/dL    Non HDL Cholesterol 72 <130 mg/dL   Comprehensive metabolic panel (BMP + Alb, Alk Phos, ALT, AST, Total. Bili, TP)    Collection Time: 02/04/21 10:09 AM   Result Value Ref Range    Sodium 140 133 - 144 mmol/L    Potassium 4.4 3.4 - 5.3 mmol/L    Chloride 110 (H) 94 - 109 mmol/L    Carbon Dioxide 29 20 - 32 mmol/L    Anion Gap 1 (L) 3 - 14 mmol/L    Glucose 98 70 - 99 mg/dL    Urea Nitrogen 14 7 - 30 mg/dL    Creatinine 0.70 0.52 - 1.04 mg/dL    GFR Estimate 87 >60 mL/min/[1.73_m2]    GFR Estimate If Black >90 >60 mL/min/[1.73_m2]    Calcium 9.8 8.5 - 10.1 mg/dL    Bilirubin Total 0.4 0.2 - 1.3 mg/dL    Albumin 4.1 3.4 - 5.0 g/dL    Protein Total 7.3 6.8 - 8.8 g/dL    Alkaline Phosphatase 57 40 - 150 U/L    ALT 48 0 - 50 U/L    AST 25 0 - 45 U/L     Letter sent.           Your lab results are normal,including the liver,kidney,glucose,bone marrow,cholesterol,and the potassium level.      Keep taking the same medications.

## 2021-01-21 NOTE — LETTER
February 4, 2021      Bridget Lawson  [address]  [facility] [address]        Dear ,    We are writing to inform you of your test results.            Your lab results are normal,including the liver,kidney,glucose,bone marrow,cholesterol,and the potassium level.      Keep taking the same medications.       Recent Results (from the past 48 hour(s))   CBC with platelets and differential    Collection Time: 02/04/21 10:09 AM   Result Value Ref Range    WBC 5.6 4.0 - 11.0 10e9/L    RBC Count 4.57 3.8 - 5.2 10e12/L    Hemoglobin 13.2 11.7 - 15.7 g/dL    Hematocrit 42.0 35.0 - 47.0 %    MCV 92 78 - 100 fl    MCH 28.9 26.5 - 33.0 pg    MCHC 31.4 (L) 31.5 - 36.5 g/dL    RDW 14.6 10.0 - 15.0 %    Platelet Count 203 150 - 450 10e9/L    % Neutrophils 65.3 %    % Lymphocytes 19.6 %    % Monocytes 12.3 %    % Eosinophils 2.3 %    % Basophils 0.5 %    Absolute Neutrophil 3.7 1.6 - 8.3 10e9/L    Absolute Lymphocytes 1.1 0.8 - 5.3 10e9/L    Absolute Monocytes 0.7 0.0 - 1.3 10e9/L    Absolute Eosinophils 0.1 0.0 - 0.7 10e9/L    Absolute Basophils 0.0 0.0 - 0.2 10e9/L    Diff Method Automated Method    Lipid Profile (Chol, Trig, HDL, LDL calc)    Collection Time: 02/04/21 10:09 AM   Result Value Ref Range    Cholesterol 134 <200 mg/dL    Triglycerides 116 <150 mg/dL    HDL Cholesterol 62 >49 mg/dL    LDL Cholesterol Calculated 49 <100 mg/dL    Non HDL Cholesterol 72 <130 mg/dL   Comprehensive metabolic panel (BMP + Alb, Alk Phos, ALT, AST, Total. Bili, TP)    Collection Time: 02/04/21 10:09 AM   Result Value Ref Range    Sodium 140 133 - 144 mmol/L    Potassium 4.4 3.4 - 5.3 mmol/L    Chloride 110 (H) 94 - 109 mmol/L    Carbon Dioxide 29 20 - 32 mmol/L    Anion Gap 1 (L) 3 - 14 mmol/L    Glucose 98 70 - 99 mg/dL    Urea Nitrogen 14 7 - 30 mg/dL    Creatinine 0.70 0.52 - 1.04 mg/dL    GFR Estimate 87 >60 mL/min/[1.73_m2]    GFR Estimate If Black >90 >60 mL/min/[1.73_m2]    Calcium 9.8 8.5 - 10.1 mg/dL     Bilirubin Total 0.4 0.2 - 1.3 mg/dL    Albumin 4.1 3.4 - 5.0 g/dL    Protein Total 7.3 6.8 - 8.8 g/dL    Alkaline Phosphatase 57 40 - 150 U/L    ALT 48 0 - 50 U/L    AST 25 0 - 45 U/L         If you have any questions or concerns, please call the clinic at the number listed above.       Sincerely,        Patel Hughes MD

## 2021-01-21 NOTE — TELEPHONE ENCOUNTER
Reason for call:  Order   Order or referral being requested: COVID testing  Reason for request: pt needing  Date needed: as soon as possible  Has the patient been seen by the PCP for this problem? YES    Additional comments: Patient is calling because she is inquiring on the status of her order for COVID testing from her appointment this morning. No order has been added yet.     Phone number to reach patient:  Home number on file [phone] (home)    Best Time:  Any time    Can we leave a detailed message on this number?  YES    Travel screening: Not Applicable

## 2021-01-22 NOTE — TELEPHONE ENCOUNTER
Patient already scheduled for 1/28. Please advise  If timeline not appropriate, otherwise close encounter.

## 2021-01-28 DIAGNOSIS — Z20.822 ENCOUNTER FOR LABORATORY TESTING FOR COVID-19 VIRUS: ICD-10-CM

## 2021-01-28 LAB
SARS-COV-2 RNA RESP QL NAA+PROBE: NORMAL
SPECIMEN SOURCE: NORMAL

## 2021-01-28 PROCEDURE — 99207 PR NO CHARGE LOS: CPT

## 2021-01-28 PROCEDURE — U0003 INFECTIOUS AGENT DETECTION BY NUCLEIC ACID (DNA OR RNA); SEVERE ACUTE RESPIRATORY SYNDROME CORONAVIRUS 2 (SARS-COV-2) (CORONAVIRUS DISEASE [COVID-19]), AMPLIFIED PROBE TECHNIQUE, MAKING USE OF HIGH THROUGHPUT TECHNOLOGIES AS DESCRIBED BY CMS-2020-01-R: HCPCS | Performed by: INTERNAL MEDICINE

## 2021-01-28 PROCEDURE — U0005 INFEC AGEN DETEC AMPLI PROBE: HCPCS | Performed by: INTERNAL MEDICINE

## 2021-01-28 RX ORDER — HEPARIN SODIUM (PORCINE) LOCK FLUSH IV SOLN 100 UNIT/ML 100 UNIT/ML
5 SOLUTION INTRAVENOUS
Status: CANCELLED | OUTPATIENT
Start: 2021-01-28

## 2021-01-28 RX ORDER — HEPARIN SODIUM,PORCINE 10 UNIT/ML
5 VIAL (ML) INTRAVENOUS
Status: CANCELLED | OUTPATIENT
Start: 2021-01-28

## 2021-01-28 RX ORDER — ZOLEDRONIC ACID 5 MG/100ML
5 INJECTION, SOLUTION INTRAVENOUS ONCE
Status: CANCELLED
Start: 2021-01-28 | End: 2021-01-28

## 2021-01-29 LAB
LABORATORY COMMENT REPORT: NORMAL
SARS-COV-2 RNA RESP QL NAA+PROBE: NEGATIVE
SPECIMEN SOURCE: NORMAL

## 2021-02-04 DIAGNOSIS — I10 ESSENTIAL HYPERTENSION WITH GOAL BLOOD PRESSURE LESS THAN 130/80: ICD-10-CM

## 2021-02-04 DIAGNOSIS — Z86.73 HISTORY OF STROKE: ICD-10-CM

## 2021-02-04 DIAGNOSIS — M81.8 OTHER OSTEOPOROSIS WITHOUT CURRENT PATHOLOGICAL FRACTURE: ICD-10-CM

## 2021-02-04 DIAGNOSIS — E78.5 HYPERLIPIDEMIA WITH TARGET LDL LESS THAN 100: ICD-10-CM

## 2021-02-04 DIAGNOSIS — Z87.11 HISTORY OF GASTRIC ULCER: ICD-10-CM

## 2021-02-04 LAB
ALBUMIN SERPL-MCNC: 4.1 G/DL (ref 3.4–5)
ALP SERPL-CCNC: 57 U/L (ref 40–150)
ALT SERPL W P-5'-P-CCNC: 48 U/L (ref 0–50)
ANION GAP SERPL CALCULATED.3IONS-SCNC: 1 MMOL/L (ref 3–14)
AST SERPL W P-5'-P-CCNC: 25 U/L (ref 0–45)
BASOPHILS # BLD AUTO: 0 10E9/L (ref 0–0.2)
BASOPHILS NFR BLD AUTO: 0.5 %
BILIRUB SERPL-MCNC: 0.4 MG/DL (ref 0.2–1.3)
BUN SERPL-MCNC: 14 MG/DL (ref 7–30)
CALCIUM SERPL-MCNC: 9.8 MG/DL (ref 8.5–10.1)
CHLORIDE SERPL-SCNC: 110 MMOL/L (ref 94–109)
CHOLEST SERPL-MCNC: 134 MG/DL
CO2 SERPL-SCNC: 29 MMOL/L (ref 20–32)
CREAT SERPL-MCNC: 0.7 MG/DL (ref 0.52–1.04)
DIFFERENTIAL METHOD BLD: ABNORMAL
EOSINOPHIL # BLD AUTO: 0.1 10E9/L (ref 0–0.7)
EOSINOPHIL NFR BLD AUTO: 2.3 %
ERYTHROCYTE [DISTWIDTH] IN BLOOD BY AUTOMATED COUNT: 14.6 % (ref 10–15)
GFR SERPL CREATININE-BSD FRML MDRD: 87 ML/MIN/{1.73_M2}
GLUCOSE SERPL-MCNC: 98 MG/DL (ref 70–99)
HCT VFR BLD AUTO: 42 % (ref 35–47)
HDLC SERPL-MCNC: 62 MG/DL
HGB BLD-MCNC: 13.2 G/DL (ref 11.7–15.7)
LDLC SERPL CALC-MCNC: 49 MG/DL
LYMPHOCYTES # BLD AUTO: 1.1 10E9/L (ref 0.8–5.3)
LYMPHOCYTES NFR BLD AUTO: 19.6 %
MCH RBC QN AUTO: 28.9 PG (ref 26.5–33)
MCHC RBC AUTO-ENTMCNC: 31.4 G/DL (ref 31.5–36.5)
MCV RBC AUTO: 92 FL (ref 78–100)
MONOCYTES # BLD AUTO: 0.7 10E9/L (ref 0–1.3)
MONOCYTES NFR BLD AUTO: 12.3 %
NEUTROPHILS # BLD AUTO: 3.7 10E9/L (ref 1.6–8.3)
NEUTROPHILS NFR BLD AUTO: 65.3 %
NONHDLC SERPL-MCNC: 72 MG/DL
PLATELET # BLD AUTO: 203 10E9/L (ref 150–450)
POTASSIUM SERPL-SCNC: 4.4 MMOL/L (ref 3.4–5.3)
PROT SERPL-MCNC: 7.3 G/DL (ref 6.8–8.8)
RBC # BLD AUTO: 4.57 10E12/L (ref 3.8–5.2)
SODIUM SERPL-SCNC: 140 MMOL/L (ref 133–144)
TRIGL SERPL-MCNC: 116 MG/DL
WBC # BLD AUTO: 5.6 10E9/L (ref 4–11)

## 2021-02-04 PROCEDURE — 36415 COLL VENOUS BLD VENIPUNCTURE: CPT | Performed by: INTERNAL MEDICINE

## 2021-02-04 PROCEDURE — 80053 COMPREHEN METABOLIC PANEL: CPT | Performed by: INTERNAL MEDICINE

## 2021-02-04 PROCEDURE — 80061 LIPID PANEL: CPT | Performed by: INTERNAL MEDICINE

## 2021-02-04 PROCEDURE — 85025 COMPLETE CBC W/AUTO DIFF WBC: CPT | Performed by: INTERNAL MEDICINE

## 2021-02-05 ENCOUNTER — INFUSION THERAPY VISIT (OUTPATIENT)
Dept: INFUSION THERAPY | Facility: CLINIC | Age: 71
End: 2021-02-05
Attending: INTERNAL MEDICINE
Payer: COMMERCIAL

## 2021-02-05 VITALS
HEART RATE: 76 BPM | RESPIRATION RATE: 16 BRPM | TEMPERATURE: 98.3 F | SYSTOLIC BLOOD PRESSURE: 147 MMHG | DIASTOLIC BLOOD PRESSURE: 83 MMHG

## 2021-02-05 DIAGNOSIS — M81.8 OTHER OSTEOPOROSIS WITHOUT CURRENT PATHOLOGICAL FRACTURE: ICD-10-CM

## 2021-02-05 DIAGNOSIS — E21.0 PRIMARY HYPERPARATHYROIDISM (H): ICD-10-CM

## 2021-02-05 DIAGNOSIS — R13.10 DYSPHAGIA, UNSPECIFIED TYPE: ICD-10-CM

## 2021-02-05 DIAGNOSIS — Z87.11 HISTORY OF GASTRIC ULCER: Primary | ICD-10-CM

## 2021-02-05 PROCEDURE — 96365 THER/PROPH/DIAG IV INF INIT: CPT

## 2021-02-05 PROCEDURE — 250N000011 HC RX IP 250 OP 636: Performed by: INTERNAL MEDICINE

## 2021-02-05 RX ORDER — HEPARIN SODIUM,PORCINE 10 UNIT/ML
5 VIAL (ML) INTRAVENOUS
Status: CANCELLED | OUTPATIENT
Start: 2021-02-05

## 2021-02-05 RX ORDER — HEPARIN SODIUM (PORCINE) LOCK FLUSH IV SOLN 100 UNIT/ML 100 UNIT/ML
5 SOLUTION INTRAVENOUS
Status: CANCELLED | OUTPATIENT
Start: 2021-02-05

## 2021-02-05 RX ORDER — ZOLEDRONIC ACID 5 MG/100ML
5 INJECTION, SOLUTION INTRAVENOUS ONCE
Status: CANCELLED
Start: 2021-02-05 | End: 2021-02-05

## 2021-02-05 RX ORDER — ZOLEDRONIC ACID 5 MG/100ML
5 INJECTION, SOLUTION INTRAVENOUS ONCE
Status: COMPLETED | OUTPATIENT
Start: 2021-02-05 | End: 2021-02-05

## 2021-02-05 RX ADMIN — ZOLEDRONIC ACID 5 MG: 0.05 INJECTION, SOLUTION INTRAVENOUS at 10:40

## 2021-02-05 ASSESSMENT — PAIN SCALES - GENERAL: PAINLEVEL: NO PAIN (0)

## 2021-02-05 NOTE — PROGRESS NOTES
Infusion Nursing Note:  Bridget Lawson presents today for reclast.    Patient seen by provider today: No   present during visit today: Not Applicable.    Note: N/A.  Patient did not meet criteria for an asymptomatic covid-19 PCR test in infusion today.     Intravenous Access:  Peripheral IV placed.    Treatment Conditions:  Lab Results   Component Value Date     02/04/2021                   Lab Results   Component Value Date    POTASSIUM 4.4 02/04/2021           Lab Results   Component Value Date    MAG 1.9 02/20/2010            Lab Results   Component Value Date    CR 0.70 02/04/2021                   Lab Results   Component Value Date    CLARA 9.8 02/04/2021                Lab Results   Component Value Date    BILITOTAL 0.4 02/04/2021           Lab Results   Component Value Date    ALBUMIN 4.1 02/04/2021                    Lab Results   Component Value Date    ALT 48 02/04/2021           Lab Results   Component Value Date    AST 25 02/04/2021       Results reviewed, labs MET treatment parameters, ok to proceed with treatment.      Post Infusion Assessment:  Patient tolerated infusion without incident.  Blood return noted pre and post infusion.  Site patent and intact, free from redness, edema or discomfort.  No evidence of extravasations.  Access discontinued per protocol.       Discharge Plan:   Discharge instructions reviewed with: Patient.  Patient and/or family verbalized understanding of discharge instructions and all questions answered.  Patient discharged in stable condition accompanied by: self.  Departure Mode: Ambulatory.    Ghislaine Shine RN

## 2021-03-09 ENCOUNTER — IMMUNIZATION (OUTPATIENT)
Dept: NURSING | Facility: CLINIC | Age: 71
End: 2021-03-09
Payer: COMMERCIAL

## 2021-03-09 PROCEDURE — 0031A PR COVID VAC JANSSEN AD26 0.5ML: CPT

## 2021-03-09 PROCEDURE — 91303 PR COVID VAC JANSSEN AD26 0.5ML: CPT

## 2021-03-11 DIAGNOSIS — L30.4 INTERTRIGO: ICD-10-CM

## 2021-03-11 DIAGNOSIS — E78.5 HYPERLIPIDEMIA WITH TARGET LDL LESS THAN 100: ICD-10-CM

## 2021-03-11 DIAGNOSIS — I10 ESSENTIAL HYPERTENSION WITH GOAL BLOOD PRESSURE LESS THAN 130/80: Chronic | ICD-10-CM

## 2021-03-12 RX ORDER — KETOCONAZOLE 20 MG/G
CREAM TOPICAL
Qty: 60 G | Refills: 0 | Status: SHIPPED | OUTPATIENT
Start: 2021-03-12 | End: 2021-05-13

## 2021-03-12 RX ORDER — RAMIPRIL 10 MG/1
CAPSULE ORAL
Qty: 90 CAPSULE | Refills: 3 | Status: SHIPPED | OUTPATIENT
Start: 2021-03-12 | End: 2022-02-18

## 2021-03-12 RX ORDER — AMLODIPINE BESYLATE 5 MG/1
TABLET ORAL
Qty: 90 TABLET | Refills: 3 | Status: SHIPPED | OUTPATIENT
Start: 2021-03-12 | End: 2022-02-18

## 2021-03-31 DIAGNOSIS — E78.5 HYPERLIPIDEMIA WITH TARGET LDL LESS THAN 100: ICD-10-CM

## 2021-03-31 RX ORDER — ROSUVASTATIN CALCIUM 20 MG/1
TABLET, COATED ORAL
Qty: 90 TABLET | Refills: 1 | Status: SHIPPED | OUTPATIENT
Start: 2021-03-31 | End: 2021-09-25

## 2021-05-13 DIAGNOSIS — R39.15 URINARY URGENCY: ICD-10-CM

## 2021-05-13 DIAGNOSIS — L30.4 INTERTRIGO: ICD-10-CM

## 2021-05-13 RX ORDER — KETOCONAZOLE 20 MG/G
CREAM TOPICAL
Qty: 60 G | Refills: 0 | Status: SHIPPED | OUTPATIENT
Start: 2021-05-13 | End: 2021-07-21

## 2021-05-13 RX ORDER — SOLIFENACIN SUCCINATE 10 MG/1
TABLET, FILM COATED ORAL
Qty: 90 TABLET | Refills: 0 | OUTPATIENT
Start: 2021-05-13

## 2021-05-18 RX ORDER — SOLIFENACIN SUCCINATE 10 MG/1
10 TABLET, FILM COATED ORAL DAILY
Qty: 90 TABLET | Refills: 0 | Status: SHIPPED | OUTPATIENT
Start: 2021-05-18 | End: 2022-02-18

## 2021-05-18 NOTE — TELEPHONE ENCOUNTER
[facility] Call Center    Phone Message    May a detailed message be left on voicemail: yes     Reason for Call: Medication Refill Request    Has the patient contacted the pharmacy for the refill? Yes   Name of medication being requested: solifenacin (VESICARE) 10 MG tablet [[id number]] (Order [id number])    Provider who prescribed the medication: Stephanie Shi PA-C    Pharmacy: [facility] [address] [place] [address]  Date medication is needed:5/18/21    Action Taken: Other: UA URO    Travel Screening: Not Applicable

## 2021-05-21 ENCOUNTER — VIRTUAL VISIT (OUTPATIENT)
Dept: UROLOGY | Facility: CLINIC | Age: 71
End: 2021-05-21
Payer: COMMERCIAL

## 2021-05-21 VITALS — BODY MASS INDEX: 44.3 KG/M2 | WEIGHT: 250 LBS | HEIGHT: 63 IN

## 2021-05-21 DIAGNOSIS — N32.81 OAB (OVERACTIVE BLADDER): Primary | ICD-10-CM

## 2021-05-21 PROCEDURE — 99213 OFFICE O/P EST LOW 20 MIN: CPT | Mod: TEL | Performed by: PHYSICIAN ASSISTANT

## 2021-05-21 RX ORDER — SOLIFENACIN SUCCINATE 10 MG/1
10 TABLET, FILM COATED ORAL DAILY
Qty: 90 TABLET | Refills: 4 | Status: SHIPPED | OUTPATIENT
Start: 2021-05-21 | End: 2022-02-18

## 2021-05-21 ASSESSMENT — MIFFLIN-ST. JEOR: SCORE: 1610.18

## 2021-05-21 ASSESSMENT — PAIN SCALES - GENERAL: PAINLEVEL: NO PAIN (0)

## 2021-05-21 NOTE — PROGRESS NOTES
MED CHECK FOR VESICARE.  PT STATES IT IS HELPING.  PT USING VAGINAL CREAM.  PT DOESN'T WANT TO RENEW THE CREAM.      Bridget is a 70 year old who is being evaluated via a billable telephone visit.      What phone number would you like to be contacted at? [phone]  How would you like to obtain your AVS? Mail a copy    Phone call duration: 6 minutes  CC: Urgency, frequency, nocturia     HPI:  Bridget Lawson is a pleasant 70 year old female who presents in 3 month follow up of the above. All ongoing for many years. Nocturia x 5, frequency, urgency throughout the day, initially. Has eliminated caffeine. Can struggle with constipation. No gross hematuria. No dysuria.     Has been on estrace cream and Vesicare and is happy with her control. Several of the anticholinergics were >$500 initially.  Vesicare has been affordable and effective.       Past Medical History        Past Medical History:   Diagnosis Date     Cerebral artery occlusion with cerebral infarction (H)        Hx of stroke   (02/10)     Hyperlipidemia LDL goal < 100 10/8/2012     Start prava in 7/14     Intracranial hemorrhage (H) 2/10     R basal ganglia, due to HTN; had mild L hemiparesis and dysarthria; good recovery     Obese       Osteopenia       in 6/10, prior to PTH surgery, T - 3.2 forearm, - 1.9 spine, - 1.5L and -1.6 R femoral necks     Sleep apnea       CPAP     Spider veins       Unspecified essential hypertension       Essential hypertension            Past Surgical History         Past Surgical History:   Procedure Laterality Date     BREAST SURGERY   11/2004     R breast bx     C/SECTION, CLASSICAL   1985     with tubal     ENDOSCOPIC RELEASE CARPAL TUNNEL Right 2/7/2020     Procedure: RIGHT ENDOSCOPIC CARPAL TUNNEL RELEASE;  Surgeon: Piper Guardado MD;  Location: [facility] OR     HYSTERECTOMY   5/1998     GREGORY w/ BSO     KNEE SURGERY   9/1998     L Arthroscopy     NECK SURGERY   12/2010     R parathyroid adenoma     ORTHOPEDIC SURGERY          REPAIR TENDON ANKLE Right 2017     Procedure: REPAIR TENDON ANKLE;  RIGHT POSTERIOR TIBIAL TENDON RECONSTRUCTION ;  Surgeon: Xavi Duran MD;  Location: [facility] OR     SHOULDER SURGERY   2008     rotator cuff repair R     TONSILLECTOMY         as a child T & A            Social History   Social History            Socioeconomic History     Marital status:        Spouse name: Not on file     Number of children: 2     Years of education: Not on file     Highest education level: Not on file   Occupational History     Not on file   Social Needs     Financial resource strain: Not on file     Food insecurity       Worry: Not on file       Inability: Not on file     Transportation needs       Medical: Not on file       Non-medical: Not on file   Tobacco Use     Smoking status: Former Smoker       Packs/day: 2.00       Years: 27.00       Pack years: 54.00       Types: Cigarettes       Start date: 10/24/1969       Last attempt to quit: 1996       Years since quittin.5     Smokeless tobacco: Never Used   Substance and Sexual Activity     Alcohol use: Yes       Alcohol/week: 0.0 standard drinks       Comment: 1/day     Drug use: No     Sexual activity: Not Currently   Lifestyle     Physical activity       Days per week: Not on file       Minutes per session: Not on file     Stress: Not on file   Relationships     Social connections       Talks on phone: Not on file       Gets together: Not on file       Attends Druze service: Not on file       Active member of club or organization: Not on file       Attends meetings of clubs or organizations: Not on file       Relationship status: Not on file     Intimate partner violence       Fear of current or ex partner: Not on file       Emotionally abused: Not on file       Physically abused: Not on file       Forced sexual activity: Not on file   Other Topics Concern     Parent/sibling w/ CABG, MI or angioplasty before 65F 55M? Not Asked   Social History  Narrative     2 dtrs; no GC            Family History         Family History   Problem Relation Age of Onset     Breast Cancer Mother            [place]; masectomy left; asthma;  in 2015 at age 97     Breast Cancer Maternal Grandmother           possible CA; masectomy     Breast Cancer Paternal Aunt           possible CA, masectomy     Respiratory Father           COPD     Lipids Brother              ROS:14 point ROS neg other than the symptoms noted above in the HPI.          Allergies   Allergen Reactions     Animal Dander               Current Outpatient Medications   Medication     Acetaminophen (TYLENOL PO)     amLODIPine (NORVASC) 5 MG tablet     aspirin  MG EC tablet     Calcium Carb-Cholecalciferol (CALTRATE 600+D) 600-800 MG-UNIT TABS     Carboxymethylcellulose Sodium (THERATEARS OP)     esomeprazole (NEXIUM) 20 MG DR capsule     estradiol (ESTRACE VAGINAL) 0.1 MG/GM vaginal cream     fluticasone (FLONASE) 50 MCG/ACT spray     glucosamine-chondroitinoitin 750-600 MG TABS     HYDROcodone-acetaminophen (NORCO) 5-325 MG tablet     ibuprofen (ADVIL/MOTRIN) 600 MG tablet     isosorbide mononitrate (IMDUR) 60 MG 24 hr tablet     ketoconazole (NIZORAL) 2 % external cream     labetalol (NORMODYNE) 200 MG tablet     MAGNESIUM GLUCONATE PO     MELATONIN PO     Multiple Vitamins-Minerals (CENTRUM SILVER ULTRA WOMENS PO)     NONFORMULARY     ORDER FOR DME     ramipril (ALTACE) 10 MG capsule     rosuvastatin (CRESTOR) 20 MG tablet      No current facility-administered medications for this visit.             PEx:   PSYCH: NAD  EYES: EOMI  MOUTH: MMM  NECK: Supple, no notable adenopathy  RESP: Unlabored breathing  CARDIAC: No LE edema  SKIN: Warm, no rashes  ABD: soft, Nontender  NEURO: AAO x3     Urine: trace leuk est  PVR 151cc     A/P: Bridget Lawson is a 70 year old female with urgency, frequency  -Continue estrogen cream three times a week near urethra (pea-sized amount): If >$40, she will let me know and we  can get via a compound pharmacy.  - Vesicare 10mg daily, again reviewed SE.   -Eliminate irritants     Stephanie Shi PA-C  [facility]      12 minutes spent on the date of the encounter doing chart review, review of test results, interpretation of tests, patient visit and documentation   956}

## 2021-05-21 NOTE — LETTER
5/21/2021       RE: Bridget Lawson  [address]  [facility] [address]     Dear Colleague,    Thank you for referring your patient, Bridget Lawson, to the [facility] at [facility]. Please see a copy of my visit note below.    MED CHECK FOR VESICARE.  PT STATES IT IS HELPING.  PT USING VAGINAL CREAM.  PT DOESN'T WANT TO RENEW THE CREAM.      Birdget is a 70 year old who is being evaluated via a billable telephone visit.      What phone number would you like to be contacted at? [phone]  How would you like to obtain your AVS? Mail a copy    Phone call duration: 6 minutes  CC: Urgency, frequency, nocturia     HPI:  Bridget Lawson is a pleasant 70 year old female who presents in 3 month follow up of the above. All ongoing for many years. Nocturia x 5, frequency, urgency throughout the day, initially. Has eliminated caffeine. Can struggle with constipation. No gross hematuria. No dysuria.     Has been on estrace cream and Vesicare and is happy with her control. Several of the anticholinergics were >$500 initially.  Vesicare has been affordable and effective.       Past Medical History        Past Medical History:   Diagnosis Date     Cerebral artery occlusion with cerebral infarction (H)        Hx of stroke   (02/10)     Hyperlipidemia LDL goal < 100 10/8/2012     Start prava in 7/14     Intracranial hemorrhage (H) 2/10     R basal ganglia, due to HTN; had mild L hemiparesis and dysarthria; good recovery     Obese       Osteopenia       in 6/10, prior to PTH surgery, T - 3.2 forearm, - 1.9 spine, - 1.5L and -1.6 R femoral necks     Sleep apnea       CPAP     Spider veins       Unspecified essential hypertension       Essential hypertension            Past Surgical History         Past Surgical History:   Procedure Laterality Date     BREAST SURGERY   11/2004     R breast bx     C/SECTION, CLASSICAL   1985     with tubal      ENDOSCOPIC RELEASE CARPAL TUNNEL Right 2020     Procedure: RIGHT ENDOSCOPIC CARPAL TUNNEL RELEASE;  Surgeon: Piper Guardado MD;  Location: [facility] OR     HYSTERECTOMY   1998     GREGORY w/ BSO     KNEE SURGERY   1998     L Arthroscopy     NECK SURGERY   2010     R parathyroid adenoma     ORTHOPEDIC SURGERY         REPAIR TENDON ANKLE Right 2017     Procedure: REPAIR TENDON ANKLE;  RIGHT POSTERIOR TIBIAL TENDON RECONSTRUCTION ;  Surgeon: Xavi Duran MD;  Location: [facility] OR     SHOULDER SURGERY   2008     rotator cuff repair R     TONSILLECTOMY         as a child T & A            Social History   Social History            Socioeconomic History     Marital status:        Spouse name: Not on file     Number of children: 2     Years of education: Not on file     Highest education level: Not on file   Occupational History     Not on file   Social Needs     Financial resource strain: Not on file     Food insecurity       Worry: Not on file       Inability: Not on file     Transportation needs       Medical: Not on file       Non-medical: Not on file   Tobacco Use     Smoking status: Former Smoker       Packs/day: 2.00       Years: 27.00       Pack years: 54.00       Types: Cigarettes       Start date: 10/24/1969       Last attempt to quit: 1996       Years since quittin.5     Smokeless tobacco: Never Used   Substance and Sexual Activity     Alcohol use: Yes       Alcohol/week: 0.0 standard drinks       Comment: 1/day     Drug use: No     Sexual activity: Not Currently   Lifestyle     Physical activity       Days per week: Not on file       Minutes per session: Not on file     Stress: Not on file   Relationships     Social connections       Talks on phone: Not on file       Gets together: Not on file       Attends Tenriism service: Not on file       Active member of club or organization: Not on file       Attends meetings of clubs or organizations: Not on file       Relationship status: Not on  file     Intimate partner violence       Fear of current or ex partner: Not on file       Emotionally abused: Not on file       Physically abused: Not on file       Forced sexual activity: Not on file   Other Topics Concern     Parent/sibling w/ CABG, MI or angioplasty before 65F 55M? Not Asked   Social History Narrative     2 dtrs; no GC            Family History         Family History   Problem Relation Age of Onset     Breast Cancer Mother            CA; masectomy left; asthma;  in 2015 at age 97     Breast Cancer Maternal Grandmother           possible CA; masectomy     Breast Cancer Paternal Aunt           possible CA, masectomy     Respiratory Father           COPD     Lipids Brother              ROS:14 point ROS neg other than the symptoms noted above in the HPI.          Allergies   Allergen Reactions     Animal Dander               Current Outpatient Medications   Medication     Acetaminophen (TYLENOL PO)     amLODIPine (NORVASC) 5 MG tablet     aspirin  MG EC tablet     Calcium Carb-Cholecalciferol (CALTRATE 600+D) 600-800 MG-UNIT TABS     Carboxymethylcellulose Sodium (THERATEARS OP)     esomeprazole (NEXIUM) 20 MG DR capsule     estradiol (ESTRACE VAGINAL) 0.1 MG/GM vaginal cream     fluticasone (FLONASE) 50 MCG/ACT spray     glucosamine-chondroitinoitin 750-600 MG TABS     HYDROcodone-acetaminophen (NORCO) 5-325 MG tablet     ibuprofen (ADVIL/MOTRIN) 600 MG tablet     isosorbide mononitrate (IMDUR) 60 MG 24 hr tablet     ketoconazole (NIZORAL) 2 % external cream     labetalol (NORMODYNE) 200 MG tablet     MAGNESIUM GLUCONATE PO     MELATONIN PO     Multiple Vitamins-Minerals (CENTRUM SILVER ULTRA WOMENS PO)     NONFORMULARY     ORDER FOR DME     ramipril (ALTACE) 10 MG capsule     rosuvastatin (CRESTOR) 20 MG tablet      No current facility-administered medications for this visit.             PEx:   PSYCH: NAD  EYES: EOMI  MOUTH: MMM  NECK: Supple, no notable adenopathy  RESP: Unlabored  breathing  CARDIAC: No LE edema  SKIN: Warm, no rashes  ABD: soft, Nontender  NEURO: AAO x3     Urine: trace leuk est  PVR 151cc     A/P: Bridget Lawson is a 70 year old female with urgency, frequency  -Continue estrogen cream three times a week near urethra (pea-sized amount): If >$40, she will let me know and we can get via a compound pharmacy.  - Vesicare 10mg daily, again reviewed SE.   -Eliminate irritants     Stephanie Shi PA-C  [facility]rology      12 minutes spent on the date of the encounter doing chart review, review of test results, interpretation of tests, patient visit and documentation   956}

## 2021-07-20 DIAGNOSIS — L30.4 INTERTRIGO: ICD-10-CM

## 2021-07-21 RX ORDER — KETOCONAZOLE 20 MG/G
CREAM TOPICAL
Qty: 60 G | Refills: 0 | Status: SHIPPED | OUTPATIENT
Start: 2021-07-21 | End: 2021-07-30

## 2021-07-21 NOTE — TELEPHONE ENCOUNTER
Routing refill request to provider for review/approval because:  Drug interaction warning  Klarissa Adam RN  [facility]=

## 2021-07-30 DIAGNOSIS — L30.4 INTERTRIGO: ICD-10-CM

## 2021-07-30 RX ORDER — KETOCONAZOLE 20 MG/G
CREAM TOPICAL
Qty: 60 G | Refills: 0 | Status: SHIPPED | OUTPATIENT
Start: 2021-07-30 | End: 2022-02-07

## 2021-07-30 NOTE — TELEPHONE ENCOUNTER
Routing refill request to provider for review/approval because:  Drug interaction warning    Edgar Bueno RN  [facility] Triage Nurse

## 2022-01-13 DIAGNOSIS — I10 ESSENTIAL HYPERTENSION WITH GOAL BLOOD PRESSURE LESS THAN 140/90: ICD-10-CM

## 2022-01-14 RX ORDER — LABETALOL 200 MG/1
TABLET, FILM COATED ORAL
Qty: 270 TABLET | Refills: 0 | OUTPATIENT
Start: 2022-01-14

## 2022-01-14 NOTE — TELEPHONE ENCOUNTER
Routing refill request to provider for review/approval because:  Labs out of range:    BP Readings from Last 3 Encounters:   02/05/21 (!) 147/83   03/09/20 (!) 140/80   02/07/20 (!) 146/87     Pt needs to establish with a new pcp   Rosalva Young RN

## 2022-01-18 DIAGNOSIS — I10 ESSENTIAL HYPERTENSION WITH GOAL BLOOD PRESSURE LESS THAN 140/90: ICD-10-CM

## 2022-01-18 RX ORDER — LABETALOL 200 MG/1
TABLET, FILM COATED ORAL
Qty: 270 TABLET | Refills: 0 | OUTPATIENT
Start: 2022-01-18

## 2022-01-18 NOTE — TELEPHONE ENCOUNTER
Routing refill request to provider for review/approval because:  BP elevated     Pt is scheduled to est with new PCP     Also asking for orders for Reclast - advised likely need to est care first then provider can order     Appointments in Next Year    Feb 18, 2022  1:30 PM  (Arrive by 1:10 PM)  Provider Visit with LIZZ Perez CNP  [facility] ([facility] ) [phone]          Piper MCGOVERN, Triage RN  [facility]

## 2022-01-25 DIAGNOSIS — I10 ESSENTIAL HYPERTENSION WITH GOAL BLOOD PRESSURE LESS THAN 140/90: ICD-10-CM

## 2022-01-27 RX ORDER — LABETALOL 200 MG/1
TABLET, FILM COATED ORAL
Qty: 90 TABLET | Refills: 0 | Status: SHIPPED | OUTPATIENT
Start: 2022-01-27 | End: 2022-02-18

## 2022-01-27 NOTE — TELEPHONE ENCOUNTER
Routing refill request to provider for review/approval because:  Patient scheduled on 2/18 medication pended for 30 day supply  BP Readings from Last 3 Encounters:   02/05/21 (!) 147/83   03/09/20 (!) 140/80   02/07/20 (!) 146/87         Edgar Bueno RN  [facility] Triage Nurse

## 2022-02-07 DIAGNOSIS — L30.4 INTERTRIGO: ICD-10-CM

## 2022-02-09 RX ORDER — KETOCONAZOLE 20 MG/G
CREAM TOPICAL
Qty: 30 G | Refills: 0 | Status: SHIPPED | OUTPATIENT
Start: 2022-02-09 | End: 2022-03-18

## 2022-02-09 NOTE — TELEPHONE ENCOUNTER
Routing refill request to provider for review/approval because:  Medication last filled by . Pt has an appt scheduled 2/18 to establish care with Dr. Pickens.  Medication pended for 30g to pharmacy.   Dari Vivas RN

## 2022-02-18 ENCOUNTER — OFFICE VISIT (OUTPATIENT)
Dept: INTERNAL MEDICINE | Facility: CLINIC | Age: 72
End: 2022-02-18
Payer: COMMERCIAL

## 2022-02-18 VITALS
WEIGHT: 270 LBS | HEART RATE: 75 BPM | TEMPERATURE: 97.8 F | HEIGHT: 63 IN | SYSTOLIC BLOOD PRESSURE: 128 MMHG | DIASTOLIC BLOOD PRESSURE: 82 MMHG | BODY MASS INDEX: 47.84 KG/M2 | OXYGEN SATURATION: 95 %

## 2022-02-18 DIAGNOSIS — Z86.73 HISTORY OF STROKE: ICD-10-CM

## 2022-02-18 DIAGNOSIS — Z87.11 HISTORY OF GASTRIC ULCER: ICD-10-CM

## 2022-02-18 DIAGNOSIS — G47.33 OBSTRUCTIVE SLEEP APNEA: ICD-10-CM

## 2022-02-18 DIAGNOSIS — M81.8 OTHER OSTEOPOROSIS WITHOUT CURRENT PATHOLOGICAL FRACTURE: ICD-10-CM

## 2022-02-18 DIAGNOSIS — R79.9 ABNORMAL FINDING OF BLOOD CHEMISTRY, UNSPECIFIED: ICD-10-CM

## 2022-02-18 DIAGNOSIS — R06.02 SHORTNESS OF BREATH: ICD-10-CM

## 2022-02-18 DIAGNOSIS — Z12.31 ENCOUNTER FOR SCREENING MAMMOGRAM FOR BREAST CANCER: ICD-10-CM

## 2022-02-18 DIAGNOSIS — I10 ESSENTIAL HYPERTENSION WITH GOAL BLOOD PRESSURE LESS THAN 130/80: ICD-10-CM

## 2022-02-18 DIAGNOSIS — Z79.1 NSAID LONG-TERM USE: ICD-10-CM

## 2022-02-18 DIAGNOSIS — E78.5 HYPERLIPIDEMIA WITH TARGET LDL LESS THAN 100: ICD-10-CM

## 2022-02-18 DIAGNOSIS — R53.83 FATIGUE, UNSPECIFIED TYPE: ICD-10-CM

## 2022-02-18 DIAGNOSIS — N32.81 OAB (OVERACTIVE BLADDER): ICD-10-CM

## 2022-02-18 DIAGNOSIS — I10 ESSENTIAL HYPERTENSION WITH GOAL BLOOD PRESSURE LESS THAN 140/90: ICD-10-CM

## 2022-02-18 DIAGNOSIS — E21.0 PRIMARY HYPERPARATHYROIDISM (H): ICD-10-CM

## 2022-02-18 DIAGNOSIS — E66.01 MORBID OBESITY (H): ICD-10-CM

## 2022-02-18 DIAGNOSIS — M25.473 ANKLE SWELLING, UNSPECIFIED LATERALITY: ICD-10-CM

## 2022-02-18 DIAGNOSIS — L30.4 INTERTRIGO: ICD-10-CM

## 2022-02-18 DIAGNOSIS — Z76.89 ENCOUNTER TO ESTABLISH CARE: Primary | ICD-10-CM

## 2022-02-18 DIAGNOSIS — K21.01 GASTROESOPHAGEAL REFLUX DISEASE WITH ESOPHAGITIS AND HEMORRHAGE: ICD-10-CM

## 2022-02-18 DIAGNOSIS — R63.5 WEIGHT GAIN: ICD-10-CM

## 2022-02-18 LAB
ERYTHROCYTE [DISTWIDTH] IN BLOOD BY AUTOMATED COUNT: 14.3 % (ref 10–15)
HCT VFR BLD AUTO: 42.8 % (ref 35–47)
HGB BLD-MCNC: 13.4 G/DL (ref 11.7–15.7)
MCH RBC QN AUTO: 29.2 PG (ref 26.5–33)
MCHC RBC AUTO-ENTMCNC: 31.3 G/DL (ref 31.5–36.5)
MCV RBC AUTO: 93 FL (ref 78–100)
PLATELET # BLD AUTO: 210 10E3/UL (ref 150–450)
RBC # BLD AUTO: 4.59 10E6/UL (ref 3.8–5.2)
WBC # BLD AUTO: 6.3 10E3/UL (ref 4–11)

## 2022-02-18 PROCEDURE — 85027 COMPLETE CBC AUTOMATED: CPT | Performed by: NURSE PRACTITIONER

## 2022-02-18 PROCEDURE — 80061 LIPID PANEL: CPT | Performed by: NURSE PRACTITIONER

## 2022-02-18 PROCEDURE — 99214 OFFICE O/P EST MOD 30 MIN: CPT | Performed by: NURSE PRACTITIONER

## 2022-02-18 PROCEDURE — 84443 ASSAY THYROID STIM HORMONE: CPT | Performed by: NURSE PRACTITIONER

## 2022-02-18 PROCEDURE — 36415 COLL VENOUS BLD VENIPUNCTURE: CPT | Performed by: NURSE PRACTITIONER

## 2022-02-18 PROCEDURE — 80053 COMPREHEN METABOLIC PANEL: CPT | Performed by: NURSE PRACTITIONER

## 2022-02-18 RX ORDER — SOLIFENACIN SUCCINATE 10 MG/1
10 TABLET, FILM COATED ORAL DAILY
Qty: 90 TABLET | Refills: 4 | Status: SHIPPED | OUTPATIENT
Start: 2022-02-18 | End: 2022-07-06

## 2022-02-18 RX ORDER — IBUPROFEN 200 MG
200 TABLET ORAL EVERY 4 HOURS PRN
COMMUNITY
End: 2022-05-10

## 2022-02-18 RX ORDER — AMLODIPINE BESYLATE 5 MG/1
TABLET ORAL
Qty: 90 TABLET | Refills: 3 | Status: SHIPPED | OUTPATIENT
Start: 2022-02-18 | End: 2023-02-02

## 2022-02-18 RX ORDER — LABETALOL 200 MG/1
TABLET, FILM COATED ORAL
Qty: 90 TABLET | Refills: 3 | Status: SHIPPED | OUTPATIENT
Start: 2022-02-18 | End: 2022-06-16

## 2022-02-18 RX ORDER — ISOSORBIDE MONONITRATE 60 MG/1
60 TABLET, EXTENDED RELEASE ORAL DAILY
Qty: 90 TABLET | Refills: 3 | Status: SHIPPED | OUTPATIENT
Start: 2022-02-18 | End: 2022-09-16

## 2022-02-18 RX ORDER — SENNOSIDES 8.6 MG
2 TABLET ORAL EVERY OTHER DAY
COMMUNITY
End: 2022-11-02

## 2022-02-18 RX ORDER — RAMIPRIL 10 MG/1
CAPSULE ORAL
Qty: 90 CAPSULE | Refills: 3 | Status: SHIPPED | OUTPATIENT
Start: 2022-02-18 | End: 2022-09-16

## 2022-02-18 RX ORDER — CALCIUM CARBONATE 500 MG/1
1 TABLET, CHEWABLE ORAL DAILY
COMMUNITY
End: 2022-11-02

## 2022-02-18 RX ORDER — ROSUVASTATIN CALCIUM 20 MG/1
TABLET, COATED ORAL
Qty: 90 TABLET | Refills: 3 | Status: SHIPPED | OUTPATIENT
Start: 2022-02-18 | End: 2022-09-16

## 2022-02-18 RX ORDER — BISACODYL 5 MG/1
5 TABLET, DELAYED RELEASE ORAL EVERY OTHER DAY
COMMUNITY
End: 2023-06-30

## 2022-02-18 RX ORDER — CARBOXYMETHYLCELLULOSE SODIUM 10 MG/ML
1 GEL OPHTHALMIC 3 TIMES DAILY
COMMUNITY
End: 2022-11-02

## 2022-02-18 ASSESSMENT — PATIENT HEALTH QUESTIONNAIRE - PHQ9
10. IF YOU CHECKED OFF ANY PROBLEMS, HOW DIFFICULT HAVE THESE PROBLEMS MADE IT FOR YOU TO DO YOUR WORK, TAKE CARE OF THINGS AT HOME, OR GET ALONG WITH OTHER PEOPLE: SOMEWHAT DIFFICULT
SUM OF ALL RESPONSES TO PHQ QUESTIONS 1-9: 10
SUM OF ALL RESPONSES TO PHQ QUESTIONS 1-9: 10

## 2022-02-18 NOTE — PROGRESS NOTES
Assessment & Plan     Encounter to establish care  -Patient is seen in clinic today to establish care.  A majority of this time today was discussing her chronic medical history and acute concerns regarding her leg swelling and mood changes.  Patient will return for a follow-up, close visit and we will discuss health maintenance at that time.    Essential hypertension with goal blood pressure less than 140/90  - BP at goal today  - Continue labetalol, atenolol, isosorbide, ramipril  - Labs today  - Refilled meds  - Comprehensive metabolic panel (BMP + Alb, Alk Phos, ALT, AST, Total. Bili, TP)  - CBC with platelets  - Glucose  - TSH with free T4 reflex  - isosorbide mononitrate (IMDUR) 60 MG 24 hr tablet  Dispense: 90 tablet; Refill: 3  - labetalol (NORMODYNE) 200 MG tablet  Dispense: 90 tablet; Refill: 3  - ramipril (ALTACE) 10 MG capsule  Dispense: 90 capsule; Refill: 3  - Comprehensive metabolic panel (BMP + Alb, Alk Phos, ALT, AST, Total. Bili, TP)  - CBC with platelets  - Glucose  - TSH with free T4 reflex    Hyperlipidemia with target LDL less than 100  - Check lipid  - Refilled Crestor  - rosuvastatin (CRESTOR) 20 MG tablet  Dispense: 90 tablet; Refill: 3  - Lipid Profile (Chol, Trig, HDL, LDL calc)  - Lipid Profile (Chol, Trig, HDL, LDL calc)    History of gastric ulcer  - Per chart review; has GERD symptoms.  Takes NSAIDS daily  - Counseled patient on management of her GERD symptoms.  It sounds like these need to be better controlled.  This is likely related to her chronic use of NSAIDs and her morbid obesity.  - Seems her symptoms worsened on Nexium.  Will discontinue and start omeprazole daily.  - Discussed and recommended she stop taking NSAIDs with her hx- can use Tylenol for pain.  Patient verbalized an understanding  - Continue PRN TUMS    Morbid obesity (H)  - BMI 48.60, with associated GERD, hypertension, hyperlipidemia, JOHNY  - Discussed trying to be more active, watching diet    Primary  hyperparathyroidism (H)  - Hx of; status post excision of right superior parathyroid adenoma in 2010 per chart review  - TSH check in 2019 WNL; will recheck next visit    Intertrigo  - Refilled cream    Ankle swelling, unspecified laterality  - Reports increased ankle swelling; I suspect this is related to her significant weight gain and inactivity.  She denies orthopnea or PND but does endorse some dyspnea on exertion. Again, I do suspect this is related to her weight gain and inactivity however, will obtain an echocardiogram and a TSH.  - Echocardiogram Complete  - TSH with free T4 reflex  - TSH with free T4 reflex    Weight gain  - TSH with free T4 reflex  - TSH with free T4 reflex    Fatigue, unspecified type  - Check TSH.  It is also very possible that her CPAP settings need to be recalibrated.  She was advised to follow-up with her sleep provider to discuss  - TSH with free T4 reflex  - TSH with free T4 reflex    History of stroke  - Intracerebral hemorrhage in 2010; has recovered nicely- I wonder if she has some cognitive effects from her stroke.    Obstructive sleep apnea  - Advised to f/u with Sleep Clinic    Other osteoporosis without current pathological fracture  - States she is due for Reclast infusion - check labs    Shortness of breath  - See discussion above  - Echocardiogram Complete  - TSH with free T4 reflex  - TSH with free T4 reflex    OAB (overactive bladder)  - solifenacin (VESICARE) 10 MG tablet  Dispense: 90 tablet; Refill: 4    NSAID long-term use  - See discussion above    Gastroesophageal reflux disease with esophagitis and hemorrhage  - See discussion above  - omeprazole (PRILOSEC) 20 MG DR capsule  Dispense: 60 capsule; Refill: 1    Encounter for screening mammogram for breast cancer  - *MA Screening Digital Bilateral    Abnormal finding of blood chemistry, unspecified   - Glucose  - Glucose         BMI:   Estimated body mass index is 48.6 kg/m  as calculated from the following:     "Height as of this encounter: 1.588 m (5' 2.5\").    Weight as of this encounter: 122.5 kg (270 lb).   Weight management plan: Discussed healthy diet and exercise guidelines    Depression Screening Follow Up    PHQ 2/18/2022   PHQ-9 Total Score 10   Q9: Thoughts of better off dead/self-harm past 2 weeks Not at all     Last PHQ-9 2/18/2022   1.  Little interest or pleasure in doing things 1   2.  Feeling down, depressed, or hopeless 1   3.  Trouble falling or staying asleep, or sleeping too much 2   4.  Feeling tired or having little energy 2   5.  Poor appetite or overeating 2   6.  Feeling bad about yourself 1   7.  Trouble concentrating 1   8.  Moving slowly or restless 0   Q9: Thoughts of better off dead/self-harm past 2 weeks 0   PHQ-9 Total Score 10       Follow Up Actions Taken  Patient counseled, no additional follow up at this time.     See Patient Instructions    Return in about 1 month (around 3/18/2022) for Routine preventive and follow up chronic medical conditions.    [facility] is a 71 year old who presents for the following health issues   Establish care-previous [facility] patient    History of Present Illness     Reason for visit:  Swollen ankles, prescriptions renewed, knees and left shoulder  Symptom onset:  More than a month    She eats 0-1 servings of fruits and vegetables daily.She consumes 1 sweetened beverage(s) daily.She exercises with enough effort to increase her heart rate 9 or less minutes per day.  She exercises with enough effort to increase her heart rate 3 or less days per week.   She is taking medications regularly.       Answers for HPI/ROS submitted by the patient on 2/18/2022  If you checked off any problems, how difficult have these problems made it for you to do your work, take care of things at home, or get along with other people?: Somewhat difficult  PHQ9 TOTAL SCORE: 10      Establish " care:  Patient is seen in clinic today to establish care.  Patient used to follow with Dr. Hughes who has since retired.  Past medical history is significant, including hypertension, hyperlipidemia, JOHNY, history of intracerebral bleed/stroke due to hypertension, GERD, GI bleed, morbid obesity, primary hyperparathyroidism, in addition to others.    Hypertension:  History of hypertension; maintained on amlodipine 5 mg daily, labetalol 200 mg 3 times daily, ramipril 10 mg daily, in addition to Imdur 60 mg daily.  Patient denies any headaches, chest pain, dizziness.  She does endorse some shortness of breath.  She attributes her shortness of breath to her weight gain.    Ankle swelling:  Reports increased lower ankle swelling, bilaterally.  This has been going on for some time.  She reports by the end of the day at nighttime, her ankles are quite sore from the swelling.  The swelling improves when she lays down at nighttime but shortly after waking in the morning her swelling increases significantly.    SOB:  Reports feeling more short of breath with activity as of late.  Denies orthopnea or PND.  Attributes her shortness of breath to weight gain.  Denies chest pain.  Does have some lower ankle swelling.    Morbid obesity:  Weight gain:  BMI 48.60.  Associated conditions include hypertension, hyperlipidemia, GERD, JOHNY.    Patient reports a significant weight gain over the past several years.  Per chart review, it appears that she has gained about 60 pounds since 2018.  Patient attributes this to diet and being very sedentary.  She reports that she really does not do anything other than sit around and watch TV all day.    JOHNY:  Follows with sleep provider.  States she is compliant with her CPAP machine.  Does endorse that she sleeps more lately and has more difficulty getting to sleep.  She states that she has not had a change in her CPAP settings with her weight gain.    GERD:  History of GI bleed:  Chronic NSAID  use:  Reports daily GERD symptoms.  Takes 2 Tums daily which helps relieve her symptoms.  States her symptoms generally go away after taking this 1 dose of Tums during the day.  States she did try Nexium however she reports that her GERD came back and was worse. Denies abdominal pain.  Denies blood in stool.    She does report that she takes daily NSAIDs and has done this for years.  She takes 2 tablets at nighttime for pain which she attributes to an old mattress.    Mood change:  PHQ-9 score was 10 today.  Patient endorses feeling like her mood is lower than usual.  Denies suicidal ideation.  Does have quite a bit of fatigue and is overeating.  States she has never been treated for depression.    Fall:  States she slipped while getting out of bed the other day.  Was unable to get up by herself.  Had to call the fire department to help her get up.  She denies any injury.    Hx if CVA/cerebral bleed:  History of in 2010.  Reports that initially she had a difficult time speaking and swallowing and using her left side.  She feels that she has fully recovered but does report that maybe her memory is not as good as it used to be.      Review of Systems   CONSTITUTIONAL:POSITIVE  for weight gain  INTEGUMENTARY/SKIN: NEGATIVE for worrisome rashes, moles or lesions  EYES: NEGATIVE for vision changes or irritation  ENT/MOUTH: NEGATIVE for ear, mouth and throat problems  RESP:NEGATIVE for significant cough or SOB  CV: POSITIVE for ankle edema, dyspnea on exertion and NEGATIVE for chest pain/chest pressure, orthopnea and paroxysmal nocturnal dyspnea  GI: POSITIVE for heartburn or reflux  MUSCULOSKELETAL: Chronic arthralgias at nighttime which she attributes to her mattress  NEURO: NEGATIVE for weakness, dizziness or paresthesias  PSYCHIATRIC: POSITIVE fordepressed mood      Objective    /82 (BP Location: Left arm, Patient Position: Sitting, Cuff Size: Adult Large)   Pulse 75   Temp 97.8  F (36.6  C) (Tympanic)   Ht  "1.588 m (5' 2.5\")   Wt 122.5 kg (270 lb)   SpO2 95%   BMI 48.60 kg/m    There is no height or weight on file to calculate BMI.     Physical Exam     GENERAL APPEARANCE: alert and no distress, obese  EYES: Eyes grossly normal to inspection, PERRL and conjunctivae and sclerae normal  HENT: ear canals and TM's normal and nose and mouth without ulcers or lesions  NECK: no adenopathy, no asymmetry, masses, or scars and thyroid normal to palpation  RESP: lungs clear to auscultation - no rales, rhonchi or wheezes  CV: regular rates and rhythm, normal S1 S2, no S3 or S4 and no murmur, click or rub; Bilateral ankle edema -non-pitting; difficult to assess due to body habitus  ABDOMEN: soft, nontender, obese, without hepatosplenomegaly or masses and bowel sounds normal  MS: extremities normal   SKIN: no suspicious lesions or rashes  PSYCH: mentation appears normal     -Review of chart        Answers for HPI/ROS submitted by the patient on 2/18/2022  If you checked off any problems, how difficult have these problems made it for you to do your work, take care of things at home, or get along with other people?: Somewhat difficult  PHQ9 TOTAL SCORE: 10      "

## 2022-02-18 NOTE — PATIENT INSTRUCTIONS
-Recommend treating daily heartburn symptoms with something...  Sent order for omeprazole daily    -Would recommend STOPPING ibuprofen use due to your history of stomach ulcer.  Tylenol is OK    -Schedule Echocardiogram to work up/evaluate leg swelling- I do suspect it's related to weight gain, but will be sure your heart looks ok    -Schedule a follow up in the Sleep Clinic- your CPAP may need adjustment due to weight gain- this may be contributing to poor sleep/fatigue    -Labs today    -Follow up in 1 month

## 2022-02-19 LAB
ALBUMIN SERPL-MCNC: 3.9 G/DL (ref 3.4–5)
ALP SERPL-CCNC: 54 U/L (ref 40–150)
ALT SERPL W P-5'-P-CCNC: 46 U/L (ref 0–50)
ANION GAP SERPL CALCULATED.3IONS-SCNC: 6 MMOL/L (ref 3–14)
AST SERPL W P-5'-P-CCNC: 19 U/L (ref 0–45)
BILIRUB SERPL-MCNC: 0.4 MG/DL (ref 0.2–1.3)
BUN SERPL-MCNC: 17 MG/DL (ref 7–30)
CALCIUM SERPL-MCNC: 9.7 MG/DL (ref 8.5–10.1)
CHLORIDE BLD-SCNC: 107 MMOL/L (ref 94–109)
CHOLEST SERPL-MCNC: 120 MG/DL
CO2 SERPL-SCNC: 26 MMOL/L (ref 20–32)
CREAT SERPL-MCNC: 0.75 MG/DL (ref 0.52–1.04)
FASTING STATUS PATIENT QL REPORTED: YES
FASTING STATUS PATIENT QL REPORTED: YES
GFR SERPL CREATININE-BSD FRML MDRD: 84 ML/MIN/1.73M2
GLUCOSE BLD-MCNC: 91 MG/DL (ref 70–99)
GLUCOSE BLD-MCNC: 91 MG/DL (ref 70–99)
HDLC SERPL-MCNC: 58 MG/DL
LDLC SERPL CALC-MCNC: 40 MG/DL
NONHDLC SERPL-MCNC: 62 MG/DL
POTASSIUM BLD-SCNC: 4.4 MMOL/L (ref 3.4–5.3)
PROT SERPL-MCNC: 7.2 G/DL (ref 6.8–8.8)
SODIUM SERPL-SCNC: 139 MMOL/L (ref 133–144)
TRIGL SERPL-MCNC: 109 MG/DL
TSH SERPL DL<=0.005 MIU/L-ACNC: 0.56 MU/L (ref 0.4–4)

## 2022-02-19 ASSESSMENT — PATIENT HEALTH QUESTIONNAIRE - PHQ9: SUM OF ALL RESPONSES TO PHQ QUESTIONS 1-9: 10

## 2022-02-22 ENCOUNTER — TELEPHONE (OUTPATIENT)
Dept: INTERNAL MEDICINE | Facility: CLINIC | Age: 72
End: 2022-02-22
Payer: COMMERCIAL

## 2022-02-22 DIAGNOSIS — M81.8 OTHER OSTEOPOROSIS WITHOUT CURRENT PATHOLOGICAL FRACTURE: Primary | ICD-10-CM

## 2022-02-22 NOTE — TELEPHONE ENCOUNTER
Call to patient re: lab results- all WNL  Also ordered last Reclast infusion  Patient verbalized an understanding  LIZZ Perez CNP

## 2022-02-28 ENCOUNTER — ANCILLARY PROCEDURE (OUTPATIENT)
Dept: MAMMOGRAPHY | Facility: CLINIC | Age: 72
End: 2022-02-28
Attending: NURSE PRACTITIONER
Payer: COMMERCIAL

## 2022-02-28 DIAGNOSIS — M81.8 OTHER OSTEOPOROSIS WITHOUT CURRENT PATHOLOGICAL FRACTURE: ICD-10-CM

## 2022-02-28 DIAGNOSIS — Z12.31 ENCOUNTER FOR SCREENING MAMMOGRAM FOR BREAST CANCER: ICD-10-CM

## 2022-02-28 DIAGNOSIS — R13.10 DYSPHAGIA, UNSPECIFIED TYPE: ICD-10-CM

## 2022-02-28 DIAGNOSIS — E21.0 PRIMARY HYPERPARATHYROIDISM (H): ICD-10-CM

## 2022-02-28 DIAGNOSIS — Z87.11 HISTORY OF GASTRIC ULCER: Primary | ICD-10-CM

## 2022-02-28 DIAGNOSIS — Z12.31 VISIT FOR SCREENING MAMMOGRAM: ICD-10-CM

## 2022-02-28 PROCEDURE — 77067 SCR MAMMO BI INCL CAD: CPT | Mod: TC | Performed by: RADIOLOGY

## 2022-02-28 PROCEDURE — 77063 BREAST TOMOSYNTHESIS BI: CPT | Mod: TC | Performed by: RADIOLOGY

## 2022-02-28 RX ORDER — NALOXONE HYDROCHLORIDE 0.4 MG/ML
0.2 INJECTION, SOLUTION INTRAMUSCULAR; INTRAVENOUS; SUBCUTANEOUS
Status: CANCELLED | OUTPATIENT
Start: 2022-03-01

## 2022-02-28 RX ORDER — DIPHENHYDRAMINE HYDROCHLORIDE 50 MG/ML
50 INJECTION INTRAMUSCULAR; INTRAVENOUS
Status: CANCELLED
Start: 2022-03-01

## 2022-02-28 RX ORDER — MEPERIDINE HYDROCHLORIDE 25 MG/ML
25 INJECTION INTRAMUSCULAR; INTRAVENOUS; SUBCUTANEOUS EVERY 30 MIN PRN
Status: CANCELLED | OUTPATIENT
Start: 2022-03-01

## 2022-02-28 RX ORDER — ZOLEDRONIC ACID 5 MG/100ML
5 INJECTION, SOLUTION INTRAVENOUS ONCE
Status: CANCELLED
Start: 2022-03-01 | End: 2022-03-01

## 2022-02-28 RX ORDER — HEPARIN SODIUM (PORCINE) LOCK FLUSH IV SOLN 100 UNIT/ML 100 UNIT/ML
5 SOLUTION INTRAVENOUS
Status: CANCELLED | OUTPATIENT
Start: 2022-03-01

## 2022-02-28 RX ORDER — ALBUTEROL SULFATE 0.83 MG/ML
2.5 SOLUTION RESPIRATORY (INHALATION)
Status: CANCELLED | OUTPATIENT
Start: 2022-03-01

## 2022-02-28 RX ORDER — HEPARIN SODIUM,PORCINE 10 UNIT/ML
5 VIAL (ML) INTRAVENOUS
Status: CANCELLED | OUTPATIENT
Start: 2022-03-01

## 2022-02-28 RX ORDER — EPINEPHRINE 1 MG/ML
0.3 INJECTION, SOLUTION, CONCENTRATE INTRAVENOUS EVERY 5 MIN PRN
Status: CANCELLED | OUTPATIENT
Start: 2022-03-01

## 2022-02-28 RX ORDER — METHYLPREDNISOLONE SODIUM SUCCINATE 125 MG/2ML
125 INJECTION, POWDER, LYOPHILIZED, FOR SOLUTION INTRAMUSCULAR; INTRAVENOUS
Status: CANCELLED
Start: 2022-03-01

## 2022-02-28 RX ORDER — ALBUTEROL SULFATE 90 UG/1
1-2 AEROSOL, METERED RESPIRATORY (INHALATION)
Status: CANCELLED
Start: 2022-03-01

## 2022-03-01 ENCOUNTER — LAB (OUTPATIENT)
Dept: URGENT CARE | Facility: URGENT CARE | Age: 72
End: 2022-03-01
Attending: NURSE PRACTITIONER
Payer: COMMERCIAL

## 2022-03-01 DIAGNOSIS — M81.8 OTHER OSTEOPOROSIS WITHOUT CURRENT PATHOLOGICAL FRACTURE: ICD-10-CM

## 2022-03-01 PROCEDURE — U0003 INFECTIOUS AGENT DETECTION BY NUCLEIC ACID (DNA OR RNA); SEVERE ACUTE RESPIRATORY SYNDROME CORONAVIRUS 2 (SARS-COV-2) (CORONAVIRUS DISEASE [COVID-19]), AMPLIFIED PROBE TECHNIQUE, MAKING USE OF HIGH THROUGHPUT TECHNOLOGIES AS DESCRIBED BY CMS-2020-01-R: HCPCS

## 2022-03-01 PROCEDURE — U0005 INFEC AGEN DETEC AMPLI PROBE: HCPCS

## 2022-03-02 LAB — SARS-COV-2 RNA RESP QL NAA+PROBE: NEGATIVE

## 2022-03-03 ENCOUNTER — INFUSION THERAPY VISIT (OUTPATIENT)
Dept: INFUSION THERAPY | Facility: CLINIC | Age: 72
End: 2022-03-03
Attending: INTERNAL MEDICINE
Payer: COMMERCIAL

## 2022-03-03 VITALS
RESPIRATION RATE: 18 BRPM | TEMPERATURE: 97.8 F | DIASTOLIC BLOOD PRESSURE: 83 MMHG | OXYGEN SATURATION: 100 % | HEART RATE: 73 BPM | SYSTOLIC BLOOD PRESSURE: 138 MMHG

## 2022-03-03 DIAGNOSIS — M81.8 OTHER OSTEOPOROSIS WITHOUT CURRENT PATHOLOGICAL FRACTURE: ICD-10-CM

## 2022-03-03 DIAGNOSIS — Z87.11 HISTORY OF GASTRIC ULCER: Primary | ICD-10-CM

## 2022-03-03 DIAGNOSIS — R13.10 DYSPHAGIA, UNSPECIFIED TYPE: ICD-10-CM

## 2022-03-03 DIAGNOSIS — E21.0 PRIMARY HYPERPARATHYROIDISM (H): ICD-10-CM

## 2022-03-03 PROCEDURE — 96374 THER/PROPH/DIAG INJ IV PUSH: CPT

## 2022-03-03 PROCEDURE — 250N000011 HC RX IP 250 OP 636: Performed by: NURSE PRACTITIONER

## 2022-03-03 RX ORDER — ALBUTEROL SULFATE 90 UG/1
1-2 AEROSOL, METERED RESPIRATORY (INHALATION)
Status: CANCELLED
Start: 2022-03-03

## 2022-03-03 RX ORDER — NALOXONE HYDROCHLORIDE 0.4 MG/ML
0.2 INJECTION, SOLUTION INTRAMUSCULAR; INTRAVENOUS; SUBCUTANEOUS
Status: CANCELLED | OUTPATIENT
Start: 2022-03-03

## 2022-03-03 RX ORDER — ZOLEDRONIC ACID 5 MG/100ML
5 INJECTION, SOLUTION INTRAVENOUS ONCE
Status: COMPLETED | OUTPATIENT
Start: 2022-03-03 | End: 2022-03-03

## 2022-03-03 RX ORDER — HEPARIN SODIUM,PORCINE 10 UNIT/ML
5 VIAL (ML) INTRAVENOUS
Status: CANCELLED | OUTPATIENT
Start: 2022-03-03

## 2022-03-03 RX ORDER — DIPHENHYDRAMINE HYDROCHLORIDE 50 MG/ML
50 INJECTION INTRAMUSCULAR; INTRAVENOUS
Status: CANCELLED
Start: 2022-03-03

## 2022-03-03 RX ORDER — METHYLPREDNISOLONE SODIUM SUCCINATE 125 MG/2ML
125 INJECTION, POWDER, LYOPHILIZED, FOR SOLUTION INTRAMUSCULAR; INTRAVENOUS
Status: CANCELLED
Start: 2022-03-03

## 2022-03-03 RX ORDER — MEPERIDINE HYDROCHLORIDE 25 MG/ML
25 INJECTION INTRAMUSCULAR; INTRAVENOUS; SUBCUTANEOUS EVERY 30 MIN PRN
Status: CANCELLED | OUTPATIENT
Start: 2022-03-03

## 2022-03-03 RX ORDER — ZOLEDRONIC ACID 5 MG/100ML
5 INJECTION, SOLUTION INTRAVENOUS ONCE
Status: CANCELLED
Start: 2022-03-03 | End: 2022-03-03

## 2022-03-03 RX ORDER — HEPARIN SODIUM (PORCINE) LOCK FLUSH IV SOLN 100 UNIT/ML 100 UNIT/ML
5 SOLUTION INTRAVENOUS
Status: CANCELLED | OUTPATIENT
Start: 2022-03-03

## 2022-03-03 RX ORDER — ALBUTEROL SULFATE 0.83 MG/ML
2.5 SOLUTION RESPIRATORY (INHALATION)
Status: CANCELLED | OUTPATIENT
Start: 2022-03-03

## 2022-03-03 RX ORDER — EPINEPHRINE 1 MG/ML
0.3 INJECTION, SOLUTION INTRAMUSCULAR; SUBCUTANEOUS EVERY 5 MIN PRN
Status: CANCELLED | OUTPATIENT
Start: 2022-03-03

## 2022-03-03 RX ADMIN — ZOLEDRONIC ACID 5 MG: 0.05 INJECTION, SOLUTION INTRAVENOUS at 12:01

## 2022-03-03 ASSESSMENT — PAIN SCALES - GENERAL: PAINLEVEL: NO PAIN (0)

## 2022-03-03 NOTE — PROGRESS NOTES
Infusion Nursing Note:  Bridget Lawson presents today for Reclast.    Patient seen by provider today: No   present during visit today: Not Applicable.    Note: N/A.      Intravenous Access:  No Intravenous access/labs at this visit.  Peripheral IV placed.    Treatment Conditions:  Lab Results   Component Value Date     02/18/2022    POTASSIUM 4.4 02/18/2022    MAG 1.9 02/20/2010    CR 0.75 02/18/2022    CLARA 9.7 02/18/2022    BILITOTAL 0.4 02/18/2022    ALBUMIN 3.9 02/18/2022    ALT 46 02/18/2022    AST 19 02/18/2022     Results reviewed, labs MET treatment parameters, ok to proceed with treatment.      Post Infusion Assessment:  Patient tolerated infusion without incident.  Blood return noted pre and post infusion.  Site patent and intact, free from redness, edema or discomfort.  No evidence of extravasations.  Access discontinued per protocol.       Discharge Plan:   Patient and/or family verbalized understanding of discharge instructions and all questions answered.  AVS to patient via [employer].  Patient will return as needed for next appointment.   Patient discharged in stable condition accompanied by: self.  Departure Mode: Ambulatory.      Janis Al RN

## 2022-03-17 DIAGNOSIS — L30.4 INTERTRIGO: ICD-10-CM

## 2022-03-18 ENCOUNTER — HOSPITAL ENCOUNTER (OUTPATIENT)
Dept: CARDIOLOGY | Facility: CLINIC | Age: 72
Discharge: HOME | End: 2022-03-18
Attending: NURSE PRACTITIONER | Admitting: NURSE PRACTITIONER
Payer: COMMERCIAL

## 2022-03-18 DIAGNOSIS — M25.473 ANKLE SWELLING, UNSPECIFIED LATERALITY: ICD-10-CM

## 2022-03-18 DIAGNOSIS — R06.02 SHORTNESS OF BREATH: Primary | ICD-10-CM

## 2022-03-18 LAB — LVEF ECHO: NORMAL

## 2022-03-18 PROCEDURE — 93306 TTE W/DOPPLER COMPLETE: CPT | Mod: 26 | Performed by: INTERNAL MEDICINE

## 2022-03-18 PROCEDURE — 999N000208 ECHOCARDIOGRAM COMPLETE

## 2022-03-18 PROCEDURE — 255N000002 HC RX 255 OP 636: Performed by: NURSE PRACTITIONER

## 2022-03-18 RX ORDER — KETOCONAZOLE 20 MG/G
CREAM TOPICAL
Qty: 30 G | Refills: 0 | Status: SHIPPED | OUTPATIENT
Start: 2022-03-18 | End: 2022-04-25

## 2022-03-18 RX ADMIN — HUMAN ALBUMIN MICROSPHERES AND PERFLUTREN 3 ML: 10; .22 INJECTION, SOLUTION INTRAVENOUS at 09:47

## 2022-03-18 NOTE — LETTER
"2022      Kerri Lawson  [address]  [facility] [address]        Dear ,    We are writing to inform you of your test results.    Your echo shows that your heart pump function is strong.  You do have what is called mild \"LVH\" which is consistent with high blood pressure.  Overall things look okay but let us talk about this more at your follow-up appointment on .    Resulted Orders   Echocardiogram Complete   Result Value Ref Range    LVEF  65-70%     Narrative    [id number]  [id number]  [id number]^LINDSAY^KRISTEN     [facility]  [facility]  Echocardiography Laboratory  [address] [facility]  [address]  [place] [address]  Phone [phone]  Fax [phone]     Name: KERRI LAWSON  MRN: [id number]  : 1950  Study Date: 2022 08:56 AM  Age: 71 yrs  Gender: Female  Patient Location: [facility]  Reason For Study: Shortness of breath, Ankle swelling, unspecified laterality  Ordering Physician: KRISTEN MONTOYA  Referring Physician: KRISTEN MONTOYA  Performed By: Neva Ruiz RDCS     BSA: 2.2 m2  Height: 62 in  Weight: 270 lb  HR: 82  BP: 171/96 mmHg  ______________________________________________________________________________  Procedure  Complete Echo Adult. Optison (NDC #1418-1214) given intravenously.     ______________________________________________________________________________  Interpretation Summary     BP: 171/96 mmHg.  Mild concentric left ventricular hypertrophy.  Left ventricular systolic function is normal.  The visual ejection fraction is 65-70%.  Grade I or early diastolic dysfunction.  No regional wall motion abnormalities noted.  The right ventricle is normal in size and function.  No significant valve disease.  Normal inferior vena cava.     There is no comparison study available.  ______________________________________________________________________________  Left Ventricle  The " left ventricle is normal in size. There is mild concentric left  ventricular hypertrophy. Left ventricular systolic function is normal. The  visual ejection fraction is 65-70%. Grade I or early diastolic dysfunction. No  regional wall motion abnormalities noted.     Right Ventricle  The right ventricle is normal in size and function.     Atria  Normal left atrial size. Right atrial size is normal.     Mitral Valve  The mitral valve leaflets appear normal. There is no evidence of stenosis,  fluttering, or prolapse. There is trace mitral regurgitation.     Tricuspid Valve  The tricuspid valve is not well visualized. There is trace tricuspid  regurgitation.     Aortic Valve  There is mild trileaflet aortic sclerosis. No aortic regurgitation is present.  No aortic stenosis is present.     Pulmonic Valve  The pulmonic valve is not well visualized. There is trace pulmonic valvular  regurgitation.     Vessels  The aortic root is normal size. The ascending aorta is Borderline dilated. 3.9  cm. The inferior vena cava is normal.     Pericardium  There is no pericardial effusion.     Rhythm  Sinus rhythm was noted.     ______________________________________________________________________________  MMode/2D Measurements & Calculations  IVSd: 1.4 cm  LVIDd: 4.3 cm  LVIDs: 3.4 cm  LVPWd: 1.3 cm  FS: 20.5 %  LV mass(C)d: 217.6 grams  LV mass(C)dI: 100.2 grams/m2  Ao root diam: 3.5 cm  LA dimension: 3.5 cm     asc Aorta Diam: 3.9 cm  LA/Ao: 1.0  LA Volume Index (BP): 21.9 ml/m2  RWT: 0.61     Doppler Measurements & Calculations  MV E max paul: 59.4 cm/sec  MV A max paul: 107.4 cm/sec  MV E/A: 0.55  MV dec slope: 192.1 cm/sec2  MV dec time: 0.31 sec  PA acc time: 0.07 sec  E/E': 8.5     Peak E' Paul: 7.0 cm/sec     ______________________________________________________________________________  Report approved by: Dr Dave Carter 03/18/2022 10:37 AM             If you have any questions or concerns, please call the clinic at the  number listed above.       Sincerely,      LIZZ Perez CNP

## 2022-03-18 NOTE — TELEPHONE ENCOUNTER
Routing refill request to provider for review/approval because:  Drug interaction warning    Edgar Bueno RN  [facility] Triage Nurse

## 2022-03-21 NOTE — RESULT ENCOUNTER NOTE
Called patient with results of Echo - no answer  Has appt on 3/29 with me- will discuss then  LIZZ Perez CNP

## 2022-03-29 ENCOUNTER — OFFICE VISIT (OUTPATIENT)
Dept: INTERNAL MEDICINE | Facility: CLINIC | Age: 72
End: 2022-03-29
Payer: COMMERCIAL

## 2022-03-29 ENCOUNTER — ANCILLARY PROCEDURE (OUTPATIENT)
Dept: GENERAL RADIOLOGY | Facility: CLINIC | Age: 72
End: 2022-03-29
Attending: NURSE PRACTITIONER
Payer: COMMERCIAL

## 2022-03-29 VITALS
SYSTOLIC BLOOD PRESSURE: 120 MMHG | BODY MASS INDEX: 48.51 KG/M2 | TEMPERATURE: 97.6 F | WEIGHT: 269.5 LBS | RESPIRATION RATE: 16 BRPM | OXYGEN SATURATION: 97 % | DIASTOLIC BLOOD PRESSURE: 90 MMHG | HEART RATE: 80 BPM

## 2022-03-29 DIAGNOSIS — I10 ESSENTIAL HYPERTENSION WITH GOAL BLOOD PRESSURE LESS THAN 140/90: ICD-10-CM

## 2022-03-29 DIAGNOSIS — G89.29 CHRONIC LEFT SHOULDER PAIN: ICD-10-CM

## 2022-03-29 DIAGNOSIS — M25.512 CHRONIC LEFT SHOULDER PAIN: ICD-10-CM

## 2022-03-29 DIAGNOSIS — R60.0 LOWER EXTREMITY EDEMA: Primary | ICD-10-CM

## 2022-03-29 LAB
ALBUMIN UR-MCNC: NEGATIVE MG/DL
APPEARANCE UR: CLEAR
BACTERIA #/AREA URNS HPF: ABNORMAL /HPF
BILIRUB UR QL STRIP: NEGATIVE
COLOR UR AUTO: YELLOW
GLUCOSE UR STRIP-MCNC: NEGATIVE MG/DL
HGB UR QL STRIP: NEGATIVE
KETONES UR STRIP-MCNC: NEGATIVE MG/DL
LEUKOCYTE ESTERASE UR QL STRIP: ABNORMAL
NITRATE UR QL: NEGATIVE
PH UR STRIP: 5.5 [PH] (ref 5–7)
RBC #/AREA URNS AUTO: ABNORMAL /HPF
SP GR UR STRIP: >=1.03 (ref 1–1.03)
SQUAMOUS #/AREA URNS AUTO: ABNORMAL /LPF
UROBILINOGEN UR STRIP-ACNC: 0.2 E.U./DL
WBC #/AREA URNS AUTO: ABNORMAL /HPF

## 2022-03-29 PROCEDURE — 81001 URINALYSIS AUTO W/SCOPE: CPT | Performed by: NURSE PRACTITIONER

## 2022-03-29 PROCEDURE — 99214 OFFICE O/P EST MOD 30 MIN: CPT | Performed by: NURSE PRACTITIONER

## 2022-03-29 PROCEDURE — 73030 X-RAY EXAM OF SHOULDER: CPT | Mod: LT | Performed by: RADIOLOGY

## 2022-03-29 RX ORDER — HYDROCHLOROTHIAZIDE 12.5 MG/1
12.5 TABLET ORAL DAILY
Qty: 30 TABLET | Refills: 1 | Status: SHIPPED | OUTPATIENT
Start: 2022-03-29 | End: 2022-05-11

## 2022-03-29 NOTE — PATIENT INSTRUCTIONS
-Hold the amlodipine for now  -START hydrochlorothiazide 12.5 mg daily; this is a mild diuretic.    -Let's see if your swelling improves  -Please check your blood pressure daily- if your blood pressure is over 140/90, then increase the hydrochlorothiazide to 2 pills daily   -Referral to OT for lymphedema therapy to help with leg swelling- someone will call you to schedule  -Referral to Orthopedics for shoulder pain- please get xray on way out of clinic  -Follow up 1 month, for BP check labs, and health maintenance exam

## 2022-03-29 NOTE — PROGRESS NOTES
Assessment & Plan     Lower extremity edema  - Chronic for past year- worse with weight gain.  Echo looked ok.  She has had mild protein in urine, but not nephrotic range.  I suspect swelling is due to weight gain and venous insufficiency, however, amlodipine could be playing a role as well.  - Hold amlodipine for now to see if this helps  - Start hydrochlorothiazide for BP control and swelling  - Referral to Lymphedema therap  - Check UA  - REVIEW OF HEALTH MAINTENANCE PROTOCOL ORDERS  - Lymphedema Therapy Referral  - UA with Microscopic - lab collect  - UA with Microscopic - lab collect    Essential hypertension with goal blood pressure less than 140/90  - Elevated DBP- continue ramipril, labetalol, hold amlodipine due to swelling of her legs, and start hydrochlorothiazide 12.5 mg daily.  Patient was advised to check her blood pressure daily and if her blood pressure is over 140/90, please increase hydrochlorothiazide to 2 pills daily.  - Follow up 1 month for blood pressure check and lab  - hydrochlorothiazide (HYDRODIURIL) 12.5 MG tablet  Dispense: 30 tablet; Refill: 1    Chronic left shoulder pain  - Suspect rotator cuff pathology  - Check Xray  - Referral to Ortho  - XR Shoulder Left 2 Views  - Orthopedic  Referral         See Patient Instructions    Return in about 1 month (around 4/29/2022) for Blood pressure check.    LIZZ Perez CNP  [facility] is a 71 year old who presents for the following health issues      HPI     Pt following up on echo results from 3/18/2022.     LE edema:  Patient is seen in clinic today for a follow-up of echo results and lower extremity edema.  We reviewed her echocardiogram results which were essentially unrevealing other than mild grade 1 diastolic dysfunction.  Lower extremity edema is ongoing and has been present at least for the past year and worse over the past year.  She has gained weight and  has been more sedentary as well.  She is using her CPAP, but reports that her machine has been recalled and has an appointment in April to see her primary sleep provider.    Hypertension:  Diastolic blood pressure slightly above goal.  Patient is maintained on amlodipine 5 mg daily, ramipril 10 mg daily, labetalol 200 mg 3 times daily.    Left shoulder pain:  Reports pain in left shoulder, has been present for quite some time.  Reports having surgery on her right shoulder in the 90s for a bone spur.  She is now unable to raise her arm or pull up her pants due to the pain in her shoulder.    Review of Systems   CONSTITUTIONAL:NEGATIVE for fever, chills, change in weight  RESP:NEGATIVE for significant cough or SOB  CV: NEGATIVE for chest pain, palpitations; Positive for ongoing LE edema  MUSCULOSKELETAL: POSITIVE  for left shoulder pain; unable to fully raise, hard to put on pants      Objective    BP (!) 120/90 (BP Location: Left arm, Patient Position: Chair, Cuff Size: Adult Regular)   Pulse 80   Temp 97.6  F (36.4  C) (Oral)   Resp 16   Wt 122.2 kg (269 lb 8 oz)   SpO2 97%   BMI 48.51 kg/m    Body mass index is 48.51 kg/m .     Physical Exam   GENERAL APPEARANCE: healthy, alert and no distress  EYES: Eyes grossly normal to inspection, PERRL and conjunctivae and sclerae normal  RESP: lungs clear to auscultation - no rales, rhonchi or wheezes  CV: regular rates and rhythm, normal S1 S2, no S3 or S4 and no murmur, click or rub  ABDOMEN: soft, nontender, without hepatosplenomegaly or masses and bowel sounds normal  MS: Left posterior shoulder TTP; able to elevate, abduct to 90 degrees before pain occurs; painful internal rotation; + empty can test on left  SKIN: no suspicious lesions or rashes  PSYCH: mentation appears normal and affect normal/bright    Reviewed Echo results:  Interpretation Summary     BP: 171/96 mmHg.  Mild concentric left ventricular hypertrophy.  Left ventricular systolic function is  normal.  The visual ejection fraction is 65-70%.  Grade I or early diastolic dysfunction.  No regional wall motion abnormalities noted.  The right ventricle is normal in size and function.  No significant valve disease.  Normal inferior vena cava.     There is no comparison study available.

## 2022-03-30 ENCOUNTER — TELEPHONE (OUTPATIENT)
Dept: INTERNAL MEDICINE | Facility: CLINIC | Age: 72
End: 2022-03-30
Payer: COMMERCIAL

## 2022-03-30 NOTE — TELEPHONE ENCOUNTER
Call to patient re: shoulder xray results.                                                                   IMPRESSION: Anatomic alignment left shoulder. No acute displaced shoulder fracture. Subacromial spurring. No significant acromioclavicular joint space narrowing. Mild-moderate glenohumeral joint osteoarthritis.    Also reviewed UA results  -Continue with plan for OT/lymphedema  -Continue with plan for Orthopedics    LIZZ Perez CNP

## 2022-04-12 ENCOUNTER — TELEPHONE (OUTPATIENT)
Dept: INTERNAL MEDICINE | Facility: CLINIC | Age: 72
End: 2022-04-12
Payer: COMMERCIAL

## 2022-04-14 ENCOUNTER — OFFICE VISIT (OUTPATIENT)
Dept: ORTHOPEDICS | Facility: CLINIC | Age: 72
End: 2022-04-14
Attending: NURSE PRACTITIONER
Payer: COMMERCIAL

## 2022-04-14 VITALS — SYSTOLIC BLOOD PRESSURE: 122 MMHG | DIASTOLIC BLOOD PRESSURE: 86 MMHG

## 2022-04-14 DIAGNOSIS — M25.512 CHRONIC LEFT SHOULDER PAIN: ICD-10-CM

## 2022-04-14 DIAGNOSIS — G89.29 CHRONIC LEFT SHOULDER PAIN: ICD-10-CM

## 2022-04-14 PROCEDURE — 99204 OFFICE O/P NEW MOD 45 MIN: CPT | Performed by: FAMILY MEDICINE

## 2022-04-14 RX ORDER — CELECOXIB 100 MG/1
100 CAPSULE ORAL 2 TIMES DAILY
Qty: 60 CAPSULE | Refills: 0 | Status: SHIPPED | OUTPATIENT
Start: 2022-04-14 | End: 2022-05-10

## 2022-04-14 NOTE — LETTER
"    4/14/2022         RE: Bridget Lawson  [address]  [facility] [address]        Dear Colleague,    Thank you for referring your patient, Bridget Lawson, to the [facility] [place]. Please see a copy of my visit note below.    CHIEF COMPLAINT:  Pain of the Left Shoulder       HISTORY OF PRESENT ILLNESS  Ms. Lawson is a pleasant 71 year old year old female who presents to clinic today.  Bridget explains that chronic left shoulder pain that is getting worse. Trouble with ADL at this time which lead to the visit. Patient is right hand dominant.     Onset: gradual  Location: left shoulder  Quality:  dull  Duration: 20 years   Severity: 6-7/10 at worst  Timing:worse since couple of years  Modifying factors:  resting and non-use makes it better, movement and use makes it worse  Associated signs & symptoms: pain  Previous similar pain: Yes, right shoulder. Had surgery for bones spurs  Treatments to date:adjust sleep and modified activities to avoid pain    Additional history: a remote history of right shoulder surgery to remove \"bone spur\"    Review of Systems:    Have you recently had a a fever, chills, weight loss? No    Do you have any vision problems? No    Do you have any chest pain or edema? No    Do you have any shortness of breath or wheezing?  No    Do you have stomach problems? No    Do you have any numbness or focal weakness? No    Do you have diabetes? No    Do you have problems with bleeding or clotting? No    Do you have an rashes or other skin lesions? No    MEDICAL HISTORY  Patient Active Problem List   Diagnosis     Anemia     Obstructive sleep apnea     Primary hyperparathyroidism (H)     Acute gastric ulcer with hemorrhage     Late effects of cerebrovascular disease     Other osteoporosis without current pathological fracture     Hyperlipidemia with target LDL less than 100     Preventive measure     Osteopenia     Intracranial hemorrhage (H)     Pain in left " foot     Arthritis of right knee     Dysphagia     Memory loss     Essential hypertension with goal blood pressure less than 130/80     Personal history of tobacco use, presenting hazards to health     Family history of malignant neoplasm of breast     Traumatic rupture of right posterior tibial tendon, subsequent encounter     PND (post-nasal drip)     Obesity (BMI 35.0-39.9) with comorbidity (H)     History of gastric ulcer     Carpal tunnel syndrome of right wrist     Hand arthritis     NSAID long-term use     Urinary frequency     Urinary urgency     History of stroke       Current Outpatient Medications   Medication Sig Dispense Refill     Acetaminophen (TYLENOL PO) Take 500 mg by mouth every 6 hours as needed for mild pain or fever        amLODIPine (NORVASC) 5 MG tablet TAKE 1 TABLET BY MOUTH ONCE DAILY 90 tablet 3     aspirin  MG EC tablet Take 1 tablet (325 mg) by mouth daily 60 tablet 0     bisacodyl (DULCOLAX) 5 MG EC tablet Take 5 mg by mouth every other day       Calcium Carb-Cholecalciferol (CALTRATE 600+D) 600-800 MG-UNIT TABS Take 1 tablet by mouth 2 times daily        calcium carbonate (TUMS) 500 MG chewable tablet Take 1 chew tab by mouth daily       carboxymethylcellulose PF (REFRESH LIQUIGEL) 1 % ophthalmic gel 1 drop 3 times daily       estradiol (ESTRACE) 0.1 MG/GM vaginal cream APPLY A SMALL AMOUNT TO THE VAGINAL OPENING AND URETHRA MON, WED, AND FRI AT BEDTIME 43 g 1     fluticasone (FLONASE) 50 MCG/ACT spray Spray 1 spray into both nostrils At Bedtime       GLUCOSAMINE-CHONDROITIN PO Take 1 tablet by mouth 2 times daily 1500/1200 mg tabs       hydrochlorothiazide (HYDRODIURIL) 12.5 MG tablet Take 1 tablet (12.5 mg) by mouth daily 30 tablet 1     ibuprofen (ADVIL/MOTRIN) 200 MG tablet Take 200 mg by mouth every 4 hours as needed for mild pain       isosorbide mononitrate (IMDUR) 60 MG 24 hr tablet Take 1 tablet (60 mg) by mouth daily 90 tablet 3     ketoconazole (NIZORAL) 2 % external  cream Apply to affected area twice daily as needed 30 g 0     labetalol (NORMODYNE) 200 MG tablet TAKE 1 TABLET BY MOUTH THREE TIMES DAILY 90 tablet 3     MAGNESIUM GLUCONATE PO Take 400 mg by mouth daily       MELATONIN PO Take 10 mg by mouth nightly as needed        Multiple Vitamins-Minerals (CENTRUM SILVER ULTRA WOMENS PO) Take 1 tablet by mouth daily       NONFORMULARY Apply to affected area daily PERIO GEL (contains hydrogen peroxide 1.7%)  - Apply to Periotray        omeprazole (PRILOSEC) 20 MG DR capsule Take 1 capsule (20 mg) by mouth daily 60 capsule 1     ORDER FOR DME Use your CPAP device as directed by your provider.       ramipril (ALTACE) 10 MG capsule Take 1 capsule by mouth once daily 90 capsule 3     rosuvastatin (CRESTOR) 20 MG tablet TAKE 1 TABLET BY MOUTH ONCE DAILY,REPLACES PRAVASTATIN 90 tablet 3     sennosides (SENOKOT) 8.6 MG tablet Take 2 tablets by mouth every other day       solifenacin (VESICARE) 10 MG tablet Take 1 tablet (10 mg) by mouth daily 90 tablet 4     Zoledronic Acid (RECLAST IV) Once yearly infusion         Allergies   Allergen Reactions     Animal Dander        Family History   Problem Relation Age of Onset     Breast Cancer Mother          CA; masectomy left; asthma;  in 2015 at age 97     Breast Cancer Maternal Grandmother         possible CA; masectomy     Breast Cancer Paternal Aunt         possible CA, masectomy     Respiratory Father         COPD     Lipids Brother        Additional medical/Social/Surgical histories reviewed in [facility] and updated as appropriate.       PHYSICAL EXAM  There were no vitals taken for this visit.    General  - normal appearance, in no obvious distress  Musculoskeletal -Left shoulder  - inspection: normal bone and joint alignment, no obvious deformity, no scapular winging, no AC step-off  - palpation: mildly tender RC insertion, normal clavicle, non-tender AC  - ROM:  painful and limited flexion and ER at end range, limited IR, and  abduction limited.  - strength: 5/5  strength, 5/5 in all shoulder planes  - special tests:  (-) Speed's  (+) Neer  (+) Hawkin's  (+) Shashi's  (-) [place]'s  (-) apprehension  (-) subscap lift-off  Neuro  - no sensory or motor deficit, grossly normal coordination, normal muscle tone  Skin  - no ecchymosis, erythema, warmth, or induration, no obvious rash  Psych  - interactive, appropriate, normal mood and affect     IMAGING :   LEFT SHOULDER TWO VIEWS  3/29/2022 12:38 PM     INDICATION: Left shoulder pain.     COMPARISON: None available.                                                                      IMPRESSION: Anatomic alignment left shoulder. No acute displaced  shoulder fracture. Subacromial spurring. No significant  acromioclavicular joint space narrowing. Mild-moderate glenohumeral  joint osteoarthritis.     JOSUE MCGILL MD      ASSESSMENT & PLAN  Ms. Lawson is a 71 year old year old female who presents to clinic today with chronic left shoulder pain over the past decade.  XR revealing moderate osteoarthritis of left shoulder.  Concern for possible rotator cuff arthropathy with chronic rotator cuff tears.      Diagnosis:   Chronic pain of left shoulder  History of GI bleed    Further diagnostic and treatment options discussed for chronic pain of left shoulder.  Clinical examination and x-rays is most consistent with rotator cuff arthropathy.  At this point in time, we discussed proceeding with MRI to look at the status of the cuff.  She is quite adamant about pursuing some sort of surgical intervention for this.  I did discuss that especially with her name moderate grade osteoarthritis, arthroplasty would not be indicated at this time.  I did not provide alternative options for her to think about, including formal physical therapy, glenohumeral injection under ultrasound guidance, as well as oral analgesics.    She would like to proceed with MRI and I provided this order.  In the meantime I have  started her on Celebrex, as she does take ibuprofen frequently and has a history of GI bleed with hemorrhage.  Celebrex will be protective for her in this manner.  Follow-up in person to discuss MRI in greater detail.      It was a pleasure seeing Bridget today.    Raji Bender DO, [facility]  Primary Care Sports Medicine        Again, thank you for allowing me to participate in the care of your patient.        Sincerely,        Raji Bender DO

## 2022-04-14 NOTE — PROGRESS NOTES
"CHIEF COMPLAINT:  Pain of the Left Shoulder       HISTORY OF PRESENT ILLNESS  Ms. Lawson is a pleasant 71 year old year old female who presents to clinic today.  Bridget explains that chronic left shoulder pain that is getting worse. Trouble with ADL at this time which lead to the visit. Patient is right hand dominant.     Onset: gradual  Location: left shoulder  Quality:  dull  Duration: 20 years   Severity: 6-7/10 at worst  Timing:worse since couple of years  Modifying factors:  resting and non-use makes it better, movement and use makes it worse  Associated signs & symptoms: pain  Previous similar pain: Yes, right shoulder. Had surgery for bones spurs  Treatments to date:adjust sleep and modified activities to avoid pain    Additional history: a remote history of right shoulder surgery to remove \"bone spur\"    Review of Systems:    Have you recently had a a fever, chills, weight loss? No    Do you have any vision problems? No    Do you have any chest pain or edema? No    Do you have any shortness of breath or wheezing?  No    Do you have stomach problems? No    Do you have any numbness or focal weakness? No    Do you have diabetes? No    Do you have problems with bleeding or clotting? No    Do you have an rashes or other skin lesions? No    MEDICAL HISTORY  Patient Active Problem List   Diagnosis     Anemia     Obstructive sleep apnea     Primary hyperparathyroidism (H)     Acute gastric ulcer with hemorrhage     Late effects of cerebrovascular disease     Other osteoporosis without current pathological fracture     Hyperlipidemia with target LDL less than 100     Preventive measure     Osteopenia     Intracranial hemorrhage (H)     Pain in left foot     Arthritis of right knee     Dysphagia     Memory loss     Essential hypertension with goal blood pressure less than 130/80     Personal history of tobacco use, presenting hazards to health     Family history of malignant neoplasm of breast     Traumatic rupture " of right posterior tibial tendon, subsequent encounter     PND (post-nasal drip)     Obesity (BMI 35.0-39.9) with comorbidity (H)     History of gastric ulcer     Carpal tunnel syndrome of right wrist     Hand arthritis     NSAID long-term use     Urinary frequency     Urinary urgency     History of stroke       Current Outpatient Medications   Medication Sig Dispense Refill     Acetaminophen (TYLENOL PO) Take 500 mg by mouth every 6 hours as needed for mild pain or fever        amLODIPine (NORVASC) 5 MG tablet TAKE 1 TABLET BY MOUTH ONCE DAILY 90 tablet 3     aspirin  MG EC tablet Take 1 tablet (325 mg) by mouth daily 60 tablet 0     bisacodyl (DULCOLAX) 5 MG EC tablet Take 5 mg by mouth every other day       Calcium Carb-Cholecalciferol (CALTRATE 600+D) 600-800 MG-UNIT TABS Take 1 tablet by mouth 2 times daily        calcium carbonate (TUMS) 500 MG chewable tablet Take 1 chew tab by mouth daily       carboxymethylcellulose PF (REFRESH LIQUIGEL) 1 % ophthalmic gel 1 drop 3 times daily       estradiol (ESTRACE) 0.1 MG/GM vaginal cream APPLY A SMALL AMOUNT TO THE VAGINAL OPENING AND URETHRA MON, WED, AND FRI AT BEDTIME 43 g 1     fluticasone (FLONASE) 50 MCG/ACT spray Spray 1 spray into both nostrils At Bedtime       GLUCOSAMINE-CHONDROITIN PO Take 1 tablet by mouth 2 times daily 1500/1200 mg tabs       hydrochlorothiazide (HYDRODIURIL) 12.5 MG tablet Take 1 tablet (12.5 mg) by mouth daily 30 tablet 1     ibuprofen (ADVIL/MOTRIN) 200 MG tablet Take 200 mg by mouth every 4 hours as needed for mild pain       isosorbide mononitrate (IMDUR) 60 MG 24 hr tablet Take 1 tablet (60 mg) by mouth daily 90 tablet 3     ketoconazole (NIZORAL) 2 % external cream Apply to affected area twice daily as needed 30 g 0     labetalol (NORMODYNE) 200 MG tablet TAKE 1 TABLET BY MOUTH THREE TIMES DAILY 90 tablet 3     MAGNESIUM GLUCONATE PO Take 400 mg by mouth daily       MELATONIN PO Take 10 mg by mouth nightly as needed         Multiple Vitamins-Minerals (CENTRUM SILVER ULTRA WOMENS PO) Take 1 tablet by mouth daily       NONFORMULARY Apply to affected area daily PERIO GEL (contains hydrogen peroxide 1.7%)  - Apply to Periotray        omeprazole (PRILOSEC) 20 MG DR capsule Take 1 capsule (20 mg) by mouth daily 60 capsule 1     ORDER FOR DME Use your CPAP device as directed by your provider.       ramipril (ALTACE) 10 MG capsule Take 1 capsule by mouth once daily 90 capsule 3     rosuvastatin (CRESTOR) 20 MG tablet TAKE 1 TABLET BY MOUTH ONCE DAILY,REPLACES PRAVASTATIN 90 tablet 3     sennosides (SENOKOT) 8.6 MG tablet Take 2 tablets by mouth every other day       solifenacin (VESICARE) 10 MG tablet Take 1 tablet (10 mg) by mouth daily 90 tablet 4     Zoledronic Acid (RECLAST IV) Once yearly infusion         Allergies   Allergen Reactions     Animal Dander        Family History   Problem Relation Age of Onset     Breast Cancer Mother          [place]; masectomy left; asthma;  in 2015 at age 97     Breast Cancer Maternal Grandmother         possible CA; masectomy     Breast Cancer Paternal Aunt         possible CA, masectomy     Respiratory Father         COPD     Lipids Brother        Additional medical/Social/Surgical histories reviewed in [facility] and updated as appropriate.       PHYSICAL EXAM  There were no vitals taken for this visit.    General  - normal appearance, in no obvious distress  Musculoskeletal -Left shoulder  - inspection: normal bone and joint alignment, no obvious deformity, no scapular winging, no AC step-off  - palpation: mildly tender RC insertion, normal clavicle, non-tender AC  - ROM:  painful and limited flexion and ER at end range, limited IR, and abduction limited.  - strength: 5/5  strength, 5/5 in all shoulder planes  - special tests:  (-) Speed's  (+) Neer  (+) Hawkin's  (+) Shashi's  (-) [place]'s  (-) apprehension  (-) subscap lift-off  Neuro  - no sensory or motor deficit, grossly normal coordination, normal  muscle tone  Skin  - no ecchymosis, erythema, warmth, or induration, no obvious rash  Psych  - interactive, appropriate, normal mood and affect     IMAGING :   LEFT SHOULDER TWO VIEWS  3/29/2022 12:38 PM     INDICATION: Left shoulder pain.     COMPARISON: None available.                                                                      IMPRESSION: Anatomic alignment left shoulder. No acute displaced  shoulder fracture. Subacromial spurring. No significant  acromioclavicular joint space narrowing. Mild-moderate glenohumeral  joint osteoarthritis.     JOSUE MCGILL MD      ASSESSMENT & PLAN  Ms. Lawson is a 71 year old year old female who presents to clinic today with chronic left shoulder pain over the past decade.  XR revealing moderate osteoarthritis of left shoulder.  Concern for possible rotator cuff arthropathy with chronic rotator cuff tears.      Diagnosis:   Chronic pain of left shoulder  History of GI bleed    Further diagnostic and treatment options discussed for chronic pain of left shoulder.  Clinical examination and x-rays is most consistent with rotator cuff arthropathy.  At this point in time, we discussed proceeding with MRI to look at the status of the cuff.  She is quite adamant about pursuing some sort of surgical intervention for this.  I did discuss that especially with her name moderate grade osteoarthritis, arthroplasty would not be indicated at this time.  I did not provide alternative options for her to think about, including formal physical therapy, glenohumeral injection under ultrasound guidance, as well as oral analgesics.    She would like to proceed with MRI and I provided this order.  In the meantime I have started her on Celebrex, as she does take ibuprofen frequently and has a history of GI bleed with hemorrhage.  Celebrex will be protective for her in this manner.  Follow-up in person to discuss MRI in greater detail.      It was a pleasure seeing Bridget today.    Raji Bender,  [facility]  Primary Care Sports Medicine

## 2022-04-18 ENCOUNTER — OFFICE VISIT (OUTPATIENT)
Dept: SLEEP MEDICINE | Facility: CLINIC | Age: 72
End: 2022-04-18
Payer: COMMERCIAL

## 2022-04-18 VITALS
HEART RATE: 67 BPM | HEIGHT: 63 IN | WEIGHT: 264 LBS | OXYGEN SATURATION: 96 % | DIASTOLIC BLOOD PRESSURE: 86 MMHG | BODY MASS INDEX: 46.78 KG/M2 | SYSTOLIC BLOOD PRESSURE: 149 MMHG

## 2022-04-18 DIAGNOSIS — G47.33 OSA (OBSTRUCTIVE SLEEP APNEA): Primary | ICD-10-CM

## 2022-04-18 PROCEDURE — 99215 OFFICE O/P EST HI 40 MIN: CPT | Performed by: PSYCHIATRY & NEUROLOGY

## 2022-04-18 NOTE — PATIENT INSTRUCTIONS
We advise you to purchase a light box with 10,000 lux of fluorescent,  UV-free  light. Look at it for minimal 30 minutes a day when you wake up.    We advise you to take low dose 1mg melatonin by mouth every evening about 12 hours before desired wake up time.  For example, if you want to wake up at 8 a.m., you may take it at 8 p.m.

## 2022-04-18 NOTE — PROGRESS NOTES
[facility]  Patient Name: Bridget Lawson  MRN: [id number]  : 1950  Date of Clinic Visit: 2022  Primary Care Provider: Erin Pickens      FOLLOW-UP FOR: Obstructive sleep apnea compliance follow-up    VISIT TYPE: In Personal Encounter    INTERVAL HISTORY:  Bridget Lawson is a 71 year old female presenting for follow-up for obstructive sleep apnea treated with auto titrating CPAP.    This is a patient with history of obstructive sleep apnea diagnosed in  through in lab polysomnography performed at [facility] (we do not have official report in the system treated with auto titrating CPAP, hemorrhagic stroke, hypertension, obesity with BMI 47.52, memory loss.    The most recent CPAP compliance report reviewed  3/18/2022-  30 -day average residual AHI 1. 9 events per hour  Average EPAP pressure 90th percentile: 16.6  CPAP settin-17 cm H2O  Percentage of days used greater than 4 hours: 93.8%  Average usage: 8 hours 28 minutes  Humidifier: 3    Patient is continuing to find clinical benefits of CPAP use including consolidated sleep, restorative sleep, improved daytime energy level.  However she notices intermittent daytime sleepinessdespite nightly use of CPAP with excellent residual AHI of 1.9 events per hour.  She is using so-clean CPAP  reportedly.  The original polysomnography performed in  demonstrated AHI greater than 70 events per hour.    Her current bedroom time is at 1: 30 AM.  She reports that she takes 10 mg of melatonin on a nightly basis.  Her sleep latency is 20 minutes.  She likes to go to bed at this late because she likes to watch TV shows and movies.  Nocturnal awakening wise she reports she wakes up about 5 times a night to use restroom.  No difficulty falling back asleep.  Wake up time on average is at 10 AM.  She takes naps 4-5 times a week that last about 30 minutes.  They are refreshing.  She is currently not driving automobiles.   Sometimes she dozes off while sitting in her recliner in the daytime.      Physical Examination:  General: no distress, cooperative , pleasant  HEENT: EOMI, no stridor  Airflow at nares:  clear bilaterally  Cardiac: no evidence of circulatory collapse  Pulmonary: no sign of gross respiratory distress  Abdomen: Slightly distended  Skin: no rashes or lesions  Extremities: no peripheral edema or cyanosis         REVIEW OF SYSTEMS: 12-point RoS negative except as per HPI.      MEDICATIONS:  Current Outpatient Medications   Medication Sig Dispense Refill     Acetaminophen (TYLENOL PO) Take 500 mg by mouth every 6 hours as needed for mild pain or fever        amLODIPine (NORVASC) 5 MG tablet TAKE 1 TABLET BY MOUTH ONCE DAILY (Patient not taking: Reported on 4/14/2022) 90 tablet 3     aspirin  MG EC tablet Take 1 tablet (325 mg) by mouth daily 60 tablet 0     bisacodyl (DULCOLAX) 5 MG EC tablet Take 5 mg by mouth every other day       Calcium Carb-Cholecalciferol (CALTRATE 600+D) 600-800 MG-UNIT TABS Take 1 tablet by mouth 2 times daily        calcium carbonate (TUMS) 500 MG chewable tablet Take 1 chew tab by mouth daily       carboxymethylcellulose PF (REFRESH LIQUIGEL) 1 % ophthalmic gel 1 drop 3 times daily       celecoxib (CELEBREX) 100 MG capsule Take 1 capsule (100 mg) by mouth 2 times daily 60 capsule 0     estradiol (ESTRACE) 0.1 MG/GM vaginal cream APPLY A SMALL AMOUNT TO THE VAGINAL OPENING AND URETHRA MON, WED, AND FRI AT BEDTIME 43 g 1     fluticasone (FLONASE) 50 MCG/ACT spray Spray 1 spray into both nostrils At Bedtime       GLUCOSAMINE-CHONDROITIN PO Take 1 tablet by mouth 2 times daily 1500/1200 mg tabs       hydrochlorothiazide (HYDRODIURIL) 12.5 MG tablet Take 1 tablet (12.5 mg) by mouth daily 30 tablet 1     ibuprofen (ADVIL/MOTRIN) 200 MG tablet Take 200 mg by mouth every 4 hours as needed for mild pain       isosorbide mononitrate (IMDUR) 60 MG 24 hr tablet Take 1 tablet (60 mg) by mouth daily 90  tablet 3     ketoconazole (NIZORAL) 2 % external cream Apply to affected area twice daily as needed 30 g 0     labetalol (NORMODYNE) 200 MG tablet TAKE 1 TABLET BY MOUTH THREE TIMES DAILY 90 tablet 3     MAGNESIUM GLUCONATE PO Take 400 mg by mouth daily       MELATONIN PO Take 10 mg by mouth nightly as needed        Multiple Vitamins-Minerals (CENTRUM SILVER ULTRA WOMENS PO) Take 1 tablet by mouth daily       NONFORMULARY Apply to affected area daily PERIO GEL (contains hydrogen peroxide 1.7%)  - Apply to Periotray        omeprazole (PRILOSEC) 20 MG DR capsule Take 1 capsule (20 mg) by mouth daily 60 capsule 1     ORDER FOR DME Use your CPAP device as directed by your provider.       ramipril (ALTACE) 10 MG capsule Take 1 capsule by mouth once daily 90 capsule 3     rosuvastatin (CRESTOR) 20 MG tablet TAKE 1 TABLET BY MOUTH ONCE DAILY,REPLACES PRAVASTATIN 90 tablet 3     sennosides (SENOKOT) 8.6 MG tablet Take 2 tablets by mouth every other day       solifenacin (VESICARE) 10 MG tablet Take 1 tablet (10 mg) by mouth daily 90 tablet 4     Zoledronic Acid (RECLAST IV) Once yearly infusion               ASSESSMENT AND PLAN:    Bridget Lawson is a 71 year old female with history of hemorrhagic stroke, memory loss, systemic hypertension presenting for a follow-up visit for     Severe obstructive sleep apnea  Residue JOHNY associated excessive daytime sleepiness  -Baseline AHI greater than 70 events per hour demonstrated on the original polysomnography performed in 2010  --Excellent residual AHI of 1.9 events per hour demonstrated on the compliance report  We will continue auto titrating current pressure setting 12-17 cm H2O  -She has registered her Nutrisystem CPAP device.  We informed her that the company will get back to you once on new replacement machine is ready.  Risk to benefit profile discussed.  Continuation of CPAP therapy is recommended given her history of hemorrhagic stroke and ASCVD  profile.  -Advised her to stop using Soclean CPAP .  Advised her to clean her CPAP hose and apparatus with regular soap such as Dove or Martir [employer] Martir baby  with running water  -If patient continues to experience excessive daytime sleepiness despite JOHNY continue to be well controlled, other conditions which could contribute to EDS such as hypovitaminosis D, iron deficiency which we advised the patient to follow-up with her primary care provider as soon as possible.  If non responsive or unrevealing we could consider stimulant therapy in the future.     Delayed sleep-wake phase  -Following recommendations were provided and noted in the after visit summary:  We advise you to purchase a light box with 10,000 lux of fluorescent,  UV-free  light. Look at it for minimal 30 minutes a day when you wake up.  We advise you to take low dose 1mg melatonin by mouth every evening about 12 hours before desired wake up time.  For example, if you want to wake up at 8 a.m., you may take it at 8 p.m.  Return to clinic in approximately 1 year or sooner as clinically indicated    Suboptimally controlled hypertension: Initial blood pressure 149/86, repeat 146/87 in the clinic today  -Advised the patient to follow-up with her primary care provider as soon as possible to go over suboptimally controlled blood pressure.  The patient is asymptomatic no reported headache, blurry vision, dizziness.    The patient verbalized comprehension of care plans.    Patient advised to never drive if tired or sleepy.    Time was given for questions and I answered best to my ability.      Voice recognition software was used to chart this note.  Although proofreading has been performed, minor errors might be present.  Please do not hesitate to contact me with any question or concern.    The patient was evaluated and care plan was formulated with the Sleep Medicine Attending Physician Dr. Sandhu.    Matt Al M.D.  Sleep Medicine  Fellow  [facility]    Sleep Staff Addendum    I have seen and evaluated the patient on 4-18 with the sleep fellow and agree with the documentation.  I supervised the fellow during the visit.      In addition to direct face-to-face time I also personally spent additionally time in chart work preparation and also after the visit in documentation, placing orders and coordination of care.      Total time (face-to-face time + chart work time) spent in the care of the patient on 4-18 was greater than 40 minutes.     Wale Sandhu MD

## 2022-04-18 NOTE — NURSING NOTE
"Chief Complaint   Patient presents with     CPAP Follow Up     Machine on recall       Initial BP (!) 149/86   Pulse 67   Ht 1.588 m (5' 2.5\")   Wt 119.7 kg (264 lb)   SpO2 96%   BMI 47.52 kg/m   Estimated body mass index is 47.52 kg/m  as calculated from the following:    Height as of this encounter: 1.588 m (5' 2.5\").    Weight as of this encounter: 119.7 kg (264 lb).    Medication Reconciliation: complete  ESS 8  Nida Holley, CMA  "

## 2022-04-22 DIAGNOSIS — L30.4 INTERTRIGO: ICD-10-CM

## 2022-04-25 RX ORDER — KETOCONAZOLE 20 MG/G
CREAM TOPICAL
Qty: 30 G | Refills: 11 | Status: SHIPPED | OUTPATIENT
Start: 2022-04-25 | End: 2023-05-01

## 2022-04-25 NOTE — TELEPHONE ENCOUNTER
Prescription approved per [facility] refill protocol.   Viktoria Feliciano RN

## 2022-04-30 ENCOUNTER — HOSPITAL ENCOUNTER (OUTPATIENT)
Dept: MRI IMAGING | Facility: CLINIC | Age: 72
Discharge: HOME | End: 2022-04-30
Attending: FAMILY MEDICINE | Admitting: FAMILY MEDICINE
Payer: COMMERCIAL

## 2022-04-30 DIAGNOSIS — G89.29 CHRONIC LEFT SHOULDER PAIN: ICD-10-CM

## 2022-04-30 DIAGNOSIS — M25.512 CHRONIC LEFT SHOULDER PAIN: ICD-10-CM

## 2022-04-30 PROCEDURE — 73221 MRI JOINT UPR EXTREM W/O DYE: CPT | Mod: LT

## 2022-04-30 PROCEDURE — 73221 MRI JOINT UPR EXTREM W/O DYE: CPT | Mod: 26 | Performed by: RADIOLOGY

## 2022-05-07 DIAGNOSIS — G89.29 CHRONIC LEFT SHOULDER PAIN: ICD-10-CM

## 2022-05-07 DIAGNOSIS — M25.512 CHRONIC LEFT SHOULDER PAIN: ICD-10-CM

## 2022-05-09 ENCOUNTER — HOSPITAL ENCOUNTER (OUTPATIENT)
Dept: OCCUPATIONAL THERAPY | Facility: CLINIC | Age: 72
Discharge: HOME | End: 2022-05-09
Attending: NURSE PRACTITIONER
Payer: COMMERCIAL

## 2022-05-09 DIAGNOSIS — I89.0 LYMPHEDEMA OF BOTH LOWER EXTREMITIES: Primary | ICD-10-CM

## 2022-05-09 DIAGNOSIS — I10 ESSENTIAL HYPERTENSION WITH GOAL BLOOD PRESSURE LESS THAN 140/90: ICD-10-CM

## 2022-05-09 DIAGNOSIS — R60.0 LOWER EXTREMITY EDEMA: ICD-10-CM

## 2022-05-09 PROCEDURE — 97140 MANUAL THERAPY 1/> REGIONS: CPT | Mod: GO | Performed by: OCCUPATIONAL THERAPIST

## 2022-05-09 PROCEDURE — 97165 OT EVAL LOW COMPLEX 30 MIN: CPT | Mod: GO | Performed by: OCCUPATIONAL THERAPIST

## 2022-05-09 NOTE — PROGRESS NOTES
[facility]        OUTPATIENT OCCUPATIONAL THERAPY EDEMA EVALUATION  PLAN OF TREATMENT FOR OUTPATIENT REHABILITATION  (COMPLETE FOR INITIAL CLAIMS ONLY)  Patient's Last Name, First Name, Bridget Galarza                           Provider s Name:   [facility] Medical Record No.  [id number]     Start of Care Date:  05/09/22   Onset Date:  03/29/22   Type:  OT   Medical Diagnosis:  lymphedema   Therapy Diagnosis:  lymphedema Visits from SOC:  1                                     __________________________________________________________________________________   Plan of Treatment/Functional Goals:    Manual lymph drainage, Gradient compression bandaging, Fit for compression garment, Exercises, Precautions to prevent infection / exacerbation, Education, Skin care / precautions, Home management program development        GOALS  1. Goal description: Pt will report understanding s/s cellulitis vs s/s lymphedema for knowledge base needed to manage from home independantly       Target date: 08/05/22  2. Goal description: Tolerate gradient compression bandaging/wearing compression garments 23 hrs/day to prevent re-acccumulation of extracellular fluid for max reductions in BLE lymphedema needed to reduce risk skin infections starting and promote less weight in legs for better walking       Target date: 08/05/22  3. Goal description: Demonstrate independence in applying gradient compression bandages  for I building with home management of BLE lymphedema needed to reduce weight in legs for better walking and reduce risk skin infections forming       Target date: 08/05/22  4. Goal description: Demonstrate independence in performing prescribed exercises to facilate the lymph system and muscle pumping systems for max reductions in BLE lymphedema needed to reduce risk skin  infections starting and promote less weight in legs for better walking       Target date: 08/05/22  5. Goal description: Be independent in donning/doffing, wearing schedule, and care of compression garments for I building with home management of BLE lymphedema needed to reduce weight in legs for better walking and reduce risk skin infections forming       Target date: 08/05/22  6. Goal description: Pt will display a total BLE volume reduction of .7L+ for reductions needed to reduce risk skin infections and help promote more ease with mobility tasks       Target date: 08/05/22     7.             8.              Treatment Frequency: 3x/week   Treatment duration: 3x/wk x 4 weeks, then 0x/wk x 3 weeks, then 1x/wk x 1 week    Demetri Tucker I CERTIFY THE NEED FOR THESE SERVICES FURNISHED UNDER        THIS PLAN OF TREATMENT AND WHILE UNDER MY CARE     (Physician co-signature of this document indicates review and certification of the therapy plan).                   Certification date from: 05/09/22       Certification date to: 08/05/22           Referring physician: Erin Pickens   Initial Assessment  See Epic Evaluation- Start of care: 05/09/22

## 2022-05-09 NOTE — PROGRESS NOTES
05/09/22 1500   Quick Adds   Quick Adds Certification   Rehab Discipline   Discipline OT   Type of Visit   Type of visit Initial Edema Evaluation   General Information   Start of care 05/09/22   Referring physician Erin Pickens   Orders Evaluate and treat as indicated   Order date 03/29/22   Medical diagnosis lymphedema   Onset of illness / date of surgery 03/29/22   Edema onset 03/29/22   Affected body parts RLE;LLE   Edema etiology Chronic Venous Insufficiency  (obesity; reduced activity; B knee arthritis)   Edema etiology comments Pts BLE lymphedema is of unknown origin. She reports no start to this swelling until later in life ruling out primary componenet to her situation. Recent cardiac workup was unremarkable and pt has not been through any cancer care history. referring providers note indicates suspicions venous insufficiency and weight gain primary reasons for BLE lymphedema. pt does reports knee pain with R greater than l and possible inflammation and joint irritation leading to reduced activity and swelling too.   Pertinent history of current problem (PT: include personal factors and/or comorbidities that impact the POC; OT: include additional occupational profile info) PMH significant for rotator cuff issues R shoulder, obesity, and BLE lymphedema.   Surgical / medical history reviewed Yes   Prior level of functional mobility Mod I  (SEC)   Prior treatment Compression garments;Elevation   Community support Family / friend caregiver   Patient role / employment history Disabled;Retired   Psychosocial concerns   (pt needs spousal support for therapy engagement)   Living environment [place] [facility]   Current assistive devices   (SEC)   Fall Risk Screen   Fall screen completed by OT   Have you fallen 2 or more times in the past year? No   Have you fallen and had an injury in the past year? No   Is patient a fall risk? No   Abuse Screen (yes response referral indicated)   Feels Unsafe at Home or  Work/School no   Feels Threatened by Someone no   Does Anyone Try to Keep You From Having Contact with Others or Doing Things Outside Your Home? no   Physical Signs of Abuse Present no   Patient needs abuse support services and resources No   System Outcome Measures   Lymphedema Life Impact Scale (score range 0-72). A higher score indicates greater impairment.   (pt did not comlete-will return upon return to clinic)   Subjective Report   Patient report of symptoms   (heavy legs; harder to walk)   Patient / Family Goals   Patient / family goals statement to reduce swelling in legs   Pain   Patient currently in pain No   Cognitive Status   Orientation Orientation to person, place and time   Level of consciousness Alert   Follows commands and answers questions 100% of the time   Personal safety and judgement Intact   Memory Intact   Edema Exam / Assessment   Skin condition Pitting   Skin condition comments 1-2+ pitting ankles-mid calf BLE   Pitting 1+;2+   Pitting location BLE   Capillary refill Symmetrical   Dorsal pedal pulse comments   (unable to palpate-no signs ischemia noted)   Stemmer sign Negative   Girth Measurements   Girth Measurements   (will obatin upon return for services)   Range of Motion   ROM comments BLE WFL   Strength   Strength comments NT'd   Activities of Daily Living   Activities of Daily Living I-Min A   Bed Mobility   Bed mobility I   Transfers   Transfers Mod I   Gait / Locomotion   Gait / Locomotion Mod I   Sensory   Sensory perception comments   (no neuropathy reported or identified)   Vascular Assessment   Vascular Assessment Comments   (referring provider note suggests possible venous issues)   Coordination   Coordination Gross motor coordination appropriate   Muscle Tone   Muscle tone No deficits were identified   Planned Edema Interventions   Planned edema interventions Manual lymph drainage;Gradient compression bandaging;Fit for compression garment;Exercises;Precautions to prevent  infection / exacerbation;Education;Skin care / precautions;Home management program development   Clinical Impression   Criteria for skilled therapeutic intervention met Yes   Therapy diagnosis lymphedema   Influenced by the following impairments / conditions Stage 1   Assessment of Occupational Performance 1-3 Performance Deficits   Identified Performance Deficits infection risk; decreased functional mobility   Clinical Decision Making (Complexity) Low complexity   Treatment Frequency 3x/week   Treatment duration 3x/wk x 4 weeks, then 0x/wk x 3 weeks, then 1x/wk x 1 week   Patient / family and/or staff in agreement with plan of care Yes   Risks and benefits of therapy have been explained Yes   Clinical impression comments Pt will benefit from skilled lymphedema services to reduce BLE lymphedema to reduce risk skin infections sarting and to promote better enaggement and more ease with mobiltiy tasks.   Goals   Edema Eval Goals 1;2;3;4;5;6   Goal 1   Goal identifier Ed   Goal description Pt will report understanding s/s cellulitis vs s/s lymphedema for knowledge base needed to manage from home independantly   Target date 08/05/22   Goal 2   Goal identifier GCB Wearing   Goal description Tolerate gradient compression bandaging/wearing compression garments 23 hrs/day to prevent re-acccumulation of extracellular fluid for max reductions in BLE lymphedema needed to reduce risk skin infections starting and promote less weight in legs for better walking   Target date 08/05/22   Goal 3   Goal identifier GCB Donning   Goal description Demonstrate independence in applying gradient compression bandages  for I building with home management of BLE lymphedema needed to reduce weight in legs for better walking and reduce risk skin infections forming   Target date 08/05/22   Goal 4   Goal identifier MLD/HEP   Goal description Demonstrate independence in performing prescribed exercises to facilate the lymph system and muscle pumping  systems for max reductions in BLE lymphedema needed to reduce risk skin infections starting and promote less weight in legs for better walking   Target date 08/05/22   Goal 5   Goal identifier Garments   Goal description Be independent in donning/doffing, wearing schedule, and care of compression garments for I building with home management of BLE lymphedema needed to reduce weight in legs for better walking and reduce risk skin infections forming   Target date 08/05/22   Goal 6   Goal identifier Reductions   Goal description Pt will display a total BLE volume reduction of .7L+ for reductions needed to reduce risk skin infections and help promote more ease with mobility tasks   Target date 08/05/22   Total Evaluation Time   OT Eval, Low Complexity Minutes (82971) 17   Certification   Certification date from 05/09/22   Certification date to 08/05/22   Medical Diagnosis lymphedema   Certification I certify the need for these services furnished under this plan of treatment and while under my care.  (Physician co-signature of this document indicates review and certification of the therapy plan).

## 2022-05-10 ENCOUNTER — OFFICE VISIT (OUTPATIENT)
Dept: INTERNAL MEDICINE | Facility: CLINIC | Age: 72
End: 2022-05-10
Payer: COMMERCIAL

## 2022-05-10 VITALS
DIASTOLIC BLOOD PRESSURE: 70 MMHG | RESPIRATION RATE: 16 BRPM | BODY MASS INDEX: 47.19 KG/M2 | TEMPERATURE: 98.1 F | HEART RATE: 69 BPM | SYSTOLIC BLOOD PRESSURE: 120 MMHG | OXYGEN SATURATION: 95 % | WEIGHT: 262.2 LBS

## 2022-05-10 DIAGNOSIS — Z00.00 ENCOUNTER FOR MEDICARE ANNUAL WELLNESS EXAM: ICD-10-CM

## 2022-05-10 DIAGNOSIS — I10 ESSENTIAL HYPERTENSION WITH GOAL BLOOD PRESSURE LESS THAN 140/90: ICD-10-CM

## 2022-05-10 DIAGNOSIS — Z87.11 HISTORY OF GASTRIC ULCER: ICD-10-CM

## 2022-05-10 DIAGNOSIS — M79.604 CHRONIC PAIN OF BOTH LOWER EXTREMITIES: ICD-10-CM

## 2022-05-10 DIAGNOSIS — Z79.1 NSAID LONG-TERM USE: ICD-10-CM

## 2022-05-10 DIAGNOSIS — M25.512 CHRONIC LEFT SHOULDER PAIN: ICD-10-CM

## 2022-05-10 DIAGNOSIS — G89.29 CHRONIC LEFT SHOULDER PAIN: ICD-10-CM

## 2022-05-10 DIAGNOSIS — Z23 HIGH PRIORITY FOR COVID-19 VACCINATION: ICD-10-CM

## 2022-05-10 DIAGNOSIS — G47.33 OBSTRUCTIVE SLEEP APNEA: ICD-10-CM

## 2022-05-10 DIAGNOSIS — Z00.00 MEDICARE ANNUAL WELLNESS VISIT, SUBSEQUENT: Primary | ICD-10-CM

## 2022-05-10 DIAGNOSIS — G89.29 CHRONIC PAIN OF BOTH LOWER EXTREMITIES: ICD-10-CM

## 2022-05-10 DIAGNOSIS — M79.605 CHRONIC PAIN OF BOTH LOWER EXTREMITIES: ICD-10-CM

## 2022-05-10 DIAGNOSIS — R60.0 LOWER EXTREMITY EDEMA: ICD-10-CM

## 2022-05-10 LAB
ANION GAP SERPL CALCULATED.3IONS-SCNC: 6 MMOL/L (ref 3–14)
BUN SERPL-MCNC: 23 MG/DL (ref 7–30)
CALCIUM SERPL-MCNC: 9.4 MG/DL (ref 8.5–10.1)
CHLORIDE BLD-SCNC: 108 MMOL/L (ref 94–109)
CO2 SERPL-SCNC: 26 MMOL/L (ref 20–32)
CREAT SERPL-MCNC: 0.77 MG/DL (ref 0.52–1.04)
GFR SERPL CREATININE-BSD FRML MDRD: 82 ML/MIN/1.73M2
GLUCOSE BLD-MCNC: 102 MG/DL (ref 70–99)
POTASSIUM BLD-SCNC: 3.9 MMOL/L (ref 3.4–5.3)
SODIUM SERPL-SCNC: 140 MMOL/L (ref 133–144)

## 2022-05-10 PROCEDURE — 99214 OFFICE O/P EST MOD 30 MIN: CPT | Mod: 25 | Performed by: NURSE PRACTITIONER

## 2022-05-10 PROCEDURE — 91306 COVID-19,PF,MODERNA (18+ YRS BOOSTER .25ML): CPT | Performed by: NURSE PRACTITIONER

## 2022-05-10 PROCEDURE — 36415 COLL VENOUS BLD VENIPUNCTURE: CPT | Performed by: NURSE PRACTITIONER

## 2022-05-10 PROCEDURE — 80048 BASIC METABOLIC PNL TOTAL CA: CPT | Performed by: NURSE PRACTITIONER

## 2022-05-10 PROCEDURE — 0064A COVID-19,PF,MODERNA (18+ YRS BOOSTER .25ML): CPT | Performed by: NURSE PRACTITIONER

## 2022-05-10 PROCEDURE — G0438 PPPS, INITIAL VISIT: HCPCS | Performed by: NURSE PRACTITIONER

## 2022-05-10 RX ORDER — CELECOXIB 100 MG/1
200 CAPSULE ORAL 2 TIMES DAILY
Qty: 120 CAPSULE | Refills: 0 | Status: SHIPPED | OUTPATIENT
Start: 2022-05-10 | End: 2022-05-11

## 2022-05-10 RX ORDER — CELECOXIB 100 MG/1
CAPSULE ORAL
Qty: 60 CAPSULE | Refills: 0 | OUTPATIENT
Start: 2022-05-10

## 2022-05-10 RX ORDER — GABAPENTIN 100 MG/1
100 CAPSULE ORAL 2 TIMES DAILY
Qty: 60 CAPSULE | Refills: 0 | Status: SHIPPED | OUTPATIENT
Start: 2022-05-10 | End: 2022-06-10

## 2022-05-10 ASSESSMENT — ACTIVITIES OF DAILY LIVING (ADL): CURRENT_FUNCTION: NO ASSISTANCE NEEDED

## 2022-05-10 ASSESSMENT — PATIENT HEALTH QUESTIONNAIRE - PHQ9: SUM OF ALL RESPONSES TO PHQ QUESTIONS 1-9: 9

## 2022-05-10 NOTE — LETTER
May 11, 2022      Bridget Lawson  [address]  [facility] [address]        Dear ,    We are writing to inform you of your test results.    Your test results fall within the expected range(s) or remain unchanged from previous results.  Please continue with current treatment plan.    Resulted Orders   Basic metabolic panel  (Ca, Cl, CO2, Creat, Gluc, K, Na, BUN)   Result Value Ref Range    Sodium 140 133 - 144 mmol/L    Potassium 3.9 3.4 - 5.3 mmol/L    Chloride 108 94 - 109 mmol/L    Carbon Dioxide (CO2) 26 20 - 32 mmol/L    Anion Gap 6 3 - 14 mmol/L    Urea Nitrogen 23 7 - 30 mg/dL    Creatinine 0.77 0.52 - 1.04 mg/dL    Calcium 9.4 8.5 - 10.1 mg/dL    Glucose 102 (H) 70 - 99 mg/dL    GFR Estimate 82 >60 mL/min/1.73m2      Comment:      Effective December 21, 2021 eGFRcr in adults is calculated using the 2021 CKD-EPI creatinine equation which includes age and gender (Quincy et al., NEJM, DOI: 10.1056/DWDArx5507069)       If you have any questions or concerns, please call the clinic at the number listed above.       Sincerely,      LIZZ Perez CNP

## 2022-05-10 NOTE — PATIENT INSTRUCTIONS
-Follow up with Sports Medicine to discuss Shoulder MRI results  -DO NOT TAKE advil, aleve, celebrex at the same time- only take 1 of these- these are ALL the same medications and can be harmful to your kidneys  -OK to take Tylenol 3 (325 mg pills ) Three times daily for pain  -Increase Celebrex to 200 mg twice daily (2 pills twice a day)  -NO MORE ALEVE OR ADVIL  -KEEP taking your omeprazole daily  -Start gabapentin for leg pain.  Start taking 1 pill at night time- if tolerated and not too sleepy, then in 1 week start taking 1 pill twice daily.  If it makes you too sleepy then STOP.    -Take aspirin 81 mg daily for stroke prevention  -Get labs today  -Follow up in 4-6 weeks, ok to do Telephone visit        Patient Education   Personalized Prevention Plan  You are due for the preventive services outlined below.  Your care team is available to assist you in scheduling these services.  If you have already completed any of these items, please share that information with your care team to update in your medical record.  Health Maintenance Due   Topic Date Due    Discuss Advance Care Planning  Never done    Annual Wellness Visit  10/28/2020    COVID-19 Vaccine (3 - Booster for Mckenna series) 03/02/2022       Depression and Suicide in Older Adults    Nearly 2 million older Americans have some type of depression. Some of them even take their own lives. Yet depression among older adults is often ignored. Learn the warning signs. You may help spare a loved one needless pain. You may also save a life.   What is depression?  Depression is a common and serious illness that affects the way you think and feel. It is not a normal part of aging, nor is it a sign of weakness, a character flaw, or something you can snap out of. Most people with depression need treatment to get better. The most common symptom is a feeling of deep sadness. People who are depressed also may seem tired and listless. And nothing seems to give them  "pleasure. It s normal to grieve or be sad sometimes. But sadness lessens or passes with time. Depression rarely goes away or improves on its own. A person with clinical depression can't \"snap out of it.\" Other symptoms of depression are:   Sleeping more or less than normal  Eating more or less than normal  Having headaches, stomachaches, or other pains that don t go away  Feeling nervous,  empty,  or worthless  Crying a great deal  Thinking or talking about suicide or death  Loss of interest in activities previously enjoyed  Social isolation  Feeling confused or forgetful  What causes it?  The causes of depression aren t fully known. But it is thought to result from a complex blend of these factors:   Biochemistry. Certain chemicals in the brain play a role.  Genes. Depression does run in families.  Life stress. Life stresses can also trigger depression in some people. Older adults often face many stressors, such as death of friends or a spouse, health problems, and financial concerns.  Chronic conditions. This includes conditions such as diabetes, heart disease, or cancer. These can cause symptoms of depression. Medicine side effects can cause changes in thoughts and behaviors.  How you can help  Often, depressed people may not want to ask for help. When they do, they may be ignored. Or, they may receive the wrong treatment. You can help by showing parents and older friends love and support. If they seem depressed, don t lecture the person, ignore the symptoms, or discount the symptoms as a  normal  part of aging -which they are not. Get involved, listen, and show interest and support.   Help them understand that depression is a treatable illness. Tell them you can help them find the right treatment. Offer to go to their healthcare provider's appointment with them for support when the symptoms are discussed. With their approval, contact a local mental health center, social service agency, or hospital about " services.   You can be an advocate for him or her at healthcare appointments. Many older adults have chronic illnesses that can cause symptoms of depression. Medicine side effects can change thoughts and behaviors. You can help make sure that the healthcare provider looks at all of these factors. He or she should refer your family member or friend to a mental healthcare provider when needed. in some cases, untreated depression can lead to a misdiagnosis. A person may be diagnosed with a brain disorder such as dementia. If the healthcare provider does not take the issue of depression seriously, help your family member or friend to find another provider.   Don't be afraid to ask  If you think an older person you care about could be suicidal, ask,  Have you thought about suicide?  Most people will tell you the truth. If they say  yes,  they may already have a plan for how and when they will attempt it. Find out as much as you can. The more detailed the plan, and the easier it is to carry out, the more danger the person is in right now. Tell the person you are there for them and do not want them to harm him or herself. Don't wait to get help for the person. Call the person's healthcare provider, local hospital, or emergency services.   To learn more  National Suicide Prevention Lifeline (crisis hotline) 502-348-TWEL (613-349-0094)  National [place] of Mental Dqeipv243-526-0385dvp.nimh.nih.gov  National [place] on Mental Pwbgolx411-345-3701kse.pedro.org  Mental Health Fvlcliq335-814-9685org.[facility].org  National Suicide Pdozpxe260-MMVQLAD (372-310-6982)    Call 911  Never leave the person alone. A person who is actively suicidal needs psychiatric care right away. They will need constant supervision. Never leave the person out of sight. Call 911 or the national 24-hour suicide crisis hotline at 107-671-LPAG (870-368-3747). You can also take the person to the closest emergency room.   Carley last reviewed this educational  content on 5/1/2020 2000-2021 [employer]. All rights reserved. This information is not intended as a substitute for professional medical care. Always follow your healthcare professional's instructions.

## 2022-05-10 NOTE — PROGRESS NOTES
"SUBJECTIVE:   Bridget Lawson is a 71 year old female who presents for Preventive Visit.    Split Bill scripting  The purpose of this visit is to discuss your medical history and prevent health problems before you are sick. You may be responsible for a co-pay, coinsurance, or deductible if your visit today includes services such as checking on a sore throat, having an x-ray or lab test, or treating and evaluating a new or existing condition     Patient has been advised of split billing requirements and indicates understanding: Yes  Are you in the first 12 months of your Medicare coverage?  No    Healthy Habits:     In general, how would you rate your overall health?  Poor    Frequency of exercise:  None    Duration of exercise:  Less than 15 minutes    Do you usually eat at least 4 servings of fruit and vegetables a day, include whole grains    & fiber and avoid regularly eating high fat or \"junk\" foods?  No    Taking medications regularly:  Yes    Barriers to taking medications:  None    Medication side effects:  None    Ability to successfully perform activities of daily living:  No assistance needed    Home Safety:  No safety concerns identified    Hearing Impairment:  No hearing concerns    In the past 6 months, have you been bothered by leaking of urine? Yes (On sanctura)    In general, how would you rate your overall mental or emotional health?  Fair      PHQ-2 Total Score: 2    Additional concerns today:  Yes (Pain)       Do you feel safe in your environment? Yes    Have you ever done Advance Care Planning? (For example, a Health Directive, POLST, or a discussion with a medical provider or your loved ones about your wishes): Yes, advance care planning is on file.    Health Maintenance Screening:    -Immunizations:   -Influenza: Up to date   -Pneumococcal: Up to date   -Td/Tdap: Up to date   -Shingles: Up to date   -HPV: NA   -MMR: NA   -COVID Up to date, booster today    -Colon Cancer Screening:  Up to " date; colonoscopy completed Nov, 2019- recommended f/u in 3 years- DUE November    -Lung Cancer Screening:  NA    -Breast Cancer Screening: Up to date    -Cervical Cancer Screening:  NA, aged out    -STD Screen:  NA, low risk    -Cholesterol Screening:  Up to date; lipids favorable- on Crestor    -Diabetes Screening:  Up to date    -Bone Density: Up to date; completed 2019, November- Osteoporosis.  Treated with Reclast- last dose March, 2022.  DEXA DUE November    -Depression Screening: Due; states she feels low mood due to not having grandchildren; has 2 daughters who decided not to have children.  Denies feeling depressed, otherwise.     PHQ-2 Score:   PHQ-2 ( 1999 Pfizer) 5/10/2022 2/18/2022   Q1: Little interest or pleasure in doing things 1 2   Q2: Feeling down, depressed or hopeless 1 1   PHQ-2 Score 2 3   PHQ-2 Total Score (12-17 Years)- Positive if 3 or more points; Administer PHQ-A if positive - -   Q1: Little interest or pleasure in doing things Several days More than half the days   Q2: Feeling down, depressed or hopeless Several days Several days   PHQ-2 Score 2 3     PATIENT HEALTH QUESTIONNAIRE-9 (PHQ - 9)    Over the last 2 weeks, how often have you been bothered by any of the following problems?    1. Little interest or pleasure in doing things -      2. Feeling down, depressed, or hopeless -      3. Trouble falling or staying asleep, or sleeping too much -     4. Feeling tired or having little energy -      5. Poor appetite or overeating -      6. Feeling bad about yourself - or that you are a failure or have let yourself or your family down -      7. Trouble concentrating on things, such as reading the newspaper or watching television -     8. Moving or speaking so slowly that other people could have noticed? Or the opposite - being so fidgety or restless that you have been moving around a lot more than usual     9. Thoughts that you would be better off dead or of hurting  yourself in some way      Total Score:       If you checked off any problems, how difficult have these problems made it for you to do your work, take care of things at home, or get along with other people?      Developed by Haroon Jimenez, Lilly Menjivar, Nestor Bolton and colleagues, with an educational mc from [employer] No permission required to reproduce, translate, display or distribute. permission required to reproduce, translate, display or distribute.      Hypertension Follow-up      Do you check your blood pressure regularly outside of the clinic? Yes     Are you following a low salt diet? No    Are your blood pressures ever more than 140 on the top number (systolic) OR more   than 90 on the bottom number (diastolic), for example 140/90? No    Patient's blood pressure is much better controlled.  She has been checking her blood pressure at home and it has been ranging primarily 120s to 130s over 70s to 80s.  At her last visit hydrochlorothiazide 12.5 mg daily was added, in addition to amlodipine 5 mg daily, labetalol 200 mg 3 times daily, ramipril 10 mg daily.  Patient states she is tolerating these blood pressure medications without any issues.  Denies chest pain, shortness of breath, dizziness.    Leg pain:  Chronic LE edema:    During medication reconciliation, patient reported to me that she was taking Advil, in addition to Aleve- daily.  She was also recently started on Celebrex per sports medicine for her right shoulder pain.  She also reports she is taking 6-8 Tylenol per day.  She reports that she needs to take this medication because she has so much right leg pain which she attributes to leg swelling.  Discussed that this is unsafe as she has a history of gastric ulcer, in addition to taking medications that are on the same drug class.  She states she needs to take these medications as she has such significant leg pain.  Describes her leg pain as throbbing and aching.  She would again, attributes this to  her chronic lower extremity edema.    Patient did recently see OT for lymphedema therapy.  A plan is in place and follow-ups have been scheduled.    Fall risk  Fallen 2 or more times in the past year?: No (1 fall after getting OOB, slipped; no injury)  Any fall with injury in the past year?: No    Cognitive Screening   1) Repeat 3 items (Leader, Season, Table)    2) Clock draw: NORMAL  3) 3 item recall: Recalls 3 objects  Results: 3 items recalled: COGNITIVE IMPAIRMENT LESS LIKELY    Mini-CogTM Copyright S Srinivasa. Licensed by the author for use in [facility]; reprinted with permission ([email or web address]). All rights reserved.      Do you have sleep apnea, excessive snoring or daytime drowsiness?: yes- Follows with Sleep Medicine    Reviewed and updated as needed this visit by clinical staff   Tobacco  Allergies  Meds                Reviewed and updated as needed this visit by Provider                   Social History     Tobacco Use     Smoking status: Former Smoker     Packs/day: 2.00     Years: 27.00     Pack years: 54.00     Types: Cigarettes     Start date: 10/24/1969     Quit date: 1996     Years since quittin.7     Smokeless tobacco: Never Used   Substance Use Topics     Alcohol use: Yes     Alcohol/week: 0.0 standard drinks     Comment: 1/day     If you drink alcohol do you typically have >3 drinks per day or >7 drinks per week? No    Alcohol Use 10/28/2019   Prescreen: >3 drinks/day or >7 drinks/week? No   No flowsheet data found.      Current providers sharing in care for this patient include:   Patient Care Team:  Erin Pickens APRN CNP as PCP - General (Internal Medicine)  Stephanie Shi PA-C as Physician Assistant (Physician Assistant - Medical)  Stephanie Shi PA-C as Assigned OBGYN Provider  Erin Pickens APRN CNP as Assigned PCP  Raji Bender DO as Assigned Musculoskeletal Provider  Wale Sandhu MD as Assigned Sleep Provider    The following  "health maintenance items are reviewed in Epic and correct as of today:  Health Maintenance Due   Topic Date Due     ADVANCE CARE PLANNING  Never done     MEDICARE ANNUAL WELLNESS VISIT  10/28/2020     COVID-19 Vaccine (3 - Booster for Mckenna series) 03/02/2022     Lab work is in process  Labs reviewed in EPIC      Review of Systems  CONSTITUTIONAL: NEGATIVE for fever, chills, change in weight  INTEGUMENTARY/SKIN: NEGATIVE for worrisome rashes, moles or lesions  EYES: NEGATIVE for vision changes or irritation  ENT/MOUTH: NEGATIVE for ear, mouth and throat problems  RESP: NEGATIVE for significant cough or SOB  BREAST: NEGATIVE for masses, tenderness or discharge  CV: NEGATIVE for chest pain, palpitations or peripheral edema  GI: NEGATIVE for nausea, abdominal pain, heartburn, or change in bowel habits  : Urinary frequency; on Sanctura  MUSCULOSKELETAL: Right shoulder pain; chronic RLE pain  NEURO: NEGATIVE for weakness, dizziness or paresthesias  ENDOCRINE: NEGATIVE for temperature intolerance, skin/hair changes  HEME: NEGATIVE for bleeding problems  PSYCHIATRIC: Endorses low mood due to not having grandchildren    OBJECTIVE:   /70 (BP Location: Left arm, Patient Position: Chair, Cuff Size: Adult Large)   Pulse 69   Temp 98.1  F (36.7  C) (Oral)   Resp 16   Wt 118.9 kg (262 lb 3.2 oz)   SpO2 95%   BMI 47.19 kg/m   Estimated body mass index is 47.19 kg/m  as calculated from the following:    Height as of 4/18/22: 1.588 m (5' 2.5\").    Weight as of this encounter: 118.9 kg (262 lb 3.2 oz).     Physical Exam     GENERAL APPEARANCE: healthy, alert and no distress  EYES: Eyes grossly normal to inspection, PERRL and conjunctivae and sclerae normal  RESP: lungs clear to auscultation - no rales, rhonchi or wheezes  CV: regular rate and rhythm, normal S1 S2, no S3 or S4, no murmur, click or rub, no peripheral edema and peripheral pulses strong  ABDOMEN: soft, nontender, no hepatosplenomegaly, no masses and bowel " sounds normal  MS: Chronic bilateral LE edema, +2; antalgic gait- uses cane  SKIN: no suspicious lesions or rashes  NEURO: Normal strength and tone, sensory exam grossly normal, mentation intact and speech normal  PSYCH: mentation appears normal and affect normal/bright    Diagnostic Test Results:  Labs reviewed in Epic    ASSESSMENT / PLAN:   Bridget was seen today for hypertension and physical.    Diagnoses and all orders for this visit:    Medicare annual wellness visit, subsequent  - COVID booster today  - Health Maint up to date - due for colonoscopy in November; due for DEXA in November    Chronic left shoulder pain  -     celecoxib (CELEBREX) 100 MG capsule; Take 2 capsules (200 mg) by mouth 2 times daily  -     Following with Sports Med- has rotator cuff tear/pathology on MRI  -     Encouraged to f/u with Sports Med to discuss plan    Essential hypertension with goal blood pressure less than 140/90  -     Basic metabolic panel  (Ca, Cl, CO2, Creat, Gluc, K, Na, BUN); Future  -     Basic metabolic panel  (Ca, Cl, CO2, Creat, Gluc, K, Na, BUN)  -     Much better controlled. Continue current medications- amlodipine 5 mg/day, hydrochlorothiazide 12.5 mg/day, ramipril, labetalol  -     Check BMP today    Lower extremity edema  - Has est'd care with OT/Lymphedema    History of gastric ulcer  - Counseled on NSAID use  - Continue PPI  - Currently asymptomatic    NSAID long-term use  -     Basic metabolic panel  (Ca, Cl, CO2, Creat, Gluc, K, Na, BUN); Future  -     Basic metabolic panel  (Ca, Cl, CO2, Creat, Gluc, K, Na, BUN)  -     During med rec, patient reported taking Aleve, advil AND celebrex daily for her pain.  Taking advil and aleve primarily for chronic LE edema pain/throbbine.  Recently prescribed celebrex for right shoulder  -     Counseled on NSAID use  -     STOP advil, aleve, INCREASE celebrex to 200 mg BID    Obstructive sleep apnea  - Following with Sleep Med    High priority for COVID-19 vaccination  -  "    COVID-19,PF,MODERNA (18+ YRS BOOSTER .25ML)    Chronic pain of both lower extremities  -     gabapentin (NEURONTIN) 100 MG capsule; Take 1 capsule (100 mg) by mouth 2 times daily  -     Long discussion re: pain control today- COUNSELED on NSAID use  -     Pain Plan:  Increase Celebrex to 200 mg BID; STOP aleve, advil.  TAKE tylenol 1000 mg TID scheduled.  Consider topicals.  Hopefully OT/Lymphedema will help.  START gabapentin 100 mg at HS x 1 week; if tolerated, increase to BID. Counseled on use, side effects  -     Follow up in 4-6 weeks, telephone visit ok.  See me sooner if concerns. Patient verbalized an understanding  -     Check BMP today    Encounter for Medicare annual wellness exam        Patient has been advised of split billing requirements and indicates understanding: Yes    COUNSELING:  Reviewed preventive health counseling, as reflected in patient instructions    Estimated body mass index is 47.19 kg/m  as calculated from the following:    Height as of 4/18/22: 1.588 m (5' 2.5\").    Weight as of this encounter: 118.9 kg (262 lb 3.2 oz).    Weight management plan: Discussed healthy diet and exercise guidelines    She reports that she quit smoking about 25 years ago. Her smoking use included cigarettes. She started smoking about 52 years ago. She has a 54.00 pack-year smoking history. She has never used smokeless tobacco.      Appropriate preventive services were discussed with this patient, including applicable screening as appropriate for cardiovascular disease, diabetes, osteopenia/osteoporosis, and glaucoma.  As appropriate for age/gender, discussed screening for colorectal cancer, prostate cancer, breast cancer, and cervical cancer. Checklist reviewing preventive services available has been given to the patient.    Reviewed patients plan of care and provided an AVS. The Basic Care Plan (routine screening as documented in Health Maintenance) for Bridget meets the Care Plan requirement. This Care " Plan has been established and reviewed with the Patient.    Counseling Resources:  ATP IV Guidelines  Pooled Cohorts Equation Calculator  Breast Cancer Risk Calculator  Breast Cancer: Medication to Reduce Risk  FRAX Risk Assessment  ICSI Preventive Guidelines  Dietary Guidelines for Americans, 2010  USDA's MyPlate  ASA Prophylaxis  Lung CA Screening    LIZZ Perez [facility]    Identified Health Risks:          The patient's PHQ-9 score is consistent with mild depression. She was provided with information regarding depression and was advised to schedule a follow up appointment in 4 weeks to further address this issue.

## 2022-05-11 RX ORDER — HYDROCHLOROTHIAZIDE 12.5 MG/1
12.5 TABLET ORAL DAILY
Qty: 30 TABLET | Refills: 1 | Status: SHIPPED | OUTPATIENT
Start: 2022-05-11 | End: 2022-07-12

## 2022-05-11 RX ORDER — CELECOXIB 100 MG/1
200 CAPSULE ORAL 2 TIMES DAILY
Qty: 120 CAPSULE | Refills: 0 | Status: SHIPPED | OUTPATIENT
Start: 2022-05-11 | End: 2022-07-12

## 2022-05-27 ENCOUNTER — APPOINTMENT (OUTPATIENT)
Dept: ULTRASOUND IMAGING | Facility: CLINIC | Age: 72
End: 2022-05-27
Attending: EMERGENCY MEDICINE
Payer: COMMERCIAL

## 2022-05-27 ENCOUNTER — HOSPITAL ENCOUNTER (EMERGENCY)
Facility: CLINIC | Age: 72
Discharge: HOME | End: 2022-05-27
Attending: EMERGENCY MEDICINE | Admitting: EMERGENCY MEDICINE
Payer: COMMERCIAL

## 2022-05-27 VITALS
TEMPERATURE: 97.7 F | DIASTOLIC BLOOD PRESSURE: 90 MMHG | WEIGHT: 250 LBS | HEIGHT: 63 IN | BODY MASS INDEX: 44.3 KG/M2 | OXYGEN SATURATION: 94 % | SYSTOLIC BLOOD PRESSURE: 169 MMHG | HEART RATE: 73 BPM | RESPIRATION RATE: 18 BRPM

## 2022-05-27 DIAGNOSIS — R60.0 BILATERAL EDEMA OF LOWER EXTREMITY: ICD-10-CM

## 2022-05-27 DIAGNOSIS — M79.661 RIGHT CALF PAIN: ICD-10-CM

## 2022-05-27 PROCEDURE — 99284 EMERGENCY DEPT VISIT MOD MDM: CPT | Mod: 25

## 2022-05-27 PROCEDURE — 93971 EXTREMITY STUDY: CPT | Mod: RT

## 2022-05-27 NOTE — ED TRIAGE NOTES
Increased right calf pain and swelling for the past 4 days     Triage Assessment     Row Name 05/27/22 1565       Triage Assessment (Adult)    Airway WDL WDL       Respiratory WDL    Respiratory WDL WDL       Skin Circulation/Temperature WDL    Skin Circulation/Temperature WDL WDL       Cardiac WDL    Cardiac WDL WDL       Peripheral/Neurovascular WDL    Peripheral Neurovascular WDL WDL       Cognitive/Neuro/Behavioral WDL    Cognitive/Neuro/Behavioral WDL WDL

## 2022-05-28 NOTE — DISCHARGE INSTRUCTIONS
I recommend that you have a repeat ultrasound in 5 to 7 days if you continue to have any symptoms.

## 2022-05-28 NOTE — ED PROVIDER NOTES
History     Chief Complaint:  Leg Pain       HPI   Bridget Lawson is a 71 year old female who presents with right calf pain.  She has chronic bilateral lower extremity edema, but she started having right calf pain about 4 days.  She also thinks it is a little more swollen.  She denies any known injury to the leg.  She also denies any redness to the area, fevers, chest pain, or shortness of breath.  She has no other complaints today.    ROS:  Review of Systems   All other systems reviewed and are negative.        Allergies:  Animal Dander     Medications:    Acetaminophen (TYLENOL PO)  amLODIPine (NORVASC) 5 MG tablet  aspirin  MG EC tablet  bisacodyl (DULCOLAX) 5 MG EC tablet  Calcium Carb-Cholecalciferol 600-800 MG-UNIT TABS  calcium carbonate (TUMS) 500 MG chewable tablet  carboxymethylcellulose PF (REFRESH LIQUIGEL) 1 % ophthalmic gel  celecoxib (CELEBREX) 100 MG capsule  fluticasone (FLONASE) 50 MCG/ACT spray  gabapentin (NEURONTIN) 100 MG capsule  GLUCOSAMINE-CHONDROITIN PO  hydrochlorothiazide (HYDRODIURIL) 12.5 MG tablet  isosorbide mononitrate (IMDUR) 60 MG 24 hr tablet  ketoconazole (NIZORAL) 2 % external cream  labetalol (NORMODYNE) 200 MG tablet  MAGNESIUM GLUCONATE PO  MELATONIN PO  Multiple Vitamins-Minerals (CENTRUM SILVER ULTRA WOMENS PO)  omeprazole (PRILOSEC) 20 MG DR capsule  ORDER FOR DME  ramipril (ALTACE) 10 MG capsule  rosuvastatin (CRESTOR) 20 MG tablet  sennosides (SENOKOT) 8.6 MG tablet  solifenacin (VESICARE) 10 MG tablet        Past Medical History:    Past Medical History:   Diagnosis Date     Cerebral artery occlusion with cerebral infarction (H)      Hyperlipidemia LDL goal < 100 10/8/2012     Intracranial hemorrhage (H) 2/10     Obese      Obstructive sleep apnea      Osteopenia      Sleep apnea      Spider veins      Unspecified essential hypertension        Past Surgical History:    Past Surgical History:   Procedure Laterality Date     BREAST SURGERY  11/2004    R breast  "bx     C/SECTION, CLASSICAL  1985    with tubal     ENDOSCOPIC RELEASE CARPAL TUNNEL Right 2/7/2020    Procedure: RIGHT ENDOSCOPIC CARPAL TUNNEL RELEASE;  Surgeon: Piper Guardado MD;  Location: [facility]     HYSTERECTOMY  5/1998    GREGORY w/ BSO     KNEE SURGERY  9/1998    L Arthroscopy     NECK SURGERY  12/2010    R parathyroid adenoma     ORTHOPEDIC SURGERY       REPAIR TENDON ANKLE Right 11/1/2017    Procedure: REPAIR TENDON ANKLE;  RIGHT POSTERIOR TIBIAL TENDON RECONSTRUCTION ;  Surgeon: Xavi Duran MD;  Location: [facility] OR     SHOULDER SURGERY  11/2008    rotator cuff repair R     TONSILLECTOMY      as a child T & A        Family History:    family history includes Breast Cancer in her maternal grandmother, mother, and paternal aunt; Lipids in her brother; Respiratory in her father.    Social History:   reports that she quit smoking about 25 years ago. Her smoking use included cigarettes. She started smoking about 52 years ago. She has a 54.00 pack-year smoking history. She has never used smokeless tobacco. She reports current alcohol use. She reports that she does not use drugs.  PCP: Erin Mcmahan     Physical Exam     Patient Vitals for the past 24 hrs:   BP Temp Temp src Pulse Resp SpO2 Height Weight   05/27/22 1914 (!) 169/90 -- -- 73 18 94 % -- --   05/27/22 1651 (!) 174/80 97.7  F (36.5  C) Temporal 67 18 95 % 1.594 m (5' 2.75\") 113.4 kg (250 lb)        Physical Exam  Nursing note and vitals reviewed.  Constitutional:  Oriented to person, place, and time. Cooperative.   HENT:   Nose:    Nose normal.   Mouth/Throat:   Facemask in place.   Eyes:    Conjunctivae normal and EOM are normal.      Pupils are equal, round, and reactive to light.   Neck:    Trachea normal.   Cardiovascular:  Normal rate, regular rhythm, normal heart sounds and normal pulses. No murmur heard.  Pulmonary/Chest:  Effort normal and breath sounds normal.   Abdominal:   Soft. Normal appearance and bowel sounds are normal.      There is " no tenderness.      There is no rebound and no CVA tenderness.   Musculoskeletal:  Bilateral lower extremity edema present with some tenderness to palpation to the right calf.  The right calf is otherwise normal in appearance.  Extremities otherwise atraumatic x 4.   Lymphadenopathy:  No cervical adenopathy.   Neurological:   Alert and oriented to person, place, and time. Normal strength.      No cranial nerve deficit or sensory deficit. GCS eye subscore is 4. GCS verbal subscore is 5. GCS motor subscore is 6.   Skin:    Skin is intact. No rash noted.   Psychiatric:   Normal mood and affect.      Emergency Department Course   Imaging:  US Lower Extremity Venous Duplex Right   Final Result   IMPRESSION:   1.  No deep venous thrombosis in the right lower extremity.         Report per radiology      Emergency Department Course:      Reviewed:  I reviewed nursing notes, vitals, past medical history, Care Everywhere and [facility]    Interventions:  Medications - No data to display     Disposition:  The patient was discharged to home.     Impression & Plan    Medical Decision Making:  This is a 71-year-old female who came in for further evaluation of right calf pain.  She had an ultrasound obtained, which is negative for DVT.  I do not see any signs of cellulitis.  She could have a muscle strain or contusion despite not recalling any injury.  We placed her in an Ace wrap, and I recommended she elevate the leg is much as she can over the next few days.  She knows to follow-up with her primary care provider, as I recommended she have a repeat ultrasound in 5 to 7 days if her symptoms persist.  She knows to return here with any concerns or worsening symptoms as well.    Diagnosis:    ICD-10-CM    1. Right calf pain  M79.661    2. Bilateral edema of lower extremity  R60.0         Discharge Medications:  Discharge Medication List as of 5/27/2022  7:52 PM           5/27/2022   No att. providers found        Boom Haney  MD  05/27/22 4801

## 2022-06-02 ENCOUNTER — TELEPHONE (OUTPATIENT)
Dept: INTERNAL MEDICINE | Facility: CLINIC | Age: 72
End: 2022-06-02
Payer: COMMERCIAL

## 2022-06-02 DIAGNOSIS — R60.0 LOWER EXTREMITY EDEMA: Primary | ICD-10-CM

## 2022-06-02 NOTE — TELEPHONE ENCOUNTER
"Pt called clinic requesting an authorization for a follow up US of R leg for concern of blood clots.      Per 5/27 ED notes from Dr. Haney- \" I recommended she have a repeat ultrasound in 5 to 7 days if her symptoms persist\"  Can we leave a detailed message on this number? YES  Phone number patient can be reached at: [phone]    Leesa Boyce RN  [facility] Triage    "

## 2022-06-02 NOTE — TELEPHONE ENCOUNTER
Called pt and relayed Erin Mcmahan's message that the US was ordered. Provided pt with phone number to schedule the US. Pt verbalized understanding and agrees to plan.

## 2022-06-06 ENCOUNTER — HOSPITAL ENCOUNTER (OUTPATIENT)
Dept: ULTRASOUND IMAGING | Facility: CLINIC | Age: 72
Discharge: HOME | End: 2022-06-06
Attending: NURSE PRACTITIONER | Admitting: NURSE PRACTITIONER
Payer: COMMERCIAL

## 2022-06-06 DIAGNOSIS — R60.0 LOWER EXTREMITY EDEMA: ICD-10-CM

## 2022-06-06 PROCEDURE — 93970 EXTREMITY STUDY: CPT

## 2022-06-08 ENCOUNTER — HOSPITAL ENCOUNTER (OUTPATIENT)
Dept: OCCUPATIONAL THERAPY | Facility: CLINIC | Age: 72
Discharge: HOME | End: 2022-06-08
Payer: COMMERCIAL

## 2022-06-08 DIAGNOSIS — R60.0 LOWER EXTREMITY EDEMA: Primary | ICD-10-CM

## 2022-06-08 DIAGNOSIS — I89.0 LYMPHEDEMA OF BOTH LOWER EXTREMITIES: ICD-10-CM

## 2022-06-08 PROCEDURE — 97140 MANUAL THERAPY 1/> REGIONS: CPT | Mod: GO | Performed by: OCCUPATIONAL THERAPIST

## 2022-06-09 ENCOUNTER — HOSPITAL ENCOUNTER (OUTPATIENT)
Dept: OCCUPATIONAL THERAPY | Facility: CLINIC | Age: 72
Discharge: HOME | End: 2022-06-09
Payer: COMMERCIAL

## 2022-06-09 DIAGNOSIS — G89.29 CHRONIC PAIN OF BOTH LOWER EXTREMITIES: ICD-10-CM

## 2022-06-09 DIAGNOSIS — I89.0 LYMPHEDEMA OF BOTH LOWER EXTREMITIES: ICD-10-CM

## 2022-06-09 DIAGNOSIS — M79.604 CHRONIC PAIN OF BOTH LOWER EXTREMITIES: ICD-10-CM

## 2022-06-09 DIAGNOSIS — M79.605 CHRONIC PAIN OF BOTH LOWER EXTREMITIES: ICD-10-CM

## 2022-06-09 DIAGNOSIS — R60.0 LOWER EXTREMITY EDEMA: Primary | ICD-10-CM

## 2022-06-09 PROCEDURE — 97140 MANUAL THERAPY 1/> REGIONS: CPT | Mod: GO | Performed by: OCCUPATIONAL THERAPIST

## 2022-06-10 ENCOUNTER — HOSPITAL ENCOUNTER (OUTPATIENT)
Dept: OCCUPATIONAL THERAPY | Facility: CLINIC | Age: 72
Discharge: HOME | End: 2022-06-10
Payer: COMMERCIAL

## 2022-06-10 DIAGNOSIS — R60.0 LOWER EXTREMITY EDEMA: Primary | ICD-10-CM

## 2022-06-10 DIAGNOSIS — I89.0 LYMPHEDEMA OF BOTH LOWER EXTREMITIES: ICD-10-CM

## 2022-06-10 PROCEDURE — 97140 MANUAL THERAPY 1/> REGIONS: CPT | Mod: GO | Performed by: OCCUPATIONAL THERAPIST

## 2022-06-10 RX ORDER — GABAPENTIN 100 MG/1
100 CAPSULE ORAL 2 TIMES DAILY
Qty: 60 CAPSULE | Refills: 0 | Status: SHIPPED | OUTPATIENT
Start: 2022-06-10 | End: 2022-07-12

## 2022-06-10 NOTE — TELEPHONE ENCOUNTER
Routing refill request to provider for review/approval because:  Drug not on the [facility] refill protocol   Last Written Prescription Date:  5/10/22  Last Fill Quantity: 60,   # refills: 0    Last OV: 5/10/22      Marcy Hernandez RN

## 2022-06-13 DIAGNOSIS — K21.01 GASTROESOPHAGEAL REFLUX DISEASE WITH ESOPHAGITIS AND HEMORRHAGE: ICD-10-CM

## 2022-06-14 ENCOUNTER — HOSPITAL ENCOUNTER (OUTPATIENT)
Dept: OCCUPATIONAL THERAPY | Facility: CLINIC | Age: 72
Discharge: HOME | End: 2022-06-14
Payer: COMMERCIAL

## 2022-06-14 DIAGNOSIS — I10 ESSENTIAL HYPERTENSION WITH GOAL BLOOD PRESSURE LESS THAN 140/90: ICD-10-CM

## 2022-06-14 DIAGNOSIS — I89.0 LYMPHEDEMA OF BOTH LOWER EXTREMITIES: ICD-10-CM

## 2022-06-14 DIAGNOSIS — R60.0 LOWER EXTREMITY EDEMA: Primary | ICD-10-CM

## 2022-06-14 DIAGNOSIS — K21.01 GASTROESOPHAGEAL REFLUX DISEASE WITH ESOPHAGITIS AND HEMORRHAGE: ICD-10-CM

## 2022-06-14 PROCEDURE — 97140 MANUAL THERAPY 1/> REGIONS: CPT | Mod: GO | Performed by: OCCUPATIONAL THERAPIST

## 2022-06-14 NOTE — TELEPHONE ENCOUNTER
Routing refill request to provider for review/approval because:   No diagnosis of osteoporosis on record  Vickie Haddad RN

## 2022-06-16 ENCOUNTER — HOSPITAL ENCOUNTER (OUTPATIENT)
Dept: OCCUPATIONAL THERAPY | Facility: CLINIC | Age: 72
Discharge: HOME | End: 2022-06-16
Payer: COMMERCIAL

## 2022-06-16 DIAGNOSIS — I89.0 LYMPHEDEMA OF BOTH LOWER EXTREMITIES: ICD-10-CM

## 2022-06-16 DIAGNOSIS — R60.0 LOWER EXTREMITY EDEMA: Primary | ICD-10-CM

## 2022-06-16 DIAGNOSIS — I10 ESSENTIAL HYPERTENSION WITH GOAL BLOOD PRESSURE LESS THAN 140/90: ICD-10-CM

## 2022-06-16 PROCEDURE — 97140 MANUAL THERAPY 1/> REGIONS: CPT | Mod: GO | Performed by: OCCUPATIONAL THERAPIST

## 2022-06-16 RX ORDER — LABETALOL 200 MG/1
TABLET, FILM COATED ORAL
Qty: 90 TABLET | Refills: 3 | Status: SHIPPED | OUTPATIENT
Start: 2022-06-16 | End: 2022-11-10

## 2022-06-16 NOTE — TELEPHONE ENCOUNTER
Routing refill request to provider for review/approval because:  BP      BP Readings from Last 6 Encounters:   05/27/22 (!) 169/90   05/10/22 120/70   04/18/22 (!) 149/86   04/14/22 122/86   03/29/22 (!) 120/90   03/03/22 138/83

## 2022-06-17 ENCOUNTER — HOSPITAL ENCOUNTER (OUTPATIENT)
Dept: OCCUPATIONAL THERAPY | Facility: CLINIC | Age: 72
Discharge: HOME | End: 2022-06-17
Payer: COMMERCIAL

## 2022-06-17 DIAGNOSIS — I89.0 LYMPHEDEMA OF BOTH LOWER EXTREMITIES: ICD-10-CM

## 2022-06-17 DIAGNOSIS — R60.0 LOWER EXTREMITY EDEMA: Primary | ICD-10-CM

## 2022-06-17 PROCEDURE — 97140 MANUAL THERAPY 1/> REGIONS: CPT | Mod: GO | Performed by: OCCUPATIONAL THERAPIST

## 2022-06-17 RX ORDER — LABETALOL 200 MG/1
TABLET, FILM COATED ORAL
Qty: 90 TABLET | Refills: 3 | OUTPATIENT
Start: 2022-06-17

## 2022-06-21 ENCOUNTER — HOSPITAL ENCOUNTER (OUTPATIENT)
Dept: OCCUPATIONAL THERAPY | Facility: CLINIC | Age: 72
Discharge: HOME | End: 2022-06-21
Payer: COMMERCIAL

## 2022-06-21 DIAGNOSIS — I89.0 LYMPHEDEMA OF BOTH LOWER EXTREMITIES: ICD-10-CM

## 2022-06-21 DIAGNOSIS — R60.0 LOWER EXTREMITY EDEMA: Primary | ICD-10-CM

## 2022-06-21 PROCEDURE — 97140 MANUAL THERAPY 1/> REGIONS: CPT | Mod: GO | Performed by: OCCUPATIONAL THERAPIST

## 2022-06-22 ENCOUNTER — HOSPITAL ENCOUNTER (OUTPATIENT)
Dept: OCCUPATIONAL THERAPY | Facility: CLINIC | Age: 72
Discharge: HOME | End: 2022-06-22
Payer: COMMERCIAL

## 2022-06-22 DIAGNOSIS — I89.0 LYMPHEDEMA OF BOTH LOWER EXTREMITIES: ICD-10-CM

## 2022-06-22 DIAGNOSIS — R60.0 LOWER EXTREMITY EDEMA: Primary | ICD-10-CM

## 2022-06-22 PROCEDURE — 97140 MANUAL THERAPY 1/> REGIONS: CPT | Mod: GO | Performed by: OCCUPATIONAL THERAPIST

## 2022-06-24 ENCOUNTER — HOSPITAL ENCOUNTER (OUTPATIENT)
Dept: OCCUPATIONAL THERAPY | Facility: CLINIC | Age: 72
Discharge: HOME | End: 2022-06-24
Payer: COMMERCIAL

## 2022-06-24 DIAGNOSIS — R60.0 LOWER EXTREMITY EDEMA: Primary | ICD-10-CM

## 2022-06-24 DIAGNOSIS — I89.0 LYMPHEDEMA OF BOTH LOWER EXTREMITIES: ICD-10-CM

## 2022-06-24 PROCEDURE — 97140 MANUAL THERAPY 1/> REGIONS: CPT | Mod: GO | Performed by: OCCUPATIONAL THERAPIST

## 2022-06-24 NOTE — PROGRESS NOTES
[facility]    OUTPATIENT OCCUPATIONAL THERAPY  PLAN OF TREATMENT FOR OUTPATIENT REHABILITATION AND PROGRESS NOTE    Patient's Last Name, First Name, Bridget Galarza Date of Birth  1950   Provider's Name  [facility] Medical Record No.  [id number]    Onset Date  3/29/22 Start of Care Date  5/9/22   Type:     __PT   _X_OT   __SLP Medical Diagnosis  lymphedema   OT Diagnosis  lymphedema Plan of Treatment  Frequency/Duration: 3x/wk x 2 weeks  Certification date-current cert     Goals:  Goal Identifier Ed   Goal Description Pt will report understanding s/s cellulitis vs s/s lymphedema for knowledge base needed to manage from home independantly   Target Date 08/05/22   Date Met      Progress (detail required for progress note):       Goal Identifier GCB Wearing   Goal Description Tolerate gradient compression bandaging/wearing compression garments 23 hrs/day to prevent re-acccumulation of extracellular fluid for max reductions in BLE lymphedema needed to reduce risk skin infections starting and promote less weight in legs for better walking   Target Date 08/05/22   Date Met  06/10/22   Progress (detail required for progress note):       Goal Identifier GCB Donning   Goal Description Demonstrate independence in applying gradient compression bandages  for I building with home management of BLE lymphedema needed to reduce weight in legs for better walking and reduce risk skin infections forming   Target Date 08/05/22   Date Met  06/10/22   Progress (detail required for progress note):       Goal Identifier MLD/HEP   Goal Description Demonstrate independence in performing prescribed exercises to facilate the lymph system and muscle pumping systems for max reductions in BLE lymphedema needed to reduce risk skin infections starting and promote less weight in legs for  better walking   Target Date 08/05/22   Date Met      Progress (detail required for progress note):       Goal Identifier Garments   Goal Description Be independent in donning/doffing, wearing schedule, and care of compression garments for I building with home management of BLE lymphedema needed to reduce weight in legs for better walking and reduce risk skin infections forming   Target Date 08/05/22   Date Met      Progress (detail required for progress note):       Goal Identifier Reductions   Goal Description Pt will display a total BLE volume reduction of .7L+ for reductions needed to reduce risk skin infections and help promote more ease with mobility tasks   Target Date 08/05/22   Date Met  06/17/22   Progress (detail required for progress note):       Goal Identifier     Goal Description     Target Date     Date Met      Progress (detail required for progress note):       Goal Identifier     Goal Description     Target Date     Date Met      Progress (detail required for progress note):         Beginning/End Dates of Progress Note Reporting Period:  5/9/22 to 6/24/22    Progress Toward Goals:   Progress this reporting period: Pt and spouse have built I with use GCB's and reduced BLE lymphedema. They are I with HEP and have learned MLD techniques for home management. Pt to be fit for maintenance garments soon. POC to cont.    Client Self (Subjective) Report for Progress Note Reporting Period:      Outcome Measures (Most Recent Score):  LLIS scored post services       Objective Measurements: see flowsheet                                                           Referring Provider: Erin Tucker

## 2022-06-24 NOTE — ADDENDUM NOTE
Encounter addended by: Demetri Tucker on: 6/24/2022 5:04 PM   Actions taken: Flowsheet accepted, Clinical Note Signed

## 2022-06-28 ENCOUNTER — HOSPITAL ENCOUNTER (OUTPATIENT)
Dept: OCCUPATIONAL THERAPY | Facility: CLINIC | Age: 72
Discharge: HOME | End: 2022-06-28
Payer: COMMERCIAL

## 2022-06-28 DIAGNOSIS — I89.0 LYMPHEDEMA OF BOTH LOWER EXTREMITIES: ICD-10-CM

## 2022-06-28 DIAGNOSIS — R60.0 LOWER EXTREMITY EDEMA: Primary | ICD-10-CM

## 2022-06-28 PROCEDURE — 97140 MANUAL THERAPY 1/> REGIONS: CPT | Mod: GO | Performed by: OCCUPATIONAL THERAPIST

## 2022-06-30 ENCOUNTER — HOSPITAL ENCOUNTER (OUTPATIENT)
Dept: OCCUPATIONAL THERAPY | Facility: CLINIC | Age: 72
Discharge: HOME | End: 2022-06-30
Payer: COMMERCIAL

## 2022-06-30 DIAGNOSIS — I89.0 LYMPHEDEMA OF BOTH LOWER EXTREMITIES: ICD-10-CM

## 2022-06-30 DIAGNOSIS — R60.0 LOWER EXTREMITY EDEMA: Primary | ICD-10-CM

## 2022-06-30 PROCEDURE — 97140 MANUAL THERAPY 1/> REGIONS: CPT | Mod: GO | Performed by: OCCUPATIONAL THERAPIST

## 2022-07-01 ENCOUNTER — HOSPITAL ENCOUNTER (OUTPATIENT)
Dept: OCCUPATIONAL THERAPY | Facility: CLINIC | Age: 72
Discharge: HOME | End: 2022-07-01
Payer: COMMERCIAL

## 2022-07-01 DIAGNOSIS — I89.0 LYMPHEDEMA OF BOTH LOWER EXTREMITIES: Primary | ICD-10-CM

## 2022-07-01 DIAGNOSIS — I89.0 LYMPHEDEMA: ICD-10-CM

## 2022-07-01 PROCEDURE — 97140 MANUAL THERAPY 1/> REGIONS: CPT | Mod: GO | Performed by: OCCUPATIONAL THERAPIST

## 2022-07-06 DIAGNOSIS — N32.81 OAB (OVERACTIVE BLADDER): ICD-10-CM

## 2022-07-06 RX ORDER — SOLIFENACIN SUCCINATE 10 MG/1
TABLET, FILM COATED ORAL
Qty: 90 TABLET | Refills: 0 | Status: SHIPPED | OUTPATIENT
Start: 2022-07-06 | End: 2022-10-04

## 2022-07-07 DIAGNOSIS — M25.512 CHRONIC LEFT SHOULDER PAIN: ICD-10-CM

## 2022-07-07 DIAGNOSIS — I10 ESSENTIAL HYPERTENSION WITH GOAL BLOOD PRESSURE LESS THAN 140/90: ICD-10-CM

## 2022-07-07 DIAGNOSIS — M79.605 CHRONIC PAIN OF BOTH LOWER EXTREMITIES: ICD-10-CM

## 2022-07-07 DIAGNOSIS — M79.604 CHRONIC PAIN OF BOTH LOWER EXTREMITIES: ICD-10-CM

## 2022-07-07 DIAGNOSIS — G89.29 CHRONIC LEFT SHOULDER PAIN: ICD-10-CM

## 2022-07-07 DIAGNOSIS — G89.29 CHRONIC PAIN OF BOTH LOWER EXTREMITIES: ICD-10-CM

## 2022-07-11 NOTE — TELEPHONE ENCOUNTER
Gabapentin        Last written prescription date: 6.10.22        Last fill quantity: 60, # refills: 0        Last office visit: 5.10.22        Future office visit: N/A               Routing refill request to provider for review/approval because: Drug not on the [facility], [facility] or [facility] refill protocol or controlled substance

## 2022-07-12 RX ORDER — CELECOXIB 100 MG/1
200 CAPSULE ORAL 2 TIMES DAILY
Qty: 120 CAPSULE | Refills: 0 | Status: SHIPPED | OUTPATIENT
Start: 2022-07-12 | End: 2022-08-04

## 2022-07-12 RX ORDER — HYDROCHLOROTHIAZIDE 12.5 MG/1
12.5 TABLET ORAL DAILY
Qty: 30 TABLET | Refills: 1 | Status: SHIPPED | OUTPATIENT
Start: 2022-07-12 | End: 2022-08-29

## 2022-07-12 RX ORDER — GABAPENTIN 100 MG/1
100 CAPSULE ORAL 2 TIMES DAILY
Qty: 60 CAPSULE | Refills: 0 | Status: SHIPPED | OUTPATIENT
Start: 2022-07-12 | End: 2022-08-04

## 2022-07-27 ENCOUNTER — HOSPITAL ENCOUNTER (OUTPATIENT)
Dept: OCCUPATIONAL THERAPY | Facility: CLINIC | Age: 72
Discharge: HOME | End: 2022-07-27
Payer: COMMERCIAL

## 2022-07-27 DIAGNOSIS — I89.0 LYMPHEDEMA OF BOTH LOWER EXTREMITIES: ICD-10-CM

## 2022-07-27 DIAGNOSIS — I89.0 LYMPHEDEMA: Primary | ICD-10-CM

## 2022-07-27 PROCEDURE — 97140 MANUAL THERAPY 1/> REGIONS: CPT | Mod: GO | Performed by: OCCUPATIONAL THERAPIST

## 2022-07-27 NOTE — PROGRESS NOTES
[facility]    Outpatient Occupational Therapy Discharge Note  Patient: Bridget Lawson  : 1950    Beginning/End Dates of Reporting Period:  22 to 22    Referring Provider: Erin Jon Diagnosis: lymphedema    Client Self Report:      Objective Measurements: see flowsheet                                                        Outcome Measures (most recent score):  Lymphedema Life Impact Scale (score range 0-72). A higher score indicates greater impairment.: 27-post score; no pre score obtained    Goals:   Goal Identifier Ed   Goal Description Pt will report understanding s/s cellulitis vs s/s lymphedema for knowledge base needed to manage from home independantly   Target Date 22   Date Met  22   Progress (detail required for progress note):       Goal Identifier GCB Wearing   Goal Description Tolerate gradient compression bandaging/wearing compression garments 23 hrs/day to prevent re-acccumulation of extracellular fluid for max reductions in BLE lymphedema needed to reduce risk skin infections starting and promote less weight in legs for better walking   Target Date 22   Date Met  06/10/22   Progress (detail required for progress note):       Goal Identifier GCB Donning   Goal Description Demonstrate independence in applying gradient compression bandages  for I building with home management of BLE lymphedema needed to reduce weight in legs for better walking and reduce risk skin infections forming   Target Date 22   Date Met  06/10/22   Progress (detail required for progress note):       Goal Identifier MLD/HEP   Goal Description Demonstrate independence in performing prescribed exercises to facilate the lymph system and muscle pumping systems for max reductions in BLE lymphedema needed to reduce risk skin infections starting and promote less weight in  legs for better walking   Target Date 08/05/22   Date Met  07/01/22   Progress (detail required for progress note):       Goal Identifier Garments   Goal Description Be independent in donning/doffing, wearing schedule, and care of compression garments for I building with home management of BLE lymphedema needed to reduce weight in legs for better walking and reduce risk skin infections forming   Target Date 08/05/22   Date Met  07/27/22   Progress (detail required for progress note):       Goal Identifier Reductions   Goal Description Pt will display a total BLE volume reduction of .7L+ for reductions needed to reduce risk skin infections and help promote more ease with mobility tasks   Target Date 08/05/22   Date Met  06/17/22   Progress (detail required for progress note):       Goal Identifier     Goal Description     Target Date     Date Met      Progress (detail required for progress note):       Goal Identifier     Goal Description     Target Date     Date Met      Progress (detail required for progress note):         Plan:  Discharge from therapy.    Discharge: Pt and spouse have built I with home management of BLE lymphedema. They I with compression donning/doffing, self MLD, and pt has HEP for use. Pt appropriate to assume I with home management. Pt to be discharged.    Reason for Discharge: Patient has met all goals.  No further expectation of progress.    Equipment Issued: Pt obtained BLE knee high velcro compression    Discharge Plan: Patient to continue home program.

## 2022-08-03 DIAGNOSIS — M25.512 CHRONIC LEFT SHOULDER PAIN: ICD-10-CM

## 2022-08-03 DIAGNOSIS — G89.29 CHRONIC LEFT SHOULDER PAIN: ICD-10-CM

## 2022-08-04 RX ORDER — CELECOXIB 100 MG/1
200 CAPSULE ORAL 2 TIMES DAILY
Qty: 120 CAPSULE | Refills: 0 | Status: SHIPPED | OUTPATIENT
Start: 2022-08-04 | End: 2022-08-29

## 2022-08-04 NOTE — TELEPHONE ENCOUNTER
Routing refill request to provider for review/approval because:  BP  BP Readings from Last 3 Encounters:   05/27/22 (!) 169/90   05/10/22 120/70   04/18/22 (!) 149/86   Age    Viktoria Feliciano RN

## 2022-08-11 NOTE — ED NOTES
Ace wrap was applied to the right lower leg.    Erythromycin Counseling:  I discussed with the patient the risks of erythromycin including but not limited to GI upset, allergic reaction, drug rash, diarrhea, increase in liver enzymes, and yeast infections.

## 2022-08-29 DIAGNOSIS — M25.512 CHRONIC LEFT SHOULDER PAIN: ICD-10-CM

## 2022-08-29 DIAGNOSIS — G89.29 CHRONIC LEFT SHOULDER PAIN: ICD-10-CM

## 2022-08-29 RX ORDER — CELECOXIB 100 MG/1
200 CAPSULE ORAL 2 TIMES DAILY
Qty: 120 CAPSULE | Refills: 0 | Status: SHIPPED | OUTPATIENT
Start: 2022-08-29 | End: 2022-09-16

## 2022-08-29 NOTE — LETTER
[facility] [address] [address]  [phone]  September 8, 2022  Bridget Lawson  [address] [place] [id number]    Dear Bridget,    I am contacting you regarding the refill request we received for you. After reviewing your chart it looks like you are overdue to establish care. Please call [phone] or schedule this through my chart to continue to receive refills. If you anticipate running out before your appointment let us know and we can send in a ericka refill.       Thank you,     M [facility] nursing staff

## 2022-09-12 ENCOUNTER — TRANSFERRED RECORDS (OUTPATIENT)
Dept: HEALTH INFORMATION MANAGEMENT | Facility: CLINIC | Age: 72
End: 2022-09-12

## 2022-09-14 DIAGNOSIS — K21.01 GASTROESOPHAGEAL REFLUX DISEASE WITH ESOPHAGITIS AND HEMORRHAGE: ICD-10-CM

## 2022-09-14 DIAGNOSIS — I10 ESSENTIAL HYPERTENSION WITH GOAL BLOOD PRESSURE LESS THAN 130/80: ICD-10-CM

## 2022-09-14 DIAGNOSIS — M79.605 CHRONIC PAIN OF BOTH LOWER EXTREMITIES: ICD-10-CM

## 2022-09-14 DIAGNOSIS — G89.29 CHRONIC PAIN OF BOTH LOWER EXTREMITIES: ICD-10-CM

## 2022-09-14 DIAGNOSIS — M79.604 CHRONIC PAIN OF BOTH LOWER EXTREMITIES: ICD-10-CM

## 2022-09-14 DIAGNOSIS — E78.5 HYPERLIPIDEMIA WITH TARGET LDL LESS THAN 100: ICD-10-CM

## 2022-09-14 DIAGNOSIS — G89.29 CHRONIC LEFT SHOULDER PAIN: ICD-10-CM

## 2022-09-14 DIAGNOSIS — I10 ESSENTIAL HYPERTENSION WITH GOAL BLOOD PRESSURE LESS THAN 140/90: ICD-10-CM

## 2022-09-14 DIAGNOSIS — M25.512 CHRONIC LEFT SHOULDER PAIN: ICD-10-CM

## 2022-09-14 NOTE — TELEPHONE ENCOUNTER
Reason for Call:  Medication or medication refill:    Do you use a [facility]?  Name of the pharmacy and phone number for the current request:  [id number] [address] [facility] [phone] Fax [phone]    Name of the medication requested: Full current meds    Other request: Will need enough to make it to 2/1/23 appt with Dr. Rosas    Can we leave a detailed message on this number? YES    Phone number patient can be reached at: Cell number on file:    Telephone Information:   Mobile [phone]       Best Time: About noon    Call taken on 9/14/2022 at 1:30 PM by Zeb Castillo

## 2022-09-16 RX ORDER — CELECOXIB 100 MG/1
200 CAPSULE ORAL 2 TIMES DAILY
Qty: 120 CAPSULE | Refills: 5 | Status: SHIPPED | OUTPATIENT
Start: 2022-09-16 | End: 2023-03-13

## 2022-09-16 RX ORDER — ISOSORBIDE MONONITRATE 60 MG/1
60 TABLET, EXTENDED RELEASE ORAL DAILY
Qty: 90 TABLET | Refills: 1 | Status: SHIPPED | OUTPATIENT
Start: 2022-09-16 | End: 2023-03-28

## 2022-09-16 RX ORDER — HYDROCHLOROTHIAZIDE 12.5 MG/1
12.5 TABLET ORAL DAILY
Qty: 30 TABLET | Refills: 1 | Status: SHIPPED | OUTPATIENT
Start: 2022-09-16 | End: 2022-12-23

## 2022-09-16 RX ORDER — ROSUVASTATIN CALCIUM 20 MG/1
TABLET, COATED ORAL
Qty: 90 TABLET | Refills: 1 | Status: SHIPPED | OUTPATIENT
Start: 2022-09-16 | End: 2023-03-13

## 2022-09-16 RX ORDER — RAMIPRIL 10 MG/1
CAPSULE ORAL
Qty: 90 CAPSULE | Refills: 1 | Status: SHIPPED | OUTPATIENT
Start: 2022-09-16 | End: 2023-04-10

## 2022-09-16 RX ORDER — GABAPENTIN 100 MG/1
100 CAPSULE ORAL 2 TIMES DAILY
Qty: 180 CAPSULE | Refills: 1 | Status: SHIPPED | OUTPATIENT
Start: 2022-09-16 | End: 2023-07-17

## 2022-09-16 NOTE — TELEPHONE ENCOUNTER
Routing refill request to provider for review/approval because:  Patient has establish care visit 2/1 asking for refills to last to visit    CAM Ernst RN  [facility] Triage Nurse

## 2022-09-21 DIAGNOSIS — N32.81 OAB (OVERACTIVE BLADDER): ICD-10-CM

## 2022-09-21 RX ORDER — SOLIFENACIN SUCCINATE 10 MG/1
TABLET, FILM COATED ORAL
Qty: 90 TABLET | Refills: 0 | OUTPATIENT
Start: 2022-09-21

## 2022-10-04 DIAGNOSIS — N32.81 OAB (OVERACTIVE BLADDER): ICD-10-CM

## 2022-10-04 RX ORDER — SOLIFENACIN SUCCINATE 10 MG/1
TABLET, FILM COATED ORAL
Qty: 90 TABLET | Refills: 2 | Status: SHIPPED | OUTPATIENT
Start: 2022-10-04 | End: 2023-06-07

## 2022-10-31 PROBLEM — E66.01 MORBID OBESITY (H): Status: ACTIVE | Noted: 2022-10-31

## 2022-10-31 PROBLEM — R35.0 URINARY FREQUENCY: Status: RESOLVED | Noted: 2019-10-28 | Resolved: 2022-10-31

## 2022-10-31 PROBLEM — R09.82 PND (POST-NASAL DRIP): Status: RESOLVED | Noted: 2017-10-31 | Resolved: 2022-10-31

## 2022-10-31 PROBLEM — S86.111D: Status: RESOLVED | Noted: 2017-10-23 | Resolved: 2022-10-31

## 2022-10-31 PROBLEM — E78.00 PURE HYPERCHOLESTEROLEMIA: Status: ACTIVE | Noted: 2022-10-31

## 2022-10-31 PROBLEM — I89.0 LYMPHEDEMA OF BOTH LOWER EXTREMITIES: Status: ACTIVE | Noted: 2022-10-31

## 2022-10-31 PROBLEM — G47.33 OSA ON CPAP: Status: ACTIVE | Noted: 2022-10-31

## 2022-10-31 PROBLEM — R39.15 URINARY URGENCY: Status: RESOLVED | Noted: 2019-10-28 | Resolved: 2022-10-31

## 2022-10-31 PROBLEM — Z86.73 HISTORY OF STROKE: Status: RESOLVED | Noted: 2020-01-23 | Resolved: 2022-10-31

## 2022-10-31 PROBLEM — K27.9 PUD (PEPTIC ULCER DISEASE): Status: ACTIVE | Noted: 2022-10-31

## 2022-10-31 PROBLEM — I10 BENIGN ESSENTIAL HYPERTENSION: Status: ACTIVE | Noted: 2022-10-31

## 2022-10-31 PROBLEM — E66.01 MORBID OBESITY (H): Status: RESOLVED | Noted: 2018-09-10 | Resolved: 2022-10-31

## 2022-10-31 PROBLEM — Z86.39 HISTORY OF HYPERPARATHYROIDISM: Status: ACTIVE | Noted: 2022-10-31

## 2022-10-31 PROBLEM — M19.049 HAND ARTHRITIS: Status: RESOLVED | Noted: 2019-10-28 | Resolved: 2022-10-31

## 2022-10-31 PROBLEM — N32.81 URGENCY-FREQUENCY SYNDROME: Status: ACTIVE | Noted: 2022-10-31

## 2022-10-31 PROBLEM — Z79.1 NSAID LONG-TERM USE: Status: RESOLVED | Noted: 2019-10-28 | Resolved: 2022-10-31

## 2022-10-31 PROBLEM — G56.01 CARPAL TUNNEL SYNDROME OF RIGHT WRIST: Status: RESOLVED | Noted: 2019-10-28 | Resolved: 2022-10-31

## 2022-10-31 PROBLEM — M81.0 OSTEOPOROSIS: Status: ACTIVE | Noted: 2022-10-31

## 2022-11-02 ENCOUNTER — OFFICE VISIT (OUTPATIENT)
Dept: INTERNAL MEDICINE | Facility: CLINIC | Age: 72
End: 2022-11-02
Payer: COMMERCIAL

## 2022-11-02 VITALS
DIASTOLIC BLOOD PRESSURE: 82 MMHG | OXYGEN SATURATION: 96 % | SYSTOLIC BLOOD PRESSURE: 129 MMHG | HEART RATE: 68 BPM | HEIGHT: 63 IN | WEIGHT: 259 LBS | BODY MASS INDEX: 45.89 KG/M2

## 2022-11-02 DIAGNOSIS — Z23 HIGH PRIORITY FOR 2019-NCOV VACCINE: ICD-10-CM

## 2022-11-02 DIAGNOSIS — H25.9 AGE-RELATED CATARACT OF BOTH EYES, UNSPECIFIED AGE-RELATED CATARACT TYPE: ICD-10-CM

## 2022-11-02 DIAGNOSIS — Z01.818 PREOPERATIVE EXAMINATION: Primary | ICD-10-CM

## 2022-11-02 PROBLEM — Z92.89 HISTORY OF BLOOD TRANSFUSION: Status: ACTIVE | Noted: 2022-11-02

## 2022-11-02 PROCEDURE — 99214 OFFICE O/P EST MOD 30 MIN: CPT | Performed by: INTERNAL MEDICINE

## 2022-11-02 PROCEDURE — 0134A COVID-19,PF,MODERNA BIVALENT: CPT | Performed by: INTERNAL MEDICINE

## 2022-11-02 PROCEDURE — 91313 COVID-19,PF,MODERNA BIVALENT: CPT | Performed by: INTERNAL MEDICINE

## 2022-11-02 NOTE — PROGRESS NOTES
ASSESSMENT/PLAN:                                                      Bridget Lawson is a pleasant 72 year old female who presents for a preoperative evaluation. She is undergoing a low risk procedure. Her risk of major cardiac event is 1 point: 0.9%.    (Z01.818) Preoperative examination  (primary encounter diagnosis)  (H25.9) Age-related cataract of both eyes, unspecified age-related cataract type  Plan: no additional work-up, cardiac or otherwise, indicated prior to surgery; no medication changes/adjustments indicated prior to surgery.     (Z23) High priority for 2019-nCoV vaccine  Plan: COVID-19 booster given today.     RECOMMEND PROCEEDING WITH SURGERY: YES    Marcia Rosas MD   [address] [address]  T: [phone], F: [phone]    SUBJECTIVE:                                                      Bridget Lawson is a very pleasant 72 year old female who presents for preoperative evaluation.    Surgeon: Antonio  Date: 11/9/2022 (right), 11/30/2022 (left)  Location: [facility], Fax#[phone]  Surgery: right then left cataract surgery (low risk)  Indication: bilateral cataracts    Past Medical History:   Diagnosis Date     Benign essential hypertension      History of blood transfusion     no adverse reactions     History of hyperparathyroidism     s/p adenoma resection     History of stroke 2009    hemorrhagic; due to uncontrolled hypertension     Lymphedema of both lower extremities      Morbid obesity (H)      JOHNY on CPAP      Osteoporosis      PUD (peptic ulcer disease)     due to NSAID use     Pure hypercholesterolemia      Urgency-frequency syndrome      Personal or family history of excessive or prolonged bleeding? No  Personal or family history of blood clots? No  History of snoring/Sleep Apnea? YES - has JOHNY on CPAP  History of blood transfusions? YES - no adverse reactions    Cardiovascular risk: Cerebrovascular disease (1  point)    Risk of major cardiac event: 1 point: 0.9%    Past Surgical History:   Procedure Laterality Date     ARTHROSCOPY KNEE Left       SECTION, TUBAL LIGATION, COMBINED       ENDOSCOPIC RELEASE CARPAL TUNNEL Right 2020    Procedure: RIGHT ENDOSCOPIC CARPAL TUNNEL RELEASE;  Surgeon: Piper Guardado MD;  Location: [facility] OR     HYSTERECTOMY TOTAL ABDOMINAL, BILATERAL SALPINGO-OOPHORECTOMY, COMBINED       PARATHYROID GLAND SURGERY      adenoma removed     REPAIR TENDON ANKLE Right 2017    Procedure: REPAIR TENDON ANKLE;  RIGHT POSTERIOR TIBIAL TENDON RECONSTRUCTION ;  Surgeon: Xavi Duran MD;  Location: [facility] OR     ROTATOR CUFF REPAIR RT/LT Right      TONSILLECTOMY & ADENOIDECTOMY       Artificial assistive devices, such as pacemakers, artificial cardiac valves, or artificial joints? No    No Known Allergies    Personal or family history of allergy to anesthesia? No  Allergy to local or topical analgesics? No  Allergy to latex? No  Allergy to adhesives/skin preparations (chlorhexadine)? No    Current Outpatient Medications   Medication Sig     Acetaminophen (TYLENOL PO) Take 500 mg by mouth every 6 hours as needed for mild pain or fever      amLODIPine (NORVASC) 5 MG tablet TAKE 1 TABLET BY MOUTH ONCE DAILY     aspirin  MG EC tablet Take 1 tablet (325 mg) by mouth daily     bisacodyl (DULCOLAX) 5 MG EC tablet Take 5 mg by mouth every other day     Calcium Carb-Cholecalciferol 600-800 MG-UNIT TABS Take 1 tablet by mouth 2 times daily      celecoxib (CELEBREX) 100 MG capsule Take 2 capsules (200 mg) by mouth 2 times daily     fluticasone (FLONASE) 50 MCG/ACT spray Spray 1 spray into both nostrils At Bedtime     gabapentin (NEURONTIN) 100 MG capsule Take 1 capsule (100 mg) by mouth 2 times daily     GLUCOSAMINE-CHONDROITIN PO Take 1 tablet by mouth 2 times daily 1500/1200 mg tabs     hydrochlorothiazide (HYDRODIURIL) 12.5 MG tablet Take 1 tablet (12.5 mg) by mouth daily     isosorbide  mononitrate (IMDUR) 60 MG 24 hr tablet Take 1 tablet (60 mg) by mouth daily     ketoconazole (NIZORAL) 2 % external cream Apply to affected area twice daily as needed     labetalol (NORMODYNE) 200 MG tablet TAKE 1 TABLET BY MOUTH THREE TIMES DAILY     MAGNESIUM GLUCONATE PO Take 400 mg by mouth daily     MELATONIN PO Take 10 mg by mouth nightly as needed      Multiple Vitamins-Minerals (CENTRUM SILVER ULTRA WOMENS PO) Take 1 tablet by mouth daily     omeprazole (PRILOSEC) 20 MG DR capsule Take 1 capsule (20 mg) by mouth daily     ORDER FOR DME Use your CPAP device as directed by your provider.     ramipril (ALTACE) 10 MG capsule Take 1 capsule by mouth once daily     rosuvastatin (CRESTOR) 20 MG tablet TAKE 1 TABLET BY MOUTH ONCE DAILY,REPLACES PRAVASTATIN     solifenacin (VESICARE) 10 MG tablet Take 1 tablet by mouth once daily     Over the counter medications? Other than above, no  Vitamin Supplements? Other than above, no  Herbal Supplements? No    Family History   Problem Relation Age of Onset     Breast Cancer Mother      Chronic Obstructive Pulmonary Disease Father      Diabetes Type 2  Father      Hypertension Father      Hyperlipidemia Brother      Breast Cancer Maternal Grandmother      Myocardial Infarction No family hx of      Cerebrovascular Disease No family hx of      Coronary Artery Disease Early Onset No family hx of      Colon Cancer No family hx of      Ovarian Cancer No family hx of      Social History     Occupational History     Occupation: Retired -    Tobacco Use     Smoking status: Former     Packs/day: 3.00     Years: 27.00     Pack years: 81.00     Types: Cigarettes     Quit date: 1996     Years since quittin.1     Smokeless tobacco: Never   Vaping Use     Vaping Use: Never used   Substance and Sexual Activity     Alcohol use: Yes     Comment: 1 drink/day     Drug use: No     Sexual activity: Not Currently   Social History Narrative    .    No adult daughters.     "No grandchildren.    No formal exercise.      Functional capacity: Can do light work around the house like dusting or washing dishes (4 METs)    OBJECTIVE:                                                      /82   Pulse 68   Ht 1.594 m (5' 2.75\")   Wt 117.5 kg (259 lb)   SpO2 96%   BMI 46.25 kg/m    Constitutional: well-appearing  Respiratory: normal respiratory effort; clear to auscultation bilaterally  Cardiovascular: regular rate and rhythm; no edema  Gastrointestinal: soft, non-tender, non-distended, and bowel sounds present; no organomegaly or masses  Musculoskeletal: walks with walker  Psych: normal judgment and insight; normal mood and affect    ---    (Chart documentation was completed, in part, with [employer] voice-recognition software. Even though reviewed, some grammatical, spelling, and word errors may remain.)  "

## 2022-11-09 DIAGNOSIS — I10 ESSENTIAL HYPERTENSION WITH GOAL BLOOD PRESSURE LESS THAN 140/90: ICD-10-CM

## 2022-11-10 RX ORDER — LABETALOL 200 MG/1
TABLET, FILM COATED ORAL
Qty: 270 TABLET | Refills: 1 | Status: SHIPPED | OUTPATIENT
Start: 2022-11-10 | End: 2023-04-28

## 2022-11-10 NOTE — TELEPHONE ENCOUNTER
Routing refill request to provider for review/approval because:  Passes protocol but medication previously filled by Erin Vivas RN

## 2022-11-14 ENCOUNTER — TRANSFERRED RECORDS (OUTPATIENT)
Dept: HEALTH INFORMATION MANAGEMENT | Facility: CLINIC | Age: 72
End: 2022-11-14

## 2022-11-29 ENCOUNTER — OFFICE VISIT (OUTPATIENT)
Dept: UROLOGY | Facility: CLINIC | Age: 72
End: 2022-11-29
Payer: COMMERCIAL

## 2022-11-29 VITALS — WEIGHT: 260 LBS | OXYGEN SATURATION: 97 % | HEART RATE: 68 BPM | HEIGHT: 62 IN | BODY MASS INDEX: 47.84 KG/M2

## 2022-11-29 DIAGNOSIS — N32.81 OAB (OVERACTIVE BLADDER): Primary | ICD-10-CM

## 2022-11-29 LAB
ALBUMIN UR-MCNC: NEGATIVE MG/DL
APPEARANCE UR: CLEAR
BILIRUB UR QL STRIP: NEGATIVE
COLOR UR AUTO: YELLOW
GLUCOSE UR STRIP-MCNC: NEGATIVE MG/DL
HGB UR QL STRIP: NEGATIVE
KETONES UR STRIP-MCNC: NEGATIVE MG/DL
LEUKOCYTE ESTERASE UR QL STRIP: ABNORMAL
NITRATE UR QL: NEGATIVE
PH UR STRIP: 5.5 [PH] (ref 5–7)
RESULT: 76
SP GR UR STRIP: 1.02 (ref 1–1.03)
UROBILINOGEN UR STRIP-ACNC: 0.2 E.U./DL

## 2022-11-29 PROCEDURE — 99213 OFFICE O/P EST LOW 20 MIN: CPT | Mod: 25 | Performed by: PHYSICIAN ASSISTANT

## 2022-11-29 PROCEDURE — 81003 URINALYSIS AUTO W/O SCOPE: CPT | Mod: QW | Performed by: PHYSICIAN ASSISTANT

## 2022-11-29 PROCEDURE — 51798 US URINE CAPACITY MEASURE: CPT | Performed by: PHYSICIAN ASSISTANT

## 2022-11-29 RX ORDER — ESTRADIOL 0.1 MG/G
CREAM VAGINAL
Qty: 42.5 G | Refills: 3 | Status: SHIPPED | OUTPATIENT
Start: 2022-11-29 | End: 2024-07-23

## 2022-11-29 ASSESSMENT — PAIN SCALES - GENERAL: PAINLEVEL: EXTREME PAIN (9)

## 2022-11-29 NOTE — PROGRESS NOTES
Chief Complaint   Patient presents with     OAB     DISCUSS MEDS FOR OAB       pvr- 76 ml    Arin Bradford CMA        CC: Urgency, frequency, nocturia     HPI:  Bridget Lawson is a pleasant 72 year old female who presents in follow up of the above. Last seen 2-3 years ago.Above ongoing for many years. Nocturia x 3-5, frequency, urgency throughout the day. Can struggle with constipation. No gross hematuria. No dysuria.     Has not been on estrace cream in the interim and Vesicare is not as successfaul in keeping her dry as it intitally did. Several of the anticholinergics were >$500 initially.         Past Medical History        Past Medical History:   Diagnosis Date     Cerebral artery occlusion with cerebral infarction (H)        Hx of stroke   (02/10)     Hyperlipidemia LDL goal < 100 10/8/2012     Start prava in 7/14     Intracranial hemorrhage (H) 2/10     R basal ganglia, due to HTN; had mild L hemiparesis and dysarthria; good recovery     Obese       Osteopenia       in 6/10, prior to PTH surgery, T - 3.2 forearm, - 1.9 spine, - 1.5L and -1.6 R femoral necks     Sleep apnea       CPAP     Spider veins       Unspecified essential hypertension       Essential hypertension            Past Surgical History         Past Surgical History:   Procedure Laterality Date     BREAST SURGERY   11/2004     R breast bx     C/SECTION, CLASSICAL   1985     with tubal     ENDOSCOPIC RELEASE CARPAL TUNNEL Right 2/7/2020     Procedure: RIGHT ENDOSCOPIC CARPAL TUNNEL RELEASE;  Surgeon: iPper Guardado MD;  Location:  OR     HYSTERECTOMY   5/1998     GREGORY w/ BSO     KNEE SURGERY   9/1998     L Arthroscopy     NECK SURGERY   12/2010     R parathyroid adenoma     ORTHOPEDIC SURGERY         REPAIR TENDON ANKLE Right 11/1/2017     Procedure: REPAIR TENDON ANKLE;  RIGHT POSTERIOR TIBIAL TENDON RECONSTRUCTION ;  Surgeon: Xavi Duran MD;  Location:  OR     SHOULDER SURGERY   11/2008     rotator cuff repair R      TONSILLECTOMY         as a child T & A            Social History   Social History            Socioeconomic History     Marital status:        Spouse name: Not on file     Number of children: 2     Years of education: Not on file     Highest education level: Not on file   Occupational History     Not on file   Social Needs     Financial resource strain: Not on file     Food insecurity       Worry: Not on file       Inability: Not on file     Transportation needs       Medical: Not on file       Non-medical: Not on file   Tobacco Use     Smoking status: Former Smoker       Packs/day: 2.00       Years: 27.00       Pack years: 54.00       Types: Cigarettes       Start date: 10/24/1969       Last attempt to quit: 1996       Years since quittin.5     Smokeless tobacco: Never Used   Substance and Sexual Activity     Alcohol use: Yes       Alcohol/week: 0.0 standard drinks       Comment: 1/day     Drug use: No     Sexual activity: Not Currently   Lifestyle     Physical activity       Days per week: Not on file       Minutes per session: Not on file     Stress: Not on file   Relationships     Social connections       Talks on phone: Not on file       Gets together: Not on file       Attends Cheondoism service: Not on file       Active member of club or organization: Not on file       Attends meetings of clubs or organizations: Not on file       Relationship status: Not on file     Intimate partner violence       Fear of current or ex partner: Not on file       Emotionally abused: Not on file       Physically abused: Not on file       Forced sexual activity: Not on file   Other Topics Concern     Parent/sibling w/ CABG, MI or angioplasty before 65F 55M? Not Asked   Social History Narrative     2 dtrs; no GC            Family History         Family History   Problem Relation Age of Onset     Breast Cancer Mother            CA; masectomy left; asthma;  in  at age 97     Breast Cancer Maternal Grandmother            possible CA; masectomy     Breast Cancer Paternal Aunt           possible CA, masectomy     Respiratory Father           COPD     Lipids Brother              ROS:14 point ROS neg other than the symptoms noted above in the HPI.          Allergies   Allergen Reactions     Animal Dander               Current Outpatient Medications   Medication     Acetaminophen (TYLENOL PO)     amLODIPine (NORVASC) 5 MG tablet     aspirin  MG EC tablet     Calcium Carb-Cholecalciferol (CALTRATE 600+D) 600-800 MG-UNIT TABS     Carboxymethylcellulose Sodium (THERATEARS OP)     esomeprazole (NEXIUM) 20 MG DR capsule     estradiol (ESTRACE VAGINAL) 0.1 MG/GM vaginal cream     fluticasone (FLONASE) 50 MCG/ACT spray     glucosamine-chondroitinoitin 750-600 MG TABS     HYDROcodone-acetaminophen (NORCO) 5-325 MG tablet     ibuprofen (ADVIL/MOTRIN) 600 MG tablet     isosorbide mononitrate (IMDUR) 60 MG 24 hr tablet     ketoconazole (NIZORAL) 2 % external cream     labetalol (NORMODYNE) 200 MG tablet     MAGNESIUM GLUCONATE PO     MELATONIN PO     Multiple Vitamins-Minerals (CENTRUM SILVER ULTRA WOMENS PO)     NONFORMULARY     ORDER FOR DME     ramipril (ALTACE) 10 MG capsule     rosuvastatin (CRESTOR) 20 MG tablet      No current facility-administered medications for this visit.             PEx:   PSYCH: NAD  EYES: EOMI       Urine: small leuk est        A/P: Bridget Lawson is a 72 year old female with urgency, frequency, OAB  -Restart estrogen cream three times a week near urethra (pea-sized amount): If >$50, she will let me know and we can get via a compound pharmacy.  - Vesicare 10mg daily, again reviewed SE (for now)   -Eliminate irritants, make note of current irritants  -Limit fluids after 5pm  -PFPT  -3 mo return. Consider myrbetriq 25 mg if room for improvmeent.      Stephanie Shi PA-C  [facility]      22 minutes spent on the date of the encounter doing chart review, review of test results, interpretation of  tests, patient visit and documentation

## 2022-11-29 NOTE — PATIENT INSTRUCTIONS
Below is a list of things that can irritate the bladder and should be eliminated:    Caffeinated soft drinks.  Coffee.  Tea.  Chocolate.  Tomato-based foods.  Acidic juices and fruits. (includes cranberry juice)  Alcohol.  Nicotine  Carbonated drinks.  Aspartame/Nutrasweet.    Continue with solfenacin (bladder control).    Estrogen cream three times a week near urethra (pea-sized amount): If >$50, let me know and we can get via a compound pharmacy.    Please see one of the dedicated pelvic floor physical therapists ([facility] [phone]).    Please be aware that coverage of these services is subject to the terms and limitations of your health insurance plan.  Call member services at your health plan with any benefit or coverage questions.      Please bring the following to your appointment:    *Your personal calendar for scheduling future appointments  *Comfortable clothing

## 2022-11-29 NOTE — LETTER
Date:December 1, 2022      Patient was self referred, no letter generated. Do not send.        [facility] Health Information

## 2022-11-29 NOTE — LETTER
11/29/2022       RE: Bridget Lawson  [address]  [place] [facility] [address]     Dear Colleague,    Thank you for referring your patient, Bridget Lawson, to the [facility] at [facility]. Please see a copy of my visit note below.    Chief Complaint   Patient presents with     OAB     DISCUSS MEDS FOR OAB       pvr- 76 ml    Arin Bradford CMA        CC: Urgency, frequency, nocturia     HPI:  Bridget Lawson is a pleasant 72 year old female who presents in follow up of the above. Last seen 2-3 years ago.Above ongoing for many years. Nocturia x 3-5, frequency, urgency throughout the day. Can struggle with constipation. No gross hematuria. No dysuria.     Has not been on estrace cream in the interim and Vesicare is not as successfaul in keeping her dry as it intitally did. Several of the anticholinergics were >$500 initially.         Past Medical History        Past Medical History:   Diagnosis Date     Cerebral artery occlusion with cerebral infarction (H)        Hx of stroke   (02/10)     Hyperlipidemia LDL goal < 100 10/8/2012     Start prava in 7/14     Intracranial hemorrhage (H) 2/10     R basal ganglia, due to HTN; had mild L hemiparesis and dysarthria; good recovery     Obese       Osteopenia       in 6/10, prior to PTH surgery, T - 3.2 forearm, - 1.9 spine, - 1.5L and -1.6 R femoral necks     Sleep apnea       CPAP     Spider veins       Unspecified essential hypertension       Essential hypertension            Past Surgical History         Past Surgical History:   Procedure Laterality Date     BREAST SURGERY   11/2004     R breast bx     C/SECTION, CLASSICAL   1985     with tubal     ENDOSCOPIC RELEASE CARPAL TUNNEL Right 2/7/2020     Procedure: RIGHT ENDOSCOPIC CARPAL TUNNEL RELEASE;  Surgeon: Piper Guardado MD;  Location: [facility] OR     HYSTERECTOMY   5/1998     GREGORY w/ BSO     KNEE SURGERY   9/1998     L Arthroscopy     NECK  SURGERY   2010     R parathyroid adenoma     ORTHOPEDIC SURGERY         REPAIR TENDON ANKLE Right 2017     Procedure: REPAIR TENDON ANKLE;  RIGHT POSTERIOR TIBIAL TENDON RECONSTRUCTION ;  Surgeon: Xaiv Duran MD;  Location: [facility] OR     SHOULDER SURGERY   2008     rotator cuff repair R     TONSILLECTOMY         as a child T & A            Social History   Social History            Socioeconomic History     Marital status:        Spouse name: Not on file     Number of children: 2     Years of education: Not on file     Highest education level: Not on file   Occupational History     Not on file   Social Needs     Financial resource strain: Not on file     Food insecurity       Worry: Not on file       Inability: Not on file     Transportation needs       Medical: Not on file       Non-medical: Not on file   Tobacco Use     Smoking status: Former Smoker       Packs/day: 2.00       Years: 27.00       Pack years: 54.00       Types: Cigarettes       Start date: 10/24/1969       Last attempt to quit: 1996       Years since quittin.5     Smokeless tobacco: Never Used   Substance and Sexual Activity     Alcohol use: Yes       Alcohol/week: 0.0 standard drinks       Comment: 1/day     Drug use: No     Sexual activity: Not Currently   Lifestyle     Physical activity       Days per week: Not on file       Minutes per session: Not on file     Stress: Not on file   Relationships     Social connections       Talks on phone: Not on file       Gets together: Not on file       Attends Amish service: Not on file       Active member of club or organization: Not on file       Attends meetings of clubs or organizations: Not on file       Relationship status: Not on file     Intimate partner violence       Fear of current or ex partner: Not on file       Emotionally abused: Not on file       Physically abused: Not on file       Forced sexual activity: Not on file   Other Topics Concern     Parent/sibling  w/ CABG, MI or angioplasty before 65F 55M? Not Asked   Social History Narrative     2 dtrs; no GC            Family History         Family History   Problem Relation Age of Onset     Breast Cancer Mother            CA; masectomy left; asthma;  in 2015 at age 97     Breast Cancer Maternal Grandmother           possible CA; masectomy     Breast Cancer Paternal Aunt           possible CA, masectomy     Respiratory Father           COPD     Lipids Brother              ROS:14 point ROS neg other than the symptoms noted above in the HPI.          Allergies   Allergen Reactions     Animal Dander               Current Outpatient Medications   Medication     Acetaminophen (TYLENOL PO)     amLODIPine (NORVASC) 5 MG tablet     aspirin  MG EC tablet     Calcium Carb-Cholecalciferol (CALTRATE 600+D) 600-800 MG-UNIT TABS     Carboxymethylcellulose Sodium (THERATEARS OP)     esomeprazole (NEXIUM) 20 MG DR capsule     estradiol (ESTRACE VAGINAL) 0.1 MG/GM vaginal cream     fluticasone (FLONASE) 50 MCG/ACT spray     glucosamine-chondroitinoitin 750-600 MG TABS     HYDROcodone-acetaminophen (NORCO) 5-325 MG tablet     ibuprofen (ADVIL/MOTRIN) 600 MG tablet     isosorbide mononitrate (IMDUR) 60 MG 24 hr tablet     ketoconazole (NIZORAL) 2 % external cream     labetalol (NORMODYNE) 200 MG tablet     MAGNESIUM GLUCONATE PO     MELATONIN PO     Multiple Vitamins-Minerals (CENTRUM SILVER ULTRA WOMENS PO)     NONFORMULARY     ORDER FOR DME     ramipril (ALTACE) 10 MG capsule     rosuvastatin (CRESTOR) 20 MG tablet      No current facility-administered medications for this visit.             PEx:   PSYCH: NAD  EYES: EOMI       Urine: small leuk est        A/P: Bridget Lawson is a 72 year old female with urgency, frequency, OAB  -Restart estrogen cream three times a week near urethra (pea-sized amount): If >$50, she will let me know and we can get via a compound pharmacy.  - Vesicare 10mg daily, again reviewed SE (for now)    -Eliminate irritants, make note of current irritants  -Limit fluids after 5pm  -PFPT  -3 mo return. Consider myrbetriq 25 mg if room for improvmeent.      Stephanie Shi PA-C  [facility]      22 minutes spent on the date of the encounter doing chart review, review of test results, interpretation of tests, patient visit and documentation             Again, thank you for allowing me to participate in the care of your patient.      Sincerely,    Stephanie Shi PA-C, JULIA

## 2023-01-31 PROBLEM — E78.00 PURE HYPERCHOLESTEROLEMIA: Status: ACTIVE | Noted: 2023-01-31

## 2023-01-31 PROBLEM — Z87.11 HISTORY OF GASTRIC ULCER: Status: RESOLVED | Noted: 2019-10-28 | Resolved: 2023-01-31

## 2023-01-31 PROBLEM — I89.0 LYMPHEDEMA OF BOTH LOWER EXTREMITIES: Status: ACTIVE | Noted: 2023-01-31

## 2023-01-31 PROBLEM — M81.0 OSTEOPOROSIS: Status: ACTIVE | Noted: 2023-01-31

## 2023-01-31 PROBLEM — E78.00 PURE HYPERCHOLESTEROLEMIA: Status: RESOLVED | Noted: 2022-10-31 | Resolved: 2023-01-31

## 2023-01-31 PROBLEM — I10 BENIGN ESSENTIAL HYPERTENSION: Status: RESOLVED | Noted: 2022-10-31 | Resolved: 2023-01-31

## 2023-01-31 PROBLEM — Z86.39 HISTORY OF HYPERPARATHYROIDISM: Status: ACTIVE | Noted: 2023-01-31

## 2023-01-31 PROBLEM — M81.0 OSTEOPOROSIS: Status: RESOLVED | Noted: 2022-10-31 | Resolved: 2023-01-31

## 2023-01-31 PROBLEM — G47.33 OSA ON CPAP: Status: ACTIVE | Noted: 2023-01-31

## 2023-01-31 PROBLEM — G47.33 OSA ON CPAP: Status: RESOLVED | Noted: 2022-10-31 | Resolved: 2023-01-31

## 2023-01-31 PROBLEM — N32.81 URGENCY-FREQUENCY SYNDROME: Status: ACTIVE | Noted: 2023-01-31

## 2023-01-31 PROBLEM — I89.0 LYMPHEDEMA OF BOTH LOWER EXTREMITIES: Status: RESOLVED | Noted: 2022-10-31 | Resolved: 2023-01-31

## 2023-01-31 PROBLEM — Z92.89 HISTORY OF BLOOD TRANSFUSION: Status: RESOLVED | Noted: 2022-11-02 | Resolved: 2023-01-31

## 2023-01-31 PROBLEM — Z86.39 HISTORY OF HYPERPARATHYROIDISM: Status: RESOLVED | Noted: 2022-10-31 | Resolved: 2023-01-31

## 2023-01-31 PROBLEM — E66.01 MORBID OBESITY (H): Status: RESOLVED | Noted: 2022-10-31 | Resolved: 2023-01-31

## 2023-01-31 PROBLEM — N32.81 URGENCY-FREQUENCY SYNDROME: Status: RESOLVED | Noted: 2022-10-31 | Resolved: 2023-01-31

## 2023-01-31 PROBLEM — K27.9 PUD (PEPTIC ULCER DISEASE): Status: ACTIVE | Noted: 2023-01-31

## 2023-01-31 PROBLEM — Z92.89 HISTORY OF BLOOD TRANSFUSION: Status: ACTIVE | Noted: 2023-01-31

## 2023-01-31 PROBLEM — I10 BENIGN ESSENTIAL HYPERTENSION: Status: ACTIVE | Noted: 2023-01-31

## 2023-01-31 PROBLEM — K27.9 PUD (PEPTIC ULCER DISEASE): Status: RESOLVED | Noted: 2022-10-31 | Resolved: 2023-01-31

## 2023-01-31 PROBLEM — E66.01 MORBID OBESITY (H): Status: ACTIVE | Noted: 2023-01-31

## 2023-02-01 ENCOUNTER — OFFICE VISIT (OUTPATIENT)
Dept: INTERNAL MEDICINE | Facility: CLINIC | Age: 73
End: 2023-02-01
Payer: COMMERCIAL

## 2023-02-01 VITALS
DIASTOLIC BLOOD PRESSURE: 74 MMHG | OXYGEN SATURATION: 100 % | WEIGHT: 255.5 LBS | HEART RATE: 71 BPM | TEMPERATURE: 97.2 F | BODY MASS INDEX: 46.73 KG/M2 | SYSTOLIC BLOOD PRESSURE: 132 MMHG

## 2023-02-01 DIAGNOSIS — Z12.11 SPECIAL SCREENING FOR MALIGNANT NEOPLASMS, COLON: ICD-10-CM

## 2023-02-01 DIAGNOSIS — Z76.89 ENCOUNTER TO ESTABLISH CARE: ICD-10-CM

## 2023-02-01 DIAGNOSIS — E66.01 MORBID OBESITY (H): ICD-10-CM

## 2023-02-01 DIAGNOSIS — E78.00 PURE HYPERCHOLESTEROLEMIA: ICD-10-CM

## 2023-02-01 DIAGNOSIS — Z13.1 SCREENING FOR DIABETES MELLITUS: ICD-10-CM

## 2023-02-01 DIAGNOSIS — L84 CALLUS OF FOOT: Primary | ICD-10-CM

## 2023-02-01 DIAGNOSIS — Z78.0 ASYMPTOMATIC MENOPAUSE: ICD-10-CM

## 2023-02-01 DIAGNOSIS — R73.01 IMPAIRED FASTING GLUCOSE: ICD-10-CM

## 2023-02-01 DIAGNOSIS — E55.9 VITAMIN D DEFICIENCY: ICD-10-CM

## 2023-02-01 LAB
ALBUMIN SERPL BCG-MCNC: 4.6 G/DL (ref 3.5–5.2)
ALP SERPL-CCNC: 59 U/L (ref 35–104)
ALT SERPL W P-5'-P-CCNC: 26 U/L (ref 10–35)
ANION GAP SERPL CALCULATED.3IONS-SCNC: 11 MMOL/L (ref 7–15)
AST SERPL W P-5'-P-CCNC: 27 U/L (ref 10–35)
BILIRUB SERPL-MCNC: 0.3 MG/DL
BUN SERPL-MCNC: 18 MG/DL (ref 8–23)
CALCIUM SERPL-MCNC: 9.8 MG/DL (ref 8.8–10.2)
CHLORIDE SERPL-SCNC: 105 MMOL/L (ref 98–107)
CHOLEST SERPL-MCNC: 120 MG/DL
CREAT SERPL-MCNC: 0.8 MG/DL (ref 0.51–0.95)
DEPRECATED HCO3 PLAS-SCNC: 26 MMOL/L (ref 22–29)
GFR SERPL CREATININE-BSD FRML MDRD: 77 ML/MIN/1.73M2
GLUCOSE SERPL-MCNC: 101 MG/DL (ref 70–99)
HBA1C MFR BLD: 5.6 % (ref 0–5.6)
HDLC SERPL-MCNC: 52 MG/DL
LDLC SERPL CALC-MCNC: 48 MG/DL
NONHDLC SERPL-MCNC: 68 MG/DL
POTASSIUM SERPL-SCNC: 4.2 MMOL/L (ref 3.4–5.3)
PROT SERPL-MCNC: 7.1 G/DL (ref 6.4–8.3)
SODIUM SERPL-SCNC: 142 MMOL/L (ref 136–145)
TRIGL SERPL-MCNC: 102 MG/DL

## 2023-02-01 PROCEDURE — 80061 LIPID PANEL: CPT | Performed by: INTERNAL MEDICINE

## 2023-02-01 PROCEDURE — 82306 VITAMIN D 25 HYDROXY: CPT | Performed by: INTERNAL MEDICINE

## 2023-02-01 PROCEDURE — 99213 OFFICE O/P EST LOW 20 MIN: CPT | Performed by: INTERNAL MEDICINE

## 2023-02-01 PROCEDURE — 80053 COMPREHEN METABOLIC PANEL: CPT | Performed by: INTERNAL MEDICINE

## 2023-02-01 PROCEDURE — 83036 HEMOGLOBIN GLYCOSYLATED A1C: CPT | Performed by: INTERNAL MEDICINE

## 2023-02-01 PROCEDURE — 36415 COLL VENOUS BLD VENIPUNCTURE: CPT | Performed by: INTERNAL MEDICINE

## 2023-02-01 NOTE — LETTER
February 2, 2023      Bridget Bowerberg  [address]  [facility] [address]        Dear ,    We are writing to inform you of your test results.    Your test results fall within or near the expected range(s).  Please continue with current treatment plan.    Resulted Orders   Lipid Profile   Result Value Ref Range    Cholesterol 120 <200 mg/dL    Triglycerides 102 <150 mg/dL    Direct Measure HDL 52 >=50 mg/dL    LDL Cholesterol Calculated 48 <=100 mg/dL    Non HDL Cholesterol 68 <130 mg/dL   Comprehensive metabolic panel   Result Value Ref Range    Sodium 142 136 - 145 mmol/L    Potassium 4.2 3.4 - 5.3 mmol/L    Chloride 105 98 - 107 mmol/L    Carbon Dioxide (CO2) 26 22 - 29 mmol/L    Anion Gap 11 7 - 15 mmol/L    Urea Nitrogen 18.0 8.0 - 23.0 mg/dL    Creatinine 0.80 0.51 - 0.95 mg/dL    Calcium 9.8 8.8 - 10.2 mg/dL    Glucose 101 (H) 70 - 99 mg/dL    Alkaline Phosphatase 59 35 - 104 U/L    AST 27 10 - 35 U/L    ALT 26 10 - 35 U/L    Protein Total 7.1 6.4 - 8.3 g/dL    Albumin 4.6 3.5 - 5.2 g/dL    Bilirubin Total 0.3 <=1.2 mg/dL    GFR Estimate 77 >60 mL/min/1.73m2   Vitamin D Deficiency   Result Value Ref Range    Vitamin D, Total (25-Hydroxy) 61 20 - 75 ug/L   Hemoglobin A1c   Result Value Ref Range    Hemoglobin A1C 5.6 0.0 - 5.6 %     If you have any questions or concerns, please call the clinic at the number listed above.       Sincerely,      Marcia Rosas MD

## 2023-02-01 NOTE — PROGRESS NOTES
ASSESSMENT/PLAN                                                       (L84) Callus of foot  (primary encounter diagnosis)  Comment: large thickened callus tip of left third toe causing pain.  Plan: referred to podiatry for further evaluation - patient will be contacted to schedule.      (Z76.89) Encounter to establish care  Comment: PMH, PSH, FH, SH, medications, allergies, immunizations, and preventative health measures reviewed and updated as appropriate.  Plan: see below for plans.      (Z78.0) Asymptomatic menopause  Plan: DEXA ordered - patient to schedule.     (Z12.11) Special screening for malignant neoplasms, colon  Plan: screening colonoscopy ordered - patient to schedule.     (E78.00) Pure hypercholesterolemia  (Z13.1) Screening for diabetes mellitus  (E55.9) Vitamin D deficiency  (R73.01) Impaired fasting glucose  Plan: nonfasting labs today.    (E66.01) Morbid obesity (H)  Comment: known issue that I take into account for their medical decisions, no current exacerbations or new concerns.    Marcia Rosas MD   [address] [address]  T: [phone], F: [phone]    SUBJECTIVE                                                      Bridget Lawson is a very pleasant 72 year old female who presents to establish care:    Patient also complains of left third toe pain.  Chronic in nature.  No known trauma or injury.    PMH, PSH, FH, SH, medications, allergies, immunizations, and preventative health measures reviewed and updated as appropriate.    Past Medical History:   Diagnosis Date     Benign essential hypertension      History of blood transfusion     no adverse reactions     History of hyperparathyroidism     s/p adenoma resection     History of stroke 2009    hemorrhagic; due to uncontrolled hypertension     Lymphedema of both lower extremities      Morbid obesity (H)      JOHNY on CPAP      Osteoporosis      PUD (peptic ulcer disease)     due to NSAID use      Pure hypercholesterolemia      Urgency-frequency syndrome      Past Surgical History:   Procedure Laterality Date     ARTHROSCOPY KNEE Left      CATARACT EXTRACTION, BILATERAL        SECTION, TUBAL LIGATION, COMBINED       ENDOSCOPIC RELEASE CARPAL TUNNEL Right 2020    Procedure: RIGHT ENDOSCOPIC CARPAL TUNNEL RELEASE;  Surgeon: Piper Guardado MD;  Location: [facility] OR     HYSTERECTOMY TOTAL ABDOMINAL, BILATERAL SALPINGO-OOPHORECTOMY, COMBINED       PARATHYROID GLAND SURGERY      adenoma removed     REPAIR TENDON ANKLE Right 2017    Procedure: REPAIR TENDON ANKLE;  RIGHT POSTERIOR TIBIAL TENDON RECONSTRUCTION ;  Surgeon: Xavi Duran MD;  Location: [facility] OR     ROTATOR CUFF REPAIR RT/LT Right      TONSILLECTOMY & ADENOIDECTOMY       Family History   Problem Relation Age of Onset     Breast Cancer Mother      Chronic Obstructive Pulmonary Disease Father      Diabetes Type 2  Father      Hypertension Father      Hyperlipidemia Brother      Breast Cancer Maternal Grandmother      Myocardial Infarction No family hx of      Cerebrovascular Disease No family hx of      Coronary Artery Disease Early Onset No family hx of      Colon Cancer No family hx of      Ovarian Cancer No family hx of      Social History     Occupational History     Occupation: Retired -    Tobacco Use     Smoking status: Former     Packs/day: 3.00     Years: 27.00     Pack years: 81.00     Types: Cigarettes     Quit date: 1996     Years since quittin.4     Smokeless tobacco: Never   Vaping Use     Vaping Use: Never used   Substance and Sexual Activity     Alcohol use: Yes     Comment: 1 drink/day     Drug use: No     Sexual activity: Not Currently   Social History Narrative    .    No adult daughters.    No grandchildren.    No formal exercise.      No Known Allergies     Current Outpatient Medications   Medication Sig     Acetaminophen (TYLENOL PO) Take 500 mg by mouth every 6 hours as needed for  mild pain or fever      amLODIPine (NORVASC) 5 MG tablet TAKE 1 TABLET BY MOUTH ONCE DAILY     aspirin  MG EC tablet Take 1 tablet (325 mg) by mouth daily     bisacodyl (DULCOLAX) 5 MG EC tablet Take 5 mg by mouth every other day     Calcium Carb-Cholecalciferol 600-800 MG-UNIT TABS Take 1 tablet by mouth 2 times daily      celecoxib (CELEBREX) 100 MG capsule Take 2 capsules (200 mg) by mouth 2 times daily     estradiol (ESTRACE VAGINAL) 0.1 MG/GM vaginal cream Apply small amount to the vaginal opening and urethra M, W, F @ h.s.     fluticasone (FLONASE) 50 MCG/ACT spray Spray 1 spray into both nostrils At Bedtime     gabapentin (NEURONTIN) 100 MG capsule Take 1 capsule (100 mg) by mouth 2 times daily     GLUCOSAMINE-CHONDROITIN PO Take 1 tablet by mouth 2 times daily 1500/1200 mg tabs     hydrochlorothiazide (HYDRODIURIL) 12.5 MG tablet Take 1 tablet by mouth once daily     isosorbide mononitrate (IMDUR) 60 MG 24 hr tablet Take 1 tablet (60 mg) by mouth daily     ketoconazole (NIZORAL) 2 % external cream Apply to affected area twice daily as needed     labetalol (NORMODYNE) 200 MG tablet TAKE 1 TABLET BY MOUTH THREE TIMES DAILY     MAGNESIUM GLUCONATE PO Take 400 mg by mouth daily     MELATONIN PO Take 10 mg by mouth nightly as needed      Multiple Vitamins-Minerals (CENTRUM SILVER ULTRA WOMENS PO) Take 1 tablet by mouth daily     omeprazole (PRILOSEC) 20 MG DR capsule Take 1 capsule (20 mg) by mouth daily     ORDER FOR DME Use your CPAP device as directed by your provider.     ramipril (ALTACE) 10 MG capsule Take 1 capsule by mouth once daily     rosuvastatin (CRESTOR) 20 MG tablet TAKE 1 TABLET BY MOUTH ONCE DAILY,REPLACES PRAVASTATIN     solifenacin (VESICARE) 10 MG tablet Take 1 tablet by mouth once daily     Immunization History   Administered Date(s) Administered     COVID-19 Vaccine (Mckenna) 03/09/2021     COVID-19 Vaccine 18+ (Moderna) 11/02/2021     COVID-19 Vaccine Bivalent Booster 18+ (Moderna)  11/02/2022     COVID-19,PF,Moderna Booster 05/10/2022     FLUAD(HD)65+ QUAD 10/18/2021, 10/24/2022     Flu 65+ Years 10/01/2018, 09/09/2019     Influenza (High Dose) 3 valent vaccine 10/24/2016, 10/07/2017, 10/01/2018, 09/09/2019, 10/18/2021     Influenza (IIV3) PF 12/09/2013, 09/29/2014, 10/05/2015     Influenza Vaccine >6 months (Alfuria,Fluzone) 08/31/2020     Pneumo Conj 13-V (2010&after) 06/22/2015     Pneumococcal 23 valent 10/24/2016     TDAP Vaccine (Adacel) 06/22/2015     Tdap (Adacel,Boostrix) 10/01/2003     Zoster vaccine recombinant adjuvanted (SHINGRIX) 09/09/2019, 11/18/2019     Zoster vaccine, live 03/10/2014     PREVENTATIVE HEALTH                                                      BMI: morbidly obese  Blood pressure: well-controlled on current regimen   Breast CA screening: up to date   Cervical CA screening: screening no longer indicated  Colon CA screening: DUE  Lung CA screening: patient does not meet screening criteria  Dexa: DUE  Screening cholesterol: n/a - already being treated for this condition  Screening diabetes: DUE  STD testing: no risk factors present  Alcohol misuse screening: alcohol use reviewed - no intervention indicated at this time  Immunizations: reviewed; up to date     OBJECTIVE                                                      /74   Pulse 71   Temp 97.2  F (36.2  C) (Temporal)   Wt 115.9 kg (255 lb 8 oz)   SpO2 100%   BMI 46.73 kg/m    Constitutional: well-appearing  Left third toe: thickened toenail; large hard callus tip of toe  ---  (Note was completed, in part, with [employer] voice-recognition software. Documentation was reviewed, but some grammatical, spelling, and word errors may remain.)

## 2023-02-02 DIAGNOSIS — E78.5 HYPERLIPIDEMIA WITH TARGET LDL LESS THAN 100: ICD-10-CM

## 2023-02-02 LAB — DEPRECATED CALCIDIOL+CALCIFEROL SERPL-MC: 61 UG/L (ref 20–75)

## 2023-02-02 RX ORDER — AMLODIPINE BESYLATE 5 MG/1
TABLET ORAL
Qty: 90 TABLET | Refills: 3 | Status: SHIPPED | OUTPATIENT
Start: 2023-02-02 | End: 2024-02-26

## 2023-02-09 ENCOUNTER — ANCILLARY PROCEDURE (OUTPATIENT)
Dept: BONE DENSITY | Facility: CLINIC | Age: 73
End: 2023-02-09
Attending: INTERNAL MEDICINE
Payer: COMMERCIAL

## 2023-02-09 DIAGNOSIS — Z78.0 ASYMPTOMATIC MENOPAUSE: ICD-10-CM

## 2023-02-09 PROCEDURE — 77085 DXA BONE DENSITY AXL VRT FX: CPT | Performed by: INTERNAL MEDICINE

## 2023-02-11 DIAGNOSIS — K27.9 PUD (PEPTIC ULCER DISEASE): ICD-10-CM

## 2023-02-11 DIAGNOSIS — M81.0 AGE-RELATED OSTEOPOROSIS WITHOUT CURRENT PATHOLOGICAL FRACTURE: Primary | ICD-10-CM

## 2023-02-11 RX ORDER — HEPARIN SODIUM (PORCINE) LOCK FLUSH IV SOLN 100 UNIT/ML 100 UNIT/ML
5 SOLUTION INTRAVENOUS
Status: CANCELLED | OUTPATIENT
Start: 2023-03-06

## 2023-02-11 RX ORDER — ALBUTEROL SULFATE 90 UG/1
1-2 AEROSOL, METERED RESPIRATORY (INHALATION)
Status: CANCELLED
Start: 2023-03-06

## 2023-02-11 RX ORDER — ZOLEDRONIC ACID 5 MG/100ML
5 INJECTION, SOLUTION INTRAVENOUS ONCE
Status: CANCELLED
Start: 2023-03-06

## 2023-02-11 RX ORDER — ALBUTEROL SULFATE 0.83 MG/ML
2.5 SOLUTION RESPIRATORY (INHALATION)
Status: CANCELLED | OUTPATIENT
Start: 2023-03-06

## 2023-02-11 RX ORDER — EPINEPHRINE 1 MG/ML
0.3 INJECTION, SOLUTION, CONCENTRATE INTRAVENOUS EVERY 5 MIN PRN
Status: CANCELLED | OUTPATIENT
Start: 2023-03-06

## 2023-02-11 RX ORDER — HEPARIN SODIUM,PORCINE 10 UNIT/ML
5 VIAL (ML) INTRAVENOUS
Status: CANCELLED | OUTPATIENT
Start: 2023-03-06

## 2023-02-11 RX ORDER — METHYLPREDNISOLONE SODIUM SUCCINATE 125 MG/2ML
125 INJECTION, POWDER, LYOPHILIZED, FOR SOLUTION INTRAMUSCULAR; INTRAVENOUS
Status: CANCELLED
Start: 2023-03-06

## 2023-02-11 RX ORDER — DIPHENHYDRAMINE HYDROCHLORIDE 50 MG/ML
50 INJECTION INTRAMUSCULAR; INTRAVENOUS
Status: CANCELLED
Start: 2023-03-06

## 2023-02-11 RX ORDER — ACETAMINOPHEN 325 MG/1
325 TABLET ORAL ONCE
Status: CANCELLED | OUTPATIENT
Start: 2023-03-06

## 2023-02-11 RX ORDER — MEPERIDINE HYDROCHLORIDE 25 MG/ML
25 INJECTION INTRAMUSCULAR; INTRAVENOUS; SUBCUTANEOUS EVERY 30 MIN PRN
Status: CANCELLED | OUTPATIENT
Start: 2023-03-06

## 2023-02-21 ENCOUNTER — OFFICE VISIT (OUTPATIENT)
Dept: PODIATRY | Facility: CLINIC | Age: 73
End: 2023-02-21
Payer: COMMERCIAL

## 2023-02-21 VITALS
DIASTOLIC BLOOD PRESSURE: 78 MMHG | SYSTOLIC BLOOD PRESSURE: 128 MMHG | WEIGHT: 255 LBS | HEIGHT: 62 IN | BODY MASS INDEX: 46.93 KG/M2

## 2023-02-21 DIAGNOSIS — L84 CALLUS OF FOOT: Primary | ICD-10-CM

## 2023-02-21 DIAGNOSIS — M20.42 HAMMERTOE OF LEFT FOOT: ICD-10-CM

## 2023-02-21 PROCEDURE — 99203 OFFICE O/P NEW LOW 30 MIN: CPT | Performed by: PODIATRIST

## 2023-02-21 NOTE — LETTER
2023         RE: Bridget Lawson  [address]  [facility] [address]        Dear Colleague,    Thank you for referring your patient, Bridget Lawson, to the [facility]. Please see a copy of my visit note below.    Assessment:      ICD-10-CM    1. Callus of foot  L84 Orthopedic  Referral      2. Hammertoe of left foot  M20.42              Plan:  Callus - foot care nurse    hammetoes - splints    Signigicant LE edeam, BIM 46 - comorbididites preclude surgical recommendation        Ankita Ordonez DPM                              Chief Complaint:     Patient presents with:  Musculoskeletal Problem: Callus of the left 3rd toe       HPI:  Bridget Lawson is a 72 year old year old female who presents for foot concern.      Past Medical & Surgical History:  Past Medical History:   Diagnosis Date     Benign essential hypertension      History of blood transfusion     no adverse reactions     History of hyperparathyroidism     s/p adenoma resection     History of stroke 2009    hemorrhagic; due to uncontrolled hypertension     Lymphedema of both lower extremities      Morbid obesity (H)      JOHNY on CPAP      Osteoporosis      PUD (peptic ulcer disease)     due to NSAID use     Pure hypercholesterolemia      Urgency-frequency syndrome       Past Surgical History:   Procedure Laterality Date     ARTHROSCOPY KNEE Left      CATARACT EXTRACTION, BILATERAL        SECTION, TUBAL LIGATION, COMBINED       ENDOSCOPIC RELEASE CARPAL TUNNEL Right 2020    Procedure: RIGHT ENDOSCOPIC CARPAL TUNNEL RELEASE;  Surgeon: Piper Guardado MD;  Location:  OR     HYSTERECTOMY TOTAL ABDOMINAL, BILATERAL SALPINGO-OOPHORECTOMY, COMBINED       PARATHYROID GLAND SURGERY      adenoma removed     REPAIR TENDON ANKLE Right 2017    Procedure: REPAIR TENDON ANKLE;  RIGHT POSTERIOR TIBIAL TENDON RECONSTRUCTION ;  Surgeon: Xavi Duran MD;  Location:  OR      ROTATOR CUFF REPAIR RT/LT Right      TONSILLECTOMY & ADENOIDECTOMY        Family History   Problem Relation Age of Onset     Breast Cancer Mother      Chronic Obstructive Pulmonary Disease Father      Diabetes Type 2  Father      Hypertension Father      Hyperlipidemia Brother      Breast Cancer Maternal Grandmother      Myocardial Infarction No family hx of      Cerebrovascular Disease No family hx of      Coronary Artery Disease Early Onset No family hx of      Colon Cancer No family hx of      Ovarian Cancer No family hx of         Social History:  ?  History   Smoking Status     Former     Packs/day: 3.00     Years: 27.00     Types: Cigarettes     Quit date: 9/1/1996   Smokeless Tobacco     Never     History   Drug Use No     Social History    Substance and Sexual Activity      Alcohol use: Yes        Comment: 1 drink/day      Allergies:  ?   No Known Allergies     Medications:    Current Outpatient Medications   Medication     Acetaminophen (TYLENOL PO)     amLODIPine (NORVASC) 5 MG tablet     aspirin  MG EC tablet     bisacodyl (DULCOLAX) 5 MG EC tablet     Calcium Carb-Cholecalciferol 600-800 MG-UNIT TABS     celecoxib (CELEBREX) 100 MG capsule     estradiol (ESTRACE VAGINAL) 0.1 MG/GM vaginal cream     fluticasone (FLONASE) 50 MCG/ACT spray     gabapentin (NEURONTIN) 100 MG capsule     GLUCOSAMINE-CHONDROITIN PO     hydrochlorothiazide (HYDRODIURIL) 12.5 MG tablet     isosorbide mononitrate (IMDUR) 60 MG 24 hr tablet     ketoconazole (NIZORAL) 2 % external cream     labetalol (NORMODYNE) 200 MG tablet     MAGNESIUM GLUCONATE PO     MELATONIN PO     Multiple Vitamins-Minerals (CENTRUM SILVER ULTRA WOMENS PO)     omeprazole (PRILOSEC) 20 MG DR capsule     ORDER FOR DME     ramipril (ALTACE) 10 MG capsule     rosuvastatin (CRESTOR) 20 MG tablet     solifenacin (VESICARE) 10 MG tablet     No current facility-administered medications for this visit.       Physical Exam:    Vitals:  [unfilled]  General:  WD/WN,  in NAD.  A&O x3.  Dermatologic:  Skin is intact, open lesions absent.   Skin texture, turgor is abnormal, callus fungus.  Vascular:  Pulses notpalpable bilateral.  Digital capillary refill time normal bilateral.  Skin temperature is normal bilateral.  Generalized edema- pitting bilateral.  Neurologic:    Gross sensation normal.  Gait and balance abnormal, walker.  Musculoskeletal:  aROM digits, ankle intact.  Muscle strength intact to foot & ankle.  Deformity present- brayan ALLISON            Again, thank you for allowing me to participate in the care of your patient.        Sincerely,        Ankita Ordonez DPM

## 2023-02-21 NOTE — PATIENT INSTRUCTIONS
PATIENT INSTRUCTIONS - Podiatry / Foot & Ankle Surgery      Crest pad L foot    [email or web address] or amazon  Shoes with mesh/stretch upper  & non-tapering toe box to avoid rubbing        We can x-ray the foot if not improved                    Here is a list of routine foot care resources, which includes toenail trimming and callus/corn management.     This is not a referral. It is your responsibility to contact the organization and your insurance to confirm cost and coverage.      ROUTINE FOOT CARE (NAIL TRIMMING / CALLUSES)      [facility]  [phone]   [facility]  [phone]  Multiple locations   [facility]  [phone] Dr. Kennedy and Dr. Negron  [address]  [place] [address]    Office: [phone]

## 2023-02-21 NOTE — PROGRESS NOTES
Assessment:      ICD-10-CM    1. Callus of foot  L84 Orthopedic  Referral      2. Hammertoe of left foot  M20.42              Plan:  Callus - foot care nurse    hammetoes - splints    Signigicant LE edeam, BIM 46 - comorbididites preclude surgical recommendation        Ankita Ordonez DPM                              Chief Complaint:     Patient presents with:  Musculoskeletal Problem: Callus of the left 3rd toe       HPI:  Bridget Lawson is a 72 year old year old female who presents for foot concern.      Past Medical & Surgical History:  Past Medical History:   Diagnosis Date     Benign essential hypertension      History of blood transfusion     no adverse reactions     History of hyperparathyroidism     s/p adenoma resection     History of stroke 2009    hemorrhagic; due to uncontrolled hypertension     Lymphedema of both lower extremities      Morbid obesity (H)      JOHNY on CPAP      Osteoporosis      PUD (peptic ulcer disease)     due to NSAID use     Pure hypercholesterolemia      Urgency-frequency syndrome       Past Surgical History:   Procedure Laterality Date     ARTHROSCOPY KNEE Left      CATARACT EXTRACTION, BILATERAL        SECTION, TUBAL LIGATION, COMBINED       ENDOSCOPIC RELEASE CARPAL TUNNEL Right 2020    Procedure: RIGHT ENDOSCOPIC CARPAL TUNNEL RELEASE;  Surgeon: Piper Guardado MD;  Location:  OR     HYSTERECTOMY TOTAL ABDOMINAL, BILATERAL SALPINGO-OOPHORECTOMY, COMBINED       PARATHYROID GLAND SURGERY      adenoma removed     REPAIR TENDON ANKLE Right 2017    Procedure: REPAIR TENDON ANKLE;  RIGHT POSTERIOR TIBIAL TENDON RECONSTRUCTION ;  Surgeon: Xavi Duran MD;  Location:  OR     ROTATOR CUFF REPAIR RT/LT Right      TONSILLECTOMY & ADENOIDECTOMY        Family History   Problem Relation Age of Onset     Breast Cancer Mother      Chronic Obstructive Pulmonary Disease Father      Diabetes Type 2  Father      Hypertension Father       Hyperlipidemia Brother      Breast Cancer Maternal Grandmother      Myocardial Infarction No family hx of      Cerebrovascular Disease No family hx of      Coronary Artery Disease Early Onset No family hx of      Colon Cancer No family hx of      Ovarian Cancer No family hx of         Social History:  ?  History   Smoking Status     Former     Packs/day: 3.00     Years: 27.00     Types: Cigarettes     Quit date: 9/1/1996   Smokeless Tobacco     Never     History   Drug Use No     Social History    Substance and Sexual Activity      Alcohol use: Yes        Comment: 1 drink/day      Allergies:  ?   No Known Allergies     Medications:    Current Outpatient Medications   Medication     Acetaminophen (TYLENOL PO)     amLODIPine (NORVASC) 5 MG tablet     aspirin  MG EC tablet     bisacodyl (DULCOLAX) 5 MG EC tablet     Calcium Carb-Cholecalciferol 600-800 MG-UNIT TABS     celecoxib (CELEBREX) 100 MG capsule     estradiol (ESTRACE VAGINAL) 0.1 MG/GM vaginal cream     fluticasone (FLONASE) 50 MCG/ACT spray     gabapentin (NEURONTIN) 100 MG capsule     GLUCOSAMINE-CHONDROITIN PO     hydrochlorothiazide (HYDRODIURIL) 12.5 MG tablet     isosorbide mononitrate (IMDUR) 60 MG 24 hr tablet     ketoconazole (NIZORAL) 2 % external cream     labetalol (NORMODYNE) 200 MG tablet     MAGNESIUM GLUCONATE PO     MELATONIN PO     Multiple Vitamins-Minerals (CENTRUM SILVER ULTRA WOMENS PO)     omeprazole (PRILOSEC) 20 MG DR capsule     ORDER FOR DME     ramipril (ALTACE) 10 MG capsule     rosuvastatin (CRESTOR) 20 MG tablet     solifenacin (VESICARE) 10 MG tablet     No current facility-administered medications for this visit.       Physical Exam:    Vitals:  [unfilled]  General:  WD/WN, in NAD.  A&O x3.  Dermatologic:  Skin is intact, open lesions absent.   Skin texture, turgor is abnormal, callus fungus.  Vascular:  Pulses notpalpable bilateral.  Digital capillary refill time normal bilateral.  Skin temperature is normal  bilateral.  Generalized edema- pitting bilateral.  Neurologic:    Gross sensation normal.  Gait and balance abnormal, walker.  Musculoskeletal:  aROM digits, ankle intact.  Muscle strength intact to foot & ankle.  Deformity present- hammertoes L

## 2023-02-28 ENCOUNTER — OFFICE VISIT (OUTPATIENT)
Dept: UROLOGY | Facility: CLINIC | Age: 73
End: 2023-02-28
Payer: COMMERCIAL

## 2023-02-28 VITALS
SYSTOLIC BLOOD PRESSURE: 127 MMHG | DIASTOLIC BLOOD PRESSURE: 81 MMHG | HEIGHT: 62 IN | WEIGHT: 247 LBS | BODY MASS INDEX: 45.45 KG/M2 | OXYGEN SATURATION: 96 % | HEART RATE: 61 BPM

## 2023-02-28 DIAGNOSIS — N32.81 OAB (OVERACTIVE BLADDER): Primary | ICD-10-CM

## 2023-02-28 PROCEDURE — 99214 OFFICE O/P EST MOD 30 MIN: CPT | Performed by: PHYSICIAN ASSISTANT

## 2023-02-28 ASSESSMENT — PAIN SCALES - GENERAL: PAINLEVEL: MILD PAIN (2)

## 2023-02-28 NOTE — NURSING NOTE
Chief Complaint   Patient presents with     Urinary Frequency     Over active bladder   hardly at all for urgency to urinate   4 times a night to urinate   Clive Neal, clinic assistant

## 2023-02-28 NOTE — LETTER
2/28/2023       RE: Bridget Lawson  [address]  [facility] [address]     Dear Colleague,    Thank you for referring your patient, Bridget Lawson, to the [facility] at [facility]. Please see a copy of my visit note below.      CC: Urgency, frequency, nocturia     HPI:  Bridget Lawson is a pleasant 72 year old female who presents in follow up of the above. Last seen 3 months ago. Above ongoing for many years. Nocturia x 3-5, frequency, urgency throughout the day. Can struggle with constipation. No gross hematuria. No dysuria.     Restarted estrace cream in the interim and has really improved her control with Vesicare. Continues with nocturia, but overall content with the progress.     Past Medical History        Past Medical History:   Diagnosis Date     Cerebral artery occlusion with cerebral infarction (H)        Hx of stroke   (02/10)     Hyperlipidemia LDL goal < 100 10/8/2012     Start prava in 7/14     Intracranial hemorrhage (H) 2/10     R basal ganglia, due to HTN; had mild L hemiparesis and dysarthria; good recovery     Obese       Osteopenia       in 6/10, prior to PTH surgery, T - 3.2 forearm, - 1.9 spine, - 1.5L and -1.6 R femoral necks     Sleep apnea       CPAP     Spider veins       Unspecified essential hypertension       Essential hypertension            Past Surgical History         Past Surgical History:   Procedure Laterality Date     BREAST SURGERY   11/2004     R breast bx     C/SECTION, CLASSICAL   1985     with tubal     ENDOSCOPIC RELEASE CARPAL TUNNEL Right 2/7/2020     Procedure: RIGHT ENDOSCOPIC CARPAL TUNNEL RELEASE;  Surgeon: Piper Guardado MD;  Location: [facility] OR     HYSTERECTOMY   5/1998     GREGORY w/ BSO     KNEE SURGERY   9/1998     L Arthroscopy     NECK SURGERY   12/2010     R parathyroid adenoma     ORTHOPEDIC SURGERY         REPAIR TENDON ANKLE Right 11/1/2017     Procedure: REPAIR TENDON ANKLE;   RIGHT POSTERIOR TIBIAL TENDON RECONSTRUCTION ;  Surgeon: Xavi Duran MD;  Location: [facility] OR     SHOULDER SURGERY   2008     rotator cuff repair R     TONSILLECTOMY         as a child T & A            Social History   Social History            Socioeconomic History     Marital status:        Spouse name: Not on file     Number of children: 2     Years of education: Not on file     Highest education level: Not on file   Occupational History     Not on file   Social Needs     Financial resource strain: Not on file     Food insecurity       Worry: Not on file       Inability: Not on file     Transportation needs       Medical: Not on file       Non-medical: Not on file   Tobacco Use     Smoking status: Former Smoker       Packs/day: 2.00       Years: 27.00       Pack years: 54.00       Types: Cigarettes       Start date: 10/24/1969       Last attempt to quit: 1996       Years since quittin.5     Smokeless tobacco: Never Used   Substance and Sexual Activity     Alcohol use: Yes       Alcohol/week: 0.0 standard drinks       Comment: 1/day     Drug use: No     Sexual activity: Not Currently   Lifestyle     Physical activity       Days per week: Not on file       Minutes per session: Not on file     Stress: Not on file   Relationships     Social connections       Talks on phone: Not on file       Gets together: Not on file       Attends [employer] service: Not on file       Active member of club or organization: Not on file       Attends meetings of clubs or organizations: Not on file       Relationship status: Not on file     Intimate partner violence       Fear of current or ex partner: Not on file       Emotionally abused: Not on file       Physically abused: Not on file       Forced sexual activity: Not on file   Other Topics Concern     Parent/sibling w/ CABG, MI or angioplasty before 65F 55M? Not Asked   Social History Narrative     2 dtrs; no GC            Family History         Family History    Problem Relation Age of Onset     Breast Cancer Mother            [place]; masectomy left; asthma;  in 2015 at age 97     Breast Cancer Maternal Grandmother           possible CA; masectomy     Breast Cancer Paternal Aunt           possible CA, masectomy     Respiratory Father           COPD     Lipids Brother                     Allergies   Allergen Reactions     Animal Dander               Current Outpatient Medications   Medication     Acetaminophen (TYLENOL PO)     amLODIPine (NORVASC) 5 MG tablet     aspirin  MG EC tablet     Calcium Carb-Cholecalciferol (CALTRATE 600+D) 600-800 MG-UNIT TABS     Carboxymethylcellulose Sodium (THERATEARS OP)     esomeprazole (NEXIUM) 20 MG DR capsule     estradiol (ESTRACE VAGINAL) 0.1 MG/GM vaginal cream     fluticasone (FLONASE) 50 MCG/ACT spray     glucosamine-chondroitinoitin 750-600 MG TABS     HYDROcodone-acetaminophen (NORCO) 5-325 MG tablet     ibuprofen (ADVIL/MOTRIN) 600 MG tablet     isosorbide mononitrate (IMDUR) 60 MG 24 hr tablet     ketoconazole (NIZORAL) 2 % external cream     labetalol (NORMODYNE) 200 MG tablet     MAGNESIUM GLUCONATE PO     MELATONIN PO     Multiple Vitamins-Minerals (CENTRUM SILVER ULTRA WOMENS PO)     NONFORMULARY     ORDER FOR DME     ramipril (ALTACE) 10 MG capsule     rosuvastatin (CRESTOR) 20 MG tablet      No current facility-administered medications for this visit.             PEx:   PSYCH: NAD      A/P: Bridget Lawson is a 72 year old female with urgency, frequency, OAB  -Continue estrogen cream three times a week near urethra (pea-sized amount): If >$50, she will let me know and we can get via a compound pharmacy.  - Vesicare 10mg daily, again reviewed SE   -Eliminate irritants, make note of current irritants  -Limit fluids after 5pm  -Can consider PFPT in the future, if needed  -12 mo return for med refill or may see Dr. Rosas if doing great. Consider myrbetriq 25 mg if room for improvmeent.      JULIA Sales [facility]  Urology      22 minutes spent on the date of the encounter doing chart review, review of test results, interpretation of tests, patient visit and documentation

## 2023-02-28 NOTE — PROGRESS NOTES
CC: Urgency, frequency, nocturia     HPI:  Bridget Lawson is a pleasant 72 year old female who presents in follow up of the above. Last seen 3 months ago. Above ongoing for many years. Nocturia x 3-5, frequency, urgency throughout the day. Can struggle with constipation. No gross hematuria. No dysuria.     Restarted estrace cream in the interim and has really improved her control with Vesicare. Continues with nocturia, but overall content with the progress.     Past Medical History        Past Medical History:   Diagnosis Date     Cerebral artery occlusion with cerebral infarction (H)        Hx of stroke   (02/10)     Hyperlipidemia LDL goal < 100 10/8/2012     Start prava in 7/14     Intracranial hemorrhage (H) 2/10     R basal ganglia, due to HTN; had mild L hemiparesis and dysarthria; good recovery     Obese       Osteopenia       in 6/10, prior to PTH surgery, T - 3.2 forearm, - 1.9 spine, - 1.5L and -1.6 R femoral necks     Sleep apnea       CPAP     Spider veins       Unspecified essential hypertension       Essential hypertension            Past Surgical History         Past Surgical History:   Procedure Laterality Date     BREAST SURGERY   11/2004     R breast bx     C/SECTION, CLASSICAL   1985     with tubal     ENDOSCOPIC RELEASE CARPAL TUNNEL Right 2/7/2020     Procedure: RIGHT ENDOSCOPIC CARPAL TUNNEL RELEASE;  Surgeon: Piper Guardado MD;  Location: [facility] OR     HYSTERECTOMY   5/1998     GREGORY w/ BSO     KNEE SURGERY   9/1998     L Arthroscopy     NECK SURGERY   12/2010     R parathyroid adenoma     ORTHOPEDIC SURGERY         REPAIR TENDON ANKLE Right 11/1/2017     Procedure: REPAIR TENDON ANKLE;  RIGHT POSTERIOR TIBIAL TENDON RECONSTRUCTION ;  Surgeon: Xavi Duran MD;  Location:  OR     SHOULDER SURGERY   11/2008     rotator cuff repair R     TONSILLECTOMY         as a child T & A            Social History   Social History            Socioeconomic History     Marital status:        Spouse  name: Not on file     Number of children: 2     Years of education: Not on file     Highest education level: Not on file   Occupational History     Not on file   Social Needs     Financial resource strain: Not on file     Food insecurity       Worry: Not on file       Inability: Not on file     Transportation needs       Medical: Not on file       Non-medical: Not on file   Tobacco Use     Smoking status: Former Smoker       Packs/day: 2.00       Years: 27.00       Pack years: 54.00       Types: Cigarettes       Start date: 10/24/1969       Last attempt to quit: 1996       Years since quittin.5     Smokeless tobacco: Never Used   Substance and Sexual Activity     Alcohol use: Yes       Alcohol/week: 0.0 standard drinks       Comment: 1/day     Drug use: No     Sexual activity: Not Currently   Lifestyle     Physical activity       Days per week: Not on file       Minutes per session: Not on file     Stress: Not on file   Relationships     Social connections       Talks on phone: Not on file       Gets together: Not on file       Attends [employer] service: Not on file       Active member of club or organization: Not on file       Attends meetings of clubs or organizations: Not on file       Relationship status: Not on file     Intimate partner violence       Fear of current or ex partner: Not on file       Emotionally abused: Not on file       Physically abused: Not on file       Forced sexual activity: Not on file   Other Topics Concern     Parent/sibling w/ CABG, MI or angioplasty before 65F 55M? Not Asked   Social History Narrative     2 dtrs; no GC            Family History         Family History   Problem Relation Age of Onset     Breast Cancer Mother            [place]; masectomy left; asthma;  in 2015 at age 97     Breast Cancer Maternal Grandmother           possible CA; masectomy     Breast Cancer Paternal Aunt           possible CA, masectomy     Respiratory Father           COPD     Lipids Brother                      Allergies   Allergen Reactions     Animal Dander               Current Outpatient Medications   Medication     Acetaminophen (TYLENOL PO)     amLODIPine (NORVASC) 5 MG tablet     aspirin  MG EC tablet     Calcium Carb-Cholecalciferol (CALTRATE 600+D) 600-800 MG-UNIT TABS     Carboxymethylcellulose Sodium (THERATEARS OP)     esomeprazole (NEXIUM) 20 MG DR capsule     estradiol (ESTRACE VAGINAL) 0.1 MG/GM vaginal cream     fluticasone (FLONASE) 50 MCG/ACT spray     glucosamine-chondroitinoitin 750-600 MG TABS     HYDROcodone-acetaminophen (NORCO) 5-325 MG tablet     ibuprofen (ADVIL/MOTRIN) 600 MG tablet     isosorbide mononitrate (IMDUR) 60 MG 24 hr tablet     ketoconazole (NIZORAL) 2 % external cream     labetalol (NORMODYNE) 200 MG tablet     MAGNESIUM GLUCONATE PO     MELATONIN PO     Multiple Vitamins-Minerals (CENTRUM SILVER ULTRA WOMENS PO)     NONFORMULARY     ORDER FOR DME     ramipril (ALTACE) 10 MG capsule     rosuvastatin (CRESTOR) 20 MG tablet      No current facility-administered medications for this visit.             PEx:   PSYCH: NAD      A/P: Bridget Lawson is a 72 year old female with urgency, frequency, OAB  -Continue estrogen cream three times a week near urethra (pea-sized amount): If >$50, she will let me know and we can get via a compound pharmacy.  - Vesicare 10mg daily, again reviewed SE   -Eliminate irritants, make note of current irritants  -Limit fluids after 5pm  -Can consider PFPT in the future, if needed  -12 mo return for med refill or may see Dr. Rosas if doing great. Consider myrbetriq 25 mg if room for improvmeent.      Stephanie Shi PA-C  [facility] Urology      22 minutes spent on the date of the encounter doing chart review, review of test results, interpretation of tests, patient visit and documentation

## 2023-03-02 DIAGNOSIS — I10 ESSENTIAL HYPERTENSION WITH GOAL BLOOD PRESSURE LESS THAN 140/90: ICD-10-CM

## 2023-03-03 RX ORDER — HYDROCHLOROTHIAZIDE 12.5 MG/1
TABLET ORAL
Qty: 90 TABLET | Refills: 1 | Status: SHIPPED | OUTPATIENT
Start: 2023-03-03 | End: 2023-06-30

## 2023-03-13 DIAGNOSIS — G89.29 CHRONIC LEFT SHOULDER PAIN: ICD-10-CM

## 2023-03-13 DIAGNOSIS — M25.512 CHRONIC LEFT SHOULDER PAIN: ICD-10-CM

## 2023-03-13 DIAGNOSIS — E78.5 HYPERLIPIDEMIA WITH TARGET LDL LESS THAN 100: ICD-10-CM

## 2023-03-13 RX ORDER — ROSUVASTATIN CALCIUM 20 MG/1
TABLET, COATED ORAL
Qty: 90 TABLET | Refills: 1 | Status: SHIPPED | OUTPATIENT
Start: 2023-03-13 | End: 2023-09-18

## 2023-03-13 RX ORDER — CELECOXIB 100 MG/1
CAPSULE ORAL
Qty: 120 CAPSULE | Refills: 0 | Status: SHIPPED | OUTPATIENT
Start: 2023-03-13 | End: 2023-04-11

## 2023-03-30 ENCOUNTER — TRANSFERRED RECORDS (OUTPATIENT)
Dept: HEALTH INFORMATION MANAGEMENT | Facility: CLINIC | Age: 73
End: 2023-03-30
Payer: COMMERCIAL

## 2023-04-08 DIAGNOSIS — I10 ESSENTIAL HYPERTENSION WITH GOAL BLOOD PRESSURE LESS THAN 130/80: ICD-10-CM

## 2023-04-10 RX ORDER — RAMIPRIL 10 MG/1
CAPSULE ORAL
Qty: 90 CAPSULE | Refills: 0 | Status: SHIPPED | OUTPATIENT
Start: 2023-04-10 | End: 2023-07-12

## 2023-04-11 ENCOUNTER — TELEPHONE (OUTPATIENT)
Dept: INTERNAL MEDICINE | Facility: CLINIC | Age: 73
End: 2023-04-11
Payer: COMMERCIAL

## 2023-04-11 DIAGNOSIS — M25.512 CHRONIC LEFT SHOULDER PAIN: ICD-10-CM

## 2023-04-11 DIAGNOSIS — G89.29 CHRONIC LEFT SHOULDER PAIN: ICD-10-CM

## 2023-04-11 RX ORDER — CELECOXIB 100 MG/1
CAPSULE ORAL
Qty: 120 CAPSULE | Refills: 0 | Status: SHIPPED | OUTPATIENT
Start: 2023-04-11 | End: 2023-05-30

## 2023-04-11 NOTE — TELEPHONE ENCOUNTER
CC: Patient calling requesting DEXA scheduling phone number.     Writer provided patient with DEXA scheduling phone number [phone]. Patient will call to schedule, no further questions or concerns at this time.    Signing encounter.    Jennifer Adan RN  [facility]

## 2023-04-14 DIAGNOSIS — M81.0 AGE-RELATED OSTEOPOROSIS WITHOUT CURRENT PATHOLOGICAL FRACTURE: Primary | ICD-10-CM

## 2023-04-18 ENCOUNTER — LAB (OUTPATIENT)
Dept: LAB | Facility: CLINIC | Age: 73
End: 2023-04-18
Payer: COMMERCIAL

## 2023-04-18 ENCOUNTER — ANCILLARY PROCEDURE (OUTPATIENT)
Dept: MAMMOGRAPHY | Facility: CLINIC | Age: 73
End: 2023-04-18
Attending: INTERNAL MEDICINE
Payer: COMMERCIAL

## 2023-04-18 DIAGNOSIS — Z12.31 VISIT FOR SCREENING MAMMOGRAM: ICD-10-CM

## 2023-04-18 DIAGNOSIS — M81.0 AGE-RELATED OSTEOPOROSIS WITHOUT CURRENT PATHOLOGICAL FRACTURE: ICD-10-CM

## 2023-04-18 LAB
ALBUMIN SERPL BCG-MCNC: 4.5 G/DL (ref 3.5–5.2)
CALCIUM SERPL-MCNC: 10.4 MG/DL (ref 8.8–10.2)
CREAT SERPL-MCNC: 0.77 MG/DL (ref 0.51–0.95)
GFR SERPL CREATININE-BSD FRML MDRD: 81 ML/MIN/1.73M2

## 2023-04-18 PROCEDURE — 82565 ASSAY OF CREATININE: CPT

## 2023-04-18 PROCEDURE — 77067 SCR MAMMO BI INCL CAD: CPT | Mod: TC | Performed by: RADIOLOGY

## 2023-04-18 PROCEDURE — 82040 ASSAY OF SERUM ALBUMIN: CPT

## 2023-04-18 PROCEDURE — 82310 ASSAY OF CALCIUM: CPT

## 2023-04-18 PROCEDURE — 77063 BREAST TOMOSYNTHESIS BI: CPT | Mod: TC | Performed by: RADIOLOGY

## 2023-04-18 PROCEDURE — 36415 COLL VENOUS BLD VENIPUNCTURE: CPT

## 2023-04-20 ENCOUNTER — PATIENT OUTREACH (OUTPATIENT)
Dept: CARE COORDINATION | Facility: CLINIC | Age: 73
End: 2023-04-20
Payer: COMMERCIAL

## 2023-04-24 ENCOUNTER — INFUSION THERAPY VISIT (OUTPATIENT)
Dept: INFUSION THERAPY | Facility: CLINIC | Age: 73
End: 2023-04-24
Attending: INTERNAL MEDICINE
Payer: COMMERCIAL

## 2023-04-24 VITALS
DIASTOLIC BLOOD PRESSURE: 61 MMHG | TEMPERATURE: 97.6 F | HEART RATE: 65 BPM | OXYGEN SATURATION: 96 % | RESPIRATION RATE: 18 BRPM | SYSTOLIC BLOOD PRESSURE: 117 MMHG

## 2023-04-24 DIAGNOSIS — M81.0 AGE-RELATED OSTEOPOROSIS WITHOUT CURRENT PATHOLOGICAL FRACTURE: ICD-10-CM

## 2023-04-24 DIAGNOSIS — K27.9 PUD (PEPTIC ULCER DISEASE): Primary | ICD-10-CM

## 2023-04-24 PROCEDURE — 96365 THER/PROPH/DIAG IV INF INIT: CPT

## 2023-04-24 PROCEDURE — 250N000011 HC RX IP 250 OP 636: Performed by: INTERNAL MEDICINE

## 2023-04-24 RX ORDER — ACETAMINOPHEN 325 MG/1
325 TABLET ORAL ONCE
Status: COMPLETED | OUTPATIENT
Start: 2023-04-24 | End: 2023-04-24

## 2023-04-24 RX ORDER — ZOLEDRONIC ACID 5 MG/100ML
5 INJECTION, SOLUTION INTRAVENOUS ONCE
Status: COMPLETED | OUTPATIENT
Start: 2023-04-24 | End: 2023-04-24

## 2023-04-24 RX ORDER — MEPERIDINE HYDROCHLORIDE 25 MG/ML
25 INJECTION INTRAMUSCULAR; INTRAVENOUS; SUBCUTANEOUS EVERY 30 MIN PRN
Status: CANCELLED | OUTPATIENT
Start: 2023-04-24

## 2023-04-24 RX ORDER — ZOLEDRONIC ACID 5 MG/100ML
5 INJECTION, SOLUTION INTRAVENOUS ONCE
Status: CANCELLED
Start: 2023-04-24

## 2023-04-24 RX ORDER — ALBUTEROL SULFATE 0.83 MG/ML
2.5 SOLUTION RESPIRATORY (INHALATION)
Status: CANCELLED | OUTPATIENT
Start: 2023-04-24

## 2023-04-24 RX ORDER — EPINEPHRINE 1 MG/ML
0.3 INJECTION, SOLUTION INTRAMUSCULAR; SUBCUTANEOUS EVERY 5 MIN PRN
Status: CANCELLED | OUTPATIENT
Start: 2023-04-24

## 2023-04-24 RX ORDER — METHYLPREDNISOLONE SODIUM SUCCINATE 125 MG/2ML
125 INJECTION, POWDER, LYOPHILIZED, FOR SOLUTION INTRAMUSCULAR; INTRAVENOUS
Status: CANCELLED
Start: 2023-04-24

## 2023-04-24 RX ORDER — ALBUTEROL SULFATE 90 UG/1
1-2 AEROSOL, METERED RESPIRATORY (INHALATION)
Status: CANCELLED
Start: 2023-04-24

## 2023-04-24 RX ORDER — HEPARIN SODIUM,PORCINE 10 UNIT/ML
5 VIAL (ML) INTRAVENOUS
Status: CANCELLED | OUTPATIENT
Start: 2023-04-24

## 2023-04-24 RX ORDER — ACETAMINOPHEN 325 MG/1
325 TABLET ORAL ONCE
Status: CANCELLED | OUTPATIENT
Start: 2023-04-24

## 2023-04-24 RX ORDER — DIPHENHYDRAMINE HYDROCHLORIDE 50 MG/ML
50 INJECTION INTRAMUSCULAR; INTRAVENOUS
Status: CANCELLED
Start: 2023-04-24

## 2023-04-24 RX ORDER — HEPARIN SODIUM (PORCINE) LOCK FLUSH IV SOLN 100 UNIT/ML 100 UNIT/ML
5 SOLUTION INTRAVENOUS
Status: CANCELLED | OUTPATIENT
Start: 2023-04-24

## 2023-04-24 RX ADMIN — ZOLEDRONIC ACID 5 MG: 0.05 INJECTION, SOLUTION INTRAVENOUS at 15:31

## 2023-04-24 NOTE — PROGRESS NOTES
Infusion Nursing Note:  Bridget Lawson presents today for Reclast Infusion.    Patient seen by provider today: No   present during visit today: Not Applicable.    Note: N/A.      Intravenous Access:  Peripheral IV placed.    Treatment Conditions:  Lab Results   Component Value Date     02/01/2023    POTASSIUM 4.2 02/01/2023    MAG 1.9 02/20/2010    CR 0.77 04/18/2023    CLARA 10.4 (H) 04/18/2023    BILITOTAL 0.3 02/01/2023    ALBUMIN 4.5 04/18/2023    ALT 26 02/01/2023    AST 27 02/01/2023     Results reviewed, labs MET treatment parameters, ok to proceed with treatment.      Post Infusion Assessment:  Patient tolerated infusion without incident.  Blood return noted pre and post infusion.  Site patent and intact, free from redness, edema or discomfort.  No evidence of extravasations.  Access discontinued per protocol.       Discharge Plan:   Patient declined prescription refills.  Discharge instructions reviewed with: Patient.  Patient and/or family verbalized understanding of discharge instructions and all questions answered.  AVS to patient via [employer].  Patient will return when scheduled for next appointment.   Patient discharged in stable condition accompanied by: self.  Departure Mode: Ambulatory.      Boom Menjivar RN

## 2023-04-28 DIAGNOSIS — I10 ESSENTIAL HYPERTENSION WITH GOAL BLOOD PRESSURE LESS THAN 140/90: ICD-10-CM

## 2023-04-28 RX ORDER — LABETALOL 200 MG/1
TABLET, FILM COATED ORAL
Qty: 270 TABLET | Refills: 1 | Status: SHIPPED | OUTPATIENT
Start: 2023-04-28 | End: 2023-06-30

## 2023-05-01 DIAGNOSIS — L30.4 INTERTRIGO: ICD-10-CM

## 2023-05-01 RX ORDER — KETOCONAZOLE 20 MG/G
CREAM TOPICAL
Qty: 30 G | Refills: 1 | Status: SHIPPED | OUTPATIENT
Start: 2023-05-01 | End: 2023-08-28

## 2023-05-04 ENCOUNTER — PATIENT OUTREACH (OUTPATIENT)
Dept: CARE COORDINATION | Facility: CLINIC | Age: 73
End: 2023-05-04
Payer: COMMERCIAL

## 2023-05-09 ENCOUNTER — TRANSFERRED RECORDS (OUTPATIENT)
Dept: HEALTH INFORMATION MANAGEMENT | Facility: CLINIC | Age: 73
End: 2023-05-09
Payer: COMMERCIAL

## 2023-05-24 ENCOUNTER — TRANSFERRED RECORDS (OUTPATIENT)
Dept: HEALTH INFORMATION MANAGEMENT | Facility: CLINIC | Age: 73
End: 2023-05-24
Payer: COMMERCIAL

## 2023-05-27 DIAGNOSIS — M25.512 CHRONIC LEFT SHOULDER PAIN: ICD-10-CM

## 2023-05-27 DIAGNOSIS — G89.29 CHRONIC LEFT SHOULDER PAIN: ICD-10-CM

## 2023-05-30 RX ORDER — CELECOXIB 100 MG/1
CAPSULE ORAL
Qty: 120 CAPSULE | Refills: 0 | Status: SHIPPED | OUTPATIENT
Start: 2023-05-30 | End: 2023-06-30

## 2023-06-07 DIAGNOSIS — N32.81 OAB (OVERACTIVE BLADDER): ICD-10-CM

## 2023-06-07 RX ORDER — SOLIFENACIN SUCCINATE 10 MG/1
TABLET, FILM COATED ORAL
Qty: 90 TABLET | Refills: 0 | Status: SHIPPED | OUTPATIENT
Start: 2023-06-07 | End: 2023-08-28

## 2023-06-15 ENCOUNTER — CARE COORDINATION (OUTPATIENT)
Dept: SLEEP MEDICINE | Facility: CLINIC | Age: 73
End: 2023-06-15

## 2023-06-15 ENCOUNTER — OFFICE VISIT (OUTPATIENT)
Dept: SLEEP MEDICINE | Facility: CLINIC | Age: 73
End: 2023-06-15
Payer: COMMERCIAL

## 2023-06-15 VITALS
HEIGHT: 63 IN | HEART RATE: 66 BPM | BODY MASS INDEX: 39.23 KG/M2 | WEIGHT: 221.4 LBS | OXYGEN SATURATION: 93 % | DIASTOLIC BLOOD PRESSURE: 69 MMHG | SYSTOLIC BLOOD PRESSURE: 104 MMHG

## 2023-06-15 DIAGNOSIS — G47.33 OSA (OBSTRUCTIVE SLEEP APNEA): Primary | ICD-10-CM

## 2023-06-15 PROCEDURE — 99213 OFFICE O/P EST LOW 20 MIN: CPT | Performed by: PHYSICIAN ASSISTANT

## 2023-06-15 ASSESSMENT — SLEEP AND FATIGUE QUESTIONNAIRES
HOW LIKELY ARE YOU TO NOD OFF OR FALL ASLEEP WHILE WATCHING TV: HIGH CHANCE OF DOZING
HOW LIKELY ARE YOU TO NOD OFF OR FALL ASLEEP WHILE SITTING AND TALKING TO SOMEONE: WOULD NEVER DOZE
HOW LIKELY ARE YOU TO NOD OFF OR FALL ASLEEP WHILE LYING DOWN TO REST IN THE AFTERNOON WHEN CIRCUMSTANCES PERMIT: SLIGHT CHANCE OF DOZING
HOW LIKELY ARE YOU TO NOD OFF OR FALL ASLEEP IN A CAR, WHILE STOPPED FOR A FEW MINUTES IN TRAFFIC: WOULD NEVER DOZE
HOW LIKELY ARE YOU TO NOD OFF OR FALL ASLEEP WHILE SITTING QUIETLY AFTER LUNCH WITHOUT ALCOHOL: SLIGHT CHANCE OF DOZING
HOW LIKELY ARE YOU TO NOD OFF OR FALL ASLEEP WHEN YOU ARE A PASSENGER IN A CAR FOR AN HOUR WITHOUT A BREAK: SLIGHT CHANCE OF DOZING
HOW LIKELY ARE YOU TO NOD OFF OR FALL ASLEEP WHILE SITTING INACTIVE IN A PUBLIC PLACE: WOULD NEVER DOZE
HOW LIKELY ARE YOU TO NOD OFF OR FALL ASLEEP WHILE SITTING AND READING: MODERATE CHANCE OF DOZING

## 2023-06-15 NOTE — PROGRESS NOTES
[facility]   Outpatient Sleep Medicine  Sujit 15, 2023       Name: Bridget Lawson MRN# [id number]   Age: 72 year old YOB: 1950            Assessment and Plan:   1. JOHNY (obstructive sleep apnea)  Patient's sleep apnea is adequately managed and well treated with current PAP settings [id number] with low residual AHI of 2.0 events per hour. Compliance is excellent. Tolerating PAP well. Ramp turned off manually in clinic today on her machine per patient request. Otherwise no indication to adjust other settings. She will continue nightly use. Prescription updated for mask/supplies and sent to [facility] for her.   - Comprehensive DME    Follow-up in about 1-2 years for routine PAP visit or sooner as needed.        Chief Complaint      Chief Complaint   Patient presents with     CPAP Follow Up     CPAP follow up          History of Present Illness:     Bridget Lawson is a 72 year old female with obesity, history of hemorraghic stroke, HTN, who presents to the clinic for follow-up of their severe obstructive sleep apnea treated with CPAP. Last seen by Dr. Sandhu 4/2022.     Originally diagnosed via PSG in 6/2010 with AHI >70 (we do not have copy of official report). Has been on CPAP therapy ever since.     Returns today for annual PAP visit. Got current Dreamstation 2 machine sometime last summer, last machine affected by recall.  No issues with her new machine. Only request today is to shut off her ramp, set to start at [id number] and feels too low.     Overall, the patient rates their experience with PAP as 6 (0 poor, 10 great). Patient is using a full face mask. The mask is comfortable. The mask is not leaking. They are not snoring with the mask on. They are not having gasp arousals.  They are not having significant oral/nasal dryness or epistaxis.  They are not having pain/skin breakdown. The pressure settings are comfortable.     Bedtime is typically 1:00AM. Usually it takes about 20  "minutes to fall asleep with the mask on. Wake time is typically 10:30AM.  Does feel well rested in AM.     [employer] Dreamstation 2 Auto-PAP 12-17 cmH2O download:  30 total days of use. 0 nonuse days.  Average use 7 hours 53 minutes per day.  30 days with >4 hours use.  Large leak 0 sec per day average. CPAP 90% pressure 14.5cm. AHI 2.0    SCALES:   INSOMNIA: Insomnia Severity Score: 14   SLEEPINESS: [place] Sleepiness Score: 8    Past medical/surgical history, family history, social history, medications and allergies were reviewed.           Physical Examination:   /69   Pulse 66   Ht 1.588 m (5' 2.52\")   Wt 100.4 kg (221 lb 6.4 oz)   SpO2 93%   BMI 39.82 kg/m    General appearance: Awake, alert, cooperative. Well groomed. Sitting comfortably in chair. In no apparent distress.  HEENT: Head: Normocephalic, atraumatic. Eyes:Conjunctiva clear. Sclera normal. Nose: External appearance without deformity.   Pulmonary:  Able to speak easily in full sentences. No cough or wheeze.   Skin:  No rashes or significant lesions on visible skin.   Neurologic: Alert, oriented x3.   Psychiatric: Mood euthymic. Affect congruent with full range and intensity.      CC:  Marcia Rosas PA-C  Sujit 15, 2023     [facility]  [address] [place] [address]     [facility] [place]  [address] [address]    Chart documentation was completed, in part, with zePASS voice-recognition software. Even though reviewed, some grammatical, spelling, and word errors may remain.    28 minutes spent on day of encounter doing chart review, history and exam, documentation, and further activities as noted above    "

## 2023-06-15 NOTE — NURSING NOTE
"Chief Complaint   Patient presents with     CPAP Follow Up     CPAP follow up       Initial /69   Pulse 66   Ht 1.588 m (5' 2.52\")   Wt 100.4 kg (221 lb 6.4 oz)   SpO2 93%   BMI 39.82 kg/m   Estimated body mass index is 39.82 kg/m  as calculated from the following:    Height as of this encounter: 1.588 m (5' 2.52\").    Weight as of this encounter: 100.4 kg (221 lb 6.4 oz).    Medication Reconciliation: complete  ESS 8  Shira Menjivar MA    "

## 2023-06-26 DIAGNOSIS — K21.01 GASTROESOPHAGEAL REFLUX DISEASE WITH ESOPHAGITIS AND HEMORRHAGE: ICD-10-CM

## 2023-06-29 DIAGNOSIS — G89.29 CHRONIC LEFT SHOULDER PAIN: ICD-10-CM

## 2023-06-29 DIAGNOSIS — M25.512 CHRONIC LEFT SHOULDER PAIN: ICD-10-CM

## 2023-06-30 ENCOUNTER — OFFICE VISIT (OUTPATIENT)
Dept: INTERNAL MEDICINE | Facility: CLINIC | Age: 73
End: 2023-06-30
Payer: COMMERCIAL

## 2023-06-30 VITALS
TEMPERATURE: 98.1 F | HEART RATE: 68 BPM | SYSTOLIC BLOOD PRESSURE: 126 MMHG | DIASTOLIC BLOOD PRESSURE: 70 MMHG | WEIGHT: 215.7 LBS | OXYGEN SATURATION: 93 % | BODY MASS INDEX: 38.22 KG/M2 | RESPIRATION RATE: 18 BRPM | HEIGHT: 63 IN

## 2023-06-30 DIAGNOSIS — Z79.899 MEDICATION MANAGEMENT: ICD-10-CM

## 2023-06-30 DIAGNOSIS — M75.122 NONTRAUMATIC COMPLETE TEAR OF LEFT ROTATOR CUFF: ICD-10-CM

## 2023-06-30 DIAGNOSIS — K59.09 CHRONIC CONSTIPATION: ICD-10-CM

## 2023-06-30 DIAGNOSIS — I10 BENIGN ESSENTIAL HYPERTENSION: ICD-10-CM

## 2023-06-30 DIAGNOSIS — Z23 HIGH PRIORITY FOR 2019-NCOV VACCINE: ICD-10-CM

## 2023-06-30 DIAGNOSIS — Z01.818 PREOPERATIVE EXAMINATION: Primary | ICD-10-CM

## 2023-06-30 LAB
ALBUMIN SERPL BCG-MCNC: 4.7 G/DL (ref 3.5–5.2)
ALBUMIN UR-MCNC: NEGATIVE MG/DL
ALP SERPL-CCNC: 48 U/L (ref 35–104)
ALT SERPL W P-5'-P-CCNC: 15 U/L (ref 0–50)
ANION GAP SERPL CALCULATED.3IONS-SCNC: 11 MMOL/L (ref 7–15)
APPEARANCE UR: CLEAR
AST SERPL W P-5'-P-CCNC: 19 U/L (ref 0–45)
BACTERIA #/AREA URNS HPF: ABNORMAL /HPF
BILIRUB SERPL-MCNC: 0.4 MG/DL
BILIRUB UR QL STRIP: NEGATIVE
BUN SERPL-MCNC: 17.6 MG/DL (ref 8–23)
CALCIUM SERPL-MCNC: 10.3 MG/DL (ref 8.8–10.2)
CHLORIDE SERPL-SCNC: 104 MMOL/L (ref 98–107)
COLOR UR AUTO: YELLOW
CREAT SERPL-MCNC: 0.85 MG/DL (ref 0.51–0.95)
DEPRECATED HCO3 PLAS-SCNC: 26 MMOL/L (ref 22–29)
ERYTHROCYTE [DISTWIDTH] IN BLOOD BY AUTOMATED COUNT: 13.7 % (ref 10–15)
GFR SERPL CREATININE-BSD FRML MDRD: 72 ML/MIN/1.73M2
GLUCOSE SERPL-MCNC: 94 MG/DL (ref 70–99)
GLUCOSE UR STRIP-MCNC: NEGATIVE MG/DL
HCT VFR BLD AUTO: 38.2 % (ref 35–47)
HGB BLD-MCNC: 12.5 G/DL (ref 11.7–15.7)
HGB UR QL STRIP: NEGATIVE
KETONES UR STRIP-MCNC: NEGATIVE MG/DL
LEUKOCYTE ESTERASE UR QL STRIP: ABNORMAL
MCH RBC QN AUTO: 30 PG (ref 26.5–33)
MCHC RBC AUTO-ENTMCNC: 32.7 G/DL (ref 31.5–36.5)
MCV RBC AUTO: 92 FL (ref 78–100)
NITRATE UR QL: NEGATIVE
PH UR STRIP: 7 [PH] (ref 5–7)
PLATELET # BLD AUTO: 164 10E3/UL (ref 150–450)
POTASSIUM SERPL-SCNC: 4 MMOL/L (ref 3.4–5.3)
PROT SERPL-MCNC: 7.1 G/DL (ref 6.4–8.3)
RBC # BLD AUTO: 4.17 10E6/UL (ref 3.8–5.2)
RBC #/AREA URNS AUTO: ABNORMAL /HPF
SODIUM SERPL-SCNC: 141 MMOL/L (ref 136–145)
SP GR UR STRIP: 1.01 (ref 1–1.03)
SQUAMOUS #/AREA URNS AUTO: ABNORMAL /LPF
UROBILINOGEN UR STRIP-ACNC: 0.2 E.U./DL
WBC # BLD AUTO: 5.4 10E3/UL (ref 4–11)
WBC #/AREA URNS AUTO: ABNORMAL /HPF

## 2023-06-30 PROCEDURE — 99214 OFFICE O/P EST MOD 30 MIN: CPT | Mod: 25 | Performed by: INTERNAL MEDICINE

## 2023-06-30 PROCEDURE — 85027 COMPLETE CBC AUTOMATED: CPT | Performed by: INTERNAL MEDICINE

## 2023-06-30 PROCEDURE — 87086 URINE CULTURE/COLONY COUNT: CPT | Performed by: INTERNAL MEDICINE

## 2023-06-30 PROCEDURE — 36415 COLL VENOUS BLD VENIPUNCTURE: CPT | Performed by: INTERNAL MEDICINE

## 2023-06-30 PROCEDURE — 80053 COMPREHEN METABOLIC PANEL: CPT | Performed by: INTERNAL MEDICINE

## 2023-06-30 PROCEDURE — 93000 ELECTROCARDIOGRAM COMPLETE: CPT | Performed by: INTERNAL MEDICINE

## 2023-06-30 PROCEDURE — 0134A COVID-19 BIVALENT 18+ (MODERNA): CPT | Performed by: INTERNAL MEDICINE

## 2023-06-30 PROCEDURE — 81001 URINALYSIS AUTO W/SCOPE: CPT | Performed by: INTERNAL MEDICINE

## 2023-06-30 PROCEDURE — 91313 COVID-19 BIVALENT 18+ (MODERNA): CPT | Performed by: INTERNAL MEDICINE

## 2023-06-30 RX ORDER — METOPROLOL SUCCINATE 50 MG/1
50 TABLET, EXTENDED RELEASE ORAL DAILY
Qty: 90 TABLET | Refills: 3 | Status: SHIPPED | OUTPATIENT
Start: 2023-06-30 | End: 2024-05-31

## 2023-06-30 RX ORDER — CELECOXIB 100 MG/1
2 CAPSULE ORAL 2 TIMES DAILY
Status: ON HOLD | COMMUNITY
Start: 2023-06-30 | End: 2023-07-19

## 2023-06-30 RX ORDER — ASPIRIN 81 MG/1
81 TABLET ORAL DAILY
Status: ON HOLD | COMMUNITY
Start: 2023-06-30 | End: 2023-07-19

## 2023-06-30 RX ORDER — POLYETHYLENE GLYCOL 3350 17 G/17G
1 POWDER, FOR SOLUTION ORAL PRN
COMMUNITY
Start: 2023-06-30

## 2023-06-30 RX ORDER — CELECOXIB 100 MG/1
CAPSULE ORAL
Qty: 120 CAPSULE | Refills: 0 | Status: SHIPPED | OUTPATIENT
Start: 2023-06-30 | End: 2023-06-30

## 2023-06-30 NOTE — PATIENT INSTRUCTIONS
COVID-19 booster and EKG in here today.    ---    Labs and urine downstairs today.    STOP labetalol, Dulcolax, and hydrochlorothiazide.    START Toprol XL 50mg once daily (to replace labetalol).     Celebrex should only be taken sparingly as needed for joint pain.    Please use Miralax daily and consistently for chronic constipation.    ---    HOLD aspirin 1 week prior to surgery.     HOLD Celebrex for at least 3 days prior to surgery.    Please continue all other medications up to and on the morning of surgery. (May take morning medications with a sip of water on the morning of surgery).

## 2023-06-30 NOTE — H&P (VIEW-ONLY)
ASSESSMENT/PLAN:                                                      Bridget Lawson is a pleasant 72 year old female who presents for a preoperative evaluation. She is undergoing an intermediate risk procedure. Her risk of major cardiac event is 1 point: 0.9%.    (Z01.818) Preoperative examination  (primary encounter diagnosis)  (M75.122) Nontraumatic complete tear of left rotator cuff  Plan: CBC, CMP, UA reflex, and EKG today; HOLD aspirin for 1 week prior to surgery; HOLD Celebrex for at least 3 days prior to surgery; continue all other medications up to and on the morning of surgery (may take morning medications with a sip of water on the morning of surgery).      (Z23) High priority for 2019-nCoV vaccine  Plan: COVID-19 booster given today.     (I10) Benign essential hypertension  Comment: well-controlled on current regimen.    Plan: STOP hydrochlorothiazide; REPLACE labetalol with Toprol XL 50 mg daily; CONTINUE amlodipine 5 mg daily, Imdur 60 mg daily, and ramipril 10 mg daily.    (K59.09) Chronic constipation  Plan: REPLACE alternating senna and Dulcolax with daily MiraLAX.    (Z79.899) Medication management  Plan: patient encouraged to minimize use of Celebrex to sparingly as needed as opposed to scheduled.    RECOMMEND PROCEEDING WITH SURGERY: YES    Marcia Rosas MD   [address] [address]  T: [phone], F: [phone]    SUBJECTIVE:                                                      Bridget Lawson is a very pleasant 72 year old female who presents for preoperative evaluation.    Surgeon: Keith  Date: 7/18/2023  Location:  OR  Surgery: LEFT reverse shoulder arthroplasty (intermediate risk)  Indication: LEFT rotator cuff tear    Past Medical History:   Diagnosis Date     Benign essential hypertension      History of blood transfusion     no adverse reactions     History of hyperparathyroidism     s/p adenoma resection     History of stroke 2009     hemorrhagic; due to uncontrolled hypertension     Lymphedema of both lower extremities      Morbid obesity (H)      JOHNY on CPAP      Osteoporosis      PUD (peptic ulcer disease)     due to NSAID use     Pure hypercholesterolemia      Urgency-frequency syndrome      Personal or family history of excessive or prolonged bleeding? No  Personal or family history of blood clots? No  History of snoring/Sleep Apnea? YES - has JOHNY, on CPAP  History of blood transfusions? YES - no adverse reactions    Cardiovascular risk: Cerebrovascular disease (1 point)    Risk of major cardiac event: 1 point: 0.9%    Past Surgical History:   Procedure Laterality Date     ARTHROSCOPY KNEE Left      CATARACT EXTRACTION, BILATERAL        SECTION, TUBAL LIGATION, COMBINED       ENDOSCOPIC RELEASE CARPAL TUNNEL Right 2020    Procedure: RIGHT ENDOSCOPIC CARPAL TUNNEL RELEASE;  Surgeon: Piper Guardado MD;  Location: [facility] OR     HYSTERECTOMY TOTAL ABDOMINAL, BILATERAL SALPINGO-OOPHORECTOMY, COMBINED       PARATHYROID GLAND SURGERY      adenoma removed     REPAIR TENDON ANKLE Right 2017    Procedure: REPAIR TENDON ANKLE;  RIGHT POSTERIOR TIBIAL TENDON RECONSTRUCTION ;  Surgeon: Xavi Duran MD;  Location: [facility] OR     ROTATOR CUFF REPAIR RT/LT Right      TONSILLECTOMY & ADENOIDECTOMY       Artificial assistive devices, such as pacemakers, artificial cardiac valves, or artificial joints? No    No Known Allergies    Personal or family history of allergy to anesthesia? No  Allergy to local or topical analgesics? No  Allergy to latex? No  Allergy to adhesives/skin preparations (chlorhexadine)? No    Current Outpatient Medications   Medication Sig     Acetaminophen (TYLENOL PO) Take 500 mg by mouth every 6 hours as needed for mild pain or fever      amLODIPine (NORVASC) 5 MG tablet TAKE 1 TABLET BY MOUTH ONCE DAILY     aspirin 81 MG EC tablet Take 1 tablet (81 mg) by mouth daily     Calcium Carb-Cholecalciferol 600-800  MG-UNIT TABS Take 1 tablet by mouth 2 times daily      celecoxib (CELEBREX) 100 MG capsule Take 1 capsule (100 mg) by mouth daily as needed for other (joint pain)     estradiol (ESTRACE VAGINAL) 0.1 MG/GM vaginal cream Apply small amount to the vaginal opening and urethra M, W, F @ h.s.     fluticasone (FLONASE) 50 MCG/ACT spray Spray 1 spray into both nostrils At Bedtime     gabapentin (NEURONTIN) 100 MG capsule Take 1 capsule (100 mg) by mouth 2 times daily     GLUCOSAMINE-CHONDROITIN PO Take 1 tablet by mouth 2 times daily 1500/1200 mg tabs     isosorbide mononitrate (IMDUR) 60 MG 24 hr tablet Take 1 tablet by mouth once daily     ketoconazole (NIZORAL) 2 % external cream Apply to affected area twice daily as needed     MAGNESIUM GLUCONATE PO Take 400 mg by mouth daily     MELATONIN PO Take 10 mg by mouth nightly as needed      metoprolol succinate ER (TOPROL XL) 50 MG 24 hr tablet Take 1 tablet (50 mg) by mouth daily     Multiple Vitamins-Minerals (CENTRUM SILVER ULTRA WOMENS PO) Take 1 tablet by mouth daily     omeprazole (PRILOSEC) 20 MG DR capsule Take 1 capsule (20 mg) by mouth daily     polyethylene glycol (MIRALAX) 17 GM/Dose powder Take 17 g (1 Capful) by mouth daily     ramipril (ALTACE) 10 MG capsule Take 1 capsule by mouth once daily     rosuvastatin (CRESTOR) 20 MG tablet Take 1 tablet by mouth once daily     solifenacin (VESICARE) 10 MG tablet Take 1 tablet by mouth once daily     Over the counter medications? Other than above, no  Vitamin Supplements? Other than above, no  Herbal Supplements? No    Family History   Problem Relation Age of Onset     Breast Cancer Mother      Chronic Obstructive Pulmonary Disease Father      Diabetes Type 2  Father      Hypertension Father      Breast Cancer Maternal Grandmother      Hyperlipidemia Brother      Myocardial Infarction No family hx of      Cerebrovascular Disease No family hx of      Coronary Artery Disease Early Onset No family hx of      Colon Cancer  "No family hx of      Ovarian Cancer No family hx of      Social History     Occupational History     Occupation: Retired -    Tobacco Use     Smoking status: Former     Packs/day: 3.00     Years: 27.00     Pack years: 81.00     Types: Cigarettes     Quit date: 1996     Years since quittin.8     Smokeless tobacco: Never   Vaping Use     Vaping Use: Never used   Substance and Sexual Activity     Alcohol use: Yes     Comment: 1 drink/day     Drug use: No     Sexual activity: Not Currently   Social History Narrative    .    Two adult daughters.    No grandchildren.    No formal exercise.      Functional capacity: Can do light work around the house like dusting or washing dishes (4 METs)    OBJECTIVE:                                                      /70   Pulse 68   Temp 98.1  F (36.7  C) (Tympanic)   Resp 18   Ht 1.588 m (5' 2.5\")   Wt 97.8 kg (215 lb 11.2 oz)   SpO2 93%   BMI 38.82 kg/m    Constitutional: well-appearing  Respiratory: normal respiratory effort; clear to auscultation bilaterally  Cardiovascular: regular rate and rhythm; no edema  Gastrointestinal: soft, non-tender, non-distended, and bowel sounds present; no organomegaly or masses  Musculoskeletal: walks with cane; transfers with some difficulty  Psych: normal judgment and insight; normal mood and affect    ---    (Chart documentation was completed, in part, with Guide voice-recognition software. Even though reviewed, some grammatical, spelling, and word errors may remain.)  "

## 2023-06-30 NOTE — LETTER
July 2, 2023      Bridget Lawson  [address]  [facility] [address]        Dear ,    We are writing to inform you of your test results.    Your test results fall within the expected range(s) or remain unchanged from previous results.  You may proceed with surgery.     Resulted Orders   CBC with platelets   Result Value Ref Range    WBC Count 5.4 4.0 - 11.0 10e3/uL    RBC Count 4.17 3.80 - 5.20 10e6/uL    Hemoglobin 12.5 11.7 - 15.7 g/dL    Hematocrit 38.2 35.0 - 47.0 %    MCV 92 78 - 100 fL    MCH 30.0 26.5 - 33.0 pg    MCHC 32.7 31.5 - 36.5 g/dL    RDW 13.7 10.0 - 15.0 %    Platelet Count 164 150 - 450 10e3/uL   Comprehensive metabolic panel   Result Value Ref Range    Sodium 141 136 - 145 mmol/L    Potassium 4.0 3.4 - 5.3 mmol/L    Chloride 104 98 - 107 mmol/L    Carbon Dioxide (CO2) 26 22 - 29 mmol/L    Anion Gap 11 7 - 15 mmol/L    Urea Nitrogen 17.6 8.0 - 23.0 mg/dL    Creatinine 0.85 0.51 - 0.95 mg/dL    Calcium 10.3 (H) 8.8 - 10.2 mg/dL    Glucose 94 70 - 99 mg/dL    Alkaline Phosphatase 48 35 - 104 U/L    AST 19 0 - 45 U/L    ALT 15 0 - 50 U/L    Protein Total 7.1 6.4 - 8.3 g/dL    Albumin 4.7 3.5 - 5.2 g/dL    Bilirubin Total 0.4 <=1.2 mg/dL    GFR Estimate 72 >60 mL/min/1.73m2   UA Macroscopic with reflex to Microscopic and Culture   Result Value Ref Range    Color Urine Yellow Colorless, Straw, Light Yellow, Yellow    Appearance Urine Clear Clear    Glucose Urine Negative Negative mg/dL    Bilirubin Urine Negative Negative    Ketones Urine Negative Negative mg/dL    Specific Gravity Urine 1.010 1.003 - 1.035    Blood Urine Negative Negative    pH Urine 7.0 5.0 - 7.0    Protein Albumin Urine Negative Negative mg/dL    Urobilinogen Urine 0.2 0.2, 1.0 E.U./dL    Nitrite Urine Negative Negative    Leukocyte Esterase Urine Large (A) Negative   Urine Microscopic Exam   Result Value Ref Range    Bacteria Urine Many (A) None Seen /HPF    RBC Urine 0-2 0-2 /HPF /HPF    WBC Urine 10-25 (A) 0-5  /HPF /HPF    Squamous Epithelials Urine Few (A) None Seen /LPF   Urine Culture   Result Value Ref Range    Culture <10,000 CFU/mL Mixture of urogenital karthikeyan        If you have any questions or concerns, please call the clinic at the number listed above.       Sincerely,      Marcia Rosas MD

## 2023-06-30 NOTE — PROGRESS NOTES
ASSESSMENT/PLAN:                                                      Bridget Lawson is a pleasant 72 year old female who presents for a preoperative evaluation. She is undergoing an intermediate risk procedure. Her risk of major cardiac event is 1 point: 0.9%.    (Z01.818) Preoperative examination  (primary encounter diagnosis)  (M75.122) Nontraumatic complete tear of left rotator cuff  Plan: CBC, CMP, UA reflex, and EKG today; HOLD aspirin for 1 week prior to surgery; HOLD Celebrex for at least 3 days prior to surgery; continue all other medications up to and on the morning of surgery (may take morning medications with a sip of water on the morning of surgery).      (Z23) High priority for 2019-nCoV vaccine  Plan: COVID-19 booster given today.     (I10) Benign essential hypertension  Comment: well-controlled on current regimen.    Plan: STOP hydrochlorothiazide; REPLACE labetalol with Toprol XL 50 mg daily; CONTINUE amlodipine 5 mg daily, Imdur 60 mg daily, and ramipril 10 mg daily.    (K59.09) Chronic constipation  Plan: REPLACE alternating senna and Dulcolax with daily MiraLAX.    (Z79.899) Medication management  Plan: patient encouraged to minimize use of Celebrex to sparingly as needed as opposed to scheduled.    RECOMMEND PROCEEDING WITH SURGERY: YES    Marcia Rosas MD   [address] [address]  T: [phone], F: [phone]    SUBJECTIVE:                                                      Bridget Lawson is a very pleasant 72 year old female who presents for preoperative evaluation.    Surgeon: Keith  Date: 7/18/2023  Location:  OR  Surgery: LEFT reverse shoulder arthroplasty (intermediate risk)  Indication: LEFT rotator cuff tear    Past Medical History:   Diagnosis Date     Benign essential hypertension      History of blood transfusion     no adverse reactions     History of hyperparathyroidism     s/p adenoma resection     History of stroke 2009     hemorrhagic; due to uncontrolled hypertension     Lymphedema of both lower extremities      Morbid obesity (H)      JOHNY on CPAP      Osteoporosis      PUD (peptic ulcer disease)     due to NSAID use     Pure hypercholesterolemia      Urgency-frequency syndrome      Personal or family history of excessive or prolonged bleeding? No  Personal or family history of blood clots? No  History of snoring/Sleep Apnea? YES - has JOHNY, on CPAP  History of blood transfusions? YES - no adverse reactions    Cardiovascular risk: Cerebrovascular disease (1 point)    Risk of major cardiac event: 1 point: 0.9%    Past Surgical History:   Procedure Laterality Date     ARTHROSCOPY KNEE Left      CATARACT EXTRACTION, BILATERAL        SECTION, TUBAL LIGATION, COMBINED       ENDOSCOPIC RELEASE CARPAL TUNNEL Right 2020    Procedure: RIGHT ENDOSCOPIC CARPAL TUNNEL RELEASE;  Surgeon: Piper Guardado MD;  Location: [facility]     HYSTERECTOMY TOTAL ABDOMINAL, BILATERAL SALPINGO-OOPHORECTOMY, COMBINED       PARATHYROID GLAND SURGERY      adenoma removed     REPAIR TENDON ANKLE Right 2017    Procedure: REPAIR TENDON ANKLE;  RIGHT POSTERIOR TIBIAL TENDON RECONSTRUCTION ;  Surgeon: Xavi Duran MD;  Location: [facility] OR     ROTATOR CUFF REPAIR RT/LT Right      TONSILLECTOMY & ADENOIDECTOMY       Artificial assistive devices, such as pacemakers, artificial cardiac valves, or artificial joints? No    No Known Allergies    Personal or family history of allergy to anesthesia? No  Allergy to local or topical analgesics? No  Allergy to latex? No  Allergy to adhesives/skin preparations (chlorhexadine)? No    Current Outpatient Medications   Medication Sig     Acetaminophen (TYLENOL PO) Take 500 mg by mouth every 6 hours as needed for mild pain or fever      amLODIPine (NORVASC) 5 MG tablet TAKE 1 TABLET BY MOUTH ONCE DAILY     aspirin 81 MG EC tablet Take 1 tablet (81 mg) by mouth daily     Calcium Carb-Cholecalciferol 600-800  MG-UNIT TABS Take 1 tablet by mouth 2 times daily      celecoxib (CELEBREX) 100 MG capsule Take 1 capsule (100 mg) by mouth daily as needed for other (joint pain)     estradiol (ESTRACE VAGINAL) 0.1 MG/GM vaginal cream Apply small amount to the vaginal opening and urethra M, W, F @ h.s.     fluticasone (FLONASE) 50 MCG/ACT spray Spray 1 spray into both nostrils At Bedtime     gabapentin (NEURONTIN) 100 MG capsule Take 1 capsule (100 mg) by mouth 2 times daily     GLUCOSAMINE-CHONDROITIN PO Take 1 tablet by mouth 2 times daily 1500/1200 mg tabs     isosorbide mononitrate (IMDUR) 60 MG 24 hr tablet Take 1 tablet by mouth once daily     ketoconazole (NIZORAL) 2 % external cream Apply to affected area twice daily as needed     MAGNESIUM GLUCONATE PO Take 400 mg by mouth daily     MELATONIN PO Take 10 mg by mouth nightly as needed      metoprolol succinate ER (TOPROL XL) 50 MG 24 hr tablet Take 1 tablet (50 mg) by mouth daily     Multiple Vitamins-Minerals (CENTRUM SILVER ULTRA WOMENS PO) Take 1 tablet by mouth daily     omeprazole (PRILOSEC) 20 MG DR capsule Take 1 capsule (20 mg) by mouth daily     polyethylene glycol (MIRALAX) 17 GM/Dose powder Take 17 g (1 Capful) by mouth daily     ramipril (ALTACE) 10 MG capsule Take 1 capsule by mouth once daily     rosuvastatin (CRESTOR) 20 MG tablet Take 1 tablet by mouth once daily     solifenacin (VESICARE) 10 MG tablet Take 1 tablet by mouth once daily     Over the counter medications? Other than above, no  Vitamin Supplements? Other than above, no  Herbal Supplements? No    Family History   Problem Relation Age of Onset     Breast Cancer Mother      Chronic Obstructive Pulmonary Disease Father      Diabetes Type 2  Father      Hypertension Father      Breast Cancer Maternal Grandmother      Hyperlipidemia Brother      Myocardial Infarction No family hx of      Cerebrovascular Disease No family hx of      Coronary Artery Disease Early Onset No family hx of      Colon Cancer  "No family hx of      Ovarian Cancer No family hx of      Social History     Occupational History     Occupation: Retired -    Tobacco Use     Smoking status: Former     Packs/day: 3.00     Years: 27.00     Pack years: 81.00     Types: Cigarettes     Quit date: 1996     Years since quittin.8     Smokeless tobacco: Never   Vaping Use     Vaping Use: Never used   Substance and Sexual Activity     Alcohol use: Yes     Comment: 1 drink/day     Drug use: No     Sexual activity: Not Currently   Social History Narrative    .    Two adult daughters.    No grandchildren.    No formal exercise.      Functional capacity: Can do light work around the house like dusting or washing dishes (4 METs)    OBJECTIVE:                                                      /70   Pulse 68   Temp 98.1  F (36.7  C) (Tympanic)   Resp 18   Ht 1.588 m (5' 2.5\")   Wt 97.8 kg (215 lb 11.2 oz)   SpO2 93%   BMI 38.82 kg/m    Constitutional: well-appearing  Respiratory: normal respiratory effort; clear to auscultation bilaterally  Cardiovascular: regular rate and rhythm; no edema  Gastrointestinal: soft, non-tender, non-distended, and bowel sounds present; no organomegaly or masses  Musculoskeletal: walks with cane; transfers with some difficulty  Psych: normal judgment and insight; normal mood and affect    ---    (Chart documentation was completed, in part, with [employer] voice-recognition software. Even though reviewed, some grammatical, spelling, and word errors may remain.)  "

## 2023-07-02 LAB — BACTERIA UR CULT: NORMAL

## 2023-07-12 DIAGNOSIS — I10 ESSENTIAL HYPERTENSION WITH GOAL BLOOD PRESSURE LESS THAN 130/80: ICD-10-CM

## 2023-07-12 RX ORDER — RAMIPRIL 10 MG/1
CAPSULE ORAL
Qty: 90 CAPSULE | Refills: 2 | Status: SHIPPED | OUTPATIENT
Start: 2023-07-12 | End: 2024-04-05

## 2023-07-12 NOTE — TELEPHONE ENCOUNTER
Prescription approved per [facility] Refill Protocol.  Dari Vivas, RN  [facility] Triage Nurse

## 2023-07-15 DIAGNOSIS — I10 ESSENTIAL HYPERTENSION WITH GOAL BLOOD PRESSURE LESS THAN 140/90: ICD-10-CM

## 2023-07-15 DIAGNOSIS — M79.605 CHRONIC PAIN OF BOTH LOWER EXTREMITIES: ICD-10-CM

## 2023-07-15 DIAGNOSIS — M79.604 CHRONIC PAIN OF BOTH LOWER EXTREMITIES: ICD-10-CM

## 2023-07-15 DIAGNOSIS — G89.29 CHRONIC PAIN OF BOTH LOWER EXTREMITIES: ICD-10-CM

## 2023-07-17 RX ORDER — HYDROCHLOROTHIAZIDE 12.5 MG/1
TABLET ORAL
Qty: 90 TABLET | Refills: 0 | Status: ON HOLD | OUTPATIENT
Start: 2023-07-17 | End: 2023-07-18

## 2023-07-17 RX ORDER — GABAPENTIN 100 MG/1
CAPSULE ORAL
Qty: 180 CAPSULE | Refills: 0 | Status: SHIPPED | OUTPATIENT
Start: 2023-07-17 | End: 2023-10-09

## 2023-07-18 ENCOUNTER — HOSPITAL ENCOUNTER (OUTPATIENT)
Facility: CLINIC | Age: 73
Discharge: HOME | End: 2023-07-19
Attending: ORTHOPAEDIC SURGERY | Admitting: ORTHOPAEDIC SURGERY
Payer: COMMERCIAL

## 2023-07-18 ENCOUNTER — ANESTHESIA EVENT (OUTPATIENT)
Dept: SURGERY | Facility: CLINIC | Age: 73
End: 2023-07-18
Payer: COMMERCIAL

## 2023-07-18 ENCOUNTER — APPOINTMENT (OUTPATIENT)
Dept: GENERAL RADIOLOGY | Facility: CLINIC | Age: 73
End: 2023-07-18
Attending: STUDENT IN AN ORGANIZED HEALTH CARE EDUCATION/TRAINING PROGRAM
Payer: COMMERCIAL

## 2023-07-18 ENCOUNTER — ANESTHESIA (OUTPATIENT)
Dept: SURGERY | Facility: CLINIC | Age: 73
End: 2023-07-18
Payer: COMMERCIAL

## 2023-07-18 DIAGNOSIS — Z96.612 S/P SHOULDER REPLACEMENT, LEFT: Primary | ICD-10-CM

## 2023-07-18 DIAGNOSIS — K21.01 GASTROESOPHAGEAL REFLUX DISEASE WITH ESOPHAGITIS AND HEMORRHAGE: ICD-10-CM

## 2023-07-18 LAB
FASTING STATUS PATIENT QL REPORTED: YES
GLUCOSE SERPL-MCNC: 98 MG/DL (ref 70–99)
POTASSIUM SERPL-SCNC: 3.4 MMOL/L (ref 3.4–5.3)

## 2023-07-18 PROCEDURE — 360N000077 HC SURGERY LEVEL 4, PER MIN: Performed by: ORTHOPAEDIC SURGERY

## 2023-07-18 PROCEDURE — 250N000009 HC RX 250: Performed by: ORTHOPAEDIC SURGERY

## 2023-07-18 PROCEDURE — 258N000003 HC RX IP 258 OP 636: Performed by: STUDENT IN AN ORGANIZED HEALTH CARE EDUCATION/TRAINING PROGRAM

## 2023-07-18 PROCEDURE — C9290 INJ, BUPIVACAINE LIPOSOME: HCPCS | Performed by: ANESTHESIOLOGY

## 2023-07-18 PROCEDURE — 250N000011 HC RX IP 250 OP 636: Performed by: STUDENT IN AN ORGANIZED HEALTH CARE EDUCATION/TRAINING PROGRAM

## 2023-07-18 PROCEDURE — 999N000063 XR SHOULDER LEFT PORT G/E 2 VIEWS: Mod: LT

## 2023-07-18 PROCEDURE — 250N000011 HC RX IP 250 OP 636: Performed by: ANESTHESIOLOGY

## 2023-07-18 PROCEDURE — 999N000141 HC STATISTIC PRE-PROCEDURE NURSING ASSESSMENT: Performed by: ORTHOPAEDIC SURGERY

## 2023-07-18 PROCEDURE — 250N000011 HC RX IP 250 OP 636: Performed by: NURSE ANESTHETIST, CERTIFIED REGISTERED

## 2023-07-18 PROCEDURE — 710N000009 HC RECOVERY PHASE 1, LEVEL 1, PER MIN: Performed by: ORTHOPAEDIC SURGERY

## 2023-07-18 PROCEDURE — 250N000011 HC RX IP 250 OP 636: Mod: JZ | Performed by: STUDENT IN AN ORGANIZED HEALTH CARE EDUCATION/TRAINING PROGRAM

## 2023-07-18 PROCEDURE — 36415 COLL VENOUS BLD VENIPUNCTURE: CPT | Performed by: ANESTHESIOLOGY

## 2023-07-18 PROCEDURE — 250N000013 HC RX MED GY IP 250 OP 250 PS 637: Performed by: PHYSICIAN ASSISTANT

## 2023-07-18 PROCEDURE — 258N000003 HC RX IP 258 OP 636: Performed by: NURSE ANESTHETIST, CERTIFIED REGISTERED

## 2023-07-18 PROCEDURE — 84132 ASSAY OF SERUM POTASSIUM: CPT | Performed by: ANESTHESIOLOGY

## 2023-07-18 PROCEDURE — 250N000009 HC RX 250: Performed by: NURSE ANESTHETIST, CERTIFIED REGISTERED

## 2023-07-18 PROCEDURE — 250N000025 HC SEVOFLURANE, PER MIN: Performed by: ORTHOPAEDIC SURGERY

## 2023-07-18 PROCEDURE — 258N000001 HC RX 258: Performed by: ORTHOPAEDIC SURGERY

## 2023-07-18 PROCEDURE — 370N000017 HC ANESTHESIA TECHNICAL FEE, PER MIN: Performed by: ORTHOPAEDIC SURGERY

## 2023-07-18 PROCEDURE — C1776 JOINT DEVICE (IMPLANTABLE): HCPCS | Performed by: ORTHOPAEDIC SURGERY

## 2023-07-18 PROCEDURE — 272N000001 HC OR GENERAL SUPPLY STERILE: Performed by: ORTHOPAEDIC SURGERY

## 2023-07-18 PROCEDURE — 82947 ASSAY GLUCOSE BLOOD QUANT: CPT | Performed by: ANESTHESIOLOGY

## 2023-07-18 PROCEDURE — 250N000009 HC RX 250: Performed by: ANESTHESIOLOGY

## 2023-07-18 PROCEDURE — 250N000013 HC RX MED GY IP 250 OP 250 PS 637: Performed by: STUDENT IN AN ORGANIZED HEALTH CARE EDUCATION/TRAINING PROGRAM

## 2023-07-18 PROCEDURE — 258N000003 HC RX IP 258 OP 636: Performed by: ANESTHESIOLOGY

## 2023-07-18 PROCEDURE — 250N000011 HC RX IP 250 OP 636: Performed by: ORTHOPAEDIC SURGERY

## 2023-07-18 PROCEDURE — C1713 ANCHOR/SCREW BN/BN,TIS/BN: HCPCS | Performed by: ORTHOPAEDIC SURGERY

## 2023-07-18 PROCEDURE — 99222 1ST HOSP IP/OBS MODERATE 55: CPT | Performed by: PHYSICIAN ASSISTANT

## 2023-07-18 DEVICE — SCREW CENTRAL 6.5X30MM DWJ130: Type: IMPLANTABLE DEVICE | Site: SHOULDER | Status: FUNCTIONAL

## 2023-07-18 DEVICE — SCREW PERIPHERAL 5.0X30MM DWJ330: Type: IMPLANTABLE DEVICE | Site: SHOULDER | Status: FUNCTIONAL

## 2023-07-18 DEVICE — IMPLANTABLE DEVICE
Type: IMPLANTABLE DEVICE | Site: SHOULDER | Status: FUNCTIONAL
Brand: TORNIER FLEX SHOULDER SYSTEM

## 2023-07-18 DEVICE — SCREW PERIPHERAL 14MM DWJ314: Type: IMPLANTABLE DEVICE | Site: SHOULDER | Status: FUNCTIONAL

## 2023-07-18 DEVICE — SCREW PERIPHERAL 18MM DWJ318: Type: IMPLANTABLE DEVICE | Site: SHOULDER | Status: FUNCTIONAL

## 2023-07-18 DEVICE — BASEPLATE LATERAL RVRS 25MM OFFS +3MM DWJ502: Type: IMPLANTABLE DEVICE | Site: SHOULDER | Status: FUNCTIONAL

## 2023-07-18 DEVICE — IMPLANTABLE DEVICE
Type: IMPLANTABLE DEVICE | Site: SHOULDER | Status: FUNCTIONAL
Brand: TORNIER PERFORM® REVERSED GLENOID

## 2023-07-18 RX ORDER — NALOXONE HYDROCHLORIDE 0.4 MG/ML
0.4 INJECTION, SOLUTION INTRAMUSCULAR; INTRAVENOUS; SUBCUTANEOUS
Status: DISCONTINUED | OUTPATIENT
Start: 2023-07-18 | End: 2023-07-19 | Stop reason: HOSPADM

## 2023-07-18 RX ORDER — ISOSORBIDE MONONITRATE 30 MG/1
60 TABLET, EXTENDED RELEASE ORAL DAILY
Status: DISCONTINUED | OUTPATIENT
Start: 2023-07-19 | End: 2023-07-19 | Stop reason: HOSPADM

## 2023-07-18 RX ORDER — HYDROMORPHONE HCL IN WATER/PF 6 MG/30 ML
0.4 PATIENT CONTROLLED ANALGESIA SYRINGE INTRAVENOUS EVERY 5 MIN PRN
Status: DISCONTINUED | OUTPATIENT
Start: 2023-07-18 | End: 2023-07-18 | Stop reason: HOSPADM

## 2023-07-18 RX ORDER — OXYCODONE HYDROCHLORIDE 5 MG/1
10 TABLET ORAL EVERY 4 HOURS PRN
Status: DISCONTINUED | OUTPATIENT
Start: 2023-07-18 | End: 2023-07-19 | Stop reason: HOSPADM

## 2023-07-18 RX ORDER — ONDANSETRON 2 MG/ML
4 INJECTION INTRAMUSCULAR; INTRAVENOUS EVERY 6 HOURS PRN
Status: DISCONTINUED | OUTPATIENT
Start: 2023-07-18 | End: 2023-07-19 | Stop reason: HOSPADM

## 2023-07-18 RX ORDER — PHENOL 1.4 %
1 AEROSOL, SPRAY (ML) MUCOUS MEMBRANE AT BEDTIME
COMMUNITY

## 2023-07-18 RX ORDER — NALOXONE HYDROCHLORIDE 0.4 MG/ML
0.2 INJECTION, SOLUTION INTRAMUSCULAR; INTRAVENOUS; SUBCUTANEOUS
Status: DISCONTINUED | OUTPATIENT
Start: 2023-07-18 | End: 2023-07-19 | Stop reason: HOSPADM

## 2023-07-18 RX ORDER — FENTANYL CITRATE 0.05 MG/ML
25 INJECTION, SOLUTION INTRAMUSCULAR; INTRAVENOUS EVERY 5 MIN PRN
Status: DISCONTINUED | OUTPATIENT
Start: 2023-07-18 | End: 2023-07-18 | Stop reason: HOSPADM

## 2023-07-18 RX ORDER — LISINOPRIL 20 MG/1
20 TABLET ORAL DAILY
Status: DISCONTINUED | OUTPATIENT
Start: 2023-07-19 | End: 2023-07-19 | Stop reason: HOSPADM

## 2023-07-18 RX ORDER — ACETAMINOPHEN 500 MG
3 TABLET ORAL EVERY 6 HOURS PRN
Status: ON HOLD | COMMUNITY
End: 2023-07-19

## 2023-07-18 RX ORDER — AMLODIPINE BESYLATE 5 MG/1
5 TABLET ORAL DAILY
Status: DISCONTINUED | OUTPATIENT
Start: 2023-07-19 | End: 2023-07-19 | Stop reason: HOSPADM

## 2023-07-18 RX ORDER — OXYCODONE HYDROCHLORIDE 5 MG/1
5 TABLET ORAL EVERY 4 HOURS PRN
Status: DISCONTINUED | OUTPATIENT
Start: 2023-07-18 | End: 2023-07-19 | Stop reason: HOSPADM

## 2023-07-18 RX ORDER — FENTANYL CITRATE 50 UG/ML
INJECTION, SOLUTION INTRAMUSCULAR; INTRAVENOUS PRN
Status: DISCONTINUED | OUTPATIENT
Start: 2023-07-18 | End: 2023-07-18

## 2023-07-18 RX ORDER — FENTANYL CITRATE 0.05 MG/ML
50 INJECTION, SOLUTION INTRAMUSCULAR; INTRAVENOUS EVERY 5 MIN PRN
Status: DISCONTINUED | OUTPATIENT
Start: 2023-07-18 | End: 2023-07-18 | Stop reason: HOSPADM

## 2023-07-18 RX ORDER — SENNOSIDES 8.6 MG
2 TABLET ORAL EVERY EVENING
Status: DISCONTINUED | OUTPATIENT
Start: 2023-07-18 | End: 2023-07-19 | Stop reason: HOSPADM

## 2023-07-18 RX ORDER — HYDROMORPHONE HCL IN WATER/PF 6 MG/30 ML
0.4 PATIENT CONTROLLED ANALGESIA SYRINGE INTRAVENOUS
Status: DISCONTINUED | OUTPATIENT
Start: 2023-07-18 | End: 2023-07-19 | Stop reason: HOSPADM

## 2023-07-18 RX ORDER — DEXAMETHASONE SODIUM PHOSPHATE 4 MG/ML
INJECTION, SOLUTION INTRA-ARTICULAR; INTRALESIONAL; INTRAMUSCULAR; INTRAVENOUS; SOFT TISSUE PRN
Status: DISCONTINUED | OUTPATIENT
Start: 2023-07-18 | End: 2023-07-18

## 2023-07-18 RX ORDER — LIDOCAINE 40 MG/G
CREAM TOPICAL
Status: DISCONTINUED | OUTPATIENT
Start: 2023-07-18 | End: 2023-07-18 | Stop reason: HOSPADM

## 2023-07-18 RX ORDER — MAGNESIUM HYDROXIDE 1200 MG/15ML
LIQUID ORAL PRN
Status: DISCONTINUED | OUTPATIENT
Start: 2023-07-18 | End: 2023-07-18 | Stop reason: HOSPADM

## 2023-07-18 RX ORDER — ACETAMINOPHEN 325 MG/1
650 TABLET ORAL EVERY 4 HOURS PRN
Status: DISCONTINUED | OUTPATIENT
Start: 2023-07-21 | End: 2023-07-19 | Stop reason: HOSPADM

## 2023-07-18 RX ORDER — CEFAZOLIN SODIUM/WATER 2 G/20 ML
2 SYRINGE (ML) INTRAVENOUS SEE ADMIN INSTRUCTIONS
Status: DISCONTINUED | OUTPATIENT
Start: 2023-07-18 | End: 2023-07-18 | Stop reason: HOSPADM

## 2023-07-18 RX ORDER — CEFAZOLIN SODIUM/WATER 2 G/20 ML
2 SYRINGE (ML) INTRAVENOUS
Status: COMPLETED | OUTPATIENT
Start: 2023-07-18 | End: 2023-07-18

## 2023-07-18 RX ORDER — SODIUM CHLORIDE, SODIUM LACTATE, POTASSIUM CHLORIDE, CALCIUM CHLORIDE 600; 310; 30; 20 MG/100ML; MG/100ML; MG/100ML; MG/100ML
INJECTION, SOLUTION INTRAVENOUS CONTINUOUS
Status: DISCONTINUED | OUTPATIENT
Start: 2023-07-18 | End: 2023-07-18 | Stop reason: HOSPADM

## 2023-07-18 RX ORDER — HYDROMORPHONE HCL IN WATER/PF 6 MG/30 ML
0.2 PATIENT CONTROLLED ANALGESIA SYRINGE INTRAVENOUS EVERY 5 MIN PRN
Status: DISCONTINUED | OUTPATIENT
Start: 2023-07-18 | End: 2023-07-18 | Stop reason: HOSPADM

## 2023-07-18 RX ORDER — ASPIRIN 81 MG/1
81 TABLET ORAL 2 TIMES DAILY
Status: DISCONTINUED | OUTPATIENT
Start: 2023-07-18 | End: 2023-07-19 | Stop reason: HOSPADM

## 2023-07-18 RX ORDER — GABAPENTIN 100 MG/1
100 CAPSULE ORAL 2 TIMES DAILY
Status: DISCONTINUED | OUTPATIENT
Start: 2023-07-18 | End: 2023-07-19 | Stop reason: HOSPADM

## 2023-07-18 RX ORDER — ACETAMINOPHEN 325 MG/1
975 TABLET ORAL ONCE
Status: COMPLETED | OUTPATIENT
Start: 2023-07-18 | End: 2023-07-18

## 2023-07-18 RX ORDER — TRANEXAMIC ACID 650 MG/1
1950 TABLET ORAL ONCE
Status: COMPLETED | OUTPATIENT
Start: 2023-07-18 | End: 2023-07-18

## 2023-07-18 RX ORDER — HYDROMORPHONE HCL IN WATER/PF 6 MG/30 ML
0.2 PATIENT CONTROLLED ANALGESIA SYRINGE INTRAVENOUS
Status: DISCONTINUED | OUTPATIENT
Start: 2023-07-18 | End: 2023-07-19 | Stop reason: HOSPADM

## 2023-07-18 RX ORDER — VANCOMYCIN HYDROCHLORIDE 1 G/20ML
INJECTION, POWDER, LYOPHILIZED, FOR SOLUTION INTRAVENOUS PRN
Status: DISCONTINUED | OUTPATIENT
Start: 2023-07-18 | End: 2023-07-18 | Stop reason: HOSPADM

## 2023-07-18 RX ORDER — CALCIUM CARBONATE 500 MG/1
1-2 TABLET, CHEWABLE ORAL PRN
COMMUNITY

## 2023-07-18 RX ORDER — ONDANSETRON 4 MG/1
4 TABLET, ORALLY DISINTEGRATING ORAL EVERY 30 MIN PRN
Status: DISCONTINUED | OUTPATIENT
Start: 2023-07-18 | End: 2023-07-18 | Stop reason: HOSPADM

## 2023-07-18 RX ORDER — SODIUM CHLORIDE, SODIUM LACTATE, POTASSIUM CHLORIDE, CALCIUM CHLORIDE 600; 310; 30; 20 MG/100ML; MG/100ML; MG/100ML; MG/100ML
INJECTION, SOLUTION INTRAVENOUS CONTINUOUS
Status: DISCONTINUED | OUTPATIENT
Start: 2023-07-18 | End: 2023-07-19 | Stop reason: HOSPADM

## 2023-07-18 RX ORDER — ONDANSETRON 4 MG/1
4 TABLET, ORALLY DISINTEGRATING ORAL EVERY 6 HOURS PRN
Status: DISCONTINUED | OUTPATIENT
Start: 2023-07-18 | End: 2023-07-19 | Stop reason: HOSPADM

## 2023-07-18 RX ORDER — SENNOSIDES A AND B 8.6 MG/1
2 TABLET, FILM COATED ORAL EVERY EVENING
COMMUNITY

## 2023-07-18 RX ORDER — HYDROXYZINE HYDROCHLORIDE 10 MG/1
10 TABLET, FILM COATED ORAL EVERY 6 HOURS PRN
Status: DISCONTINUED | OUTPATIENT
Start: 2023-07-18 | End: 2023-07-19 | Stop reason: HOSPADM

## 2023-07-18 RX ORDER — ONDANSETRON 2 MG/ML
4 INJECTION INTRAMUSCULAR; INTRAVENOUS EVERY 30 MIN PRN
Status: DISCONTINUED | OUTPATIENT
Start: 2023-07-18 | End: 2023-07-18 | Stop reason: HOSPADM

## 2023-07-18 RX ORDER — NEOSTIGMINE METHYLSULFATE 1 MG/ML
VIAL (ML) INJECTION PRN
Status: DISCONTINUED | OUTPATIENT
Start: 2023-07-18 | End: 2023-07-18

## 2023-07-18 RX ORDER — ROSUVASTATIN CALCIUM 20 MG/1
20 TABLET, COATED ORAL DAILY
Status: DISCONTINUED | OUTPATIENT
Start: 2023-07-18 | End: 2023-07-19 | Stop reason: HOSPADM

## 2023-07-18 RX ORDER — AMOXICILLIN 250 MG
1 CAPSULE ORAL 2 TIMES DAILY
Status: DISCONTINUED | OUTPATIENT
Start: 2023-07-18 | End: 2023-07-19 | Stop reason: HOSPADM

## 2023-07-18 RX ORDER — LIDOCAINE HYDROCHLORIDE 20 MG/ML
INJECTION, SOLUTION INFILTRATION; PERINEURAL PRN
Status: DISCONTINUED | OUTPATIENT
Start: 2023-07-18 | End: 2023-07-18

## 2023-07-18 RX ORDER — PROPOFOL 10 MG/ML
INJECTION, EMULSION INTRAVENOUS PRN
Status: DISCONTINUED | OUTPATIENT
Start: 2023-07-18 | End: 2023-07-18

## 2023-07-18 RX ORDER — METOPROLOL SUCCINATE 50 MG/1
50 TABLET, EXTENDED RELEASE ORAL DAILY
Status: DISCONTINUED | OUTPATIENT
Start: 2023-07-19 | End: 2023-07-19 | Stop reason: HOSPADM

## 2023-07-18 RX ORDER — PROCHLORPERAZINE MALEATE 5 MG
5 TABLET ORAL EVERY 6 HOURS PRN
Status: DISCONTINUED | OUTPATIENT
Start: 2023-07-18 | End: 2023-07-19 | Stop reason: HOSPADM

## 2023-07-18 RX ORDER — FENTANYL CITRATE 0.05 MG/ML
50 INJECTION, SOLUTION INTRAMUSCULAR; INTRAVENOUS
Status: DISCONTINUED | OUTPATIENT
Start: 2023-07-18 | End: 2023-07-18 | Stop reason: HOSPADM

## 2023-07-18 RX ORDER — GLYCOPYRROLATE 0.2 MG/ML
INJECTION, SOLUTION INTRAMUSCULAR; INTRAVENOUS PRN
Status: DISCONTINUED | OUTPATIENT
Start: 2023-07-18 | End: 2023-07-18

## 2023-07-18 RX ORDER — ACETAMINOPHEN 325 MG/1
975 TABLET ORAL EVERY 8 HOURS
Status: DISCONTINUED | OUTPATIENT
Start: 2023-07-18 | End: 2023-07-19 | Stop reason: HOSPADM

## 2023-07-18 RX ORDER — TOLTERODINE 4 MG/1
4 CAPSULE, EXTENDED RELEASE ORAL DAILY
Status: DISCONTINUED | OUTPATIENT
Start: 2023-07-19 | End: 2023-07-19 | Stop reason: HOSPADM

## 2023-07-18 RX ORDER — LIDOCAINE 40 MG/G
CREAM TOPICAL
Status: DISCONTINUED | OUTPATIENT
Start: 2023-07-18 | End: 2023-07-19 | Stop reason: HOSPADM

## 2023-07-18 RX ORDER — ONDANSETRON 2 MG/ML
INJECTION INTRAMUSCULAR; INTRAVENOUS PRN
Status: DISCONTINUED | OUTPATIENT
Start: 2023-07-18 | End: 2023-07-18

## 2023-07-18 RX ORDER — CEFAZOLIN SODIUM 2 G/100ML
2 INJECTION, SOLUTION INTRAVENOUS EVERY 8 HOURS
Status: COMPLETED | OUTPATIENT
Start: 2023-07-18 | End: 2023-07-19

## 2023-07-18 RX ADMIN — PHENYLEPHRINE HYDROCHLORIDE 0.3 MCG/KG/MIN: 10 INJECTION INTRAVENOUS at 08:03

## 2023-07-18 RX ADMIN — CEFAZOLIN SODIUM 2 G: 2 INJECTION, SOLUTION INTRAVENOUS at 16:06

## 2023-07-18 RX ADMIN — TRANEXAMIC ACID 1950 MG: 650 TABLET ORAL at 05:55

## 2023-07-18 RX ADMIN — SODIUM CHLORIDE, POTASSIUM CHLORIDE, SODIUM LACTATE AND CALCIUM CHLORIDE: 600; 310; 30; 20 INJECTION, SOLUTION INTRAVENOUS at 06:26

## 2023-07-18 RX ADMIN — MIDAZOLAM 1 MG: 1 INJECTION INTRAMUSCULAR; INTRAVENOUS at 06:56

## 2023-07-18 RX ADMIN — ASPIRIN 81 MG: 81 TABLET, COATED ORAL at 13:11

## 2023-07-18 RX ADMIN — SENNOSIDES AND DOCUSATE SODIUM 1 TABLET: 50; 8.6 TABLET ORAL at 20:07

## 2023-07-18 RX ADMIN — FENTANYL CITRATE 50 MCG: 50 INJECTION, SOLUTION INTRAMUSCULAR; INTRAVENOUS at 07:49

## 2023-07-18 RX ADMIN — ASPIRIN 81 MG: 81 TABLET, COATED ORAL at 20:07

## 2023-07-18 RX ADMIN — ROCURONIUM BROMIDE 50 MG: 50 INJECTION, SOLUTION INTRAVENOUS at 07:49

## 2023-07-18 RX ADMIN — GABAPENTIN 100 MG: 100 CAPSULE ORAL at 20:07

## 2023-07-18 RX ADMIN — Medication 2 G: at 07:55

## 2023-07-18 RX ADMIN — ROPIVACAINE HYDROCHLORIDE 10 ML: 5 INJECTION, SOLUTION EPIDURAL; INFILTRATION; PERINEURAL at 07:00

## 2023-07-18 RX ADMIN — SENNOSIDES AND DOCUSATE SODIUM 1 TABLET: 50; 8.6 TABLET ORAL at 13:11

## 2023-07-18 RX ADMIN — PROPOFOL 150 MG: 10 INJECTION, EMULSION INTRAVENOUS at 07:49

## 2023-07-18 RX ADMIN — FENTANYL CITRATE 50 MCG: 50 INJECTION, SOLUTION INTRAMUSCULAR; INTRAVENOUS at 08:31

## 2023-07-18 RX ADMIN — DEXAMETHASONE SODIUM PHOSPHATE 10 MG: 4 INJECTION, SOLUTION INTRA-ARTICULAR; INTRALESIONAL; INTRAMUSCULAR; INTRAVENOUS; SOFT TISSUE at 08:07

## 2023-07-18 RX ADMIN — BUPIVACAINE 10 ML: 13.3 INJECTION, SUSPENSION, LIPOSOMAL INFILTRATION at 07:00

## 2023-07-18 RX ADMIN — LIDOCAINE HYDROCHLORIDE 60 MG: 20 INJECTION, SOLUTION INFILTRATION; PERINEURAL at 07:49

## 2023-07-18 RX ADMIN — SODIUM CHLORIDE, POTASSIUM CHLORIDE, SODIUM LACTATE AND CALCIUM CHLORIDE: 600; 310; 30; 20 INJECTION, SOLUTION INTRAVENOUS at 13:11

## 2023-07-18 RX ADMIN — ROSUVASTATIN CALCIUM 20 MG: 20 TABLET, FILM COATED ORAL at 20:07

## 2023-07-18 RX ADMIN — ONDANSETRON 4 MG: 2 INJECTION INTRAMUSCULAR; INTRAVENOUS at 09:12

## 2023-07-18 RX ADMIN — NEOSTIGMINE METHYLSULFATE 5 MG: 1 INJECTION, SOLUTION INTRAVENOUS at 09:22

## 2023-07-18 RX ADMIN — ACETAMINOPHEN 975 MG: 325 TABLET, FILM COATED ORAL at 05:54

## 2023-07-18 RX ADMIN — GLYCOPYRROLATE 0.9 MG: 0.2 INJECTION, SOLUTION INTRAMUSCULAR; INTRAVENOUS at 09:22

## 2023-07-18 RX ADMIN — ACETAMINOPHEN 975 MG: 325 TABLET, FILM COATED ORAL at 16:05

## 2023-07-18 RX ADMIN — MIDAZOLAM 2 MG: 1 INJECTION INTRAMUSCULAR; INTRAVENOUS at 07:44

## 2023-07-18 ASSESSMENT — ACTIVITIES OF DAILY LIVING (ADL)
ADLS_ACUITY_SCORE: 35
ADLS_ACUITY_SCORE: 26
ADLS_ACUITY_SCORE: 24
ADLS_ACUITY_SCORE: 33
ADLS_ACUITY_SCORE: 24
ADLS_ACUITY_SCORE: 26

## 2023-07-18 ASSESSMENT — COPD QUESTIONNAIRES: COPD: 0

## 2023-07-18 NOTE — OP NOTE
[facility]    Operative Note    Pre-operative diagnosis: Arthritis of left shoulder region [M19.012]  Complete rotator cuff tear or rupture of left shoulder, not specified as traumatic [M75.122]  Post-operative diagnosis Same as pre-operative diagnosis, osteopenia of humerus    Procedure: Procedure(s):  Left reverse shoulder arthroplasty, no guide, autologus bone graft proximal humerus   Surgeon: Surgeon(s) and Role:     * Ulises Parker MD - Primary     * Sury Todd PA-C - Assisting  Anesthesia: General with Block   Estimated Blood Loss: Less than 100 ml    Drains: None  Specimens: * No specimens in log *  Findings:   None.  Complications: None.  Implants:   Implant Name Type Inv. Item Serial No.  Lot No. LRB No. Used Action   BASEPLATE LATERAL RVRS 25MM OFFS +3MM [id number] - [id number] Total Joint Component/Insert BASEPLATE LATERAL RVRS 25MM OFFS +3MM [id number] [employer]  Left 1 Implanted   SCREW CENTRAL 6.5X30MM [id number] - [id number] Metallic Hardware/[place] SCREW CENTRAL 6.5X30MM [id number]  [employer] [id number] 41 05 Left 1 Implanted   SCREW PERIPHERAL 5.0X30MM ZGE866 - [id number] Metallic Hardware/[place] SCREW PERIPHERAL 5.0X30MM [id number]  [employer] [id number] 41 05 Left 2 Implanted   SCREW PERIPHERAL 14MM [id number] - [id number] Metallic Hardware/[place] SCREW PERIPHERAL 14MM [id number]  [employer] [id number] 41 05 Left 1 Implanted   SCREW PERIPHERAL 18MM [id number] - [id number] Metallic Hardware/[place] SCREW PERIPHERAL 18MM [id number]  [employer] [id number] 41 05 Left 1 Implanted   GLEOSPHERE LATERALIZED AP REVERSED +2 36MM [id number] - [id number] Total Joint Component/Insert GLEOSPHERE LATERALIZED AP REVERSED +2 36MM [id number] [employer]  Left 1 Implanted   STEM HUMERAL ANATOMIC STD PTC 3B - [id number] Total Joint Component/Insert STEM HUMERAL ANATOMIC STD PTC 3B [id number] [employer]  Left 1 Implanted   TRAY REVERSE LOW OFFSET +0 [id number] -  [id number] Total Joint Component/Insert TRAY REVERSE LOW OFFSET +0 [id number] [employer]  Left 1 Implanted   INSERT REVISION REVERSE +6/12 36MM - [id number] Total Joint Component/Insert INSERT REVISION REVERSE +6/12 36MM [id number] [employer]  Left 1 Implanted       NARRATIVE:  After discussing the risks, benefits, possible complications and alternatives, the patient voiced their understanding and gave consent . The patient wished to proceed.  An interscalene block was administered. The patient was then brought to the operating room and placed in a supine position. Following administration of general anesthetic, the patient was positioned in the modified beach chair position. All bony prominences were well-padded. A lateral chest pad was placed, and the head was secured with a Kelly head gamboa with safety goggles in place.    After sterile prep and drape, a standard deltopectoral incision   was made, carried down through the skin and soft tissue. Electrocautery was used for hemostasis. Cephalic vein was taken laterally with the deltoid, and was noted to be intact throughout the case and at closure. The deltopectoral fascia was incised to approach the shoulder.    The superior cuff is absent.  The subscapularis is thinned and abnormal.    The biceps was tenodesed at the superior edge of the bicipital groove. Large bony osteophyte at the biceps groove is removed.  The subscapularis tendon is tenotomized 1cm medial to the insertion area for later repair.  This allows delivery of the head from the incision, after which a starting point   for the proximal guide was then well-visualized.  The proximal cut guide is used to check the anatomic version which is approximately 30 degrees. This was cut in 30 degrees of retroversion. Following this, it was then reamed and broached up to a size 3.  This was left in place and the protective cap was placed.     A Fukuda retractor was used to displace the humeral head  posteriorly.  The glenoid was then prepared resecting the labrum in a 360 degree arc, and removing the residual biceps stump.  Once the appropriate approach had been obtained, the guide for the central pin was then positioned at the inferior aspect of the glenoid with a 10 degree inferior tilt.  The central pin was passed and checked along the medial scapular neck with my direct digital palpation, and noted to be in excellent position.  The pin was then reamed over to prepare the glenoid baseplate. There is good bone for fixation.    The baseplate is fully seated and drilled. Appropriate length screws were placed x4 with excellent fixation.  The cone reamer was used to remove any glenoid prominence.  The glenosphere was impacted into place and the central screw was fully tightened. This shows excellent fixation and good clearance inferiorly.    Following this, attention was turned to the humerus. The proximal humerus has areas of osteopenia and thinned cancellous bone.  Bone graft is harvested from the resected humeral head and impacted into place in the metaphysis of the humerus to obtain a more stable fit of the prosthesis and augment rotational stability.  The grafting also prevents late osteolysis lateral to the stem.   The baseplate was then trialed with the low offset metaphyseal tray and  6mm bearing, showing excellent fit, stability, and rotation. The arm could be brought through 145 degrees of passive forward flexion and 70 degrees of passive external rotation without any opening of the anterior joint line.  Instruments were removed. Final implants were seated after copious irrigation.  Using the same standard bearing and tray, this shows excellent stability after reduction.  1000 mL of brown wash followed by 1000 mL of normal saline was used for irrigation.    The subscapularis was then reattached with transtendon figure eight sutures x7.  This was done with the arm abducted 40 degrees and externally  rotated 30 degrees to allow excellent excursion.  Copious irrigation was performed again with pulsatile lavage. The deltopectoral interval was closed with 0 Stratafix. The subcutaneous tissue was closed with 3-0 Vicryl and skin with 3-0 Stratafix. Generic Prineo dressing was applied.     Vancomycin powder 1gm total was used during the case and was applied to the intramedulary canal prior to stem placement, the joint space prior to subscapularis repair and the deep and superficial wound layers.    A physician assistant was necessary for this case to protect the neurovascular structures when retracting.  Maintaining the position of the arm during implantation of the components, Assisting with the dislocation of the implant trials and maintaining a clear operative field during the case. The PA is present for the entirety of the case.    The patient went to the recovery room in good condition, and will have a standard reverse total shoulder rehabilitation course.     COMPLICATIONS:  None.     CONDITION UPON DISCHARGE FOR OPERATING ROOM:  Stable    PLAN:  1. Antibiotic prophylaxis were given including Ancef. Abx given within 1 hr of surgical incision and discontinued within 24hrs.   2. DVT prophylaxis: aspirin and sequential compression device's will be started within 24hrs of surgery.   3. Weight Bearing NWB (Non weight bearing) left .upper extremity   4. Discharge anticipated date POD# 1 to home.   5. Pain Medication oral Oxycodone and IV Dilaudid.  6. Exercises: Full elbow, wrist, and hand AROM/PROM on operative side 5x/day.  Encourage ADLs out of sling as tolerated.

## 2023-07-18 NOTE — ANESTHESIA PROCEDURE NOTES
"Brachial plexus (Axillary Brachial Plexus) Procedure Note    Pre-Procedure   Staff -        Anesthesiologist:  Helen Lau       Performed By: anesthesiologist       Location: pre-op       Pre-Anesthestic Checklist: patient identified, IV checked, site marked, risks and benefits discussed, informed consent, monitors and equipment checked, pre-op evaluation, at physician/surgeon's request and post-op pain management  Timeout:       Correct Patient: Yes        Correct Procedure: Yes        Correct Site: Yes        Correct Position: Yes        Correct Laterality: Yes        Site Marked: Yes  Procedure Documentation  Procedure: Brachial plexus (Axillary Brachial Plexus)       Laterality: left       Patient Position: supine (arm externally rotated, hand placed beside head)       Patient Prep/Sterile Barriers: sterile gloves, mask, \"No-touch\" technique       Skin prep: Chloraprep       Local skin infiltrated with 3 mL of 1% lidocaine.        Insertion site: upper arm/axilla. (superior trunk block approach).       Needle Type: insulated and short bevel       Needle Gauge: 22.        Needle Length (Inches): 2        Ultrasound guided (permanent image entered into patient's record)       1. Ultrasound was used to identify targeted nerve, plexus, vascular marker, or fascial plane and place a needle adjacent to it in real-time.       2. Ultrasound was used to visualize the spread of anesthetic in close proximity to the above referenced structure.       3. A permanent image is entered into the patient's record.       4. The visualized anatomic structures appeared normal.       5. There were no apparent abnormal pathologic findings.    Assessment/Narrative         The placement was negative for: blood aspirated, painful injection and site bleeding       Paresthesias: No.       Test dose of mL at.         Test dose negative, 3 minutes after injection, for signs of intravascular, subdural, or intrathecal injection.    " "   Bolus given via needle..        Secured via.        Insertion/Infusion Method: Single Shot       Complications: none       Injection made incrementally with aspirations every 3 mL.    Medication(s) Administered   Ropivacaine 0.5% w/ 1:400K Epi (Injection) - Injection   10 mL - 7/18/2023 7:00:00 AM  Bupivacaine liposome (Exparel) 1.3% LA inj susp (Infiltration) - Infiltration   10 mL - 7/18/2023 7:00:00 AM   Comments:  Axillary Brachial Plexus Block done with U/S guidance to visualize spread.  0.5% Bupivacaine with 1:400K Epi  with Exparel   Pt mildly sedated, but communicative throughout procedure.   Pt tolerated well.  No complications.      The surgeon has given a verbal order transferring care of this patient to me for the performance of a regional analgesia block for post-op pain control. It is requested of me because I am uniquely trained and qualified to perform this block and the surgeon is neither trained nor qualified to perform this procedure.        FOR [facility] ([facility]) ONLY:   Pain Team Contact information: please page the Pain Team Via [employer]. Search \"Pain\". During daytime hours, please page the attending first. At night please page the resident first.      "

## 2023-07-18 NOTE — ANESTHESIA CARE TRANSFER NOTE
Patient: Bridget Lawson    Procedure: Procedure(s):  Left reverse shoulder arthroplasty, no guide, autologus bone graft proximal humerous        Diagnosis: Arthritis of left shoulder region [M19.012]  Complete rotator cuff tear or rupture of left shoulder, not specified as traumatic [M75.122]  Diagnosis Additional Information: No value filed.    Anesthesia Type:   General     Note:    Oropharynx: oropharynx clear of all foreign objects and spontaneously breathing  Level of Consciousness: awake  Oxygen Supplementation: face mask  Level of Supplemental Oxygen (L/min / FiO2): 8  Independent Airway: airway patency satisfactory and stable  Dentition: dentition unchanged  Vital Signs Stable: post-procedure vital signs reviewed and stable  Report to RN Given: handoff report given  Patient transferred to: PACU    Handoff Report: Identifed the Patient, Identified the Reponsible Provider, Reviewed the pertinent medical history, Discussed the surgical course, Reviewed Intra-OP anesthesia mangement and issues during anesthesia, Set expectations for post-procedure period and Allowed opportunity for questions and acknowledgement of understanding      Vitals:  Vitals Value Taken Time   /71 07/18/23 0955   Temp     Pulse 59 07/18/23 0957   Resp 16 07/18/23 0957   SpO2 100 % 07/18/23 0957   Vitals shown include unvalidated device data.    Electronically Signed By: LIZZ Cardozo CRNA  July 18, 2023  9:58 AM

## 2023-07-18 NOTE — PROGRESS NOTES
PTA medications completed by Medication Scribe day of surgery     Medication history sources: Patient, Surescripts, H&P and Patient's home med list  In the past week, patient estimated taking medication this percent of the time: Greater than 90%      Significant changes made to the medication list:  Patient reports no longer taking the following meds (med scribe removed from PTA med list): Hydrochlorothiazide, Labetalol      Additional medication history information:   None    Medication reconciliation completed by provider prior to medication history? No    Time spent in this activity: 30 minutes    The information provided in this note is only as accurate as the sources available at the time of update(s)    Prior to Admission medications    Medication Sig Last Dose Taking? Auth Provider Long Term End Date   acetaminophen (TYLENOL) 500 MG tablet Take 3 tablets by mouth every 6 hours as needed for mild pain (3 x 500 mg = 1,500 mg) Unknown at prn Yes Reported, Patient     amLODIPine (NORVASC) 5 MG tablet TAKE 1 TABLET BY MOUTH ONCE DAILY 7/17/2023 at am Yes Marcia Rosas MD Yes    aspirin 81 MG EC tablet Take 1 tablet (81 mg) by mouth daily 7/11/2023 at am Yes Marcia Rosas MD     Calcium Carb-Cholecalciferol 600-800 MG-UNIT TABS Take 1 tablet by mouth 2 times daily  7/17/2023 at pm Yes Reported, Patient     calcium carbonate (TUMS) 500 MG chewable tablet Take 1-2 chew tab by mouth as needed for heartburn Unknown at prn Yes Reported, Patient     celecoxib (CELEBREX) 100 MG capsule Take 2 capsules by mouth 2 times daily (2 x 100 mg = 200 mg) 7/17/2023 at pm Yes Marcia Rosas MD Yes    estradiol (ESTRACE VAGINAL) 0.1 MG/GM vaginal cream Apply small amount to the vaginal opening and urethra M, W, F @ h.s. 7/14/2023 at am Yes Stephanie Shi PA-C     fluticasone (FLONASE) 50 MCG/ACT spray Spray 1 spray into both nostrils daily 7/18/2023 at am Yes Reported, Patient No    gabapentin (NEURONTIN) 100 MG  capsule Take 1 capsule by mouth twice daily 7/17/2023 at pm Yes Macria Rosas MD Yes    GLUCOSAMINE-CHONDROITIN PO Take 1 tablet by mouth 2 times daily 1500/1200 mg tabs 7/17/2023 at pm Yes Reported, Patient     isosorbide mononitrate (IMDUR) 60 MG 24 hr tablet Take 1 tablet by mouth once daily 7/17/2023 at pm Yes Marcia Rosas MD Yes    ketoconazole (NIZORAL) 2 % external cream Apply to affected area twice daily as needed Unknown at prn Yes Marcia Rosas MD     MAGNESIUM GLUCONATE PO Take 400 mg by mouth daily 7/17/2023 at am Yes Reported, Patient     Melatonin 10 MG TABS tablet Take 1 tablet by mouth At Bedtime 7/16/2023 at pm Yes Reported, Patient     metoprolol succinate ER (TOPROL XL) 50 MG 24 hr tablet Take 1 tablet (50 mg) by mouth daily 7/17/2023 at am Yes Marcia Rosas MD Yes    Multiple Vitamins-Minerals (CENTRUM SILVER ULTRA WOMENS PO) Take 1 tablet by mouth daily 7/17/2023 at pm Yes Reported, Patient     omeprazole (PRILOSEC) 20 MG DR capsule Take 1 capsule (20 mg) by mouth daily  Patient taking differently: Take 20 mg by mouth as needed Unknown at prn Yes Marcia Rosas MD     polyethylene glycol (MIRALAX) 17 GM/Dose powder Take 1 Capful by mouth as needed for constipation Unknown at prn Yes Marcia Rosas MD     Polyvinyl Alcohol-Povidone (REFRESH OP) Place 1 drop into both eyes nightly as needed (for dry eyes) Unknown at prn Yes Reported, Patient     ramipril (ALTACE) 10 MG capsule Take 1 capsule by mouth once daily 7/17/2023 at am Yes Marcia Rosas MD Yes    rosuvastatin (CRESTOR) 20 MG tablet Take 1 tablet by mouth once daily 7/17/2023 at pm Yes Marcia Rosas MD Yes    senna (SENOKOT) 8.6 MG tablet Take 2 tablets by mouth every evening 7/17/2023 at pm Yes Reported, Patient     solifenacin (VESICARE) 10 MG tablet Take 1 tablet by mouth once daily 7/17/2023 at am Yes Stephanie Shi PA-C       Medication history completed by:    Yinka Gillespie CPhT  Medication  [facility]

## 2023-07-18 NOTE — CONSULTS
[facility]  Consult Note - Hospitalist Service     Date of Admission:  7/18/2023  Consult Requested by: Sury STAPLETON  Reason for Consult: postop management  PRIMARY CARE PROVIDER:  Marcia Rosas    Assessment & Plan     Bridget ANTHONY Lulu is a 73 year old female admitted on 7/18/2023, for elective repair of nontraumatic left rotator cuff, performed by Dr Ulises Parker.    Past medical history significant for left RTC tear, HTN on 4 antihypertensives, hx CVA, HLD, chronic constipation,    Nontraumatic complete tear of left rotator cuff  Admitted for elective repair of left RTC tear w Left reverse shoulder arthroplasty.  Held aspirin 1 week before and Celebrex 3 days prior to surgery.  Operative note reviewed.  Low EBL.  VS stable.  She had a facial droop noted in the PACU.  See below.  - Orthopedic Surgery is managing.  --Analgesic/pain management, DVT prophylaxis, PT/OT consultation per Ortho   - HGB check on POD #1.    - Encourage utilization of incentive spirometer.   - minimize use of Celebrex    History of hemorrhagic CVA, 2010,  secondary to hypertension  Transient left facial droop postoperative, resolved  2010 had a hemorrhagic CVA per pt and notes, was secondary hypertension.   She had left-sided facial droop and left-sided deficits at the time which resolved.  She is controlled pressure since. Of note per report from RN that the patient in the PACU area had left-sided facial droop noted.  Was not visible at all on my evaluation and I suspect this is likely uncovering of old deficits plus contribution from her anesthesia.  She has no signs of acute CVA at this time.  Intraoperatively resolved.  - ASA 81mg Qd   - PTA crestor    HTN -Well-controlled at this time.  -PTA metoprolol XL 50 to resume tomorrow, hold for SBP less than 105  -PTA amlodipine 5 daily resume tomorrow, hold for SBP less than 110  -PTA ramipril 10 Qd change to lisinopril and resumed half dose, hold for  "SBP<120  -PTA Imdur 60 daily resume tomorrow, , hold for SBP less than 130,  -PTA hydrochlorothiazide stopped by PCP, do not resume    Chronic constipation  - Miralax QD (PTA)  - Senna 1 BID here (w opiates)     JOHNY on CPAP  -Resume home CPAP    Hx PUD  - PTA PPI  -Encouraged to avoid scheduled daily Celebrex use just as needed    Morbid obesity  -Increasing complexity     Clinically Significant Risk Factors Present on Admission                # Drug Induced Platelet Defect: home medication list includes an antiplatelet medication   # Hypertension: Noted on problem list      # Obesity: Estimated body mass index is 38.94 kg/m  as calculated from the following:    Height as of this encounter: 1.581 m (5' 2.25\").    Weight as of this encounter: 97.3 kg (214 lb 9.6 oz).              Diet: Advance Diet as Tolerated: Regular Diet Adult     DVT Prophylaxis: Defer to primary service   Vargas Catheter: Not present  Lines: None     Cardiac Monitoring: None  Code Status: Full Code      Disposition Plan       Expected Discharge Date: 07/19/2023      Destination: home with family        Entered: Ruperto Manrique PA-C 07/18/2023, 3:03 PM        The patient's care was discussed with the Attending Physician,   , Bedside Nurse, Patient and Patient's Family.      At this time, I'd like to thank Ortho for consulting the Hospitalist service.  We will follow Bp in the morning and then sign off if stable.      Ruperto Manrique PA-C  [facility]  Securely message with the [employer] Web Console (learn more here)  Text page via [employer] Paging/Directory    ______________________________________________________________________    Chief Complaint   Left shoulder pain    History is obtained from the patient and EMR.      History of Present Illness   Bridget Lawson is a 73 year old female with a past medical history as noted above who presented for elective left shoulder rotator cuff repair.  Preop note reviewed.  " Confirmed with patient and her .  Was contacted by the RN that while patient was in the PACU she had a transient left facial droop which resolved.  Had been assessed by MD anesthesiologist who thought this was secondary to her block and no further intervention was indicated.  At the time of seeing the patient he has no neurologic symptoms at all.  She  Her has a facial droop.  Denies any left-sided facial symptoms.  She has no left-sided weakness numbness or tingling although she did back in 2020 after her initial CVA.  Likewise she had droop after initial CVA but it slowly resolved.  Other than some mild postoperative left-sided pain she denies any symptoms.  No shortness of breath.  No bowel movement yet.  No other new symptoms were endorsed.    -Reviewed recent history and events with   -Reviewed with the RN in detail    -  Past Medical History    I have reviewed this patient's medical history and updated it with pertinent information if needed.   Past Medical History:   Diagnosis Date     Benign essential hypertension      History of blood transfusion     no adverse reactions     History of hyperparathyroidism     s/p adenoma resection     History of stroke 2009    hemorrhagic; due to uncontrolled hypertension     Lymphedema of both lower extremities      Morbid obesity (H)      JOHNY on CPAP      Osteoporosis      PUD (peptic ulcer disease)     due to NSAID use     Pure hypercholesterolemia      Urgency-frequency syndrome        Medications   Prior to Admission Medications   Prescriptions Last Dose Informant Patient Reported? Taking?   Calcium Carb-Cholecalciferol 600-800 MG-UNIT TABS 7/17/2023 at pm Self Yes Yes   Sig: Take 1 tablet by mouth 2 times daily    GLUCOSAMINE-CHONDROITIN PO 7/17/2023 at pm Self Yes Yes   Sig: Take 1 tablet by mouth 2 times daily 1500/1200 mg tabs   MAGNESIUM GLUCONATE PO 7/17/2023 at am Self Yes Yes   Sig: Take 400 mg by mouth daily   Melatonin 10 MG TABS tablet  7/16/2023 at pm Self Yes Yes   Sig: Take 1 tablet by mouth At Bedtime   Multiple Vitamins-Minerals (CENTRUM SILVER ULTRA WOMENS PO) 7/17/2023 at pm Self Yes Yes   Sig: Take 1 tablet by mouth daily   Polyvinyl Alcohol-Povidone (REFRESH OP) Unknown at prn Self Yes Yes   Sig: Place 1 drop into both eyes nightly as needed (for dry eyes)   acetaminophen (TYLENOL) 500 MG tablet Unknown at prn Self Yes Yes   Sig: Take 3 tablets by mouth every 6 hours as needed for mild pain (3 x 500 mg = 1,500 mg)   amLODIPine (NORVASC) 5 MG tablet 7/17/2023 at am Self No Yes   Sig: TAKE 1 TABLET BY MOUTH ONCE DAILY   aspirin 81 MG EC tablet 7/11/2023 at am Self Yes Yes   Sig: Take 1 tablet (81 mg) by mouth daily   calcium carbonate (TUMS) 500 MG chewable tablet Unknown at prn  Yes Yes   Sig: Take 1-2 chew tab by mouth as needed for heartburn   celecoxib (CELEBREX) 100 MG capsule 7/17/2023 at pm Self Yes Yes   Sig: Take 2 capsules by mouth 2 times daily (2 x 100 mg = 200 mg)   estradiol (ESTRACE VAGINAL) 0.1 MG/GM vaginal cream 7/14/2023 at am Self No Yes   Sig: Apply small amount to the vaginal opening and urethra M, W, F @ h.s.   fluticasone (FLONASE) 50 MCG/ACT spray 7/18/2023 at am Self Yes Yes   Sig: Spray 1 spray into both nostrils daily   gabapentin (NEURONTIN) 100 MG capsule 7/17/2023 at pm Self No Yes   Sig: Take 1 capsule by mouth twice daily   isosorbide mononitrate (IMDUR) 60 MG 24 hr tablet 7/17/2023 at pm Self No Yes   Sig: Take 1 tablet by mouth once daily   ketoconazole (NIZORAL) 2 % external cream Unknown at prn Self No Yes   Sig: Apply to affected area twice daily as needed   metoprolol succinate ER (TOPROL XL) 50 MG 24 hr tablet 7/18/2023 at am Self No Yes   Sig: Take 1 tablet (50 mg) by mouth daily   omeprazole (PRILOSEC) 20 MG DR capsule Unknown at prn Self No Yes   Sig: Take 1 capsule (20 mg) by mouth daily   Patient taking differently: Take 20 mg by mouth as needed   polyethylene glycol (MIRALAX) 17 GM/Dose powder  Unknown at prn Self Yes Yes   Sig: Take 1 Capful by mouth as needed for constipation   ramipril (ALTACE) 10 MG capsule 7/17/2023 at am Self No Yes   Sig: Take 1 capsule by mouth once daily   rosuvastatin (CRESTOR) 20 MG tablet 7/17/2023 at pm Self No Yes   Sig: Take 1 tablet by mouth once daily   senna (SENOKOT) 8.6 MG tablet 7/17/2023 at pm Self Yes Yes   Sig: Take 2 tablets by mouth every evening   solifenacin (VESICARE) 10 MG tablet 7/17/2023 at am Self No Yes   Sig: Take 1 tablet by mouth once daily      Facility-Administered Medications: None     Allergies   No Known Allergies    Physical Exam   Vital Signs: Temp: 97.6  F (36.4  C) Temp src: Oral BP: 124/66 Pulse: 62   Resp: 18 SpO2: 95 % O2 Device: None (Room air) Oxygen Delivery: 2 LPM  Weight: 214 lbs 9.6 oz    Constitutional: Awake, alert, cooperative, no apparent distress.    ENT: grossly normocephalic,  Neck: Supple, symmetrical, trachea midline, no adenopathy.  Pulmonary: No increased work of breathing, good air exchange, clear to auscultation bilaterally, no crackles or wheezing.  Cardiovascular: Regular rate and rhythm, normal S1 and S2, no S3 or S4, and no murmur noted.  GI: Intact bowel sounds, soft, non-distended, non-tender.  Obese.  Skin/Integumen: Visualized skin appeared clear.  Neuro :  grossly intact.  Patient has no facial droop whatsoever at this time.  Cranial nerves II-X are grossly normal and intact.  Normal tongue excursion.  No flattening of the nasolabial folds of the forehead.  With the exception of the left arm is in a postop brace, thus testing limited, upper and lower extremities strength, coordination and sensation intact bilaterally.    Psych:  Alert and oriented x 3. Normal affect.  Per  and patient.  Extremities: No lower extremity edema noted, and calves are non-tender to palpation bilaterally. Dorsal pedal pulses and posterior tibial pulses palpable.        Data   Preop and postop labs were reviewed.    Vitals were  reviewed.   Reviewed with PA colleague, RN.

## 2023-07-18 NOTE — ANESTHESIA PROCEDURE NOTES
Airway       Patient location during procedure: OR       Procedure Start/Stop Times: 7/18/2023 7:53 AM  Staff -        Anesthesiologist:  Helen Lau       CRNA: Cristiana Keys APRN CRNA       Performed By: CRNA  Consent for Airway        Urgency: elective  Indications and Patient Condition       Indications for airway management: manoj-procedural       Induction type:intravenous       Mask difficulty assessment: 2 - vent by mask + OA or adjuvant +/- NMBA    Final Airway Details       Final airway type: endotracheal airway       Successful airway: ETT - single  Endotracheal Airway Details        ETT size (mm): 7.0       Cuffed: yes       Successful intubation technique: video laryngoscopy       VL Blade Size: Carter 3       Grade View of Cords: 1       Adjucts: stylet       Position: Right       Measured from: gums/teeth       Secured at (cm): 21       Bite block used: None    Post intubation assessment        Placement verified by: capnometry, equal breath sounds and chest rise        Number of attempts at approach: 1       Number of other approaches attempted: 0       Secured with: pink tape       Ease of procedure: easy       Dentition: Intact and Unchanged    Medication(s) Administered   Medication Administration Time: 7/18/2023 7:53 AM

## 2023-07-18 NOTE — ANESTHESIA POSTPROCEDURE EVALUATION
Patient: Bridget Lawson    Procedure: Procedure(s):  Left reverse shoulder arthroplasty, no guide, autologus bone graft proximal humerus        Anesthesia Type:  General    Note:  Disposition: Admission   Postop Pain Control: Uneventful            Sign Out: Well controlled pain   PONV: No   Neuro/Psych: Uneventful            Sign Out: Acceptable/Baseline neuro status   Airway/Respiratory: Uneventful            Sign Out: Acceptable/Baseline resp. status   CV/Hemodynamics: Uneventful            Sign Out: Acceptable CV status   Other NRE:    DID A NON-ROUTINE EVENT OCCUR? No           Last vitals:  Vitals Value Taken Time   /69 07/18/23 1110   Temp 35.7  C (96.3  F) 07/18/23 1050   Pulse 51 07/18/23 1110   Resp 26 07/18/23 1110   SpO2 97 % 07/18/23 1110       Electronically Signed By: Helen Lau  July 18, 2023  2:14 PM

## 2023-07-18 NOTE — ANESTHESIA PREPROCEDURE EVALUATION
Anesthesia Pre-Procedure Evaluation    Patient: Bridget Lawson   MRN: [id number] : 1950        Procedure : Procedure(s):  Left reverse shoulder arthroplasty, no guide          Past Medical History:   Diagnosis Date     Benign essential hypertension      History of blood transfusion     no adverse reactions     History of hyperparathyroidism     s/p adenoma resection     History of stroke 2009    hemorrhagic; due to uncontrolled hypertension     Lymphedema of both lower extremities      Morbid obesity (H)      JOHNY on CPAP      Osteoporosis      PUD (peptic ulcer disease)     due to NSAID use     Pure hypercholesterolemia      Urgency-frequency syndrome       Past Surgical History:   Procedure Laterality Date     ARTHROSCOPY KNEE Left      CATARACT EXTRACTION, BILATERAL        SECTION, TUBAL LIGATION, COMBINED       ENDOSCOPIC RELEASE CARPAL TUNNEL Right 2020    Procedure: RIGHT ENDOSCOPIC CARPAL TUNNEL RELEASE;  Surgeon: Piper Guardado MD;  Location: [facility] OR     HYSTERECTOMY TOTAL ABDOMINAL, BILATERAL SALPINGO-OOPHORECTOMY, COMBINED       PARATHYROID GLAND SURGERY      adenoma removed     REPAIR TENDON ANKLE Right 2017    Procedure: REPAIR TENDON ANKLE;  RIGHT POSTERIOR TIBIAL TENDON RECONSTRUCTION ;  Surgeon: Xavi Duran MD;  Location: [facility] OR     ROTATOR CUFF REPAIR RT/LT Right      TONSILLECTOMY & ADENOIDECTOMY        No Known Allergies   Social History     Tobacco Use     Smoking status: Former     Packs/day: 3.00     Years: 27.00     Pack years: 81.00     Types: Cigarettes     Quit date: 1996     Years since quittin.8     Smokeless tobacco: Never   Substance Use Topics     Alcohol use: Yes     Comment: 1 drink/day      Wt Readings from Last 1 Encounters:   23 97.3 kg (214 lb 9.6 oz)        Anesthesia Evaluation            ROS/MED HX  ENT/Pulmonary:     (+) sleep apnea,  (-) asthma and COPD   Neurologic:     (+) CVA, with deficits, - left sided weakness.      Cardiovascular:     (+) hypertension-----    METS/Exercise Tolerance:     Hematologic:       Musculoskeletal:       GI/Hepatic: Comment: Peptic ulcer disease    (+) GERD,  (-) liver disease   Renal/Genitourinary:    (-) renal disease   Endo: Comment: Hyperparathyroidism     (+) Obesity,     Psychiatric/Substance Use:       Infectious Disease:       Malignancy:       Other:            Physical Exam    Airway        Mallampati: III   TM distance: > 3 FB   Neck ROM: full     Respiratory Devices and Support         Dental     Comment: Crowns         Cardiovascular   cardiovascular exam normal          Pulmonary   pulmonary exam normal                OUTSIDE LABS:  CBC:   Lab Results   Component Value Date    WBC 5.4 06/30/2023    WBC 6.3 02/18/2022    HGB 12.5 06/30/2023    HGB 13.4 02/18/2022    HCT 38.2 06/30/2023    HCT 42.8 02/18/2022     06/30/2023     02/18/2022     BMP:   Lab Results   Component Value Date     06/30/2023     02/01/2023    POTASSIUM 3.4 07/18/2023    POTASSIUM 4.0 06/30/2023    CHLORIDE 104 06/30/2023    CHLORIDE 105 02/01/2023    CO2 26 06/30/2023    CO2 26 02/01/2023    BUN 17.6 06/30/2023    BUN 18.0 02/01/2023    CR 0.85 06/30/2023    CR 0.77 04/18/2023    GLC 98 07/18/2023    GLC 94 06/30/2023     COAGS:   Lab Results   Component Value Date    PTT 27 02/17/2010    INR 1.00 02/17/2010     POC:   Lab Results   Component Value Date     (H) 11/02/2017     HEPATIC:   Lab Results   Component Value Date    ALBUMIN 4.7 06/30/2023    PROTTOTAL 7.1 06/30/2023    ALT 15 06/30/2023    AST 19 06/30/2023    ALKPHOS 48 06/30/2023    BILITOTAL 0.4 06/30/2023     OTHER:   Lab Results   Component Value Date    A1C 5.6 02/01/2023    CLARA 10.3 (H) 06/30/2023    PHOS 2.8 02/20/2010    MAG 1.9 02/20/2010    LIPASE 107 01/11/2008    TSH 0.56 02/18/2022    T4 1.03 07/16/2015    CRP <2.9 10/28/2019       Anesthesia Plan    ASA Status:  3      Anesthesia Type: General.     - Airway:  ETT         Techniques and Equipment:     - Airway: Video-Laryngoscope         Consents    Anesthesia Plan(s) and associated risks, benefits, and realistic alternatives discussed. Questions answered and patient/representative(s) expressed understanding.    - Discussed:     - Discussed with:  Patient         Postoperative Care       PONV prophylaxis: Ondansetron (or other 5HT-3), Promethazine or metoclopramide, Dexamethasone or Solumedrol     Comments:                Helen Lau

## 2023-07-18 NOTE — PLAN OF CARE
Pt arrived from PACU ~ 1130. S/p Left reverse shoulder arthroplasty; POD 0. A&Ox4. L facial droop present upon arrival to the floor but much improved since (droop initially noted in PACU and was attributed to nerve block); also speech slightly slurred but pt attributed it to dry mouth/now resolved. Other neuros WNL. CMS intact except for numbness/tingling in L fingers. Dressing on L shoulder incision CDI; sling on; PRN ice pack utilized. Currently denies pain. Tolerated regular diet. Up to the bathroom with assist of 1/GB, voided x1. Plan for discharge to home tomorrow. Will continue to monitor.

## 2023-07-18 NOTE — OR NURSING
Dr Lau rounded on pt. Updated on pt smile slight droop to the left side of her mouth. Dr Lau felt it was do to the block that sometimes that can happen. No new orders.

## 2023-07-18 NOTE — INTERVAL H&P NOTE
"I have reviewed the surgical (or preoperative) H&P that is linked to this encounter, and examined the patient. There are no significant changes    Clinical Conditions Present on Arrival:  Clinically Significant Risk Factors Present on Admission          # Hypercalcemia: Highest Ca = 10.3 mg/dL in last 30 days, will monitor as appropriate        # Drug Induced Platelet Defect: home medication list includes an antiplatelet medication  # Obesity: Estimated body mass index is 38.94 kg/m  as calculated from the following:    Height as of this encounter: 1.581 m (5' 2.25\").    Weight as of this encounter: 97.3 kg (214 lb 9.6 oz).       "

## 2023-07-19 ENCOUNTER — APPOINTMENT (OUTPATIENT)
Dept: OCCUPATIONAL THERAPY | Facility: CLINIC | Age: 73
End: 2023-07-19
Attending: ORTHOPAEDIC SURGERY
Payer: COMMERCIAL

## 2023-07-19 VITALS
HEIGHT: 62 IN | BODY MASS INDEX: 39.49 KG/M2 | HEART RATE: 82 BPM | OXYGEN SATURATION: 94 % | WEIGHT: 214.6 LBS | SYSTOLIC BLOOD PRESSURE: 140 MMHG | RESPIRATION RATE: 18 BRPM | TEMPERATURE: 98.2 F | DIASTOLIC BLOOD PRESSURE: 79 MMHG

## 2023-07-19 LAB
GLUCOSE BLDC GLUCOMTR-MCNC: 112 MG/DL (ref 70–99)
HGB BLD-MCNC: 12.5 G/DL (ref 11.7–15.7)

## 2023-07-19 PROCEDURE — 97530 THERAPEUTIC ACTIVITIES: CPT | Mod: GO

## 2023-07-19 PROCEDURE — 250N000013 HC RX MED GY IP 250 OP 250 PS 637: Performed by: STUDENT IN AN ORGANIZED HEALTH CARE EDUCATION/TRAINING PROGRAM

## 2023-07-19 PROCEDURE — 250N000013 HC RX MED GY IP 250 OP 250 PS 637: Performed by: PHYSICIAN ASSISTANT

## 2023-07-19 PROCEDURE — 85018 HEMOGLOBIN: CPT | Performed by: STUDENT IN AN ORGANIZED HEALTH CARE EDUCATION/TRAINING PROGRAM

## 2023-07-19 PROCEDURE — 82962 GLUCOSE BLOOD TEST: CPT

## 2023-07-19 PROCEDURE — 97165 OT EVAL LOW COMPLEX 30 MIN: CPT | Mod: GO

## 2023-07-19 PROCEDURE — 97535 SELF CARE MNGMENT TRAINING: CPT | Mod: GO

## 2023-07-19 PROCEDURE — 36415 COLL VENOUS BLD VENIPUNCTURE: CPT | Performed by: STUDENT IN AN ORGANIZED HEALTH CARE EDUCATION/TRAINING PROGRAM

## 2023-07-19 PROCEDURE — 97110 THERAPEUTIC EXERCISES: CPT | Mod: GO

## 2023-07-19 PROCEDURE — 250N000011 HC RX IP 250 OP 636: Mod: JZ | Performed by: STUDENT IN AN ORGANIZED HEALTH CARE EDUCATION/TRAINING PROGRAM

## 2023-07-19 PROCEDURE — 99207 PR NO BILLABLE SERVICE THIS VISIT: CPT | Performed by: PHYSICIAN ASSISTANT

## 2023-07-19 RX ORDER — ASPIRIN 81 MG/1
81 TABLET ORAL DAILY
COMMUNITY
Start: 2023-07-19 | End: 2024-07-22

## 2023-07-19 RX ORDER — AMOXICILLIN 250 MG
1-2 CAPSULE ORAL 2 TIMES DAILY
Qty: 30 TABLET | Refills: 0 | Status: SHIPPED | OUTPATIENT
Start: 2023-07-19 | End: 2024-07-23

## 2023-07-19 RX ORDER — ASPIRIN 81 MG/1
81 TABLET ORAL 2 TIMES DAILY
Qty: 60 TABLET | Refills: 0 | Status: SHIPPED | OUTPATIENT
Start: 2023-07-19

## 2023-07-19 RX ORDER — OXYCODONE HYDROCHLORIDE 5 MG/1
5-10 TABLET ORAL EVERY 4 HOURS PRN
Qty: 30 TABLET | Refills: 0 | Status: SHIPPED | OUTPATIENT
Start: 2023-07-19 | End: 2024-07-22

## 2023-07-19 RX ORDER — CELECOXIB 100 MG/1
200 CAPSULE ORAL DAILY PRN
Start: 2023-07-19

## 2023-07-19 RX ADMIN — AMLODIPINE BESYLATE 5 MG: 5 TABLET ORAL at 09:40

## 2023-07-19 RX ADMIN — METOPROLOL SUCCINATE 50 MG: 50 TABLET, EXTENDED RELEASE ORAL at 09:40

## 2023-07-19 RX ADMIN — ACETAMINOPHEN 975 MG: 325 TABLET, FILM COATED ORAL at 09:37

## 2023-07-19 RX ADMIN — ASPIRIN 81 MG: 81 TABLET, COATED ORAL at 09:39

## 2023-07-19 RX ADMIN — SENNOSIDES AND DOCUSATE SODIUM 1 TABLET: 50; 8.6 TABLET ORAL at 09:39

## 2023-07-19 RX ADMIN — CEFAZOLIN SODIUM 2 G: 2 INJECTION, SOLUTION INTRAVENOUS at 00:39

## 2023-07-19 RX ADMIN — ACETAMINOPHEN 975 MG: 325 TABLET, FILM COATED ORAL at 00:38

## 2023-07-19 RX ADMIN — ISOSORBIDE MONONITRATE 60 MG: 30 TABLET, EXTENDED RELEASE ORAL at 09:38

## 2023-07-19 RX ADMIN — TOLTERODINE TARTRATE 4 MG: 4 CAPSULE, EXTENDED RELEASE ORAL at 09:39

## 2023-07-19 RX ADMIN — LISINOPRIL 20 MG: 20 TABLET ORAL at 09:40

## 2023-07-19 RX ADMIN — GABAPENTIN 100 MG: 100 CAPSULE ORAL at 09:38

## 2023-07-19 ASSESSMENT — ACTIVITIES OF DAILY LIVING (ADL)
ADLS_ACUITY_SCORE: 27
ADLS_ACUITY_SCORE: 26
ADLS_ACUITY_SCORE: 27
ADLS_ACUITY_SCORE: 26

## 2023-07-19 NOTE — PROGRESS NOTES
Occupational Therapy Discharge Summary    Reason for therapy discharge:    All goals and outcomes met, no further needs identified.    Progress towards therapy goal(s). See goals on Care Plan in [facility] electronic health record for goal details.  Goals met    Therapy recommendation(s):    No further therapy is recommended. Continue HEP         07/19/23 0815   Appointment Info   Signing Clinician's Name / Credentials (OT) MIKKI Chau   Quick Adds   Quick Adds Certification   Living Environment   People in Home spouse   Current Living Arrangements house   Home Accessibility stairs to enter home   Number of Stairs, Main Entrance 2   Stair Railings, Main Entrance none   Transportation Anticipated family or friend will provide   Living Environment Comments Walkin shower with bench, grab bars around toilet at home. Single level living once up two stairs   Self-Care   Usual Activity Tolerance good   Current Activity Tolerance moderate   Regular Exercise No   Equipment Currently Used at Home cane, quad   Fall history within last six months no   Activity/Exercise/Self-Care Comment Pt reports indep with functional mobility and self-cares. Uses a cane for community mobility.   General Information   Onset of Illness/Injury or Date of Surgery 07/18/23   Referring Physician Sury Todd PA-C   Additional Occupational Profile Info/Pertinent History of Current Problem POD #1 L TSA   Existing Precautions/Restrictions shoulder   Left Upper Extremity (Weight-bearing Status) non weight-bearing (NWB)   Cognitive Status Examination   Orientation Status orientation to person, place and time   Visual Perception   Visual Impairment/Limitations corrective lenses full-time   Pain Assessment   Patient Currently in Pain Yes, see Vital Sign flowsheet   Posture   Posture protracted shoulders   Range of Motion Comprehensive   General Range of Motion other (see comments)  (R UE WFL, NT L d/t recent sx)   Strength Comprehensive (MMT)    General Manual Muscle Testing (MMT) Assessment other (see comments)  (NT d/t recent surgery)   Coordination   Upper Extremity Coordination No deficits were identified   Bed Mobility   Bed Mobility supine-sit   Supine-Sit [place] (Bed Mobility) supervision   Assistive Device (Bed Mobility) bed rails  (HOB elevated, pt plans to sleep in recliner at home)   Transfers   Transfers sit-stand transfer   Transfer Comments SBA with SPC   Activities of Daily Living   BADL Assessment/Intervention lower body dressing   Lower Body Dressing Assessment/Training   Comment, (Lower Body Dressing) Dons slip on shoes with set-up   Wind[place] Level (Lower Body Dressing) set up   Clinical Impression   Criteria for Skilled Therapeutic Interventions Met (OT) Yes, treatment indicated   OT Diagnosis impaired funcitonal mobility and self cares s/p TSA   OT Problem List-Impairments impacting ADL problems related to;activity tolerance impaired;mobility;range of motion (ROM);post-surgical precautions;pain   Assessment of Occupational Performance 1-3 Performance Deficits   Identified Performance Deficits dressing, toileting, functional mobility   Planned Therapy Interventions (OT) ADL retraining;orthoic fitting/training;transfer training;progressive activity/exercise   Clinical Decision Making Complexity (OT) low complexity   Risk & Benefits of therapy have been explained care plan/treatment goals reviewed;evaluation/treatment results reviewed   OT Total Evaluation Time   OT Eval, Low Complexity Minutes (35613) 5   Therapy Certification   Medical Diagnosis Impaired functional mobility and self cares   Start of Care Date 07/18/23   Certification date from 07/18/23   Certification date to 07/19/23   OT Goals   Therapy Frequency (OT) One time eval and treatment   OT Predicted Duration/Target Date for Goal Attainment 07/19/23   OT Goals Hygiene/Grooming;Upper Body Dressing;Lower Body Dressing;Bed Mobility;Toilet Transfer/Toileting;OT Goal 1    OT: Hygiene/Grooming supervision/stand-by assist   OT: Upper Body Dressing Supervision/stand-by assist   OT: Lower Body Dressing Supervision/stand-by assist   OT: Bed Mobility Supervision/stand-by assist   OT: Toilet Transfer/Toileting Supervision/stand-by assist   OT: Goal 1 Pt and caregiver will demo ability to doff/don sling independently   Interventions   Interventions Quick Adds Self-Care/Home Management;Therapeutic Activity;Therapeutic Procedures/Exercise   Self-Care/Home Management   Self-Care/Home Mgmt/ADL, Compensatory, Meal Prep Minutes (16194) 25   Symptoms Noted During/After Treatment (Meal Preparation/Planning Training) increased pain   Treatment Detail/Skilled Intervention Pt supine in bed at start of session. Agrees to OT. Bed mob with SBA with HOB elevated, plans to use recliner at home. Ambulates to BR with SPC and SBA. Uses toilet and completes own pericares. Stands at sink for g/h with SBA. REturns to EOB for dressing. Increased time for LB dressing but pt able to complete with set-up. VCs for technique and min A to don shirt to pull sleeve over L arm. Review doff/don sling x1 with patient and then x1 once  and daughter arrived.  demo's understanding in assisting with patient directing actions. Pt reports  completes all IADLs at home.   Therapeutic Procedures/Exercise   Therapeutic Procedure: strength, endurance, ROM, flexibillity minutes (67682) 10   Symptoms Noted During/After Treatment none   Treatment Detail/Skilled Intervention Reviewed HEP for L UE ROM. Pt demos the following: elbow bends, sup/pro, wrist flex/ext, wrist lateral and medical deviation, and  strength. All performed with little to no instruction, hand out given. Exercises given when sling was off   Therapeutic Activities   Therapeutic Activity Minutes (42646) 15   Symptoms noted during/after treatment none   Treatment Detail/Skilled Intervention Pt ambulates in hallway with SPC and SBA. Pt does report  "slight light headedness but states, 'It is nothing serious\". Ambulates >100ft with good control. No LOB. Pt reports she \"feels good\". Discussed stairs to enter home, no hand rail but pt states her  can assist.   OT Discharge Planning   OT Rationale for DC Rec Pt mobilizing well with SPC. Demos abaility to doff/don sling with assist from . Dresses self with SBA and set-up. Anticipate with intermittent assist from spouse, pt will be able to return home with HEP.    [facility]  OUTPATIENT OCCUPATIONAL THERAPY  EVALUATION  PLAN OF TREATMENT FOR OUTPATIENT REHABILITATION  (COMPLETE FOR INITIAL CLAIMS ONLY)  Patient's Last Name, First Name, M.I.  YOB: 1950  Bridget Lawson                          Provider's Name  [facility] Medical Record No.  [id number]                             Onset Date:  07/18/23   Start of Care Date:  07/18/23   Type:     ___PT   _X_OT   ___SLP Medical Diagnosis:  Impaired functional mobility and self cares                    OT Diagnosis:  impaired funcitonal mobility and self cares s/p TSA Visits from SOC:  1     See note for plan of treatment, functional goals and certification details    I CERTIFY THE NEED FOR THESE SERVICES FURNISHED UNDER        THIS PLAN OF TREATMENT AND WHILE UNDER MY CARE     (Physician co-signature of this document indicates review and certification of the therapy plan).                                 "

## 2023-07-19 NOTE — PROGRESS NOTES
[facility] HOSPITALIST   BRIEF CHART CHECK    -Chart briefly reviewed for interval events  -Vital signs stable  - No mention of any recurrent neurologic symptoms   - note had no facial droop when I examined yesterday    Vitals:    07/18/23 1440 07/18/23 1605 07/18/23 1825 07/18/23 2357   BP: 124/66   (!) 142/76   BP Location: Right arm   Right arm   Patient Position:    Semi-Baltazar's   Cuff Size:    Adult Regular   Pulse: 62   64   Resp: 18   18   Temp:    97.9  F (36.6  C)   TempSrc:    Oral   SpO2: 95% 90% 92% 92%   Weight:       Height:         - Please see last note for full details.   - D/c med reconciled, patient to resume home meds as previously discussed,   - Hospital medicine will sign off,  Please reconsult if new issues or concerns.    Ruperto Manrique PA-C  [facility]  Securely message with the Vocera Web Console (learn more here)  Text page via [employer] Paging/Directory

## 2023-07-19 NOTE — PLAN OF CARE
POD0 L reverse shoulder arthroplasty.  Orientation: A&Ox4. Neuros intact ex L facial droop mostly resolved. CMS intact ex N/T in L fingers.  Vitals/Pain: VSS on RA, capno removed per pt request. Denies pain, ice pack in place to L shoulder, gave scheduled tylenol.   Diet: Regular  Lungs: Clear LS, encouraging IS use.   Lines/Drains: PIV SL; stopped IVF d/t adequate PO intake & denies N/V; int abx.   Skin/Wounds: Dressing on L shoulder incision CDI, sling on.  GI/: Hypoactive BS, +flatus, -BM. AUO.  Ambulation/Assist: A1 gb+IV pole to BR.  Plan: Discharge home tomorrow.

## 2023-07-19 NOTE — PLAN OF CARE
Pt here with POD1 L reverse shoulder arthroplasty. A&Ox4. VSS on RA. Regular diet. Up with SBA & cane. Pt ambulated in hallway. L shoulder incision covered w/ dressing, sling on. Pt voiding without issue, pt reports passing gas. Denies pain, gave scheduled Tylenol. Pt scoring green on the Aggression Stop Light Tool. Discharging home. All belongings returned. Reviewed discharge instructions with pt and family.

## 2023-07-19 NOTE — PROGRESS NOTES

## 2023-07-19 NOTE — PROGRESS NOTES
"ORTHOPEDIC UPPER EXTREMITY PROGRESS NOTE    POD# 1  Patient is a 73 year old female who underwent Procedure(s):  Left reverse shoulder arthroplasty, no guide, autologus bone graft proximal humerus  on 2023. Pain is well controlled. Tolerating medication well, no nausea or vomiting. Daughter and  here with her.  Discharge instructions reviewed.    Vitals:   Blood pressure (!) 140/79, pulse 82, temperature 98.2  F (36.8  C), temperature source Oral, resp. rate 18, height 1.581 m (5' 2.25\"), weight 97.3 kg (214 lb 9.6 oz), SpO2 94 %, not currently breastfeeding.  Temp (24hrs), Av.5  F (36.4  C), Min:96.3  F (35.7  C), Max:98.2  F (36.8  C)      EXAM   The patient is awake and alert.   Sensation is intact.  Digital Flexion/Extension maintained.   Brisk cap refill.   The incision is covered.    Labs:   Recent Labs   Lab Test 23  0751 23  1525 22  1444   HGB 12.5 12.5 13.4       ASSESSMENT  S/p L rTSA   PLAN  1. DVT prophylaxis: aspirin  2. Weight Bearing NWB (Non weight bearing).  3. Wound Care bulky surgical dressing to be removed, leave prineo mesh undisturbed  4. Discharge anticipated date today Home with No Skilled Service  5. Cont Pain Control Oxycodone and Tylenol  6. Continue with shoulder immobilizer.  Okay to remove for ADLs.  Okay for active range of motion for elbow, wrist and digits.    Arabella Del Cid PA-C  [facility]      "

## 2023-07-26 ENCOUNTER — TRANSFERRED RECORDS (OUTPATIENT)
Dept: HEALTH INFORMATION MANAGEMENT | Facility: CLINIC | Age: 73
End: 2023-07-26
Payer: COMMERCIAL

## 2023-08-22 ENCOUNTER — TRANSFERRED RECORDS (OUTPATIENT)
Dept: HEALTH INFORMATION MANAGEMENT | Facility: CLINIC | Age: 73
End: 2023-08-22
Payer: COMMERCIAL

## 2023-08-23 ENCOUNTER — TRANSFERRED RECORDS (OUTPATIENT)
Dept: HEALTH INFORMATION MANAGEMENT | Facility: CLINIC | Age: 73
End: 2023-08-23
Payer: COMMERCIAL

## 2023-08-26 DIAGNOSIS — N32.81 OAB (OVERACTIVE BLADDER): ICD-10-CM

## 2023-08-26 DIAGNOSIS — L30.4 INTERTRIGO: ICD-10-CM

## 2023-08-28 RX ORDER — KETOCONAZOLE 20 MG/G
CREAM TOPICAL
Qty: 30 G | Refills: 2 | Status: SHIPPED | OUTPATIENT
Start: 2023-08-28 | End: 2024-03-08

## 2023-08-28 RX ORDER — SOLIFENACIN SUCCINATE 10 MG/1
TABLET, FILM COATED ORAL
Qty: 90 TABLET | Refills: 0 | Status: SHIPPED | OUTPATIENT
Start: 2023-08-28 | End: 2023-11-20

## 2023-09-16 DIAGNOSIS — E78.5 HYPERLIPIDEMIA WITH TARGET LDL LESS THAN 100: ICD-10-CM

## 2023-09-18 RX ORDER — ROSUVASTATIN CALCIUM 20 MG/1
TABLET, COATED ORAL
Qty: 90 TABLET | Refills: 0 | Status: SHIPPED | OUTPATIENT
Start: 2023-09-18 | End: 2023-12-27

## 2023-09-27 ENCOUNTER — TRANSFERRED RECORDS (OUTPATIENT)
Dept: HEALTH INFORMATION MANAGEMENT | Facility: CLINIC | Age: 73
End: 2023-09-27
Payer: COMMERCIAL

## 2023-10-07 DIAGNOSIS — M79.605 CHRONIC PAIN OF BOTH LOWER EXTREMITIES: ICD-10-CM

## 2023-10-07 DIAGNOSIS — I10 ESSENTIAL HYPERTENSION WITH GOAL BLOOD PRESSURE LESS THAN 140/90: ICD-10-CM

## 2023-10-07 DIAGNOSIS — G89.29 CHRONIC PAIN OF BOTH LOWER EXTREMITIES: ICD-10-CM

## 2023-10-07 DIAGNOSIS — M79.604 CHRONIC PAIN OF BOTH LOWER EXTREMITIES: ICD-10-CM

## 2023-10-09 RX ORDER — GABAPENTIN 100 MG/1
CAPSULE ORAL
Qty: 180 CAPSULE | Refills: 0 | Status: SHIPPED | OUTPATIENT
Start: 2023-10-09 | End: 2024-01-01

## 2023-10-09 RX ORDER — HYDROCHLOROTHIAZIDE 12.5 MG/1
TABLET ORAL
Qty: 90 TABLET | Refills: 0 | Status: SHIPPED | OUTPATIENT
Start: 2023-10-09 | End: 2024-01-01

## 2023-10-24 ENCOUNTER — TRANSFERRED RECORDS (OUTPATIENT)
Dept: HEALTH INFORMATION MANAGEMENT | Facility: CLINIC | Age: 73
End: 2023-10-24
Payer: COMMERCIAL

## 2023-11-08 ENCOUNTER — TRANSFERRED RECORDS (OUTPATIENT)
Dept: HEALTH INFORMATION MANAGEMENT | Facility: CLINIC | Age: 73
End: 2023-11-08

## 2023-11-18 DIAGNOSIS — N32.81 OAB (OVERACTIVE BLADDER): ICD-10-CM

## 2023-11-20 RX ORDER — SOLIFENACIN SUCCINATE 10 MG/1
TABLET, FILM COATED ORAL
Qty: 90 TABLET | Refills: 0 | Status: SHIPPED | OUTPATIENT
Start: 2023-11-20 | End: 2024-07-23

## 2023-12-04 DIAGNOSIS — K21.01 GASTROESOPHAGEAL REFLUX DISEASE WITH ESOPHAGITIS AND HEMORRHAGE: ICD-10-CM

## 2023-12-26 DIAGNOSIS — E78.5 HYPERLIPIDEMIA WITH TARGET LDL LESS THAN 100: ICD-10-CM

## 2023-12-27 ENCOUNTER — TRANSFERRED RECORDS (OUTPATIENT)
Dept: HEALTH INFORMATION MANAGEMENT | Facility: CLINIC | Age: 73
End: 2023-12-27
Payer: COMMERCIAL

## 2023-12-27 RX ORDER — ROSUVASTATIN CALCIUM 20 MG/1
TABLET, COATED ORAL
Qty: 90 TABLET | Refills: 0 | Status: SHIPPED | OUTPATIENT
Start: 2023-12-27 | End: 2024-04-05

## 2023-12-27 NOTE — TELEPHONE ENCOUNTER
Prescription approved per [facility] Refill Protocol.  Dari Vivas, RN  [facility] Triage Nurse

## 2024-01-01 DIAGNOSIS — M79.604 CHRONIC PAIN OF BOTH LOWER EXTREMITIES: ICD-10-CM

## 2024-01-01 DIAGNOSIS — G89.29 CHRONIC PAIN OF BOTH LOWER EXTREMITIES: ICD-10-CM

## 2024-01-01 DIAGNOSIS — I10 ESSENTIAL HYPERTENSION WITH GOAL BLOOD PRESSURE LESS THAN 140/90: ICD-10-CM

## 2024-01-01 DIAGNOSIS — M79.605 CHRONIC PAIN OF BOTH LOWER EXTREMITIES: ICD-10-CM

## 2024-01-01 RX ORDER — GABAPENTIN 100 MG/1
CAPSULE ORAL
Qty: 180 CAPSULE | Refills: 0 | Status: SHIPPED | OUTPATIENT
Start: 2024-01-01 | End: 2024-03-26

## 2024-01-01 RX ORDER — HYDROCHLOROTHIAZIDE 12.5 MG/1
TABLET ORAL
Qty: 90 TABLET | Refills: 0 | Status: SHIPPED | OUTPATIENT
Start: 2024-01-01 | End: 2024-03-26

## 2024-02-02 ENCOUNTER — OFFICE VISIT (OUTPATIENT)
Dept: UROLOGY | Facility: CLINIC | Age: 74
End: 2024-02-02
Payer: COMMERCIAL

## 2024-02-02 VITALS — DIASTOLIC BLOOD PRESSURE: 86 MMHG | OXYGEN SATURATION: 95 % | SYSTOLIC BLOOD PRESSURE: 146 MMHG | HEART RATE: 67 BPM

## 2024-02-02 DIAGNOSIS — N32.81 OAB (OVERACTIVE BLADDER): Primary | ICD-10-CM

## 2024-02-02 LAB
ALBUMIN UR-MCNC: NEGATIVE MG/DL
APPEARANCE UR: CLEAR
BILIRUB UR QL STRIP: NEGATIVE
COLOR UR AUTO: YELLOW
GLUCOSE UR STRIP-MCNC: NEGATIVE MG/DL
HGB UR QL STRIP: NEGATIVE
KETONES UR STRIP-MCNC: NEGATIVE MG/DL
LEUKOCYTE ESTERASE UR QL STRIP: ABNORMAL
NITRATE UR QL: NEGATIVE
PH UR STRIP: 7 [PH] (ref 5–7)
RESIDUAL VOLUME (RV) (EXTERNAL): 21
SP GR UR STRIP: 1.01 (ref 1–1.03)
UROBILINOGEN UR STRIP-ACNC: 0.2 E.U./DL

## 2024-02-02 PROCEDURE — 51798 US URINE CAPACITY MEASURE: CPT | Performed by: PHYSICIAN ASSISTANT

## 2024-02-02 PROCEDURE — 99214 OFFICE O/P EST MOD 30 MIN: CPT | Performed by: PHYSICIAN ASSISTANT

## 2024-02-02 PROCEDURE — 81003 URINALYSIS AUTO W/O SCOPE: CPT | Mod: QW | Performed by: PHYSICIAN ASSISTANT

## 2024-02-02 RX ORDER — MIRABEGRON 25 MG/1
25 TABLET, FILM COATED, EXTENDED RELEASE ORAL DAILY
Qty: 90 TABLET | Refills: 4 | Status: SHIPPED | OUTPATIENT
Start: 2024-02-02

## 2024-02-02 NOTE — PATIENT INSTRUCTIONS
Stop Vesicare.   _________________________    You have been prescribed medication to help relax your bladder (Mirabegron 25 mg daily).    This medication may help control (or improve) urinary frequency, urgency and urge incontinence.    Common side effects include:  Dry mouth (Biotene mouthwash can help with this.)  Constipation  Drowsiness  Blurred vision    Rare side effect:  Urinary retention    Please see one of the dedicated pelvic floor physical therapists ([facility] [phone]).    Please be aware that coverage of these services is subject to the terms and limitations of your health insurance plan.  Call member services at your health plan with any benefit or coverage questions.      Please bring the following to your appointment:    *Your personal calendar for scheduling future appointments  *Comfortable clothing

## 2024-02-02 NOTE — PROGRESS NOTES
CC: Urgency, frequency, nocturia     HPI:  Bridget Lawson is a pleasant 73 year old female who presents in follow up of the above.     Last seen 2/28/23: Above sx ongoing for many years. Nocturia x 3-5, frequency, urgency throughout the day. Can struggle with constipation. No gross hematuria. No dysuria.     Restarted estrace cream in the interim and has really improved her control with Vesicare. Continues with nocturia, but overall content with the progress.     TODAY 2/2/24  More leakage without warning.   Can struggle with constipation.   No dysuria or gross hematuria.     Past Medical History        Past Medical History:   Diagnosis Date    Cerebral artery occlusion with cerebral infarction (H)        Hx of stroke   (02/10)    Hyperlipidemia LDL goal < 100 10/8/2012     Start prava in 7/14    Intracranial hemorrhage (H) 2/10     R basal ganglia, due to HTN; had mild L hemiparesis and dysarthria; good recovery    Obese      Osteopenia       in 6/10, prior to PTH surgery, T - 3.2 forearm, - 1.9 spine, - 1.5L and -1.6 R femoral necks    Sleep apnea       CPAP    Spider veins      Unspecified essential hypertension       Essential hypertension            Past Surgical History         Past Surgical History:   Procedure Laterality Date    BREAST SURGERY   11/2004     R breast bx    C/SECTION, CLASSICAL   1985     with tubal    ENDOSCOPIC RELEASE CARPAL TUNNEL Right 2/7/2020     Procedure: RIGHT ENDOSCOPIC CARPAL TUNNEL RELEASE;  Surgeon: Piper Guardado MD;  Location:  OR    HYSTERECTOMY   5/1998     GREGORY w/ BSO    KNEE SURGERY   9/1998     L Arthroscopy    NECK SURGERY   12/2010     R parathyroid adenoma    ORTHOPEDIC SURGERY        REPAIR TENDON ANKLE Right 11/1/2017     Procedure: REPAIR TENDON ANKLE;  RIGHT POSTERIOR TIBIAL TENDON RECONSTRUCTION ;  Surgeon: Xavi Duran MD;  Location:  OR    SHOULDER SURGERY   11/2008     rotator cuff repair R    TONSILLECTOMY         as a child T & A            Social  History   Social History            Socioeconomic History    Marital status:        Spouse name: Not on file    Number of children: 2    Years of education: Not on file    Highest education level: Not on file   Occupational History    Not on file   Social Needs    Financial resource strain: Not on file    Food insecurity       Worry: Not on file       Inability: Not on file    Transportation needs       Medical: Not on file       Non-medical: Not on file   Tobacco Use    Smoking status: Former Smoker       Packs/day: 2.00       Years: 27.00       Pack years: 54.00       Types: Cigarettes       Start date: 10/24/1969       Last attempt to quit: 1996       Years since quittin.5    Smokeless tobacco: Never Used   Substance and Sexual Activity    Alcohol use: Yes       Alcohol/week: 0.0 standard drinks       Comment: 1/day    Drug use: No    Sexual activity: Not Currently   Lifestyle    Physical activity       Days per week: Not on file       Minutes per session: Not on file    Stress: Not on file   Relationships    Social connections       Talks on phone: Not on file       Gets together: Not on file       Attends Congregational service: Not on file       Active member of club or organization: Not on file       Attends meetings of clubs or organizations: Not on file       Relationship status: Not on file    Intimate partner violence       Fear of current or ex partner: Not on file       Emotionally abused: Not on file       Physically abused: Not on file       Forced sexual activity: Not on file   Other Topics Concern    Parent/sibling w/ CABG, MI or angioplasty before 65F 55M? Not Asked   Social History Narrative     2 dtrs; no GC            Family History         Family History   Problem Relation Age of Onset    Breast Cancer Mother            [place]; masectomy left; asthma;  in 2015 at age 97    Breast Cancer Maternal Grandmother           possible CA; masectomy    Breast Cancer Paternal Aunt            possible CA, masectomy    Respiratory Father           COPD    Lipids Brother                     Allergies   Allergen Reactions    Animal Dander               Current Outpatient Medications   Medication    Acetaminophen (TYLENOL PO)    amLODIPine (NORVASC) 5 MG tablet    aspirin  MG EC tablet    Calcium Carb-Cholecalciferol (CALTRATE 600+D) 600-800 MG-UNIT TABS    Carboxymethylcellulose Sodium (THERATEARS OP)    esomeprazole (NEXIUM) 20 MG DR capsule    estradiol (ESTRACE VAGINAL) 0.1 MG/GM vaginal cream    fluticasone (FLONASE) 50 MCG/ACT spray    glucosamine-chondroitinoitin 750-600 MG TABS    HYDROcodone-acetaminophen (NORCO) 5-325 MG tablet    ibuprofen (ADVIL/MOTRIN) 600 MG tablet    isosorbide mononitrate (IMDUR) 60 MG 24 hr tablet    ketoconazole (NIZORAL) 2 % external cream    labetalol (NORMODYNE) 200 MG tablet    MAGNESIUM GLUCONATE PO    MELATONIN PO    Multiple Vitamins-Minerals (CENTRUM SILVER ULTRA WOMENS PO)    NONFORMULARY    ORDER FOR DME    ramipril (ALTACE) 10 MG capsule    rosuvastatin (CRESTOR) 20 MG tablet      No current facility-administered medications for this visit.             PEx:   PSYCH: NAD      A/P: Bridget Lawson is a 73 year old female with urgency, frequency, OAB  -Continue estrogen cream three times a week near urethra (pea-sized amount): If >$50, she will let me know and we can get via a compound pharmacy.  - Stop Vesicare start Myrbetriq 25mg daily, reviewed SE   -Eliminate irritants, make note of current irritants  -Limit fluids after 5pm  -PFPT eval  -3 mo return for med check     Stephanie Shi PA-C  [facility]      22 minutes spent on the date of the encounter doing chart review, review of test results, interpretation of tests, patient visit and documentation

## 2024-02-02 NOTE — LETTER
2/2/2024       RE: Bridget Lawson  [address]  [facility] [address]     Dear Colleague,    Thank you for referring your patient, Bridget Lawson, to the [facility]. Please see a copy of my visit note below.      CC: Urgency, frequency, nocturia     HPI:  Bridget Lawson is a pleasant 73 year old female who presents in follow up of the above.     Last seen 2/28/23: Above sx ongoing for many years. Nocturia x 3-5, frequency, urgency throughout the day. Can struggle with constipation. No gross hematuria. No dysuria.     Restarted estrace cream in the interim and has really improved her control with Vesicare. Continues with nocturia, but overall content with the progress.     TODAY 2/2/24  More leakage without warning.   Can struggle with constipation.   No dysuria or gross hematuria.     Past Medical History        Past Medical History:   Diagnosis Date    Cerebral artery occlusion with cerebral infarction (H)        Hx of stroke   (02/10)    Hyperlipidemia LDL goal < 100 10/8/2012     Start prava in 7/14    Intracranial hemorrhage (H) 2/10     R basal ganglia, due to HTN; had mild L hemiparesis and dysarthria; good recovery    Obese      Osteopenia       in 6/10, prior to PTH surgery, T - 3.2 forearm, - 1.9 spine, - 1.5L and -1.6 R femoral necks    Sleep apnea       CPAP    Spider veins      Unspecified essential hypertension       Essential hypertension            Past Surgical History         Past Surgical History:   Procedure Laterality Date    BREAST SURGERY   11/2004     R breast bx    C/SECTION, CLASSICAL   1985     with tubal    ENDOSCOPIC RELEASE CARPAL TUNNEL Right 2/7/2020     Procedure: RIGHT ENDOSCOPIC CARPAL TUNNEL RELEASE;  Surgeon: Piper Guardado MD;  Location: [facility] OR    HYSTERECTOMY   5/1998     GREGORY w/ BSO    KNEE SURGERY   9/1998     L Arthroscopy    NECK SURGERY   12/2010     R parathyroid adenoma     ORTHOPEDIC SURGERY        REPAIR TENDON ANKLE Right 2017     Procedure: REPAIR TENDON ANKLE;  RIGHT POSTERIOR TIBIAL TENDON RECONSTRUCTION ;  Surgeon: Xavi Duran MD;  Location: [facility] OR    SHOULDER SURGERY   2008     rotator cuff repair R    TONSILLECTOMY         as a child T & A            Social History   Social History            Socioeconomic History    Marital status:        Spouse name: Not on file    Number of children: 2    Years of education: Not on file    Highest education level: Not on file   Occupational History    Not on file   Social Needs    Financial resource strain: Not on file    Food insecurity       Worry: Not on file       Inability: Not on file    Transportation needs       Medical: Not on file       Non-medical: Not on file   Tobacco Use    Smoking status: Former Smoker       Packs/day: 2.00       Years: 27.00       Pack years: 54.00       Types: Cigarettes       Start date: 10/24/1969       Last attempt to quit: 1996       Years since quittin.5    Smokeless tobacco: Never Used   Substance and Sexual Activity    Alcohol use: Yes       Alcohol/week: 0.0 standard drinks       Comment: 1/day    Drug use: No    Sexual activity: Not Currently   Lifestyle    Physical activity       Days per week: Not on file       Minutes per session: Not on file    Stress: Not on file   Relationships    Social connections       Talks on phone: Not on file       Gets together: Not on file       Attends Jew service: Not on file       Active member of club or organization: Not on file       Attends meetings of clubs or organizations: Not on file       Relationship status: Not on file    Intimate partner violence       Fear of current or ex partner: Not on file       Emotionally abused: Not on file       Physically abused: Not on file       Forced sexual activity: Not on file   Other Topics Concern    Parent/sibling w/ CABG, MI or angioplasty before 65F 55M? Not Asked   Social  History Narrative     2 dtrs; no GC            Family History         Family History   Problem Relation Age of Onset    Breast Cancer Mother            CA; masectomy left; asthma;  in 2015 at age 97    Breast Cancer Maternal Grandmother           possible CA; masectomy    Breast Cancer Paternal Aunt           possible CA, masectomy    Respiratory Father           COPD    Lipids Brother                     Allergies   Allergen Reactions    Animal Dander               Current Outpatient Medications   Medication    Acetaminophen (TYLENOL PO)    amLODIPine (NORVASC) 5 MG tablet    aspirin  MG EC tablet    Calcium Carb-Cholecalciferol (CALTRATE 600+D) 600-800 MG-UNIT TABS    Carboxymethylcellulose Sodium (THERATEARS OP)    esomeprazole (NEXIUM) 20 MG DR capsule    estradiol (ESTRACE VAGINAL) 0.1 MG/GM vaginal cream    fluticasone (FLONASE) 50 MCG/ACT spray    glucosamine-chondroitinoitin 750-600 MG TABS    HYDROcodone-acetaminophen (NORCO) 5-325 MG tablet    ibuprofen (ADVIL/MOTRIN) 600 MG tablet    isosorbide mononitrate (IMDUR) 60 MG 24 hr tablet    ketoconazole (NIZORAL) 2 % external cream    labetalol (NORMODYNE) 200 MG tablet    MAGNESIUM GLUCONATE PO    MELATONIN PO    Multiple Vitamins-Minerals (CENTRUM SILVER ULTRA WOMENS PO)    NONFORMULARY    ORDER FOR DME    ramipril (ALTACE) 10 MG capsule    rosuvastatin (CRESTOR) 20 MG tablet      No current facility-administered medications for this visit.             PEx:   PSYCH: NAD      A/P: Bridget Lawson is a 73 year old female with urgency, frequency, OAB  -Continue estrogen cream three times a week near urethra (pea-sized amount): If >$50, she will let me know and we can get via a compound pharmacy.  - Stop Vesicare start Myrbetriq 25mg daily, reviewed SE   -Eliminate irritants, make note of current irritants  -Limit fluids after 5pm  -PFPT eval  -3 mo return for med check     JULIA Sales [facility]      22 minutes spent on the date of the  encounter doing chart review, review of test results, interpretation of tests, patient visit and documentation

## 2024-02-02 NOTE — NURSING NOTE
Pt up 5-6 times at nt to void.  PVR by scanner= 21mL  Pt denies any dysuria or gross hem.    Pt double voids.    KARAN Snider CMA

## 2024-02-07 ENCOUNTER — TRANSFERRED RECORDS (OUTPATIENT)
Dept: HEALTH INFORMATION MANAGEMENT | Facility: CLINIC | Age: 74
End: 2024-02-07
Payer: COMMERCIAL

## 2024-02-08 ENCOUNTER — TELEPHONE (OUTPATIENT)
Dept: UROLOGY | Facility: CLINIC | Age: 74
End: 2024-02-08
Payer: COMMERCIAL

## 2024-02-08 DIAGNOSIS — N32.81 OAB (OVERACTIVE BLADDER): Primary | ICD-10-CM

## 2024-02-08 RX ORDER — TROSPIUM CHLORIDE 20 MG/1
20 TABLET, FILM COATED ORAL
Qty: 180 TABLET | Refills: 4 | Status: SHIPPED | OUTPATIENT
Start: 2024-02-08 | End: 2024-07-23

## 2024-02-08 NOTE — TELEPHONE ENCOUNTER
[facility] Call Center    Phone Message    May a detailed message be left on voicemail: yes     Reason for Call: Other: Pt calling regarding recent medication Yuridia prescribed to pt. Pt states medication is too high in cost and is wanting an alternative, Please call pt      Action Taken: Message routed to:  Other: Uro    Travel Screening: Not Applicable

## 2024-02-25 DIAGNOSIS — E78.5 HYPERLIPIDEMIA WITH TARGET LDL LESS THAN 100: ICD-10-CM

## 2024-02-25 DIAGNOSIS — I10 ESSENTIAL HYPERTENSION WITH GOAL BLOOD PRESSURE LESS THAN 140/90: ICD-10-CM

## 2024-02-26 RX ORDER — AMLODIPINE BESYLATE 5 MG/1
TABLET ORAL
Qty: 90 TABLET | Refills: 0 | Status: SHIPPED | OUTPATIENT
Start: 2024-02-26 | End: 2024-05-31

## 2024-02-26 RX ORDER — ISOSORBIDE MONONITRATE 60 MG/1
TABLET, EXTENDED RELEASE ORAL
Qty: 90 TABLET | Refills: 0 | Status: SHIPPED | OUTPATIENT
Start: 2024-02-26 | End: 2024-05-21

## 2024-02-28 ENCOUNTER — THERAPY VISIT (OUTPATIENT)
Dept: PHYSICAL THERAPY | Facility: CLINIC | Age: 74
End: 2024-02-28
Attending: PHYSICIAN ASSISTANT
Payer: COMMERCIAL

## 2024-02-28 DIAGNOSIS — N32.81 OAB (OVERACTIVE BLADDER): ICD-10-CM

## 2024-02-28 PROCEDURE — 97161 PT EVAL LOW COMPLEX 20 MIN: CPT | Mod: GP

## 2024-02-28 PROCEDURE — 97530 THERAPEUTIC ACTIVITIES: CPT | Mod: GP

## 2024-02-28 PROCEDURE — 97112 NEUROMUSCULAR REEDUCATION: CPT | Mod: GP

## 2024-02-28 NOTE — PROGRESS NOTES
PHYSICAL THERAPY EVALUATION  Type of Visit: Evaluation    See electronic medical record for Abuse and Falls Screening details.    Subjective       Presenting condition or subjective complaint: pelvic floor therapy  Patient reports to outpatient physical therapy with symptoms consistent with overactive bladder, nocturia (x3-5 night), frequency, urgency, urinary incontinence, and constipation. Her biggest complaint is the urge to get to the bathroom - medium amount and sometimes large. Cannot stop the leaking once it starts. Not happening every times she goes to the bathroom but has started to reduce the amount of water she drinks because of this - she drinks 2 glasses of water per day. She feels like she does not completely empty each time - double voids if she stands up and does not feel it emptied all the way. She takes 3 bob a day and sometimes miralax to help her have a bowel movement. Does not leak with cough or sneeze.    Goals: reduce leaking with urge, reduce night time urination, improve bowel movement    Date of onset: 24 (date of order)    Relevant medical history: Arthritis; Bladder or bowel problems; Incontinence; Menopause; High blood pressure; Implanted device; Overweight; Sleep disorder like apnea; Stroke; Thyroid problems   Past Medical History:   Diagnosis Date    Benign essential hypertension     History of blood transfusion     no adverse reactions    History of hyperparathyroidism     s/p adenoma resection    History of stroke 2009    hemorrhagic; due to uncontrolled hypertension    Lymphedema of both lower extremities     Morbid obesity (H)     JOHNY on CPAP     Osteoporosis     PUD (peptic ulcer disease)     due to NSAID use    Pure hypercholesterolemia     Urgency-frequency syndrome      Dates & types of surgery:    Past Surgical History:   Procedure Laterality Date    ARTHROSCOPY KNEE Left     CATARACT EXTRACTION, BILATERAL       SECTION, TUBAL LIGATION, COMBINED       ENDOSCOPIC RELEASE CARPAL TUNNEL Right 02/07/2020    Procedure: RIGHT ENDOSCOPIC CARPAL TUNNEL RELEASE;  Surgeon: Piper Guardado MD;  Location: [facility]    HYSTERECTOMY TOTAL ABDOMINAL, BILATERAL SALPINGO-OOPHORECTOMY, COMBINED      PARATHYROID GLAND SURGERY      adenoma removed    REPAIR TENDON ANKLE Right 11/01/2017    Procedure: REPAIR TENDON ANKLE;  RIGHT POSTERIOR TIBIAL TENDON RECONSTRUCTION ;  Surgeon: Xavi Duran MD;  Location: [facility] OR    REVERSE ARTHROPLASTY SHOULDER Left 7/18/2023    Procedure: Left reverse shoulder arthroplasty, no guide, autologus bone graft proximal humerus ;  Surgeon: Ulises Parker MD;  Location: [facility] OR    ROTATOR CUFF REPAIR RT/LT Right     TONSILLECTOMY & ADENOIDECTOMY       Prior diagnostic imaging/testing results:       Prior therapy history for the same diagnosis, illness or injury:        Prior Level of Function  Transfers: Independent  Ambulation: Independent  ADL: Independent  IADL: Driving, Finances, Housekeeping, Laundry, Meal preparation, Medication management, Yard work    Living Environment  Social support:     Type of home:     Stairs to enter the home:         Ramp:     Stairs inside the home:         Help at home:    Equipment owned:       Employment:      Hobbies/Interests:      Patient goals for therapy: hold urine until I reach a bathroom    Pain assessment: Pain denied     Objective      PELVIC EVALUATION  ADDITIONAL HISTORY:  Sex assigned at birth: Female  Gender identity: Female    Pronouns: She/Her Hers      Bladder History:  Feels bladder filling: Yes  Triggers for feeling of inability to wait to go to the bathroom: No    How long can you wait to urinate: immediately with the urge, 3 hours between void times  Gets up at night to urinate: Yes 3-4x  Can stop the flow of urine when urinating: -- (haven't tried)  Volume of urine usually released: Medium   Other issues: Trouble emptying bladder completely  Number of bladder infections in last 12 months:     Fluid intake per day: 2 cups   small amount with dinner  Medications taken for bladder: Yes for urgency   Activities causing urine leak: Hurrying to the bathroom due to a strong urge to urinate (pee) stand up with urge to go  Amount of urine typically leaked: medium  Pads used to help with leaking: Yes I use this many pads per day: on a good day 2 and on worse days 4-5      Bowel History:  Frequency of bowel movement: 1x every 3 days  Consistency of stool: Hard [place]: type 1  Ignores the urge to defecate: Yes (if in a social situation)  Other bowel issues: Straining to have bowel movement  Length of time spent trying to have a bowel movement: 5-10 min    Sexual Function History:  Sexual orientation: Straight    Sexually active: No  Lubrication used:      Pelvic pain:      Pain or difficulty with orgasms/erection/ejaculation:      State of menopause: Post-menopause (I am done with menopause)  Hormone medications:        Are you currently pregnant: No, Number of previous pregnancies: 2, Number of deliveries: 2, If you have delivered before, did you have any of these issues during delivery:  delivery; Vaginal delivery (DNC after first one, second one Cesarian section), Have you been diagnosed with pelvic prolapse or abdominal separation: No, Do you get regular exercise: No, Have you tried pelvic floor strengthening exercises for 4 weeks: No, Do you have any history of trauma that is relevant to your care that you d like to share: No    Discussed reason for referral regarding pelvic health needs and external/internal pelvic floor muscle examination with patient/guardian.  Opportunity provided to ask questions and verbal consent for assessment and intervention was given.    POSTURE: Standing Posture: Rounded shoulders, Forward head, Lordosis decreased, forward flexed posture  LUMBAR SCREEN:  limited globally, possibly due to balance (most notable limitation in extension lumbar spine), flexion = WFL; hip: ER  limited R side, ER slight limitation L side, IR limited bilaterally; knees in slight flexion in supine  HIP SCREEN:  Strength:  hip flexion: 4+ bilaterally, hip ER hooklyin- bilaterally, hip adduction hooklyin+ bilaterally; hip ext supine: 4- bilaterally    Functional Strength Testing: Double Leg Squat: Anterior knee translation, Knee valgus, Improper use of glutes/hips, and use of UE for support    BREATHING SYMMETRY: WNL    PELVIC EXAM  External Visual Inspection:  At rest: Normal  With voluntary pelvic floor contraction: Uncertain, present on third rep possibly  Relaxation of PFM: uncertain  Use of adductors to assist with kegel at times    Integumentary:   Introitus: Unremarkable    External Digital Palpation per Perineum:   Ischiocavernosis: Unremarkable  Bulbo cavernosis: Unremarkable  Transverse perineal: Unremarkable  Levator ani: Unremarkable  Perineal body: Unremarkable    Scar:   Location/Type: not assessed  Mobility:  n/a    Internal Digital Palpation:  Per Vagina:  Myofascial Resistance to Palpation: Soft  Digital Muscle Performance: P (Power): 0-1  Compensations: Adductors  Relaxation Post-Contraction: uncertain    Per Rectum:  Not assessed      Pelvic Organ Prolapse:   Not assessed    ABDOMINAL ASSESSMENT  Diastasis Rectus Abdominis (MARIA TERESA):  Not assessed    Abdominal Activation/Strength: SLR: difficulty with resistance bilaterally, 4+ L side, 5- R side    Scar:   Location/Type: not assessed  Mobility:  n/a    Fascial Tension/Restriction: not assessed    BIOFEEDBACK:  Position: Supine  Surface Electrodes: Perineal    Abdominals:     Perianals:   Baseline muscle activity: 4-5 microvolts  Peak Amplitude/MVC: 7-10 microvolts; difficult on command to kegel, difficulty relaxing after contraction to baseline    Assessment & Plan   CLINICAL IMPRESSIONS  Medical Diagnosis: overactive bladder, nocturia, frequency, urinary incontinence, constipation    Treatment Diagnosis:     Impression/Assessment:  Patient is a 73 year old female with symptoms associated with increased urinary frequency, urgency and urge incontinence complaints.  The following significant findings have been identified: Decreased ROM/flexibility, Decreased joint mobility, Decreased strength, Impaired balance, Impaired gait, Impaired muscle performance, and Decreased activity tolerance. These impairments interfere with their ability to perform self care tasks, household chores, household mobility, and community mobility as compared to previous level of function.     Clinical Decision Making (Complexity):  Clinical Presentation: Stable/Uncomplicated  Clinical Presentation Rationale: based on medical and personal factors listed in PT evaluation  Clinical Decision Making (Complexity): Low complexity    PLAN OF CARE  Treatment Interventions:  Modalities: Biofeedback, Ultrasound  Interventions: Gait Training, Manual Therapy, Neuromuscular Re-education, Therapeutic Activity, Therapeutic Exercise, Self-Care/Home Management    Long Term Goals     PT Goal 1  Goal Identifier: frequency  Goal Description: patient will be able to void every 2-4 hours  Rationale: to maximize safety and independence with performance of ADLs and functional tasks;to maximize safety and independence within the home;to maximize safety and independence with self cares  Target Date: 05/22/24  PT Goal 2  Goal Identifier: urge  Goal Description: patient will report no leaking associated with urgency  Rationale: to maximize safety and independence with performance of ADLs and functional tasks;to maximize safety and independence within the home;to maximize safety and independence with self cares  Target Date: 05/22/24  PT Goal 3  Goal Identifier: nocturia  Goal Description: patient will void 1x or less per night  Rationale: to maximize safety and independence with performance of ADLs and functional tasks;to maximize safety and independence within the home;to maximize safety and  independence with self cares  Target Date: 05/22/24  PT Goal 4  Goal Identifier: bowel movements  Goal Description: patient will report type 3-4 bowel movement on bristol scale  Rationale: to maximize safety and independence with performance of ADLs and functional tasks;to maximize safety and independence within the home;to maximize safety and independence with self cares  Goal Progress: currently type 1 and straining for bowel movement  Target Date: 05/22/24      Frequency of Treatment: 1x/wk progressing to 1 x every other week  Duration of Treatment: 2-3 months    Recommended Referrals to Other Professionals: n/a at present  Education Assessment:   Learner/Method: Patient    Risks and benefits of evaluation/treatment have been explained.   Patient/Family/caregiver agrees with Plan of Care.     Evaluation Time:     PT Eval, Low Complexity Minutes (55628): 35     Signing Clinician: Olamide Archer, PT      [facility]                                                                                   OUTPATIENT PHYSICAL THERAPY      PLAN OF TREATMENT FOR OUTPATIENT REHABILITATION   Patient's Last Name, First Name, Bridget Galarza YOB: 1950   Provider's Name   [facility]   Medical Record No.  [id number]     Onset Date: 02/02/24 (date of order)  Start of Care Date: 02/28/24     Medical Diagnosis:  overactive bladder, nocturia, frequency, urinary incontinence, constipation      PT Treatment Diagnosis:    Plan of Treatment  Frequency/Duration: 1x/wk progressing to 1 x every other week/ 2-3 months    Certification date from 02/28/24 to 05/22/24         See note for plan of treatment details and functional goals     Olamide Archer, PT                         I CERTIFY THE NEED FOR THESE SERVICES FURNISHED UNDER        THIS PLAN OF TREATMENT AND WHILE UNDER MY CARE     (Physician attestation of this document indicates review and  certification of the therapy plan).              Referring Provider:  Stephanie Shi    Initial Assessment  See Epic Evaluation- Start of Care Date: 02/28/24

## 2024-03-06 ENCOUNTER — TRANSFERRED RECORDS (OUTPATIENT)
Dept: HEALTH INFORMATION MANAGEMENT | Facility: CLINIC | Age: 74
End: 2024-03-06
Payer: COMMERCIAL

## 2024-03-08 DIAGNOSIS — L30.4 INTERTRIGO: ICD-10-CM

## 2024-03-08 RX ORDER — KETOCONAZOLE 20 MG/G
CREAM TOPICAL
Qty: 30 G | Refills: 0 | Status: SHIPPED | OUTPATIENT
Start: 2024-03-08 | End: 2024-05-03

## 2024-03-20 ENCOUNTER — THERAPY VISIT (OUTPATIENT)
Dept: PHYSICAL THERAPY | Facility: CLINIC | Age: 74
End: 2024-03-20
Payer: COMMERCIAL

## 2024-03-20 DIAGNOSIS — N32.81 OAB (OVERACTIVE BLADDER): Primary | ICD-10-CM

## 2024-03-20 PROCEDURE — 97110 THERAPEUTIC EXERCISES: CPT | Mod: GP

## 2024-03-20 PROCEDURE — 97530 THERAPEUTIC ACTIVITIES: CPT | Mod: GP

## 2024-03-26 DIAGNOSIS — I10 ESSENTIAL HYPERTENSION WITH GOAL BLOOD PRESSURE LESS THAN 140/90: ICD-10-CM

## 2024-03-26 DIAGNOSIS — G89.29 CHRONIC PAIN OF BOTH LOWER EXTREMITIES: ICD-10-CM

## 2024-03-26 DIAGNOSIS — M79.605 CHRONIC PAIN OF BOTH LOWER EXTREMITIES: ICD-10-CM

## 2024-03-26 DIAGNOSIS — M79.604 CHRONIC PAIN OF BOTH LOWER EXTREMITIES: ICD-10-CM

## 2024-03-26 RX ORDER — GABAPENTIN 100 MG/1
CAPSULE ORAL
Qty: 180 CAPSULE | Refills: 0 | Status: SHIPPED | OUTPATIENT
Start: 2024-03-26 | End: 2024-07-22

## 2024-03-26 RX ORDER — HYDROCHLOROTHIAZIDE 12.5 MG/1
TABLET ORAL
Qty: 90 TABLET | Refills: 0 | Status: SHIPPED | OUTPATIENT
Start: 2024-03-26 | End: 2024-07-05

## 2024-04-03 ENCOUNTER — THERAPY VISIT (OUTPATIENT)
Dept: PHYSICAL THERAPY | Facility: CLINIC | Age: 74
End: 2024-04-03
Payer: COMMERCIAL

## 2024-04-03 DIAGNOSIS — N32.81 OAB (OVERACTIVE BLADDER): Primary | ICD-10-CM

## 2024-04-03 PROCEDURE — 97530 THERAPEUTIC ACTIVITIES: CPT | Mod: GP

## 2024-04-05 DIAGNOSIS — I10 ESSENTIAL HYPERTENSION WITH GOAL BLOOD PRESSURE LESS THAN 130/80: ICD-10-CM

## 2024-04-05 DIAGNOSIS — E78.5 HYPERLIPIDEMIA WITH TARGET LDL LESS THAN 100: ICD-10-CM

## 2024-04-05 RX ORDER — ROSUVASTATIN CALCIUM 20 MG/1
TABLET, COATED ORAL
Qty: 90 TABLET | Refills: 0 | Status: SHIPPED | OUTPATIENT
Start: 2024-04-05 | End: 2024-07-05

## 2024-04-05 RX ORDER — RAMIPRIL 10 MG/1
CAPSULE ORAL
Qty: 90 CAPSULE | Refills: 0 | Status: SHIPPED | OUTPATIENT
Start: 2024-04-05 | End: 2024-07-17

## 2024-04-17 ENCOUNTER — THERAPY VISIT (OUTPATIENT)
Dept: PHYSICAL THERAPY | Facility: CLINIC | Age: 74
End: 2024-04-17
Payer: COMMERCIAL

## 2024-04-17 DIAGNOSIS — N32.81 OAB (OVERACTIVE BLADDER): Primary | ICD-10-CM

## 2024-04-17 PROCEDURE — 97530 THERAPEUTIC ACTIVITIES: CPT | Mod: GP

## 2024-04-22 ENCOUNTER — TRANSFERRED RECORDS (OUTPATIENT)
Dept: HEALTH INFORMATION MANAGEMENT | Facility: CLINIC | Age: 74
End: 2024-04-22
Payer: COMMERCIAL

## 2024-05-02 DIAGNOSIS — L30.4 INTERTRIGO: ICD-10-CM

## 2024-05-02 DIAGNOSIS — K21.01 GASTROESOPHAGEAL REFLUX DISEASE WITH ESOPHAGITIS AND HEMORRHAGE: ICD-10-CM

## 2024-05-03 RX ORDER — KETOCONAZOLE 20 MG/G
CREAM TOPICAL
Qty: 30 G | Refills: 0 | Status: SHIPPED | OUTPATIENT
Start: 2024-05-03 | End: 2024-05-31

## 2024-05-03 NOTE — TELEPHONE ENCOUNTER
Prescription approved per [facility] Refill Protocol.  Drai Vivas, RN  [facility] Triage Nurse

## 2024-05-07 ENCOUNTER — OFFICE VISIT (OUTPATIENT)
Dept: UROLOGY | Facility: CLINIC | Age: 74
End: 2024-05-07
Payer: COMMERCIAL

## 2024-05-07 VITALS — OXYGEN SATURATION: 96 % | SYSTOLIC BLOOD PRESSURE: 152 MMHG | HEART RATE: 66 BPM | DIASTOLIC BLOOD PRESSURE: 80 MMHG

## 2024-05-07 DIAGNOSIS — N32.81 OAB (OVERACTIVE BLADDER): Primary | ICD-10-CM

## 2024-05-07 PROCEDURE — 99214 OFFICE O/P EST MOD 30 MIN: CPT | Performed by: PHYSICIAN ASSISTANT

## 2024-05-07 RX ORDER — ESTRADIOL 0.1 MG/G
CREAM VAGINAL
Qty: 42.5 G | Refills: 3 | Status: SHIPPED | OUTPATIENT
Start: 2024-05-07

## 2024-05-07 NOTE — NURSING NOTE
Pt does not think she has a UTI today  Pt states she is doing well and no troubles.    KARAN Snider CMA

## 2024-05-07 NOTE — LETTER
5/7/2024       RE: Bridget Lawson  [address]  [place] [facility] [address]     Dear Colleague,    Thank you for referring your patient, Bridget Lawson, to the [facility] at [facility]. Please see a copy of my visit note below.      CC: Urgency, frequency, nocturia     HPI:  Bridget Lawson is a pleasant 73 year old female who presents in follow up of the above.     Last seen 2/28/23: Above sx ongoing for many years. Nocturia x 3-5, frequency, urgency throughout the day. Can struggle with constipation. No gross hematuria. No dysuria.     Restarted estrace cream in the interim and has really improved her control with Vesicare. Continues with nocturia, but overall content with the progress.     2/2/24  More leakage without warning.   Can struggle with constipation.   No dysuria or gross hematuria.     TODAY 5/7/24  3 mo follow-up with Myrbetriq 25mg  Less urgency, less accidents.   Nocturia x 1-2  Going to PFPT    Past Medical History        Past Medical History:   Diagnosis Date    Cerebral artery occlusion with cerebral infarction (H)        Hx of stroke   (02/10)    Hyperlipidemia LDL goal < 100 10/8/2012     Start prava in 7/14    Intracranial hemorrhage (H) 2/10     R basal ganglia, due to HTN; had mild L hemiparesis and dysarthria; good recovery    Obese      Osteopenia       in 6/10, prior to PTH surgery, T - 3.2 forearm, - 1.9 spine, - 1.5L and -1.6 R femoral necks    Sleep apnea       CPAP    Spider veins      Unspecified essential hypertension       Essential hypertension            Past Surgical History         Past Surgical History:   Procedure Laterality Date    BREAST SURGERY   11/2004     R breast bx    C/SECTION, CLASSICAL   1985     with tubal    ENDOSCOPIC RELEASE CARPAL TUNNEL Right 2/7/2020     Procedure: RIGHT ENDOSCOPIC CARPAL TUNNEL RELEASE;  Surgeon: Piper Guardado MD;  Location: [facility] OR    HYSTERECTOMY   5/1998      GREGORY w/ BSO    KNEE SURGERY   1998     L Arthroscopy    NECK SURGERY   2010     R parathyroid adenoma    ORTHOPEDIC SURGERY        REPAIR TENDON ANKLE Right 2017     Procedure: REPAIR TENDON ANKLE;  RIGHT POSTERIOR TIBIAL TENDON RECONSTRUCTION ;  Surgeon: Xavi Duran MD;  Location: [facility] OR    SHOULDER SURGERY   2008     rotator cuff repair R    TONSILLECTOMY         as a child T & A            Social History   Social History            Socioeconomic History    Marital status:        Spouse name: Not on file    Number of children: 2    Years of education: Not on file    Highest education level: Not on file   Occupational History    Not on file   Social Needs    Financial resource strain: Not on file    Food insecurity       Worry: Not on file       Inability: Not on file    Transportation needs       Medical: Not on file       Non-medical: Not on file   Tobacco Use    Smoking status: Former Smoker       Packs/day: 2.00       Years: 27.00       Pack years: 54.00       Types: Cigarettes       Start date: 10/24/1969       Last attempt to quit: 1996       Years since quittin.5    Smokeless tobacco: Never Used   Substance and Sexual Activity    Alcohol use: Yes       Alcohol/week: 0.0 standard drinks       Comment: 1/day    Drug use: No    Sexual activity: Not Currently   Lifestyle    Physical activity       Days per week: Not on file       Minutes per session: Not on file    Stress: Not on file   Relationships    Social connections       Talks on phone: Not on file       Gets together: Not on file       Attends Protestant service: Not on file       Active member of club or organization: Not on file       Attends meetings of clubs or organizations: Not on file       Relationship status: Not on file    Intimate partner violence       Fear of current or ex partner: Not on file       Emotionally abused: Not on file       Physically abused: Not on file       Forced sexual activity: Not on file    Other Topics Concern    Parent/sibling w/ CABG, MI or angioplasty before 65F 55M? Not Asked   Social History Narrative     2 dtrs; no GC            Family History         Family History   Problem Relation Age of Onset    Breast Cancer Mother            CA; masectomy left; asthma;  in 2015 at age 97    Breast Cancer Maternal Grandmother           possible CA; masectomy    Breast Cancer Paternal Aunt           possible CA, masectomy    Respiratory Father           COPD    Lipids Brother                     Allergies   Allergen Reactions    Animal Dander               Current Outpatient Medications   Medication    Acetaminophen (TYLENOL PO)    amLODIPine (NORVASC) 5 MG tablet    aspirin  MG EC tablet    Calcium Carb-Cholecalciferol (CALTRATE 600+D) 600-800 MG-UNIT TABS    Carboxymethylcellulose Sodium (THERATEARS OP)    esomeprazole (NEXIUM) 20 MG DR capsule    estradiol (ESTRACE VAGINAL) 0.1 MG/GM vaginal cream    fluticasone (FLONASE) 50 MCG/ACT spray    glucosamine-chondroitinoitin 750-600 MG TABS    HYDROcodone-acetaminophen (NORCO) 5-325 MG tablet    ibuprofen (ADVIL/MOTRIN) 600 MG tablet    isosorbide mononitrate (IMDUR) 60 MG 24 hr tablet    ketoconazole (NIZORAL) 2 % external cream    labetalol (NORMODYNE) 200 MG tablet    MAGNESIUM GLUCONATE PO    MELATONIN PO    Multiple Vitamins-Minerals (CENTRUM SILVER ULTRA WOMENS PO)    NONFORMULARY    ORDER FOR DME    ramipril (ALTACE) 10 MG capsule    rosuvastatin (CRESTOR) 20 MG tablet      No current facility-administered medications for this visit.             PEx:   PSYCH: NAD      A/P: Bridget Lawson is a 73 year old female with urgency, frequency, OAB  -Continue estrogen cream three times a week near urethra (pea-sized amount): If >$50, she will let me know and we can get via a compound pharmacy.  -Myrbetriq 25mg daily to continue, reviewed SE   -Eliminate irritants, make note of current irritants  -Limit fluids after 5pm  -PFPT to continue  -12 mo  return for med check/refill. Call sooner if concerns.      Stephanie Shi PA-C  [facility]      22 minutes spent on the date of the encounter doing chart review, review of test results, interpretation of tests, patient visit and documentation

## 2024-05-07 NOTE — PROGRESS NOTES
CC: Urgency, frequency, nocturia     HPI:  Bridget Lawson is a pleasant 73 year old female who presents in follow up of the above.     Last seen 2/28/23: Above sx ongoing for many years. Nocturia x 3-5, frequency, urgency throughout the day. Can struggle with constipation. No gross hematuria. No dysuria.     Restarted estrace cream in the interim and has really improved her control with Vesicare. Continues with nocturia, but overall content with the progress.     2/2/24  More leakage without warning.   Can struggle with constipation.   No dysuria or gross hematuria.     TODAY 5/7/24  3 mo follow-up with Myrbetriq 25mg  Less urgency, less accidents.   Nocturia x 1-2  Going to PFPT    Past Medical History        Past Medical History:   Diagnosis Date    Cerebral artery occlusion with cerebral infarction (H)        Hx of stroke   (02/10)    Hyperlipidemia LDL goal < 100 10/8/2012     Start prava in 7/14    Intracranial hemorrhage (H) 2/10     R basal ganglia, due to HTN; had mild L hemiparesis and dysarthria; good recovery    Obese      Osteopenia       in 6/10, prior to PTH surgery, T - 3.2 forearm, - 1.9 spine, - 1.5L and -1.6 R femoral necks    Sleep apnea       CPAP    Spider veins      Unspecified essential hypertension       Essential hypertension            Past Surgical History         Past Surgical History:   Procedure Laterality Date    BREAST SURGERY   11/2004     R breast bx    C/SECTION, CLASSICAL   1985     with tubal    ENDOSCOPIC RELEASE CARPAL TUNNEL Right 2/7/2020     Procedure: RIGHT ENDOSCOPIC CARPAL TUNNEL RELEASE;  Surgeon: Piper Guardado MD;  Location:  OR    HYSTERECTOMY   5/1998     GREGORY w/ BSO    KNEE SURGERY   9/1998     L Arthroscopy    NECK SURGERY   12/2010     R parathyroid adenoma    ORTHOPEDIC SURGERY        REPAIR TENDON ANKLE Right 11/1/2017     Procedure: REPAIR TENDON ANKLE;  RIGHT POSTERIOR TIBIAL TENDON RECONSTRUCTION ;  Surgeon: Xavi Duran MD;  Location:  OR     SHOULDER SURGERY   2008     rotator cuff repair R    TONSILLECTOMY         as a child T & A            Social History   Social History            Socioeconomic History    Marital status:        Spouse name: Not on file    Number of children: 2    Years of education: Not on file    Highest education level: Not on file   Occupational History    Not on file   Social Needs    Financial resource strain: Not on file    Food insecurity       Worry: Not on file       Inability: Not on file    Transportation needs       Medical: Not on file       Non-medical: Not on file   Tobacco Use    Smoking status: Former Smoker       Packs/day: 2.00       Years: 27.00       Pack years: 54.00       Types: Cigarettes       Start date: 10/24/1969       Last attempt to quit: 1996       Years since quittin.5    Smokeless tobacco: Never Used   Substance and Sexual Activity    Alcohol use: Yes       Alcohol/week: 0.0 standard drinks       Comment: 1/day    Drug use: No    Sexual activity: Not Currently   Lifestyle    Physical activity       Days per week: Not on file       Minutes per session: Not on file    Stress: Not on file   Relationships    Social connections       Talks on phone: Not on file       Gets together: Not on file       Attends Mandaen service: Not on file       Active member of club or organization: Not on file       Attends meetings of clubs or organizations: Not on file       Relationship status: Not on file    Intimate partner violence       Fear of current or ex partner: Not on file       Emotionally abused: Not on file       Physically abused: Not on file       Forced sexual activity: Not on file   Other Topics Concern    Parent/sibling w/ CABG, MI or angioplasty before 65F 55M? Not Asked   Social History Narrative     2 dtrs; no GC            Family History         Family History   Problem Relation Age of Onset    Breast Cancer Mother            CA; masectomy left; asthma;  in  at age 97     Breast Cancer Maternal Grandmother           possible CA; masectomy    Breast Cancer Paternal Aunt           possible CA, masectomy    Respiratory Father           COPD    Lipids Brother                     Allergies   Allergen Reactions    Animal Dander               Current Outpatient Medications   Medication    Acetaminophen (TYLENOL PO)    amLODIPine (NORVASC) 5 MG tablet    aspirin  MG EC tablet    Calcium Carb-Cholecalciferol (CALTRATE 600+D) 600-800 MG-UNIT TABS    Carboxymethylcellulose Sodium (THERATEARS OP)    esomeprazole (NEXIUM) 20 MG DR capsule    estradiol (ESTRACE VAGINAL) 0.1 MG/GM vaginal cream    fluticasone (FLONASE) 50 MCG/ACT spray    glucosamine-chondroitinoitin 750-600 MG TABS    HYDROcodone-acetaminophen (NORCO) 5-325 MG tablet    ibuprofen (ADVIL/MOTRIN) 600 MG tablet    isosorbide mononitrate (IMDUR) 60 MG 24 hr tablet    ketoconazole (NIZORAL) 2 % external cream    labetalol (NORMODYNE) 200 MG tablet    MAGNESIUM GLUCONATE PO    MELATONIN PO    Multiple Vitamins-Minerals (CENTRUM SILVER ULTRA WOMENS PO)    NONFORMULARY    ORDER FOR DME    ramipril (ALTACE) 10 MG capsule    rosuvastatin (CRESTOR) 20 MG tablet      No current facility-administered medications for this visit.             PEx:   PSYCH: NAD      A/P: Bridget Lawson is a 73 year old female with urgency, frequency, OAB  -Continue estrogen cream three times a week near urethra (pea-sized amount): If >$50, she will let me know and we can get via a compound pharmacy.  -Myrbetriq 25mg daily to continue, reviewed SE   -Eliminate irritants, make note of current irritants  -Limit fluids after 5pm  -PFPT to continue  -12 mo return for med check/refill. Call sooner if concerns.      JULIA Sales [facility]      22 minutes spent on the date of the encounter doing chart review, review of test results, interpretation of tests, patient visit and documentation

## 2024-05-08 ENCOUNTER — TRANSFERRED RECORDS (OUTPATIENT)
Dept: HEALTH INFORMATION MANAGEMENT | Facility: CLINIC | Age: 74
End: 2024-05-08

## 2024-05-08 ENCOUNTER — THERAPY VISIT (OUTPATIENT)
Dept: PHYSICAL THERAPY | Facility: CLINIC | Age: 74
End: 2024-05-08
Payer: COMMERCIAL

## 2024-05-08 DIAGNOSIS — N32.81 OAB (OVERACTIVE BLADDER): Primary | ICD-10-CM

## 2024-05-08 PROCEDURE — 97530 THERAPEUTIC ACTIVITIES: CPT | Mod: GP

## 2024-05-08 NOTE — PROGRESS NOTES
05/08/24 0500   Appointment Info   Signing clinician's name / credentials Olamide Archer, PT, DPT   Total/Authorized Visits 10   Visits Used 5   Medical Diagnosis overactive bladder, nocturia, frequency, urinary incontinence, constipation   Quick Adds Certification;Pelvic Consent   Progress Note/Certification   Start of Care Date 02/28/24   Onset of illness/injury or Date of Surgery 02/02/24  (date of order)   Therapy Frequency 1x/month as needed   Predicted Duration 2-3 months   Certification date from 02/28/24   Certification date to 05/22/24   Progress Note Completed Date 05/08/24   GOALS   PT Goals 2;3;4   PT Goal 1   Goal Identifier frequency   Goal Description patient will be able to void every 2-4 hours   Rationale to maximize safety and independence with performance of ADLs and functional tasks;to maximize safety and independence within the home;to maximize safety and independence with self cares   Goal Progress 2 hours at present   Target Date 05/22/24   Date Met 05/08/24   PT Goal 2   Goal Identifier urge   Goal Description patient will report no leaking associated with urgency   Rationale to maximize safety and independence with performance of ADLs and functional tasks;to maximize safety and independence within the home;to maximize safety and independence with self cares   Goal Progress couple drops happening once every 3-4 weeks; not really any issues since new medication   Target Date 05/22/24   PT Goal 3   Goal Identifier nocturia   Goal Description patient will void 1x or less per night   Rationale to maximize safety and independence with performance of ADLs and functional tasks;to maximize safety and independence within the home;to maximize safety and independence with self cares   Goal Progress 1-2x/night   Target Date 05/22/24   PT Goal 4   Goal Identifier bowel movements   Goal Description patient will report type 3-4 bowel movement on bristol scale   Rationale to maximize safety and  independence with performance of ADLs and functional tasks;to maximize safety and independence within the home;to maximize safety and independence with self cares   Goal Progress type 1-2, no straining at present   Target Date 05/22/24   Subjective Report   Subjective Report Patient reports significant improvement in urge and bladder related symptoms. Continues to have fimer stool, but does not feel she has to strain to get it out.   Objective Measures   Objective Measures Objective Measure 1   Objective Measure 1   Objective Measure Wood stool   Details 1-2 on Wood scale   Treatment Interventions (PT)   Interventions Therapeutic Activity   Therapeutic Procedure/Exercise   PTRx Ther Proc 1 Supine Butterfly   PTRx Ther Proc 1 - Details hep for mobility   PTRx Ther Proc 2 Pelvic Floor Muscle Strengthening Basic   PTRx Ther Proc 2 - Details hep for strengthening and NMR, VR in clinic   Therapeutic Activity   Therapeutic Activities: dynamic activities to improve functional performance minutes (62821) 10   PTRx Ther Act 1 Relaxed Awareness of the Pelvic Muscles   PTRx Ther Act 1 - Details hep for pelvic floor relaxation for toileting   PTRx Ther Act 2 Toileting Position   PTRx Ther Act 2 - Details VR in clinic for effective defecation and positioning for bowel movement patient education on proper defecation techniques moo to poo   PTRx Ther Act 3 Education Sheet General   PTRx Ther Act 3 - Details hepgoals:pee when have urge (not just sitting and waiting)kegel and relax to finish peeing on toilet - also can rock forward and side to side to see if any more comes out; kegel to signal that finished peeingfluid: no glasses within 2 hours from bedtimedouble void before bed    PTRx Ther Act 4 Proper Defecation Techniques   PTRx Ther Act 4 - Details TA: hep, VR in clinic   PTRx Ther Act 5 Normal Bowel Habits   PTRx Ther Act 5 - Details TA: hep, VR in clinic   PTRx Ther Act 6 Functions of Fiber   PTRx Ther Act 6 - Details  TA: hep, VR in clinic   PTRx Ther Act 7 Bladder Diary   PTRx Ther Act 7 - Details TA: VR of bowel diary in clinic   Skilled Intervention see above   Patient Response/Progress patient reports understanding   Neuromuscular Re-education   PTRx Neuro Re-ed 1 Diaphragmatic Breathing - with Object/Weight   PTRx Neuro Re-ed 1 - Details hep for TA activation/kegel    PTRx Neuro Re-ed 2 Supine Pelvic Floor Muscle Strengthening   PTRx Neuro Re-ed 2 - Details hep for strengthening and NMR   PTRx Neuro Re-ed 3 Pelvic Floor Muscle Strengthening Basic   PTRx Neuro Re-ed 3 - Details hep for strengthening   PTRx Neuro Re-ed 4 Standing Pelvic Floor with Transverse Abdominus   PTRx Neuro Re-ed 4 - Details hep for TA activation and kegel   Intervention (Other)   PTRx Other  1 Urge Incontinence Suppression Techniques   PTRx Other 1 - Details NMR: hep   PTRx Other  2 General Bladder Information   PTRx Other 2 - Details TA: hep   Education   Learner/Method Patient   Plan   Home program see PTRX   Updates to plan of care n/a   Plan for next session as indicated: review bowel habits, check kegel strength and coordination, review urge and toileting techniques- see if still experiencing post void dribble, progress core and LE strength   Comments   Pelvic Health Informed Consent Statement Discussed with patient/guardian reason for referral regarding pelvic health needs and external/internal pelvic floor muscle examination.  Opportunity provided to ask questions and verbal consent for assessment and intervention was given.   Total Session Time   Timed Code Treatment Minutes 10   Total Treatment Time (sum of timed and untimed services) 10       PLAN  Patient is independent in hep and 3/4 goals met. Patient has plans to consult with PCP on bowel habits at her next wellness exam. Patient encouraged to return to pelvic PT should symptoms change or for any questions related to these symptoms.    Beginning/End Dates of Progress Note Reporting  Period:  2/28/2024 to 05/08/2024    Referring Provider:  Stephanie Shi

## 2024-05-21 DIAGNOSIS — I10 ESSENTIAL HYPERTENSION WITH GOAL BLOOD PRESSURE LESS THAN 140/90: ICD-10-CM

## 2024-05-21 DIAGNOSIS — I10 BENIGN ESSENTIAL HYPERTENSION: ICD-10-CM

## 2024-05-21 RX ORDER — METOPROLOL SUCCINATE 50 MG/1
50 TABLET, EXTENDED RELEASE ORAL DAILY
Qty: 90 TABLET | Refills: 0 | OUTPATIENT
Start: 2024-05-21

## 2024-05-21 RX ORDER — ISOSORBIDE MONONITRATE 60 MG/1
TABLET, EXTENDED RELEASE ORAL
Qty: 90 TABLET | Refills: 0 | Status: SHIPPED | OUTPATIENT
Start: 2024-05-21 | End: 2024-08-20

## 2024-05-22 RX ORDER — EPINEPHRINE 1 MG/ML
0.3 INJECTION, SOLUTION, CONCENTRATE INTRAVENOUS EVERY 5 MIN PRN
OUTPATIENT
Start: 2024-05-22

## 2024-05-22 RX ORDER — ALBUTEROL SULFATE 0.83 MG/ML
2.5 SOLUTION RESPIRATORY (INHALATION)
OUTPATIENT
Start: 2024-05-22

## 2024-05-22 RX ORDER — METHYLPREDNISOLONE SODIUM SUCCINATE 125 MG/2ML
125 INJECTION, POWDER, LYOPHILIZED, FOR SOLUTION INTRAMUSCULAR; INTRAVENOUS
Start: 2024-05-22

## 2024-05-22 RX ORDER — DIPHENHYDRAMINE HYDROCHLORIDE 50 MG/ML
50 INJECTION INTRAMUSCULAR; INTRAVENOUS
Start: 2024-05-22

## 2024-05-22 RX ORDER — ALBUTEROL SULFATE 90 UG/1
1-2 AEROSOL, METERED RESPIRATORY (INHALATION)
Start: 2024-05-22

## 2024-05-22 RX ORDER — HEPARIN SODIUM,PORCINE 10 UNIT/ML
5 VIAL (ML) INTRAVENOUS
OUTPATIENT
Start: 2024-05-22

## 2024-05-22 RX ORDER — HEPARIN SODIUM (PORCINE) LOCK FLUSH IV SOLN 100 UNIT/ML 100 UNIT/ML
5 SOLUTION INTRAVENOUS
OUTPATIENT
Start: 2024-05-22

## 2024-05-22 RX ORDER — MEPERIDINE HYDROCHLORIDE 25 MG/ML
25 INJECTION INTRAMUSCULAR; INTRAVENOUS; SUBCUTANEOUS EVERY 30 MIN PRN
OUTPATIENT
Start: 2024-05-22

## 2024-05-31 DIAGNOSIS — L30.4 INTERTRIGO: ICD-10-CM

## 2024-05-31 DIAGNOSIS — E78.5 HYPERLIPIDEMIA WITH TARGET LDL LESS THAN 100: ICD-10-CM

## 2024-05-31 DIAGNOSIS — I10 BENIGN ESSENTIAL HYPERTENSION: ICD-10-CM

## 2024-05-31 RX ORDER — AMLODIPINE BESYLATE 5 MG/1
TABLET ORAL
Qty: 90 TABLET | Refills: 0 | Status: SHIPPED | OUTPATIENT
Start: 2024-05-31 | End: 2024-07-23

## 2024-05-31 RX ORDER — METOPROLOL SUCCINATE 50 MG/1
50 TABLET, EXTENDED RELEASE ORAL DAILY
Qty: 90 TABLET | Refills: 0 | Status: SHIPPED | OUTPATIENT
Start: 2024-05-31 | End: 2024-07-23

## 2024-05-31 RX ORDER — KETOCONAZOLE 20 MG/G
CREAM TOPICAL
Qty: 30 G | Refills: 0 | Status: SHIPPED | OUTPATIENT
Start: 2024-05-31 | End: 2024-07-22

## 2024-06-05 ENCOUNTER — HOSPITAL ENCOUNTER (OUTPATIENT)
Dept: MAMMOGRAPHY | Facility: CLINIC | Age: 74
Discharge: HOME | End: 2024-06-05
Attending: INTERNAL MEDICINE | Admitting: INTERNAL MEDICINE
Payer: COMMERCIAL

## 2024-06-05 DIAGNOSIS — Z12.31 ENCOUNTER FOR SCREENING MAMMOGRAM FOR BREAST CANCER: ICD-10-CM

## 2024-06-05 PROCEDURE — 77063 BREAST TOMOSYNTHESIS BI: CPT

## 2024-07-05 DIAGNOSIS — E78.5 HYPERLIPIDEMIA WITH TARGET LDL LESS THAN 100: ICD-10-CM

## 2024-07-05 DIAGNOSIS — I10 ESSENTIAL HYPERTENSION WITH GOAL BLOOD PRESSURE LESS THAN 140/90: ICD-10-CM

## 2024-07-05 RX ORDER — ROSUVASTATIN CALCIUM 20 MG/1
TABLET, COATED ORAL
Qty: 90 TABLET | Refills: 0 | Status: SHIPPED | OUTPATIENT
Start: 2024-07-05

## 2024-07-05 RX ORDER — HYDROCHLOROTHIAZIDE 12.5 MG/1
TABLET ORAL
Qty: 90 TABLET | Refills: 0 | Status: SHIPPED | OUTPATIENT
Start: 2024-07-05 | End: 2024-07-23

## 2024-07-17 DIAGNOSIS — I10 ESSENTIAL HYPERTENSION WITH GOAL BLOOD PRESSURE LESS THAN 130/80: ICD-10-CM

## 2024-07-17 RX ORDER — RAMIPRIL 10 MG/1
CAPSULE ORAL
Qty: 100 CAPSULE | Refills: 1 | Status: SHIPPED | OUTPATIENT
Start: 2024-07-17

## 2024-07-17 NOTE — TELEPHONE ENCOUNTER
Patient overdue to fill ramipril. Per our records, last filled 4/5/24 for 90 day supply and is 13 days late to fill. Current prescription out of refills.     Patient has [facility] coverage and with this insurance plan, the patient is eligible to receive certain prescriptions as a 100-day supply at the 90-day supply cost.      New prescription needed given insufficient refills so pended for PCP review and signature.     Patient has annual wellness visit scheduled 7/23/24.         Thank you!    Katrina Cisneros, PharmD, BCACP  Population Health Pharmacist  [phone]

## 2024-07-21 DIAGNOSIS — M79.604 CHRONIC PAIN OF BOTH LOWER EXTREMITIES: ICD-10-CM

## 2024-07-21 DIAGNOSIS — M79.605 CHRONIC PAIN OF BOTH LOWER EXTREMITIES: ICD-10-CM

## 2024-07-21 DIAGNOSIS — L30.4 INTERTRIGO: ICD-10-CM

## 2024-07-21 DIAGNOSIS — G89.29 CHRONIC PAIN OF BOTH LOWER EXTREMITIES: ICD-10-CM

## 2024-07-22 PROBLEM — N32.81 OAB (OVERACTIVE BLADDER): Status: RESOLVED | Noted: 2024-02-28 | Resolved: 2024-07-22

## 2024-07-22 RX ORDER — GABAPENTIN 100 MG/1
CAPSULE ORAL
Qty: 180 CAPSULE | Refills: 0 | Status: SHIPPED | OUTPATIENT
Start: 2024-07-22 | End: 2024-08-02

## 2024-07-22 RX ORDER — KETOCONAZOLE 20 MG/G
CREAM TOPICAL
Qty: 30 G | Refills: 0 | Status: SHIPPED | OUTPATIENT
Start: 2024-07-22 | End: 2024-10-01

## 2024-07-22 NOTE — PROGRESS NOTES
Patient did not return for further treatment and no additional progress was noted.  Please refer to the progress note and goal flowsheet completed on 5/8/2024 for discharge information.

## 2024-07-23 ENCOUNTER — OFFICE VISIT (OUTPATIENT)
Dept: INTERNAL MEDICINE | Facility: CLINIC | Age: 74
End: 2024-07-23
Payer: COMMERCIAL

## 2024-07-23 VITALS
SYSTOLIC BLOOD PRESSURE: 152 MMHG | DIASTOLIC BLOOD PRESSURE: 84 MMHG | HEART RATE: 68 BPM | BODY MASS INDEX: 41.72 KG/M2 | WEIGHT: 226.7 LBS | HEIGHT: 62 IN | TEMPERATURE: 97.7 F | OXYGEN SATURATION: 95 % | RESPIRATION RATE: 18 BRPM

## 2024-07-23 DIAGNOSIS — Z13.1 SCREENING FOR DIABETES MELLITUS: ICD-10-CM

## 2024-07-23 DIAGNOSIS — E66.01 MORBID OBESITY (H): ICD-10-CM

## 2024-07-23 DIAGNOSIS — E78.00 PURE HYPERCHOLESTEROLEMIA: ICD-10-CM

## 2024-07-23 DIAGNOSIS — I10 BENIGN ESSENTIAL HYPERTENSION: ICD-10-CM

## 2024-07-23 DIAGNOSIS — E55.9 VITAMIN D DEFICIENCY: ICD-10-CM

## 2024-07-23 DIAGNOSIS — Z00.00 MEDICARE ANNUAL WELLNESS VISIT, SUBSEQUENT: Primary | ICD-10-CM

## 2024-07-23 PROCEDURE — G0439 PPPS, SUBSEQ VISIT: HCPCS | Performed by: INTERNAL MEDICINE

## 2024-07-23 PROCEDURE — 80061 LIPID PANEL: CPT | Performed by: INTERNAL MEDICINE

## 2024-07-23 PROCEDURE — 80053 COMPREHEN METABOLIC PANEL: CPT | Performed by: INTERNAL MEDICINE

## 2024-07-23 PROCEDURE — 82306 VITAMIN D 25 HYDROXY: CPT | Performed by: INTERNAL MEDICINE

## 2024-07-23 PROCEDURE — 36415 COLL VENOUS BLD VENIPUNCTURE: CPT | Performed by: INTERNAL MEDICINE

## 2024-07-23 PROCEDURE — 99214 OFFICE O/P EST MOD 30 MIN: CPT | Mod: 25 | Performed by: INTERNAL MEDICINE

## 2024-07-23 RX ORDER — AMLODIPINE BESYLATE 10 MG/1
10 TABLET ORAL DAILY
Qty: 90 TABLET | Refills: 3 | Status: SHIPPED | OUTPATIENT
Start: 2024-07-23

## 2024-07-23 SDOH — HEALTH STABILITY: PHYSICAL HEALTH: ON AVERAGE, HOW MANY DAYS PER WEEK DO YOU ENGAGE IN MODERATE TO STRENUOUS EXERCISE (LIKE A BRISK WALK)?: 0 DAYS

## 2024-07-23 SDOH — HEALTH STABILITY: PHYSICAL HEALTH: ON AVERAGE, HOW MANY MINUTES DO YOU ENGAGE IN EXERCISE AT THIS LEVEL?: 0 MIN

## 2024-07-23 ASSESSMENT — SOCIAL DETERMINANTS OF HEALTH (SDOH): HOW OFTEN DO YOU GET TOGETHER WITH FRIENDS OR RELATIVES?: ONCE A WEEK

## 2024-07-23 NOTE — PATIENT INSTRUCTIONS
Fasting labs today.    ---    STOP Toprol XL (metoprolol) AND hydrochlorothiazide.    INCREASE amlodipine from 5 to 10mg daily.    Continue isosorbide mononitrate and ramipril without change.    ---    TRY taking Miralax daily and consistently to prevent constipation.

## 2024-07-23 NOTE — PROGRESS NOTES
ASSESSMENT/PLAN                                                       (Z00.00) Medicare annual wellness visit, subsequent  (primary encounter diagnosis)  Comment: PMH, PSH, FH, SH, medications, allergies, immunizations, and preventative health measures reviewed and updated as appropriate.  Plan: see below for plans.      (E78.00) Pure hypercholesterolemia  (Z13.1) Screening for diabetes mellitus  (E55.9) Vitamin D deficiency  Plan: fasting labs today.     (I10) Benign essential hypertension  Comment: poorly-controlled on current regimen.   Plan: STOP Toprol XL 50mg daily and hydrochlorothiazide 12.5mg daily - not likely to be significantly contributing to blood pressure control; INCREASE amlodipine from 5 to 10mg daily; CONTINUE ramipril 10mg daily and isosorbide mononitrate 60mg daily; if worsening lymphedema or blood pressure continues to be elevated, can consider loop diuretic (but this may exacerbate OAB) or increasing isosorbide mononitrate dose.    (E66.01) Morbid obesity (H)  Comment: known issue that I take into account for their medical decisions, no current exacerbations or new concerns.    Appropriate preventive services were discussed with this patient, including applicable screening as appropriate for cardiovascular disease, diabetes, osteopenia/osteoporosis, and glaucoma.  As appropriate for age/gender, discussed screening for colorectal cancer, prostate cancer, breast cancer, and cervical cancer. Checklist reviewing preventive services available has been given to the patient.    Reviewed patients plan of care. The Basic Care Plan (routine screening as documented in Health Maintenance) for Bridget Lawson meets the Care Plan requirement. This Care Plan has been established and reviewed with the Patient.    Marcia Rosas MD   [address] [address]  T: [phone], F: [phone]    SUBJECTIVE                                                      Bridget ANTHONY  Lulu is a very pleasant 74 year old female who presents for her subsequent AWV:    Current providers (other than myself): n/a     PMH, PSH, FH, SH, medications, allergies, immunizations, preventative health, and health risk assessment reviewed and updated as appropriate.    Past Medical History:   Diagnosis Date    Benign essential hypertension     History of blood transfusion     no adverse reactions    History of hyperparathyroidism     s/p adenoma resection    History of stroke 2009    hemorrhagic; due to uncontrolled hypertension    Lymphedema of both lower extremities     Morbid obesity (H)     JOHNY on CPAP     Osteoporosis     PUD (peptic ulcer disease)     due to NSAID use    Pure hypercholesterolemia     Urgency-frequency syndrome      Past Surgical History:   Procedure Laterality Date    ARTHROSCOPY KNEE Left     CATARACT EXTRACTION, BILATERAL       SECTION, TUBAL LIGATION, COMBINED      ENDOSCOPIC RELEASE CARPAL TUNNEL Right 2020    Procedure: RIGHT ENDOSCOPIC CARPAL TUNNEL RELEASE;  Surgeon: Piper Guardado MD;  Location: [facility]    HYSTERECTOMY TOTAL ABDOMINAL, BILATERAL SALPINGO-OOPHORECTOMY, COMBINED      PARATHYROID GLAND SURGERY      adenoma removed    REPAIR TENDON ANKLE Right 2017    Procedure: REPAIR TENDON ANKLE;  RIGHT POSTERIOR TIBIAL TENDON RECONSTRUCTION ;  Surgeon: Xavi Duran MD;  Location: [facility] OR    REVERSE ARTHROPLASTY SHOULDER Left 2023    Procedure: Left reverse shoulder arthroplasty, no guide, autologus bone graft proximal humerus ;  Surgeon: Ulises Parker MD;  Location: [facility] OR    ROTATOR CUFF REPAIR RT/LT Right     TONSILLECTOMY & ADENOIDECTOMY       Family History   Problem Relation Age of Onset    Breast Cancer Mother     Chronic Obstructive Pulmonary Disease Father     Diabetes Type 2  Father     Hypertension Father     Breast Cancer Maternal Grandmother     Hyperlipidemia Brother     Myocardial Infarction No family hx of      Cerebrovascular Disease No family hx of     Coronary Artery Disease Early Onset No family hx of     Colon Cancer No family hx of     Ovarian Cancer No family hx of      Social History     Occupational History    Occupation: Retired -    Tobacco Use    Smoking status: Former     Types: Cigarettes    Smokeless tobacco: Never    Tobacco comments:     Quit in 1996; smoked for 27 years; 3ppd at her most.    Vaping Use    Vaping status: Never Used   Substance and Sexual Activity    Alcohol use: Yes     Comment: 1 drink/day    Drug use: No    Sexual activity: Not Currently   Social History Narrative    .    Two adult daughters.    No grandchildren.    No formal exercise.      No Known Allergies    Current Outpatient Medications   Medication Sig    amLODIPine (NORVASC) 5 MG tablet Take 1 tablet by mouth once daily    aspirin 81 MG EC tablet Take 1 tablet (81 mg) by mouth 2 times daily    Calcium Carb-Cholecalciferol 600-800 MG-UNIT TABS Take 1 tablet by mouth 2 times daily     calcium carbonate (TUMS) 500 MG chewable tablet Take 1-2 chew tab by mouth as needed for heartburn    estradiol (ESTRACE VAGINAL) 0.1 MG/GM vaginal cream Apply small amount to the vaginal opening and urethra M, W, F @ h.s.    estradiol (ESTRACE VAGINAL) 0.1 MG/GM vaginal cream Apply small amount to the vaginal opening and urethra M, W, F @ h.s.    fluticasone (FLONASE) 50 MCG/ACT spray Spray 1 spray into both nostrils daily    gabapentin (NEURONTIN) 100 MG capsule Take 1 capsule by mouth twice daily    GLUCOSAMINE-CHONDROITIN PO Take 1 tablet by mouth 2 times daily 1500/1200 mg tabs    hydroCHLOROthiazide 12.5 MG tablet Take 1 tablet by mouth once daily    isosorbide mononitrate (IMDUR) 60 MG 24 hr tablet Take 1 tablet by mouth once daily    ketoconazole (NIZORAL) 2 % external cream APPLY  CREAM TOPICALLY TO AFFECTED AREA TWICE DAILY AS NEEDED    MAGNESIUM GLUCONATE PO Take 400 mg by mouth daily    Melatonin 10 MG TABS tablet Take 1  tablet by mouth At Bedtime    metoprolol succinate ER (TOPROL XL) 50 MG 24 hr tablet Take 1 tablet by mouth once daily    mirabegron (MYRBETRIQ) 25 MG 24 hr tablet Take 1 tablet (25 mg) by mouth daily    Multiple Vitamins-Minerals (CENTRUM SILVER ULTRA WOMENS PO) Take 1 tablet by mouth daily    polyethylene glycol (MIRALAX) 17 GM/Dose powder Take 1 Capful by mouth as needed for constipation    Polyvinyl Alcohol-Povidone (REFRESH OP) Place 1 drop into both eyes nightly as needed (for dry eyes)    ramipril (ALTACE) 10 MG capsule Take 1 capsule by mouth once daily    rosuvastatin (CRESTOR) 20 MG tablet Take 1 tablet by mouth once daily    senna (SENOKOT) 8.6 MG tablet Take 2 tablets by mouth every evening    senna-docusate (SENOKOT-S/PERICOLACE) 8.6-50 MG tablet Take 1-2 tablets by mouth 2 times daily Take while on oral narcotics to prevent or treat constipation.    celecoxib (CELEBREX) 100 MG capsule Take 2 capsules (200 mg) by mouth daily as needed for moderate pain (2 x 100 mg = 200 mg)     Immunization History   Administered Date(s) Administered    COVID-19 12+ (2023-24) (Pfizer) 11/16/2023    COVID-19 Bivalent 18+ (Moderna) 11/02/2022, 06/30/2023    COVID-19 Monovalent 18+ (Moderna) 11/02/2021    COVID-19 Monovalent Booster 18+ (Moderna) 05/10/2022    COVID-19 Vaccine (Mckenna) 03/09/2021    Flu 65+ Years 10/01/2018, 09/09/2019    Influenza (High Dose) 3 valent vaccine 10/24/2016, 10/07/2017, 10/01/2018, 09/09/2019, 10/18/2021    Influenza (IIV3) PF 12/09/2013, 09/29/2014, 10/05/2015    Influenza Vaccine 65+ (FLUAD) 10/18/2021, 10/24/2022    Influenza Vaccine >6 months,quad, PF 08/31/2020    Pneumo Conj 13-V (2010&after) 06/22/2015    Pneumococcal 23 valent 10/24/2016    TDAP (Adacel,Boostrix) 10/01/2003    TDAP Vaccine (Adacel) 06/22/2015    Zoster recombinant adjuvanted (SHINGRIX) 09/09/2019, 11/18/2019    Zoster vaccine, live 03/10/2014      HEALTH                                                   "    BMI: morbidly obese  Blood pressure: elevated on current regimen  Breast CA screening: up to date   Colon CA screening: up to date   Lung CA screening: patient does not meet screening criteria  Dexa: up to date   Screening cholesterol: n/a - already being treated for this condition  Screening diabetes: DUE  Alcohol misuse screening: alcohol use reviewed - no intervention indicated at this time  Immunizations: reviewed; up to date     HEALTH RISK ASSESSMENT                                                      In general, how would you rate your overall physical health? poor  Outside of work, how many days during the week do you exercise? None  Outside of work, approximately how many minutes a day do you exercise? n/a     If you drink alcohol do you typically have >3 drinks per day or >7 drinks per week? No     Assistance with daily activities: No    Safety concerns: No    Fall risk assessment: completed today (see ambulatory assessments)    Hearing concerns: YES - feels that people are mumbling are not speaking clearly and it is hard to follow conversation in a noisy restaurant or crowded room    In the past 6 months, have you been bothered by leaking of urine: Yes    Do you have a current opioid prescription? No  Do you use any other controlled substances or medications that are not prescribed by a provider? None    PHQ-2/PHQ-9 assessment: completed today (see ambulatory assessments)    Additional concerns today: No    Have you ever done Advance Care Planning? (For example, a Health Directive, POLST, or a discussion with a medical provider or your loved ones about your wishes): Yes    OBJECTIVE                                                      BP (!) 152/84   Pulse 68   Temp 97.7  F (36.5  C) (Temporal)   Resp 18   Ht 1.581 m (5' 2.25\")   Wt 102.8 kg (226 lb 11.2 oz)   SpO2 95%   BMI 41.13 kg/m    Constitutional: well-appearing  Head, Ears, and Eyes: normocephalic; normal external auditory canal and " pinna; tympanic membranes visualized and normal; normal lids and conjunctivae  Neck: supple, symmetric, no thyromegaly or lymphadenopathy  Respiratory: normal respiratory effort; clear to auscultation bilaterally  Cardiovascular: regular rate and rhythm; no edema  Gastrointestinal: soft, non-tender, and non-distended; no organomegaly or masses  Musculoskeletal: walks with cane  Psych: normal judgment and insight; normal mood and affect; recent and remote memory intact    Cognitive impairment noted: No  ---  (Note was completed, in part, with [employer] MD voice-recognition software. Documentation was reviewed, but some grammatical, spelling, and word errors may remain.)

## 2024-07-23 NOTE — LETTER
July 24, 2024      Bridget Bowerberg  [address]  [facility] [address]        Dear ,    We are writing to inform you of your test results.    Your test results fall within the expected range(s) or remain unchanged from previous results.  Please continue with current treatment plan.    Resulted Orders   Comprehensive metabolic panel   Result Value Ref Range    Sodium 142 135 - 145 mmol/L    Potassium 3.8 3.4 - 5.3 mmol/L    Carbon Dioxide (CO2) 28 22 - 29 mmol/L    Anion Gap 10 7 - 15 mmol/L    Urea Nitrogen 19.1 8.0 - 23.0 mg/dL    Creatinine 0.69 0.51 - 0.95 mg/dL    GFR Estimate 90 >60 mL/min/1.73m2    Calcium 9.4 8.8 - 10.4 mg/dL    Chloride 104 98 - 107 mmol/L    Glucose 84 70 - 99 mg/dL    Alkaline Phosphatase 57 40 - 150 U/L    AST 20 0 - 45 U/L    ALT 14 0 - 50 U/L    Protein Total 7.0 6.4 - 8.3 g/dL    Albumin 4.5 3.5 - 5.2 g/dL    Bilirubin Total 0.3 <=1.2 mg/dL    Patient Fasting > 8hrs? No    Lipid Profile   Result Value Ref Range    Cholesterol 162 <200 mg/dL    Triglycerides 109 <150 mg/dL    Direct Measure HDL 59 >=50 mg/dL    LDL Cholesterol Calculated 81 <=100 mg/dL    Non HDL Cholesterol 103 <130 mg/dL    Patient Fasting > 8hrs? No    Vitamin D Deficiency   Result Value Ref Range    Vitamin D, Total (25-Hydroxy) 48 20 - 50 ng/mL     If you have any questions or concerns, please call the clinic at the number listed above.       Sincerely,      Marcia Rosas MD

## 2024-07-24 LAB
ALBUMIN SERPL BCG-MCNC: 4.5 G/DL (ref 3.5–5.2)
ALP SERPL-CCNC: 57 U/L (ref 40–150)
ALT SERPL W P-5'-P-CCNC: 14 U/L (ref 0–50)
ANION GAP SERPL CALCULATED.3IONS-SCNC: 10 MMOL/L (ref 7–15)
AST SERPL W P-5'-P-CCNC: 20 U/L (ref 0–45)
BILIRUB SERPL-MCNC: 0.3 MG/DL
BUN SERPL-MCNC: 19.1 MG/DL (ref 8–23)
CALCIUM SERPL-MCNC: 9.4 MG/DL (ref 8.8–10.4)
CHLORIDE SERPL-SCNC: 104 MMOL/L (ref 98–107)
CHOLEST SERPL-MCNC: 162 MG/DL
CREAT SERPL-MCNC: 0.69 MG/DL (ref 0.51–0.95)
EGFRCR SERPLBLD CKD-EPI 2021: 90 ML/MIN/1.73M2
FASTING STATUS PATIENT QL REPORTED: NO
FASTING STATUS PATIENT QL REPORTED: NO
GLUCOSE SERPL-MCNC: 84 MG/DL (ref 70–99)
HCO3 SERPL-SCNC: 28 MMOL/L (ref 22–29)
HDLC SERPL-MCNC: 59 MG/DL
LDLC SERPL CALC-MCNC: 81 MG/DL
NONHDLC SERPL-MCNC: 103 MG/DL
POTASSIUM SERPL-SCNC: 3.8 MMOL/L (ref 3.4–5.3)
PROT SERPL-MCNC: 7 G/DL (ref 6.4–8.3)
SODIUM SERPL-SCNC: 142 MMOL/L (ref 135–145)
TRIGL SERPL-MCNC: 109 MG/DL
VIT D+METAB SERPL-MCNC: 48 NG/ML (ref 20–50)

## 2024-08-02 DIAGNOSIS — G89.29 CHRONIC PAIN OF BOTH LOWER EXTREMITIES: ICD-10-CM

## 2024-08-02 DIAGNOSIS — M79.605 CHRONIC PAIN OF BOTH LOWER EXTREMITIES: ICD-10-CM

## 2024-08-02 DIAGNOSIS — M79.604 CHRONIC PAIN OF BOTH LOWER EXTREMITIES: ICD-10-CM

## 2024-08-02 RX ORDER — GABAPENTIN 100 MG/1
CAPSULE ORAL
Qty: 180 CAPSULE | Refills: 0 | Status: SHIPPED | OUTPATIENT
Start: 2024-08-02

## 2024-08-06 DIAGNOSIS — K21.01 GASTROESOPHAGEAL REFLUX DISEASE WITH ESOPHAGITIS AND HEMORRHAGE: ICD-10-CM

## 2024-08-06 NOTE — TELEPHONE ENCOUNTER
Clinic RN: Please contact patient because the medication is listed as historical or discontinued. Confirm patient is taking this medication. Document findings and route refill encounter to provider for approval or denial.

## 2024-08-06 NOTE — TELEPHONE ENCOUNTER
Called and spoke to the patient. Patient states she has been taking this medication on a daily basis and would like to continue to take this medication for her acid reflux.     Medication pended to the requested pharmacy and will route to PCP for review.     Thank you,  Dari Vivas RN

## 2024-08-19 DIAGNOSIS — I10 ESSENTIAL HYPERTENSION WITH GOAL BLOOD PRESSURE LESS THAN 140/90: ICD-10-CM

## 2024-08-20 RX ORDER — ISOSORBIDE MONONITRATE 60 MG/1
TABLET, EXTENDED RELEASE ORAL
Qty: 90 TABLET | Refills: 0 | Status: SHIPPED | OUTPATIENT
Start: 2024-08-20

## 2024-09-06 ENCOUNTER — NURSE TRIAGE (OUTPATIENT)
Dept: INTERNAL MEDICINE | Facility: CLINIC | Age: 74
End: 2024-09-06

## 2024-09-06 ENCOUNTER — VIRTUAL VISIT (OUTPATIENT)
Dept: INTERNAL MEDICINE | Facility: CLINIC | Age: 74
End: 2024-09-06
Payer: COMMERCIAL

## 2024-09-06 DIAGNOSIS — E78.00 PURE HYPERCHOLESTEROLEMIA: ICD-10-CM

## 2024-09-06 DIAGNOSIS — I10 BENIGN ESSENTIAL HYPERTENSION: ICD-10-CM

## 2024-09-06 DIAGNOSIS — U07.1 INFECTION DUE TO 2019 NOVEL CORONAVIRUS: Primary | ICD-10-CM

## 2024-09-06 PROCEDURE — 99442 PR PHYSICIAN TELEPHONE EVALUATION 11-20 MIN: CPT | Mod: 93 | Performed by: PHYSICIAN ASSISTANT

## 2024-09-06 RX ORDER — TROSPIUM CHLORIDE 20 MG/1
20 TABLET, FILM COATED ORAL
COMMUNITY
Start: 2024-08-06

## 2024-09-06 NOTE — PROGRESS NOTES
"Bridget is a 74 year old who is being evaluated via a billable telephone visit.    What phone number would you like to be contacted at? [phone]  How would you like to obtain your AVS? Mail a copy  Originating Location (pt. Location): Home    Distant Location (provider location):  On-site    Assessment & Plan     Infection due to 2019 novel coronavirus    - nirmatrelvir and ritonavir (PAXLOVID) 300 mg/100 mg therapy pack; Take 3 tablets by mouth 2 times daily for 5 days. (Take 2 Nirmatrelvir tablets and 1 Ritonavir tablet twice daily for 5 days)    HTN  Hyperlipidemia    Reviewed adjustments on medications        BMI  Estimated body mass index is 41.13 kg/m  as calculated from the following:    Height as of 7/23/24: 1.581 m (5' 2.25\").    Weight as of 7/23/24: 102.8 kg (226 lb 11.2 oz).   Weight management plan: Patient was referred to their PCP to discuss a diet and exercise plan.      COVID-19 positive patient.  Encounter for consideration of medication intervention. Patient does qualify for a prescription. Full discussion with patient including medication options, risks and benefits. Potential drug interactions reviewed with patient.     Treatment Planned  paxlovid    Temporary change to home medications:   Patient Instructions   Decrease the dosing of amlodipine to 5 mg daily ( 1/2 tablet of your medication ) x 5 days    Do not take your cholesterol rosuvastatin for the 5 days.          Estimated body mass index is 41.13 kg/m  as calculated from the following:    Height as of 7/23/24: 1.581 m (5' 2.25\").    Weight as of 7/23/24: 102.8 kg (226 lb 11.2 oz).  GFR Estimate   Date Value Ref Range Status   07/23/2024 90 >60 mL/min/1.73m2 Final     Comment:     eGFR calculated using 2021 CKD-EPI equation.   02/04/2021 87 >60 mL/min/[1.73_m2] Final     Comment:     Non  GFR Calc  Starting 12/18/2018, serum creatinine based estimated GFR (eGFR) will be   calculated using the Chronic Kidney Disease " Epidemiology Collaboration   (CKD-EPI) equation.       Lab Results   Component Value Date    [id number] Negative 03/01/2022       Hansa Gill is a 74 year old, presenting for the following health issues:  Covid Concern (Patient tested positive 9/6/2024- Patient is requesting Paxlovid )        9/6/2024    12:46 PM   Additional Questions   Roomed by Teodoro VICKERS     History of Present Illness       Reason for visit:  Covid positive 9/6/2024  Symptom onset:  Today  Symptoms include:  Congestion, cough, sore throat, headaches, no fever  Symptom intensity:  Mild  Symptom progression:  Worsening  Had these symptoms before:  No  What makes it better:  Tylenol   She is taking medications regularly.         COVID-19 Symptom Review  How many days ago did these symptoms start? 9/6/2024, Tested positive today     Are any of the following symptoms significant for you?  New or worsening difficulty breathing? No  Worsening cough? Yes, it's a dry cough.   Fever or chills? No  Headache: YES  Sore throat: YES  Chest pain: No  Diarrhea: No  Body aches? No    What treatments has patient tried? Acetaminophen and Decongestant - nasal   Does patient live in a nursing home, group home, or shelter? No  Does patient have a way to get food/medications during quarantined? Yes, I have a friend or family member who can help me. Patient's  is currently COVID positive.                    Objective           Vitals:  No vitals were obtained today due to virtual visit.    Physical Exam   General: Alert and no distress //Respiratory: No audible wheeze, cough, or shortness of breath // Psychiatric:  Appropriate affect, tone, and pace of words            Phone call duration: 11 minutes  Signed Electronically by: Dana Key PA-C

## 2024-09-06 NOTE — PATIENT INSTRUCTIONS
Decrease the dosing of amlodipine to 5 mg daily ( 1/2 tablet of your medication ) x 5 days    Do not take your cholesterol rosuvastatin for the 5 days.

## 2024-09-06 NOTE — TELEPHONE ENCOUNTER
Patient on amlodipine so pax protocol not started as patient will need provider visit     Edgar Bueno RN    Reason for Disposition   MILD difficulty breathing (e.g., minimal/no SOB at rest, SOB with walking, pulse <100)    Additional Information   Negative: SEVERE difficulty breathing (e.g., struggling for each breath, speaks in single words)   Negative: Difficult to awaken or acting confused (e.g., disoriented, slurred speech)   Negative: Bluish (or gray) lips or face now   Negative: Shock suspected (e.g., cold/pale/clammy skin, too weak to stand, low BP, rapid pulse)   Negative: Sounds like a life-threatening emergency to the triager   Negative: Diagnosed or suspected COVID-19 and symptoms lasting 3 or more weeks   Negative: COVID-19 exposure and no symptoms   Negative: COVID-19 vaccine reaction suspected (e.g., fever, headache, muscle aches) occurring 1 to 3 days after getting vaccine   Negative: COVID-19 vaccine, questions about   Negative: Lives with someone known to have influenza (flu test positive) and flu-like symptoms (e.g., cough, runny nose, sore throat, SOB; with or without fever)   Negative: COVID-19 and breastfeeding, questions about   Negative: Possible COVID-19 symptoms and triager concerned about severity of symptoms or other causes   Negative: SEVERE or constant chest pain or pressure  (Exception: Mild central chest pain, present only when coughing.)   Negative: MODERATE difficulty breathing (e.g., speaks in phrases, SOB even at rest, pulse 100-120)   Negative: Headache and stiff neck (can't touch chin to chest)   Negative: Oxygen level (e.g., pulse oximetry) 90% or lower   Negative: Chest pain or pressure  (Exception: MILD central chest pain, present only when coughing.)   Negative: Drinking very little and dehydration suspected (e.g., no urine > 12 hours, very dry mouth, very lightheaded)   Negative: Patient sounds very sick or weak to the triager    Protocols used: Coronavirus (COVID-19)  Diagnosed or [id number]

## 2024-09-06 NOTE — TELEPHONE ENCOUNTER
COVID Positive/Requesting COVID treatment    Patient is positive for COVID and requesting treatment options.    Date of positive COVID test (PCR or at home)? 9/06/2024  Current COVID symptoms: cough, shortness of breath or difficulty breathing, fatigue, headache, sore throat, and congestion or runny nose  Date COVID symptoms began: 9/05/2024    Message should be routed to clinic RN pool. Best phone number to use for call back: [phone]

## 2024-09-17 ENCOUNTER — TRANSFERRED RECORDS (OUTPATIENT)
Dept: HEALTH INFORMATION MANAGEMENT | Facility: CLINIC | Age: 74
End: 2024-09-17
Payer: COMMERCIAL

## 2024-10-01 DIAGNOSIS — L30.4 INTERTRIGO: ICD-10-CM

## 2024-10-01 RX ORDER — KETOCONAZOLE 20 MG/G
CREAM TOPICAL
Qty: 30 G | Refills: 0 | Status: SHIPPED | OUTPATIENT
Start: 2024-10-01

## 2024-10-17 DIAGNOSIS — E78.5 HYPERLIPIDEMIA WITH TARGET LDL LESS THAN 100: ICD-10-CM

## 2024-10-18 RX ORDER — ROSUVASTATIN CALCIUM 20 MG/1
TABLET, COATED ORAL
Qty: 90 TABLET | Refills: 2 | Status: SHIPPED | OUTPATIENT
Start: 2024-10-18

## 2024-11-06 DIAGNOSIS — K21.01 GASTROESOPHAGEAL REFLUX DISEASE WITH ESOPHAGITIS AND HEMORRHAGE: ICD-10-CM

## 2024-11-12 DIAGNOSIS — L30.4 INTERTRIGO: ICD-10-CM

## 2024-11-12 RX ORDER — KETOCONAZOLE 20 MG/G
CREAM TOPICAL
Qty: 30 G | Refills: 0 | Status: SHIPPED | OUTPATIENT
Start: 2024-11-12

## 2024-11-17 DIAGNOSIS — I10 ESSENTIAL HYPERTENSION WITH GOAL BLOOD PRESSURE LESS THAN 140/90: ICD-10-CM

## 2024-11-18 RX ORDER — ISOSORBIDE MONONITRATE 60 MG/1
TABLET, EXTENDED RELEASE ORAL
Qty: 90 TABLET | Refills: 0 | Status: SHIPPED | OUTPATIENT
Start: 2024-11-18

## 2024-11-19 ENCOUNTER — TRANSFERRED RECORDS (OUTPATIENT)
Dept: HEALTH INFORMATION MANAGEMENT | Facility: CLINIC | Age: 74
End: 2024-11-19
Payer: COMMERCIAL

## 2024-12-13 DIAGNOSIS — L30.4 INTERTRIGO: ICD-10-CM

## 2024-12-16 RX ORDER — KETOCONAZOLE 20 MG/G
CREAM TOPICAL
Qty: 30 G | Refills: 0 | Status: SHIPPED | OUTPATIENT
Start: 2024-12-16

## 2025-01-16 DIAGNOSIS — L30.4 INTERTRIGO: ICD-10-CM

## 2025-01-16 DIAGNOSIS — M79.604 CHRONIC PAIN OF BOTH LOWER EXTREMITIES: ICD-10-CM

## 2025-01-16 DIAGNOSIS — I10 ESSENTIAL HYPERTENSION WITH GOAL BLOOD PRESSURE LESS THAN 130/80: ICD-10-CM

## 2025-01-16 DIAGNOSIS — M79.605 CHRONIC PAIN OF BOTH LOWER EXTREMITIES: ICD-10-CM

## 2025-01-16 DIAGNOSIS — G89.29 CHRONIC PAIN OF BOTH LOWER EXTREMITIES: ICD-10-CM

## 2025-01-16 RX ORDER — KETOCONAZOLE 20 MG/G
CREAM TOPICAL
Qty: 30 G | Refills: 0 | Status: SHIPPED | OUTPATIENT
Start: 2025-01-16

## 2025-01-16 RX ORDER — RAMIPRIL 10 MG/1
CAPSULE ORAL
Qty: 100 CAPSULE | Refills: 0 | Status: SHIPPED | OUTPATIENT
Start: 2025-01-16

## 2025-01-16 RX ORDER — GABAPENTIN 100 MG/1
CAPSULE ORAL
Qty: 180 CAPSULE | Refills: 0 | Status: SHIPPED | OUTPATIENT
Start: 2025-01-16

## 2025-01-16 RX ORDER — KETOCONAZOLE 20 MG/G
CREAM TOPICAL
Qty: 30 G | Refills: 0 | Status: SHIPPED | OUTPATIENT
Start: 2025-01-16 | End: 2025-01-16

## 2025-03-25 ENCOUNTER — TRANSFERRED RECORDS (OUTPATIENT)
Dept: HEALTH INFORMATION MANAGEMENT | Facility: CLINIC | Age: 75
End: 2025-03-25

## 2025-04-24 ENCOUNTER — OFFICE VISIT (OUTPATIENT)
Dept: SLEEP MEDICINE | Facility: CLINIC | Age: 75
End: 2025-04-24
Payer: COMMERCIAL

## 2025-04-24 VITALS
WEIGHT: 225 LBS | DIASTOLIC BLOOD PRESSURE: 81 MMHG | HEIGHT: 63 IN | SYSTOLIC BLOOD PRESSURE: 125 MMHG | OXYGEN SATURATION: 96 % | BODY MASS INDEX: 39.87 KG/M2 | HEART RATE: 70 BPM

## 2025-04-24 DIAGNOSIS — G47.33 OSA (OBSTRUCTIVE SLEEP APNEA): Primary | ICD-10-CM

## 2025-04-24 ASSESSMENT — PATIENT HEALTH QUESTIONNAIRE - PHQ9
10. IF YOU CHECKED OFF ANY PROBLEMS, HOW DIFFICULT HAVE THESE PROBLEMS MADE IT FOR YOU TO DO YOUR WORK, TAKE CARE OF THINGS AT HOME, OR GET ALONG WITH OTHER PEOPLE: SOMEWHAT DIFFICULT
SUM OF ALL RESPONSES TO PHQ QUESTIONS 1-9: 12
SUM OF ALL RESPONSES TO PHQ QUESTIONS 1-9: 12

## 2025-04-24 ASSESSMENT — SLEEP AND FATIGUE QUESTIONNAIRES
HOW LIKELY ARE YOU TO NOD OFF OR FALL ASLEEP WHILE LYING DOWN TO REST IN THE AFTERNOON WHEN CIRCUMSTANCES PERMIT: SLIGHT CHANCE OF DOZING
HOW LIKELY ARE YOU TO NOD OFF OR FALL ASLEEP WHEN YOU ARE A PASSENGER IN A CAR FOR AN HOUR WITHOUT A BREAK: WOULD NEVER DOZE
HOW LIKELY ARE YOU TO NOD OFF OR FALL ASLEEP WHILE SITTING AND READING: MODERATE CHANCE OF DOZING
HOW LIKELY ARE YOU TO NOD OFF OR FALL ASLEEP IN A CAR, WHILE STOPPED FOR A FEW MINUTES IN TRAFFIC: WOULD NEVER DOZE
HOW LIKELY ARE YOU TO NOD OFF OR FALL ASLEEP WHILE SITTING QUIETLY AFTER LUNCH WITHOUT ALCOHOL: SLIGHT CHANCE OF DOZING
HOW LIKELY ARE YOU TO NOD OFF OR FALL ASLEEP WHILE SITTING AND TALKING TO SOMEONE: WOULD NEVER DOZE
HOW LIKELY ARE YOU TO NOD OFF OR FALL ASLEEP WHILE SITTING INACTIVE IN A PUBLIC PLACE: WOULD NEVER DOZE
HOW LIKELY ARE YOU TO NOD OFF OR FALL ASLEEP WHILE WATCHING TV: MODERATE CHANCE OF DOZING

## 2025-04-24 NOTE — NURSING NOTE
"Chief Complaint   Patient presents with    CPAP Follow Up       Initial /81   Pulse 70   Ht 1.588 m (5' 2.5\")   Wt 102.1 kg (225 lb)   SpO2 96%   BMI 40.50 kg/m   Estimated body mass index is 40.5 kg/m  as calculated from the following:    Height as of this encounter: 1.588 m (5' 2.5\").    Weight as of this encounter: 102.1 kg (225 lb).    Medication Reconciliation: complete  ESS 6  Nida Holley MA   "

## 2025-04-24 NOTE — PROGRESS NOTES
"[facility]   Outpatient Sleep Medicine  Apr 24, 2025       Name: Bridget Lawson MRN# [id number]   Age: 74 year old YOB: 1950          Assessment and Plan:   1. JOHNY (obstructive sleep apnea) (Primary)  Patient's sleep apnea is adequately managed and well treated with current PAP settings [id number] with low residual AHI of 3.7 events per hour. Compliance is excellent. Continues to tolerate PAP well and benefits from use but has some air hunger, agreed to increase settings to [id number] today. She will keep me updated how things are feeling. Prescription renewed for mask/supplies and sent to her DME [facility]. Follow-up in 1-2 years for routine PAP follow-up or sooner prn.   - Comprehensive DME       Depression Screening Follow-up        4/24/2025     2:11 PM   PHQ   PHQ-9 Total Score 12    Q9: Thoughts of better off dead/self-harm past 2 weeks Not at all       Patient-reported       Does the patient currently have a mental health provider?  No  Follow Up Actions Taken  Patient to follow up with PCP. Clinic staff to schedule appointment if able.  Patient declined referral.     Latrice Torres PA-C       Chief Complaint      Chief Complaint   Patient presents with    CPAP Follow Up          History of Present Illness:     Bridget Lawson is a 74 year old female with obesity, history of hemorraghic stroke, HTN, who presents to the clinic for follow-up of their severe obstructive sleep apnea treated with CPAP.     Originally diagnosed via PSG in 6/2010 with AHI >70 (we do not have copy of official report). Has been on CPAP therapy ever since.     DME [facility].     Last seen 6/2023 and returns today for routine CPAP visit.    Overall, the patient rates their experience with PAP as 2 (0 poor, 10 great). The pressure settings are not comfortable - \"need to have the machine turned up I wake up and it feels so light\". Patient is using a full face mask. The mask is comfortable. The mask is not " "leaking. They are not snoring with the mask on. They are not having gasp arousals.  They are not having significant oral/nasal dryness or epistaxis.  They are not having pain/skin breakdown.     Bedtime is typically 1:30AM. Usually it takes about 15 minutes to fall asleep with the mask on. Wake time is typically 10:00AM.  Patient estimates she is using PAP therapy 8-9 hours per night and estimates she is sleeping 8 hours per night.      Respironics Dreamstation 2 Auto-PAP 12-17 cmH2O download (3/22/25-4/20/25):  30 total days of use. 0 nonuse days.  Average use 7 hours 51 minutes per day.  30 days with >4 hours use.  Large leak 0 sec per day average. CPAP 90% pressure 16.1cm. AHI 3.7    SCALES:   INSOMNIA: Insomnia Severity Score: 19   SLEEPINESS: Bison Sleepiness Score: 6    Past medical/surgical history, family history, social history, medications and allergies were reviewed.           Physical Examination:   /81   Pulse 70   Ht 1.588 m (5' 2.5\")   Wt 102.1 kg (225 lb)   SpO2 96%   BMI 40.50 kg/m    General appearance: Awake, alert, cooperative. Well groomed. Sitting comfortably in chair. In no apparent distress.  HEENT: Head: Normocephalic, atraumatic. Eyes:Conjunctiva clear. Sclera normal. Nose: External appearance without deformity.   Pulmonary:  Able to speak easily in full sentences. No cough or wheeze.   Skin:  No rashes or significant lesions on visible skin.   Neurologic: Alert, oriented x3.   Psychiatric: Mood euthymic. Affect congruent with full range and intensity.      CC:  Marcia Rosas PA-C  Apr 24, 2025     [facility]  [address] [place] [address]     [facility]  [address] [place] [id number]    Chart documentation was completed, in part, with vSocial voice-recognition software. Even though reviewed, some grammatical, spelling, and word errors may remain.    26 minutes spent on day " of encounter doing chart review, history and exam, documentation, and further activities as noted above

## 2025-05-26 DIAGNOSIS — L30.4 INTERTRIGO: ICD-10-CM

## 2025-05-27 ENCOUNTER — TRANSFERRED RECORDS (OUTPATIENT)
Dept: HEALTH INFORMATION MANAGEMENT | Facility: CLINIC | Age: 75
End: 2025-05-27
Payer: COMMERCIAL

## 2025-05-27 RX ORDER — KETOCONAZOLE 20 MG/G
CREAM TOPICAL
Qty: 30 G | Refills: 0 | Status: SHIPPED | OUTPATIENT
Start: 2025-05-27

## 2025-06-03 ENCOUNTER — TELEPHONE (OUTPATIENT)
Dept: SLEEP MEDICINE | Facility: CLINIC | Age: 75
End: 2025-06-03
Payer: COMMERCIAL

## 2025-06-03 NOTE — TELEPHONE ENCOUNTER
FYI - Status Update    Who is Calling: patient    Update:  Home Medical needs compliance form for cpap faxed over to [phone]    Does caller want a call/response back: Yes     Okay to leave a detailed message?: Yes at Cell number on file:    No relevant phone numbers on file.

## 2025-06-18 DIAGNOSIS — M81.0 AGE-RELATED OSTEOPOROSIS WITHOUT CURRENT PATHOLOGICAL FRACTURE: Primary | ICD-10-CM

## 2025-06-18 DIAGNOSIS — K27.9 PUD (PEPTIC ULCER DISEASE): ICD-10-CM

## 2025-06-18 RX ORDER — HEPARIN SODIUM (PORCINE) LOCK FLUSH IV SOLN 100 UNIT/ML 100 UNIT/ML
5 SOLUTION INTRAVENOUS
OUTPATIENT
Start: 2025-06-25

## 2025-06-18 RX ORDER — METHYLPREDNISOLONE SODIUM SUCCINATE 40 MG/ML
40 INJECTION INTRAMUSCULAR; INTRAVENOUS
Start: 2025-06-25

## 2025-06-18 RX ORDER — ACETAMINOPHEN 325 MG/1
650 TABLET ORAL
OUTPATIENT
Start: 2025-06-25

## 2025-06-18 RX ORDER — HEPARIN SODIUM,PORCINE 10 UNIT/ML
5-20 VIAL (ML) INTRAVENOUS DAILY PRN
OUTPATIENT
Start: 2025-06-25

## 2025-06-18 RX ORDER — MEPERIDINE HYDROCHLORIDE 25 MG/ML
25 INJECTION INTRAMUSCULAR; INTRAVENOUS; SUBCUTANEOUS
OUTPATIENT
Start: 2025-06-25

## 2025-06-18 RX ORDER — ALBUTEROL SULFATE 0.83 MG/ML
2.5 SOLUTION RESPIRATORY (INHALATION)
OUTPATIENT
Start: 2025-06-25

## 2025-06-18 RX ORDER — EPINEPHRINE 1 MG/ML
0.3 INJECTION, SOLUTION, CONCENTRATE INTRAVENOUS EVERY 5 MIN PRN
OUTPATIENT
Start: 2025-06-25

## 2025-06-18 RX ORDER — DIPHENHYDRAMINE HYDROCHLORIDE 50 MG/ML
50 INJECTION, SOLUTION INTRAMUSCULAR; INTRAVENOUS
Start: 2025-06-25

## 2025-06-18 RX ORDER — ZOLEDRONIC ACID 0.05 MG/ML
5 INJECTION, SOLUTION INTRAVENOUS ONCE
Start: 2025-06-25

## 2025-06-18 RX ORDER — DIPHENHYDRAMINE HYDROCHLORIDE 50 MG/ML
25 INJECTION, SOLUTION INTRAMUSCULAR; INTRAVENOUS
Start: 2025-06-25

## 2025-06-18 RX ORDER — ALBUTEROL SULFATE 90 UG/1
1-2 INHALANT RESPIRATORY (INHALATION)
Start: 2025-06-25

## 2025-06-22 DIAGNOSIS — L30.4 INTERTRIGO: ICD-10-CM

## 2025-06-22 DIAGNOSIS — E78.5 HYPERLIPIDEMIA WITH TARGET LDL LESS THAN 100: ICD-10-CM

## 2025-06-22 DIAGNOSIS — I10 BENIGN ESSENTIAL HYPERTENSION: ICD-10-CM

## 2025-06-23 RX ORDER — AMLODIPINE BESYLATE 10 MG/1
10 TABLET ORAL DAILY
Qty: 90 TABLET | Refills: 0 | Status: SHIPPED | OUTPATIENT
Start: 2025-06-23

## 2025-06-23 RX ORDER — ROSUVASTATIN CALCIUM 20 MG/1
20 TABLET, COATED ORAL DAILY
Qty: 90 TABLET | Refills: 0 | Status: SHIPPED | OUTPATIENT
Start: 2025-06-23

## 2025-06-23 RX ORDER — KETOCONAZOLE 20 MG/G
CREAM TOPICAL
Qty: 30 G | Refills: 0 | Status: SHIPPED | OUTPATIENT
Start: 2025-06-23

## 2025-06-25 DIAGNOSIS — I10 ESSENTIAL HYPERTENSION WITH GOAL BLOOD PRESSURE LESS THAN 130/80: ICD-10-CM

## 2025-06-26 RX ORDER — RAMIPRIL 10 MG/1
CAPSULE ORAL
Qty: 90 CAPSULE | Refills: 0 | Status: SHIPPED | OUTPATIENT
Start: 2025-06-26

## 2025-07-01 ENCOUNTER — HOSPITAL ENCOUNTER (OUTPATIENT)
Dept: MAMMOGRAPHY | Facility: CLINIC | Age: 75
Discharge: HOME | End: 2025-07-01
Attending: INTERNAL MEDICINE
Payer: COMMERCIAL

## 2025-07-01 DIAGNOSIS — Z12.31 VISIT FOR SCREENING MAMMOGRAM: ICD-10-CM

## 2025-07-01 PROCEDURE — 77063 BREAST TOMOSYNTHESIS BI: CPT

## 2025-07-14 PROBLEM — Z96.612 S/P SHOULDER REPLACEMENT, LEFT: Status: RESOLVED | Noted: 2023-07-18 | Resolved: 2025-07-14

## 2025-07-15 ENCOUNTER — OFFICE VISIT (OUTPATIENT)
Dept: INTERNAL MEDICINE | Facility: CLINIC | Age: 75
End: 2025-07-15
Payer: COMMERCIAL

## 2025-07-15 VITALS
BODY MASS INDEX: 40.52 KG/M2 | SYSTOLIC BLOOD PRESSURE: 130 MMHG | DIASTOLIC BLOOD PRESSURE: 80 MMHG | HEART RATE: 69 BPM | WEIGHT: 228.7 LBS | HEIGHT: 63 IN | TEMPERATURE: 98.2 F | OXYGEN SATURATION: 95 %

## 2025-07-15 DIAGNOSIS — N32.81 OAB (OVERACTIVE BLADDER): ICD-10-CM

## 2025-07-15 DIAGNOSIS — Z86.73 HISTORY OF STROKE: ICD-10-CM

## 2025-07-15 DIAGNOSIS — E55.9 VITAMIN D DEFICIENCY: ICD-10-CM

## 2025-07-15 DIAGNOSIS — Z86.39 HISTORY OF HYPERPARATHYROIDISM: ICD-10-CM

## 2025-07-15 DIAGNOSIS — M79.604 CHRONIC PAIN OF BOTH LOWER EXTREMITIES: ICD-10-CM

## 2025-07-15 DIAGNOSIS — K21.01 GASTROESOPHAGEAL REFLUX DISEASE WITH ESOPHAGITIS AND HEMORRHAGE: ICD-10-CM

## 2025-07-15 DIAGNOSIS — R26.89 IMBALANCE: ICD-10-CM

## 2025-07-15 DIAGNOSIS — I89.0 LYMPHEDEMA OF BOTH LOWER EXTREMITIES: ICD-10-CM

## 2025-07-15 DIAGNOSIS — I10 BENIGN ESSENTIAL HYPERTENSION: ICD-10-CM

## 2025-07-15 DIAGNOSIS — G47.33 OSA ON CPAP: ICD-10-CM

## 2025-07-15 DIAGNOSIS — Z78.0 ASYMPTOMATIC MENOPAUSE: ICD-10-CM

## 2025-07-15 DIAGNOSIS — Z00.00 MEDICARE ANNUAL WELLNESS VISIT, SUBSEQUENT: Primary | ICD-10-CM

## 2025-07-15 DIAGNOSIS — K27.9 PUD (PEPTIC ULCER DISEASE): ICD-10-CM

## 2025-07-15 DIAGNOSIS — E66.01 MORBID OBESITY (H): ICD-10-CM

## 2025-07-15 DIAGNOSIS — R42 DIZZINESS: ICD-10-CM

## 2025-07-15 DIAGNOSIS — M81.0 AGE-RELATED OSTEOPOROSIS WITHOUT CURRENT PATHOLOGICAL FRACTURE: ICD-10-CM

## 2025-07-15 DIAGNOSIS — E78.00 PURE HYPERCHOLESTEROLEMIA: ICD-10-CM

## 2025-07-15 DIAGNOSIS — Z13.1 SCREENING FOR DIABETES MELLITUS: ICD-10-CM

## 2025-07-15 DIAGNOSIS — M79.605 CHRONIC PAIN OF BOTH LOWER EXTREMITIES: ICD-10-CM

## 2025-07-15 DIAGNOSIS — R73.01 IMPAIRED FASTING GLUCOSE: ICD-10-CM

## 2025-07-15 DIAGNOSIS — G89.29 CHRONIC PAIN OF BOTH LOWER EXTREMITIES: ICD-10-CM

## 2025-07-15 DIAGNOSIS — J31.0 CHRONIC RHINITIS: ICD-10-CM

## 2025-07-15 LAB
ALBUMIN SERPL BCG-MCNC: 4.4 G/DL (ref 3.5–5.2)
ALP SERPL-CCNC: 55 U/L (ref 40–150)
ALT SERPL W P-5'-P-CCNC: 10 U/L (ref 0–50)
ANION GAP SERPL CALCULATED.3IONS-SCNC: 12 MMOL/L (ref 7–15)
AST SERPL W P-5'-P-CCNC: 16 U/L (ref 0–45)
BILIRUB SERPL-MCNC: 0.3 MG/DL
BUN SERPL-MCNC: 21.4 MG/DL (ref 8–23)
CALCIUM SERPL-MCNC: 9.6 MG/DL (ref 8.8–10.4)
CHLORIDE SERPL-SCNC: 106 MMOL/L (ref 98–107)
CHOLEST SERPL-MCNC: 156 MG/DL
CREAT SERPL-MCNC: 0.71 MG/DL (ref 0.51–0.95)
EGFRCR SERPLBLD CKD-EPI 2021: 88 ML/MIN/1.73M2
ERYTHROCYTE [DISTWIDTH] IN BLOOD BY AUTOMATED COUNT: 14.1 % (ref 10–15)
EST. AVERAGE GLUCOSE BLD GHB EST-MCNC: 111 MG/DL
FASTING STATUS PATIENT QL REPORTED: YES
FASTING STATUS PATIENT QL REPORTED: YES
GLUCOSE SERPL-MCNC: 89 MG/DL (ref 70–99)
HBA1C MFR BLD: 5.5 % (ref 0–5.6)
HCO3 SERPL-SCNC: 22 MMOL/L (ref 22–29)
HCT VFR BLD AUTO: 39.7 % (ref 35–47)
HDLC SERPL-MCNC: 56 MG/DL
HGB BLD-MCNC: 13.4 G/DL (ref 11.7–15.7)
LDLC SERPL CALC-MCNC: 70 MG/DL
MCH RBC QN AUTO: 29.5 PG (ref 26.5–33)
MCHC RBC AUTO-ENTMCNC: 33.8 G/DL (ref 31.5–36.5)
MCV RBC AUTO: 87 FL (ref 78–100)
NONHDLC SERPL-MCNC: 100 MG/DL
PLATELET # BLD AUTO: 199 10E3/UL (ref 150–450)
POTASSIUM SERPL-SCNC: 4.4 MMOL/L (ref 3.4–5.3)
PROT SERPL-MCNC: 7 G/DL (ref 6.4–8.3)
RBC # BLD AUTO: 4.54 10E6/UL (ref 3.8–5.2)
SODIUM SERPL-SCNC: 140 MMOL/L (ref 135–145)
TRIGL SERPL-MCNC: 151 MG/DL
VIT B12 SERPL-MCNC: 1150 PG/ML (ref 232–1245)
VIT D+METAB SERPL-MCNC: 52 NG/ML (ref 20–50)
WBC # BLD AUTO: 6.2 10E3/UL (ref 4–11)

## 2025-07-15 PROCEDURE — 82306 VITAMIN D 25 HYDROXY: CPT | Performed by: INTERNAL MEDICINE

## 2025-07-15 PROCEDURE — 36415 COLL VENOUS BLD VENIPUNCTURE: CPT | Performed by: INTERNAL MEDICINE

## 2025-07-15 PROCEDURE — 85027 COMPLETE CBC AUTOMATED: CPT | Performed by: INTERNAL MEDICINE

## 2025-07-15 PROCEDURE — 83036 HEMOGLOBIN GLYCOSYLATED A1C: CPT | Performed by: INTERNAL MEDICINE

## 2025-07-15 PROCEDURE — 80061 LIPID PANEL: CPT | Performed by: INTERNAL MEDICINE

## 2025-07-15 PROCEDURE — 80053 COMPREHEN METABOLIC PANEL: CPT | Performed by: INTERNAL MEDICINE

## 2025-07-15 PROCEDURE — 82607 VITAMIN B-12: CPT | Performed by: INTERNAL MEDICINE

## 2025-07-15 RX ORDER — ISOSORBIDE MONONITRATE 60 MG/1
60 TABLET, EXTENDED RELEASE ORAL DAILY
Qty: 90 TABLET | Refills: 3 | Status: SHIPPED | OUTPATIENT
Start: 2025-07-15

## 2025-07-15 RX ORDER — OXYBUTYNIN CHLORIDE 10 MG/1
10 TABLET, EXTENDED RELEASE ORAL DAILY
Qty: 90 TABLET | Refills: 3 | Status: SHIPPED | OUTPATIENT
Start: 2025-07-15

## 2025-07-15 RX ORDER — ESTRADIOL 0.1 MG/G
CREAM VAGINAL
Qty: 42.5 G | Refills: 3 | Status: SHIPPED | OUTPATIENT
Start: 2025-07-15

## 2025-07-15 RX ORDER — ASPIRIN 81 MG/1
81 TABLET ORAL 2 TIMES DAILY
Qty: 60 TABLET | Refills: 0 | Status: SHIPPED | OUTPATIENT
Start: 2025-07-15

## 2025-07-15 RX ORDER — OMEPRAZOLE 20 MG/1
20 CAPSULE, DELAYED RELEASE ORAL DAILY
Qty: 90 CAPSULE | Refills: 3 | Status: SHIPPED | OUTPATIENT
Start: 2025-07-15

## 2025-07-15 RX ORDER — AMLODIPINE BESYLATE 10 MG/1
10 TABLET ORAL DAILY
Qty: 90 TABLET | Refills: 3 | Status: SHIPPED | OUTPATIENT
Start: 2025-07-15

## 2025-07-15 RX ORDER — ROSUVASTATIN CALCIUM 20 MG/1
20 TABLET, COATED ORAL DAILY
Qty: 90 TABLET | Refills: 3 | Status: SHIPPED | OUTPATIENT
Start: 2025-07-15

## 2025-07-15 RX ORDER — FLUTICASONE PROPIONATE 50 MCG
1 SPRAY, SUSPENSION (ML) NASAL DAILY
Qty: 48 G | Refills: 3 | Status: SHIPPED | OUTPATIENT
Start: 2025-07-15

## 2025-07-15 RX ORDER — GABAPENTIN 100 MG/1
100 CAPSULE ORAL 2 TIMES DAILY
Qty: 180 CAPSULE | Refills: 3 | Status: SHIPPED | OUTPATIENT
Start: 2025-07-15

## 2025-07-15 RX ORDER — RAMIPRIL 10 MG/1
CAPSULE ORAL
Qty: 90 CAPSULE | Refills: 3 | Status: SHIPPED | OUTPATIENT
Start: 2025-07-15

## 2025-07-15 ASSESSMENT — PAIN SCALES - GENERAL: PAINLEVEL_OUTOF10: MODERATE PAIN (6)

## 2025-07-15 NOTE — PROGRESS NOTES
ASSESSMENT/PLAN                                                       (Z00.00) Medicare annual wellness visit, subsequent  (primary encounter diagnosis)  Comment: PMH, PSH, FH, SH, medications, allergies, immunizations, and preventative health measures reviewed and updated as appropriate.  Plan: see below for plans.      (I10) Benign essential hypertension  Comment: well-controlled on amlodipine.  Plan: continue present management; refills provided.     (Z86.39) History of hyperparathyroidism  Comment: s/p adenoma resection.     (Z86.73) History of stroke  Comment: 2009; hemorrhagic; due to uncontrolled hypertension.    (I89.0) Lymphedema of both lower extremities  Comment: stable.     (E66.01) Morbid obesity (H)  Comment: known issue that I take into account for their medical decisions, no current exacerbations or new concerns.    (G47.33) JOHNY on CPAP  Comment: compliant with CPAP; follows with sleep medicine.     (M81.0) Age-related osteoporosis without current pathological fracture  (Z78.0) Asymptomatic menopause  Comment: currently untreated.   Plan: DEXA ordered - patient to schedule.      (N32.81) OAB (overactive bladder)  Comment: well-controlled on Estrace and Ditropan XL; follows with urology.  Plan: continue present management; refills provided.     (M79.604,  M79.605,  G89.29) Chronic pain of both lower extremities  Comment: well-controlled on gabapentin.  Plan: continue present management; refills provided.     (K27.9) PUD (peptic ulcer disease)  (K21.01) Gastroesophageal reflux disease with esophagitis and hemorrhage  Comment: well-controlled on omeprazole.   Plan: continue present management; refills provided.     (J31.0) Chronic rhinitis  Comment: well-controlled on Flonase.   Plan: continue present management; refills provided.     (E78.00) Pure hypercholesterolemia  (E55.9) Vitamin D deficiency  (Z13.1) Screening for diabetes mellitus  (R73.01) Impaired fasting glucose  Plan: non-fasting labs  today; recommendations to follow; for now, continue present management; refills provided.     (R26.89) Imbalance  (R42) Dizziness  Plan: Physical Therapy  Referral placed - patient will be contacted to schedule.      Appropriate preventive services were discussed with this patient, including applicable screening as appropriate for cardiovascular disease, diabetes, osteopenia/osteoporosis, and glaucoma.  As appropriate for age/gender, discussed screening for colorectal cancer, prostate cancer, breast cancer, and cervical cancer. Checklist reviewing preventive services available has been given to the patient.    Reviewed patients plan of care. The Basic Care Plan (routine screening as documented in Health Maintenance) for Bridget Lawson meets the Care Plan requirement. This Care Plan has been established and reviewed with the Patient.    Marcia Rosas MD   [address] [address]  T: [phone], F: [phone]    SUBJECTIVE                                                      Bridget Lawson is a very pleasant 75 year old female who presents for her subsequent AWV:    Current providers (other than myself): Melissa (sleep medicine), Yuridia (urology)    PMH, PSH, FH, SH, medications, allergies, immunizations, preventative health, and health risk assessment reviewed and updated as appropriate.    Past Medical History:   Diagnosis Date    Benign essential hypertension     History of blood transfusion     no adverse reactions    History of hyperparathyroidism     s/p adenoma resection    History of stroke 2009    hemorrhagic; due to uncontrolled hypertension    Lymphedema of both lower extremities     Morbid obesity (H)     JOHNY on CPAP     Osteoporosis     PUD (peptic ulcer disease)     due to NSAID use    Pure hypercholesterolemia     Urgency-frequency syndrome      Past Surgical History:   Procedure Laterality Date    ARTHROSCOPY KNEE Left     CATARACT  EXTRACTION, BILATERAL       SECTION, TUBAL LIGATION, COMBINED      ENDOSCOPIC RELEASE CARPAL TUNNEL Right 2020    Procedure: RIGHT ENDOSCOPIC CARPAL TUNNEL RELEASE;  Surgeon: Piper Guardado MD;  Location: [facility]    HYSTERECTOMY TOTAL ABDOMINAL, BILATERAL SALPINGO-OOPHORECTOMY, COMBINED      PARATHYROID GLAND SURGERY      adenoma removed    REPAIR TENDON ANKLE Right 2017    Procedure: REPAIR TENDON ANKLE;  RIGHT POSTERIOR TIBIAL TENDON RECONSTRUCTION ;  Surgeon: Xavi Duran MD;  Location: [facility] OR    REVERSE ARTHROPLASTY SHOULDER Left 2023    Procedure: Left reverse shoulder arthroplasty, no guide, autologus bone graft proximal humerus ;  Surgeon: Ulises Parker MD;  Location: [facility] OR    ROTATOR CUFF REPAIR RT/LT Right     TONSILLECTOMY & ADENOIDECTOMY       Family History   Problem Relation Age of Onset    Breast Cancer Mother     Chronic Obstructive Pulmonary Disease Father     Diabetes Type 2  Father     Hypertension Father     Breast Cancer Maternal Grandmother     Hyperlipidemia Brother     Myocardial Infarction No family hx of     Cerebrovascular Disease No family hx of     Coronary Artery Disease Early Onset No family hx of     Colon Cancer No family hx of     Ovarian Cancer No family hx of      Social History     Occupational History    Occupation: Retired -    Tobacco Use    Smoking status: Former     Types: Cigarettes     Passive exposure: Past    Smokeless tobacco: Never    Tobacco comments:     Quit in ; smoked for 27 years; 3ppd at her most.    Vaping Use    Vaping status: Never Used   Substance and Sexual Activity    Alcohol use: Yes     Comment: 1 drink/day    Drug use: No    Sexual activity: Not Currently   Social History Narrative    .    Two daughters.    No grandchildren.    No formal exercise.      No Known Allergies    Current Outpatient Medications   Medication Sig    amLODIPine (NORVASC) 10 MG tablet Take 1 tablet (10 mg) by mouth daily.     aspirin 81 MG EC tablet Take 1 tablet (81 mg) by mouth 2 times daily.    estradiol (ESTRACE VAGINAL) 0.1 MG/GM vaginal cream Apply small amount to the vaginal opening and urethra M, W, F @ h.s.    fluticasone (FLONASE) 50 MCG/ACT nasal spray Spray 1 spray into both nostrils daily.    gabapentin (NEURONTIN) 100 MG capsule Take 1 capsule (100 mg) by mouth 2 times daily.    isosorbide mononitrate (IMDUR) 60 MG 24 hr tablet Take 1 tablet (60 mg) by mouth daily.    omeprazole (PRILOSEC) 20 MG DR capsule Take 1 capsule (20 mg) by mouth daily.    oxyBUTYnin ER (DITROPAN XL) 10 MG 24 hr tablet Take 1 tablet (10 mg) by mouth daily.    ramipril (ALTACE) 10 MG capsule Take 1 capsule by mouth once daily    rosuvastatin (CRESTOR) 20 MG tablet Take 1 tablet (20 mg) by mouth daily.    Calcium Carb-Cholecalciferol 600-800 MG-UNIT TABS Take 1 tablet by mouth 2 times daily     calcium carbonate (TUMS) 500 MG chewable tablet Take 1-2 chew tab by mouth as needed for heartburn.    celecoxib (CELEBREX) 100 MG capsule Take 2 capsules (200 mg) by mouth daily as needed for moderate pain (2 x 100 mg = 200 mg)    GLUCOSAMINE-CHONDROITIN PO Take 1 tablet by mouth 2 times daily 1500/1200 mg tabs    ketoconazole (NIZORAL) 2 % external cream APPLY 1/2 GRAM TOPICALLY TWICE DAILY AS NEEDED FOR ITCHING OR IRRITATION    MAGNESIUM GLUCONATE PO Take 400 mg by mouth daily    Melatonin 10 MG TABS tablet Take 1 tablet by mouth At Bedtime    Multiple Vitamins-Minerals (CENTRUM SILVER ULTRA WOMENS PO) Take 1 tablet by mouth daily    polyethylene glycol (MIRALAX) 17 GM/Dose powder Take 1 Capful by mouth as needed for constipation    Polyvinyl Alcohol-Povidone (REFRESH OP) Place 1 drop into both eyes nightly as needed (for dry eyes)    senna (SENOKOT) 8.6 MG tablet Take 2 tablets by mouth every evening    TURMERIC PO Take by mouth.     Immunization History   Administered Date(s) Administered    COVID-19 12+ (Pfizer) 11/16/2023, 01/27/2025    COVID-19  Bivalent 18+ (Moderna) 11/02/2022, 06/30/2023    COVID-19 Monovalent 18+ (Moderna) 11/02/2021    COVID-19 Monovalent Booster 18+ (Moderna) 05/10/2022    COVID-19 Vaccine (Mckenna) 03/09/2021    Flu 65+ (Fluad) 10/01/2018, 09/09/2019, 10/09/2024    Influenza (High Dose) Trivalent,PF (Fluzone) 10/24/2016, 10/07/2017, 10/01/2018, 09/09/2019, 10/18/2021    Influenza (IIV3) PF 12/09/2013, 09/29/2014, 10/05/2015    Influenza Vaccine 65+ (FLUAD) 10/18/2021, 10/24/2022, 09/25/2023    Influenza Vaccine >6 months,quad, PF 08/31/2020    Pneumo Conj 13-V (2010&after) 06/22/2015    Pneumococcal 20 valent Conjugate (Prevnar 20) 08/14/2023    Pneumococcal 23 valent 10/24/2016    RSV Vaccine (Arexvy) 11/16/2023    TDAP (Adacel,Boostrix) 10/01/2003    TDAP Vaccine (Adacel) 06/22/2015    Zoster recombinant adjuvanted (Shingrix) 09/09/2019, 11/18/2019    Zoster vaccine, live 03/10/2014     PREVENTATIVE HEALTH                                                      BMI: morbidly obese  Blood pressure: well-controlled on current regimen   Breast CA screening: up to date   Colon CA screening: screening no longer indicated  Lung CA screening: patient does not meet screening criteria  Dexa: DUE  Screening cholesterol: n/a - already being treated for this condition  Screening diabetes: DUE  Alcohol misuse screening: alcohol use reviewed - no intervention indicated at this time  Immunizations: reviewed; tetanus booster DUE - not covered by Medicare (may obtain in pharmacy if desired)     HEALTH RISK ASSESSMENT                                                      In general, how would you rate your overall physical health? fair  Outside of work, how many days during the week do you exercise? None  Outside of work, approximately how many minutes a day do you exercise? n/a     If you drink alcohol do you typically have >3 drinks per day or >7 drinks per week? No     Assistance with daily activities: No    Safety concerns: No    Fall risk  "assessment: completed today (see ambulatory assessments)    Hearing concerns: No    In the past 6 months, have you been bothered by leaking of urine: No    Do you have a current opioid prescription? No  Do you use any other controlled substances or medications that are not prescribed by a provider? None    PHQ-2/PHQ-9 assessment: completed today (see ambulatory assessments)    Additional concerns today: YES - dizziness and imbalance    Have you ever done Advance Care Planning? (For example, a Health Directive, POLST, or a discussion with a medical provider or your loved ones about your wishes): Yes    OBJECTIVE                                                      /80   Pulse 69   Temp 98.2  F (36.8  C) (Temporal)   Ht 1.588 m (5' 2.5\")   Wt 103.7 kg (228 lb 11.2 oz)   LMP  (LMP Unknown)   SpO2 95%   Breastfeeding No   BMI 41.16 kg/m    Constitutional: well-appearing  Head, Ears, and Eyes: normocephalic; normal external auditory canal and pinna; tympanic membranes visualized and normal; normal lids and conjunctivae  Neck: supple, symmetric, no thyromegaly or lymphadenopathy  Respiratory: normal respiratory effort; clear to auscultation bilaterally  Cardiovascular: regular rate and rhythm; no edema  Musculoskeletal:walks with 4-prong cane  Psych: normal judgment and insight; normal mood and affect; recent and remote memory intact    Cognitive impairment noted: No  ---  (Note was completed, in part, with [email or web address] voice-recognition software. Documentation was reviewed, but some grammatical, spelling, and word errors may remain.)  "

## 2025-07-20 DIAGNOSIS — L30.4 INTERTRIGO: ICD-10-CM

## 2025-07-21 RX ORDER — KETOCONAZOLE 20 MG/G
CREAM TOPICAL
Qty: 30 G | Refills: 0 | Status: SHIPPED | OUTPATIENT
Start: 2025-07-21

## 2025-08-05 ENCOUNTER — OFFICE VISIT (OUTPATIENT)
Dept: UROLOGY | Facility: CLINIC | Age: 75
End: 2025-08-05
Payer: COMMERCIAL

## 2025-08-05 VITALS
DIASTOLIC BLOOD PRESSURE: 80 MMHG | BODY MASS INDEX: 40.04 KG/M2 | OXYGEN SATURATION: 97 % | HEIGHT: 63 IN | HEART RATE: 75 BPM | SYSTOLIC BLOOD PRESSURE: 134 MMHG | WEIGHT: 226 LBS

## 2025-08-05 DIAGNOSIS — N32.81 OAB (OVERACTIVE BLADDER): Primary | ICD-10-CM

## 2025-08-05 PROCEDURE — 1125F AMNT PAIN NOTED PAIN PRSNT: CPT | Performed by: PHYSICIAN ASSISTANT

## 2025-08-05 PROCEDURE — 99214 OFFICE O/P EST MOD 30 MIN: CPT | Performed by: PHYSICIAN ASSISTANT

## 2025-08-05 PROCEDURE — 3079F DIAST BP 80-89 MM HG: CPT | Performed by: PHYSICIAN ASSISTANT

## 2025-08-05 PROCEDURE — 3075F SYST BP GE 130 - 139MM HG: CPT | Performed by: PHYSICIAN ASSISTANT

## 2025-08-05 RX ORDER — TROSPIUM CHLORIDE ER 60 MG/1
60 CAPSULE ORAL EVERY MORNING
Qty: 90 CAPSULE | Refills: 4 | Status: SHIPPED | OUTPATIENT
Start: 2025-08-05

## 2025-08-05 ASSESSMENT — PAIN SCALES - GENERAL: PAINLEVEL_OUTOF10: MODERATE PAIN (5)

## 2025-08-06 ENCOUNTER — TELEPHONE (OUTPATIENT)
Dept: UROLOGY | Facility: CLINIC | Age: 75
End: 2025-08-06
Payer: COMMERCIAL

## 2025-08-11 ENCOUNTER — TRANSFERRED RECORDS (OUTPATIENT)
Dept: HEALTH INFORMATION MANAGEMENT | Facility: CLINIC | Age: 75
End: 2025-08-11
Payer: COMMERCIAL

## 2025-08-26 ENCOUNTER — OFFICE VISIT (OUTPATIENT)
Dept: PODIATRY | Facility: CLINIC | Age: 75
End: 2025-08-26
Payer: COMMERCIAL

## 2025-08-26 DIAGNOSIS — M20.42 ACQUIRED HAMMERTOE OF LEFT FOOT: ICD-10-CM

## 2025-08-26 DIAGNOSIS — M21.42 PES PLANUS OF BOTH FEET: ICD-10-CM

## 2025-08-26 DIAGNOSIS — L97.521 ULCER OF TOE OF LEFT FOOT, LIMITED TO BREAKDOWN OF SKIN (H): Primary | ICD-10-CM

## 2025-08-26 DIAGNOSIS — E66.01 MORBID OBESITY (H): ICD-10-CM

## 2025-08-26 DIAGNOSIS — M25.871 ANKLE IMPINGEMENT SYNDROME, RIGHT: ICD-10-CM

## 2025-08-26 DIAGNOSIS — M21.41 PES PLANUS OF BOTH FEET: ICD-10-CM

## 2025-08-26 PROCEDURE — 97597 DBRDMT OPN WND 1ST 20 CM/<: CPT | Performed by: PODIATRIST

## 2025-08-28 ENCOUNTER — TRANSFERRED RECORDS (OUTPATIENT)
Dept: HEALTH INFORMATION MANAGEMENT | Facility: CLINIC | Age: 75
End: 2025-08-28
Payer: COMMERCIAL

## (undated) DEVICE — DRAPE POUCH INSTRUMENT 1018

## (undated) DEVICE — DRAPE IOBAN INCISE 23X17" 6650EZ

## (undated) DEVICE — LINEN TOWEL PACK X5 5464

## (undated) DEVICE — GLOVE PROTEXIS BLUE W/NEU-THERA 8.5  2D73EB85

## (undated) DEVICE — ESU ELEC BLADE 4" COATED

## (undated) DEVICE — STPL SKIN 35W APPOSE 8886803712

## (undated) DEVICE — SU ETHIBOND 0 MO-7 CR 8X18" CX41D

## (undated) DEVICE — CAST PADDING 4" STERILE 9044S

## (undated) DEVICE — SYR BULB IRRIG 50ML LATEX FREE 0035280

## (undated) DEVICE — DECANTER BAG 2002S

## (undated) DEVICE — SOL NACL 0.9% IRRIG 1000ML BOTTLE 07138-09

## (undated) DEVICE — SOL WATER IRRIG 1000ML BOTTLE 2F7114

## (undated) DEVICE — PIN ALIGNMENT 2.5X200MM

## (undated) DEVICE — PACK HAND WRIST SOP15HWFSP

## (undated) DEVICE — HOOD SURG T7PLUS PEEL AWAY FACE SHIELD STRL LF 0416-801-100

## (undated) DEVICE — GLOVE PROTEXIS MICRO 7.0  2D73PM70

## (undated) DEVICE — IMM LIMB ELEVATOR DC40-0203

## (undated) DEVICE — PACK SET-UP STD 9102

## (undated) DEVICE — CLOSURE SYS SKIN PREMIERPRO EXOFIN FUSION 4X22CM STRL 3472

## (undated) DEVICE — DRSG XEROFORM 1X8"

## (undated) DEVICE — DRAPE SHEET REV FOLD 3/4 9349

## (undated) DEVICE — SU STRATAFIX MONOCRYL 4-0 SPIRAL 30CM PS-2 SXMP1B117

## (undated) DEVICE — BNDG ROLLER GAUZE CONFORM 4"X4YD 41-54

## (undated) DEVICE — DRSG KERLIX FLUFFS X5

## (undated) DEVICE — SU FIBERWIRE 2 38"  AR-7200

## (undated) DEVICE — ESU GROUND PAD UNIVERSAL W/O CORD

## (undated) DEVICE — PACK OPEN SHOULDER SOP15OCFSC

## (undated) DEVICE — GOWN XXLG REINFORCED 9071EL

## (undated) DEVICE — ESU PENCIL W/SMOKE EVAC NEPTUNE STRYKER 0703-046-000

## (undated) DEVICE — GLOVE BIOGEL PI SZ 7.5 40875

## (undated) DEVICE — SPONGE LAP 18X18" X8435

## (undated) DEVICE — Device

## (undated) DEVICE — SUCTION IRR SYSTEM W/O TIP INTERPULSE HANDPIECE 0210-100-000

## (undated) DEVICE — SOL NACL 0.9% IRRIG 1000ML BOTTLE 2F7124

## (undated) DEVICE — BLADE SAW SAGITTAL STRK MED WIDE 25X73X0.89MM 2108-105-000

## (undated) DEVICE — BONE CLEANING TIP INTERPULSE  0210-010-000

## (undated) DEVICE — ANTIFOG SOLUTION W/FOAM PAD 31142527

## (undated) DEVICE — SOLUTION WOUND CLEANSING 3/4OZ 10% PVP EA-L3011FB-50

## (undated) DEVICE — SU VICRYL 3-0 SH 27" UND J416H

## (undated) DEVICE — SUCTION CANISTER MEDIVAC LINER 3000ML W/LID 65651-530

## (undated) DEVICE — DRAPE ARTHROSCOPY SHOULDER BEACHCHAIR 29369

## (undated) DEVICE — PREP SKIN SCRUB TRAY 4461A

## (undated) DEVICE — GLOVE PROTEXIS BLUE W/NEU-THERA 9.0  2D73EB90

## (undated) DEVICE — DRAPE STERI INCISE 1050

## (undated) DEVICE — SPONGE BALL KERLIX ROUND XL W/O STRING LATEX 4935

## (undated) DEVICE — SUCTION TIP YANKAUER STR K87

## (undated) DEVICE — DRILL BIT PERIPHERAL SCREW 3.2MM MWJ126

## (undated) DEVICE — BLADE CARPAL TUNNEL ENDO 81010-1

## (undated) DEVICE — CAST STOCKINETTE 4" BIAS CUT

## (undated) DEVICE — SOL NACL 0.9% IRRIG 3000ML BAG 07972-08

## (undated) DEVICE — PACK EXTREMITY SOP15EXFSD

## (undated) DEVICE — SU ETHILON 5-0 PC-3 18" 1865G

## (undated) DEVICE — MANIFOLD NEPTUNE 4 PORT 700-20

## (undated) DEVICE — DRSG ADAPTIC 3X3" 6112

## (undated) DEVICE — SU STRATAFIX PDS PLUS 0 CT 45CM SXPP1A406

## (undated) DEVICE — PREP CHLORAPREP 26ML TINTED HI-LITE ORANGE 930815

## (undated) DEVICE — DRAPE STERI U 1015

## (undated) DEVICE — BNDG ELASTIC 6" DBL LENGTH UNSTERILE 6611-16

## (undated) DEVICE — SU STRATAFIX PDS PLUS 0 CT-1 18" SXPP1A401

## (undated) DEVICE — GLOVE BIOGEL PI MICRO INDICATOR UNDERGLOVE SZ 8.0 48980

## (undated) DEVICE — CAST PADDING 4" UNSTERILE 9044

## (undated) DEVICE — DRSG GAUZE 4X4" 3033

## (undated) DEVICE — CAST PADDING 4" COTTON WEBRIL UNSTERILE 9084

## (undated) RX ORDER — HYDROMORPHONE HYDROCHLORIDE 1 MG/ML
INJECTION, SOLUTION INTRAMUSCULAR; INTRAVENOUS; SUBCUTANEOUS
Status: DISPENSED
Start: 2017-11-01

## (undated) RX ORDER — VANCOMYCIN HYDROCHLORIDE 1 G/20ML
INJECTION, POWDER, LYOPHILIZED, FOR SOLUTION INTRAVENOUS
Status: DISPENSED
Start: 2023-07-18

## (undated) RX ORDER — FENTANYL CITRATE 50 UG/ML
INJECTION, SOLUTION INTRAMUSCULAR; INTRAVENOUS
Status: DISPENSED
Start: 2017-11-01

## (undated) RX ORDER — FENTANYL CITRATE 0.05 MG/ML
INJECTION, SOLUTION INTRAMUSCULAR; INTRAVENOUS
Status: DISPENSED
Start: 2023-07-18

## (undated) RX ORDER — FENTANYL CITRATE 50 UG/ML
INJECTION, SOLUTION INTRAMUSCULAR; INTRAVENOUS
Status: DISPENSED
Start: 2020-02-07

## (undated) RX ORDER — ACETAMINOPHEN 325 MG/1
TABLET ORAL
Status: DISPENSED
Start: 2023-07-18

## (undated) RX ORDER — CEFAZOLIN SODIUM 2 G/100ML
INJECTION, SOLUTION INTRAVENOUS
Status: DISPENSED
Start: 2020-02-07

## (undated) RX ORDER — KETAMINE HCL IN 0.9 % NACL 20 MG/2 ML
SYRINGE (ML) INTRAVENOUS
Status: DISPENSED
Start: 2017-11-01

## (undated) RX ORDER — FENTANYL CITRATE 50 UG/ML
INJECTION, SOLUTION INTRAMUSCULAR; INTRAVENOUS
Status: DISPENSED
Start: 2023-07-18

## (undated) RX ORDER — KETOROLAC TROMETHAMINE 30 MG/ML
INJECTION, SOLUTION INTRAMUSCULAR; INTRAVENOUS
Status: DISPENSED
Start: 2017-11-01

## (undated) RX ORDER — PROPOFOL 10 MG/ML
INJECTION, EMULSION INTRAVENOUS
Status: DISPENSED
Start: 2017-11-01

## (undated) RX ORDER — CEFAZOLIN SODIUM/WATER 2 G/20 ML
SYRINGE (ML) INTRAVENOUS
Status: DISPENSED
Start: 2023-07-18

## (undated) RX ORDER — ONDANSETRON 2 MG/ML
INJECTION INTRAMUSCULAR; INTRAVENOUS
Status: DISPENSED
Start: 2017-11-01

## (undated) RX ORDER — LIDOCAINE HYDROCHLORIDE 10 MG/ML
INJECTION, SOLUTION EPIDURAL; INFILTRATION; INTRACAUDAL; PERINEURAL
Status: DISPENSED
Start: 2017-11-01

## (undated) RX ORDER — CEFAZOLIN SODIUM 1 G/3ML
INJECTION, POWDER, FOR SOLUTION INTRAMUSCULAR; INTRAVENOUS
Status: DISPENSED
Start: 2017-11-01

## (undated) RX ORDER — TRANEXAMIC ACID 650 MG/1
TABLET ORAL
Status: DISPENSED
Start: 2023-07-18

## (undated) RX ORDER — NEOSTIGMINE METHYLSULFATE 1 MG/ML
VIAL (ML) INJECTION
Status: DISPENSED
Start: 2023-07-18

## (undated) RX ORDER — BUPIVACAINE HYDROCHLORIDE 2.5 MG/ML
INJECTION, SOLUTION EPIDURAL; INFILTRATION; INTRACAUDAL
Status: DISPENSED
Start: 2017-11-01

## (undated) RX ORDER — DEXAMETHASONE SODIUM PHOSPHATE 4 MG/ML
INJECTION, SOLUTION INTRA-ARTICULAR; INTRALESIONAL; INTRAMUSCULAR; INTRAVENOUS; SOFT TISSUE
Status: DISPENSED
Start: 2023-07-18

## (undated) RX ORDER — CEFAZOLIN SODIUM 2 G/100ML
INJECTION, SOLUTION INTRAVENOUS
Status: DISPENSED
Start: 2017-11-01

## (undated) RX ORDER — PROPOFOL 10 MG/ML
INJECTION, EMULSION INTRAVENOUS
Status: DISPENSED
Start: 2023-07-18

## (undated) RX ORDER — DEXAMETHASONE SODIUM PHOSPHATE 4 MG/ML
INJECTION, SOLUTION INTRA-ARTICULAR; INTRALESIONAL; INTRAMUSCULAR; INTRAVENOUS; SOFT TISSUE
Status: DISPENSED
Start: 2017-11-01

## (undated) RX ORDER — GLYCOPYRROLATE 0.2 MG/ML
INJECTION, SOLUTION INTRAMUSCULAR; INTRAVENOUS
Status: DISPENSED
Start: 2023-07-18

## (undated) RX ORDER — ONDANSETRON 2 MG/ML
INJECTION INTRAMUSCULAR; INTRAVENOUS
Status: DISPENSED
Start: 2023-07-18